# Patient Record
Sex: MALE | Race: WHITE | NOT HISPANIC OR LATINO | Employment: FULL TIME | URBAN - METROPOLITAN AREA
[De-identification: names, ages, dates, MRNs, and addresses within clinical notes are randomized per-mention and may not be internally consistent; named-entity substitution may affect disease eponyms.]

---

## 2017-01-03 ENCOUNTER — TRANSCRIBE ORDERS (OUTPATIENT)
Dept: LAB | Facility: CLINIC | Age: 57
End: 2017-01-03

## 2017-01-03 ENCOUNTER — APPOINTMENT (OUTPATIENT)
Dept: LAB | Facility: CLINIC | Age: 57
End: 2017-01-03
Payer: COMMERCIAL

## 2017-01-03 DIAGNOSIS — N40.1 ENLARGED PROSTATE WITH URINARY OBSTRUCTION: Primary | ICD-10-CM

## 2017-01-03 DIAGNOSIS — N40.1 ENLARGED PROSTATE WITH URINARY OBSTRUCTION: ICD-10-CM

## 2017-01-03 DIAGNOSIS — N13.8 ENLARGED PROSTATE WITH URINARY OBSTRUCTION: Primary | ICD-10-CM

## 2017-01-03 DIAGNOSIS — N13.8 ENLARGED PROSTATE WITH URINARY OBSTRUCTION: ICD-10-CM

## 2017-01-03 LAB — PSA SERPL-MCNC: 0.8 NG/ML (ref 0–4)

## 2017-01-03 PROCEDURE — 84153 ASSAY OF PSA TOTAL: CPT

## 2017-01-09 ENCOUNTER — ALLSCRIPTS OFFICE VISIT (OUTPATIENT)
Dept: OTHER | Facility: OTHER | Age: 57
End: 2017-01-09

## 2017-01-20 ENCOUNTER — ALLSCRIPTS OFFICE VISIT (OUTPATIENT)
Dept: OTHER | Facility: OTHER | Age: 57
End: 2017-01-20

## 2017-01-20 ENCOUNTER — TRANSCRIBE ORDERS (OUTPATIENT)
Dept: ADMINISTRATIVE | Facility: HOSPITAL | Age: 57
End: 2017-01-20

## 2017-01-20 ENCOUNTER — HOSPITAL ENCOUNTER (OUTPATIENT)
Dept: RADIOLOGY | Facility: MEDICAL CENTER | Age: 57
Discharge: HOME | End: 2017-01-20
Payer: COMMERCIAL

## 2017-01-20 DIAGNOSIS — T84.038A MECHANICAL LOOSENING OF OTHER INTERNAL PROSTHETIC JOINT, INITIAL ENCOUNTER (HCC): ICD-10-CM

## 2017-01-20 DIAGNOSIS — T84.039A: ICD-10-CM

## 2017-01-20 DIAGNOSIS — Z47.1 AFTERCARE FOLLOWING JOINT REPLACEMENT SURGERY: ICD-10-CM

## 2017-01-20 DIAGNOSIS — T84.039A: Primary | ICD-10-CM

## 2017-01-20 DIAGNOSIS — E11.9 TYPE 2 DIABETES MELLITUS WITHOUT COMPLICATIONS (HCC): ICD-10-CM

## 2017-01-20 PROCEDURE — 73560 X-RAY EXAM OF KNEE 1 OR 2: CPT

## 2017-01-27 ENCOUNTER — HOSPITAL ENCOUNTER (OUTPATIENT)
Dept: NUCLEAR MEDICINE | Facility: HOSPITAL | Age: 57
Discharge: HOME | End: 2017-01-27
Attending: ORTHOPAEDIC SURGERY
Payer: COMMERCIAL

## 2017-01-27 DIAGNOSIS — T84.039A: ICD-10-CM

## 2017-01-27 PROCEDURE — A9503 TC99M MEDRONATE: HCPCS

## 2017-01-27 PROCEDURE — 78315 BONE IMAGING 3 PHASE: CPT

## 2017-02-03 ENCOUNTER — ALLSCRIPTS OFFICE VISIT (OUTPATIENT)
Dept: OTHER | Facility: OTHER | Age: 57
End: 2017-02-03

## 2017-02-16 ENCOUNTER — LAB REQUISITION (OUTPATIENT)
Dept: LAB | Facility: HOSPITAL | Age: 57
End: 2017-02-16
Payer: COMMERCIAL

## 2017-02-16 ENCOUNTER — APPOINTMENT (OUTPATIENT)
Dept: LAB | Facility: CLINIC | Age: 57
End: 2017-02-16
Payer: COMMERCIAL

## 2017-02-16 ENCOUNTER — OFFICE VISIT (OUTPATIENT)
Dept: LAB | Facility: CLINIC | Age: 57
End: 2017-02-16
Payer: COMMERCIAL

## 2017-02-16 ENCOUNTER — TRANSCRIBE ORDERS (OUTPATIENT)
Dept: LAB | Facility: CLINIC | Age: 57
End: 2017-02-16

## 2017-02-16 DIAGNOSIS — E11.65 TYPE 2 DIABETES MELLITUS WITH HYPERGLYCEMIA (HCC): ICD-10-CM

## 2017-02-16 DIAGNOSIS — E78.5 HYPERLIPIDEMIA, UNSPECIFIED HYPERLIPIDEMIA TYPE: ICD-10-CM

## 2017-02-16 DIAGNOSIS — E11.9 DIABETES MELLITUS WITHOUT COMPLICATION (HCC): ICD-10-CM

## 2017-02-16 DIAGNOSIS — I10 ESSENTIAL (PRIMARY) HYPERTENSION: ICD-10-CM

## 2017-02-16 DIAGNOSIS — E11.9 TYPE 2 DIABETES MELLITUS WITHOUT COMPLICATIONS (HCC): ICD-10-CM

## 2017-02-16 DIAGNOSIS — E11.9 DIABETES MELLITUS WITHOUT COMPLICATION (HCC): Primary | ICD-10-CM

## 2017-02-16 DIAGNOSIS — I10 ESSENTIAL HYPERTENSION, MALIGNANT: ICD-10-CM

## 2017-02-16 DIAGNOSIS — Z96.649 MECHANICAL LOOSENING OF PROSTHETIC HIP, INITIAL ENCOUNTER (HCC): Primary | ICD-10-CM

## 2017-02-16 DIAGNOSIS — T84.038A MECHANICAL LOOSENING OF OTHER INTERNAL PROSTHETIC JOINT, INITIAL ENCOUNTER (HCC): ICD-10-CM

## 2017-02-16 DIAGNOSIS — E78.5 HYPERLIPIDEMIA: ICD-10-CM

## 2017-02-16 DIAGNOSIS — Z96.649 MECHANICAL LOOSENING OF PROSTHETIC HIP, INITIAL ENCOUNTER (HCC): ICD-10-CM

## 2017-02-16 DIAGNOSIS — T84.038A MECHANICAL LOOSENING OF PROSTHETIC HIP, INITIAL ENCOUNTER (HCC): ICD-10-CM

## 2017-02-16 DIAGNOSIS — T84.038A MECHANICAL LOOSENING OF PROSTHETIC HIP, INITIAL ENCOUNTER (HCC): Primary | ICD-10-CM

## 2017-02-16 LAB
ABO GROUP BLD: NORMAL
ALBUMIN SERPL BCP-MCNC: 4.1 G/DL (ref 3.5–5)
ALP SERPL-CCNC: 81 U/L (ref 46–116)
ALT SERPL W P-5'-P-CCNC: 57 U/L (ref 12–78)
ANION GAP SERPL CALCULATED.3IONS-SCNC: 10 MMOL/L (ref 4–13)
AST SERPL W P-5'-P-CCNC: 35 U/L (ref 5–45)
ATRIAL RATE: 63 BPM
BACTERIA UR QL AUTO: ABNORMAL /HPF
BASOPHILS # BLD AUTO: 0.02 THOUSANDS/ΜL (ref 0–0.1)
BASOPHILS NFR BLD AUTO: 0 % (ref 0–1)
BILIRUB SERPL-MCNC: 0.8 MG/DL (ref 0.2–1)
BILIRUB UR QL STRIP: NEGATIVE
BLD GP AB SCN SERPL QL: NEGATIVE
BUN SERPL-MCNC: 13 MG/DL (ref 5–25)
CALCIUM SERPL-MCNC: 9.2 MG/DL (ref 8.3–10.1)
CHLORIDE SERPL-SCNC: 102 MMOL/L (ref 100–108)
CHOLEST SERPL-MCNC: 180 MG/DL (ref 50–200)
CLARITY UR: CLEAR
CO2 SERPL-SCNC: 27 MMOL/L (ref 21–32)
COLOR UR: YELLOW
CREAT SERPL-MCNC: 0.82 MG/DL (ref 0.6–1.3)
CREAT UR-MCNC: 77.4 MG/DL
EOSINOPHIL # BLD AUTO: 0.2 THOUSAND/ΜL (ref 0–0.61)
EOSINOPHIL NFR BLD AUTO: 3 % (ref 0–6)
ERYTHROCYTE [DISTWIDTH] IN BLOOD BY AUTOMATED COUNT: 13.1 % (ref 11.6–15.1)
EST. AVERAGE GLUCOSE BLD GHB EST-MCNC: 143 MG/DL
GFR SERPL CREATININE-BSD FRML MDRD: >60 ML/MIN/1.73SQ M
GLUCOSE SERPL-MCNC: 139 MG/DL (ref 65–140)
GLUCOSE UR STRIP-MCNC: ABNORMAL MG/DL
HBA1C MFR BLD: 6.6 % (ref 4.2–6.3)
HCT VFR BLD AUTO: 43.7 % (ref 36.5–49.3)
HDLC SERPL-MCNC: 67 MG/DL (ref 40–60)
HGB BLD-MCNC: 14.5 G/DL (ref 12–17)
HGB UR QL STRIP.AUTO: NEGATIVE
KETONES UR STRIP-MCNC: NEGATIVE MG/DL
LDLC SERPL CALC-MCNC: 90 MG/DL (ref 0–100)
LDLC SERPL DIRECT ASSAY-MCNC: 102 MG/DL (ref 0–100)
LEUKOCYTE ESTERASE UR QL STRIP: ABNORMAL
LYMPHOCYTES # BLD AUTO: 1.89 THOUSANDS/ΜL (ref 0.6–4.47)
LYMPHOCYTES NFR BLD AUTO: 28 % (ref 14–44)
MCH RBC QN AUTO: 29.3 PG (ref 26.8–34.3)
MCHC RBC AUTO-ENTMCNC: 33.2 G/DL (ref 31.4–37.4)
MCV RBC AUTO: 88 FL (ref 82–98)
MICROALBUMIN UR-MCNC: 28.8 MG/L (ref 0–20)
MICROALBUMIN/CREAT 24H UR: 37 MG/G CREATININE (ref 0–30)
MONOCYTES # BLD AUTO: 0.59 THOUSAND/ΜL (ref 0.17–1.22)
MONOCYTES NFR BLD AUTO: 9 % (ref 4–12)
NEUTROPHILS # BLD AUTO: 4.01 THOUSANDS/ΜL (ref 1.85–7.62)
NEUTS SEG NFR BLD AUTO: 60 % (ref 43–75)
NITRITE UR QL STRIP: NEGATIVE
NON-SQ EPI CELLS URNS QL MICRO: ABNORMAL /HPF
P AXIS: -4 DEGREES
PH UR STRIP.AUTO: 6 [PH] (ref 4.5–8)
PLATELET # BLD AUTO: 197 THOUSANDS/UL (ref 149–390)
PMV BLD AUTO: 9.8 FL (ref 8.9–12.7)
POTASSIUM SERPL-SCNC: 4.1 MMOL/L (ref 3.5–5.3)
PR INTERVAL: 148 MS
PROT SERPL-MCNC: 7.7 G/DL (ref 6.4–8.2)
PROT UR STRIP-MCNC: NEGATIVE MG/DL
QRS AXIS: -12 DEGREES
QRSD INTERVAL: 96 MS
QT INTERVAL: 442 MS
QTC INTERVAL: 452 MS
RBC # BLD AUTO: 4.95 MILLION/UL (ref 3.88–5.62)
RBC #/AREA URNS AUTO: ABNORMAL /HPF
RH BLD: POSITIVE
SODIUM SERPL-SCNC: 139 MMOL/L (ref 136–145)
SP GR UR STRIP.AUTO: 1.01 (ref 1–1.03)
T WAVE AXIS: 21 DEGREES
TRIGL SERPL-MCNC: 117 MG/DL
UROBILINOGEN UR QL STRIP.AUTO: 0.2 E.U./DL
VENTRICULAR RATE: 63 BPM
WBC # BLD AUTO: 6.71 THOUSAND/UL (ref 4.31–10.16)
WBC #/AREA URNS AUTO: ABNORMAL /HPF

## 2017-02-16 PROCEDURE — 83036 HEMOGLOBIN GLYCOSYLATED A1C: CPT

## 2017-02-16 PROCEDURE — 82043 UR ALBUMIN QUANTITATIVE: CPT

## 2017-02-16 PROCEDURE — 80053 COMPREHEN METABOLIC PANEL: CPT

## 2017-02-16 PROCEDURE — 87086 URINE CULTURE/COLONY COUNT: CPT

## 2017-02-16 PROCEDURE — 36415 COLL VENOUS BLD VENIPUNCTURE: CPT

## 2017-02-16 PROCEDURE — 82570 ASSAY OF URINE CREATININE: CPT

## 2017-02-16 PROCEDURE — 83721 ASSAY OF BLOOD LIPOPROTEIN: CPT

## 2017-02-16 PROCEDURE — 93005 ELECTROCARDIOGRAM TRACING: CPT

## 2017-02-16 PROCEDURE — 86901 BLOOD TYPING SEROLOGIC RH(D): CPT | Performed by: ORTHOPAEDIC SURGERY

## 2017-02-16 PROCEDURE — 85025 COMPLETE CBC W/AUTO DIFF WBC: CPT

## 2017-02-16 PROCEDURE — 80061 LIPID PANEL: CPT

## 2017-02-16 PROCEDURE — 81001 URINALYSIS AUTO W/SCOPE: CPT

## 2017-02-16 PROCEDURE — 86850 RBC ANTIBODY SCREEN: CPT | Performed by: ORTHOPAEDIC SURGERY

## 2017-02-16 PROCEDURE — 86900 BLOOD TYPING SEROLOGIC ABO: CPT | Performed by: ORTHOPAEDIC SURGERY

## 2017-02-17 LAB — BACTERIA UR CULT: NORMAL

## 2017-02-19 ENCOUNTER — GENERIC CONVERSION - ENCOUNTER (OUTPATIENT)
Dept: OTHER | Facility: OTHER | Age: 57
End: 2017-02-19

## 2017-03-13 ENCOUNTER — ANESTHESIA EVENT (OUTPATIENT)
Dept: PERIOP | Facility: HOSPITAL | Age: 57
DRG: 467 | End: 2017-03-13
Payer: COMMERCIAL

## 2017-03-20 ENCOUNTER — ANESTHESIA (OUTPATIENT)
Dept: PERIOP | Facility: HOSPITAL | Age: 57
DRG: 467 | End: 2017-03-20
Payer: COMMERCIAL

## 2017-03-20 ENCOUNTER — HOSPITAL ENCOUNTER (INPATIENT)
Facility: HOSPITAL | Age: 57
LOS: 2 days | Discharge: HOME HEALTH | DRG: 467 | End: 2017-03-22
Attending: ORTHOPAEDIC SURGERY | Admitting: ORTHOPAEDIC SURGERY
Payer: COMMERCIAL

## 2017-03-20 DIAGNOSIS — E08.3319: Primary | ICD-10-CM

## 2017-03-20 DIAGNOSIS — I10 ESSENTIAL HYPERTENSION: Chronic | ICD-10-CM

## 2017-03-20 DIAGNOSIS — E78.5 HYPERLIPIDEMIA, UNSPECIFIED HYPERLIPIDEMIA TYPE: Chronic | ICD-10-CM

## 2017-03-20 DIAGNOSIS — Z96.651 STATUS POST REVISION OF TOTAL REPLACEMENT OF RIGHT KNEE: ICD-10-CM

## 2017-03-20 LAB
ABO GROUP BLD: NORMAL
BLD GP AB SCN SERPL QL: NEGATIVE
GLUCOSE SERPL-MCNC: 107 MG/DL (ref 65–140)
GLUCOSE SERPL-MCNC: 125 MG/DL (ref 65–140)
GLUCOSE SERPL-MCNC: 277 MG/DL (ref 65–140)
RH BLD: POSITIVE

## 2017-03-20 PROCEDURE — C1776 JOINT DEVICE (IMPLANTABLE): HCPCS | Performed by: ORTHOPAEDIC SURGERY

## 2017-03-20 PROCEDURE — 0SUT09Z SUPPLEMENT RIGHT KNEE JOINT, FEMORAL SURFACE WITH LINER, OPEN APPROACH: ICD-10-PCS | Performed by: ORTHOPAEDIC SURGERY

## 2017-03-20 PROCEDURE — 86901 BLOOD TYPING SEROLOGIC RH(D): CPT | Performed by: ORTHOPAEDIC SURGERY

## 2017-03-20 PROCEDURE — 86900 BLOOD TYPING SEROLOGIC ABO: CPT | Performed by: ORTHOPAEDIC SURGERY

## 2017-03-20 PROCEDURE — C1894 INTRO/SHEATH, NON-LASER: HCPCS | Performed by: ORTHOPAEDIC SURGERY

## 2017-03-20 PROCEDURE — C1713 ANCHOR/SCREW BN/BN,TIS/BN: HCPCS | Performed by: ORTHOPAEDIC SURGERY

## 2017-03-20 PROCEDURE — 0SRT0JZ REPLACEMENT OF RIGHT KNEE JOINT, FEMORAL SURFACE WITH SYNTHETIC SUBSTITUTE, OPEN APPROACH: ICD-10-PCS | Performed by: ORTHOPAEDIC SURGERY

## 2017-03-20 PROCEDURE — 0SPC09Z REMOVAL OF LINER FROM RIGHT KNEE JOINT, OPEN APPROACH: ICD-10-PCS | Performed by: ORTHOPAEDIC SURGERY

## 2017-03-20 PROCEDURE — 0SPT0JZ REMOVAL OF SYNTHETIC SUBSTITUTE FROM RIGHT KNEE JOINT, FEMORAL SURFACE, OPEN APPROACH: ICD-10-PCS | Performed by: ORTHOPAEDIC SURGERY

## 2017-03-20 PROCEDURE — 86850 RBC ANTIBODY SCREEN: CPT | Performed by: ORTHOPAEDIC SURGERY

## 2017-03-20 PROCEDURE — 82948 REAGENT STRIP/BLOOD GLUCOSE: CPT

## 2017-03-20 DEVICE — P.F.C. SIGMA FEMORAL ADAPTER BOLT NEUTRAL
Type: IMPLANTABLE DEVICE | Site: KNEE | Status: FUNCTIONAL
Brand: P.F.C. SIGMA

## 2017-03-20 DEVICE — SIGMA FEMORAL TC3 CEMENTED 5 RIGHT
Type: IMPLANTABLE DEVICE | Site: KNEE | Status: FUNCTIONAL
Brand: SIGMA

## 2017-03-20 DEVICE — P.F.C. SIGMA POSTERIOR AUGMENT COMBO CEMENTED SIZE 5 8MM
Type: IMPLANTABLE DEVICE | Site: KNEE | Status: FUNCTIONAL
Brand: P.F.C. SIGMA

## 2017-03-20 DEVICE — SIGMA TIBIAL INSERT ROTATING PLATFORM TC3 SIZE 5 25MM GVF
Type: IMPLANTABLE DEVICE | Site: KNEE | Status: FUNCTIONAL
Brand: SIGMA

## 2017-03-20 DEVICE — P.F.C. SIGMA POSTERIOR AUGMENT COMBO CEMENTED SIZE 5 4MM
Type: IMPLANTABLE DEVICE | Site: KNEE | Status: FUNCTIONAL
Brand: P.F.C. SIGMA

## 2017-03-20 DEVICE — UNIVERSAL STEM FLUTED 75MM X 20MM: Type: IMPLANTABLE DEVICE | Site: KNEE | Status: FUNCTIONAL

## 2017-03-20 DEVICE — P.F.C. SIGMA FEMORAL ADAPTER 5 DEGREE
Type: IMPLANTABLE DEVICE | Site: KNEE | Status: FUNCTIONAL
Brand: P.F.C. SIGMA

## 2017-03-20 DEVICE — SMARTSET GHV GENTAMICIN HIGH VISCOSITY BONE CEMENT 40G
Type: IMPLANTABLE DEVICE | Site: KNEE | Status: FUNCTIONAL
Brand: SMARTSET

## 2017-03-20 DEVICE — UNIVERSAL FEMORAL SLEEVE FULL POROUS 31MM: Type: IMPLANTABLE DEVICE | Site: KNEE | Status: FUNCTIONAL

## 2017-03-20 DEVICE — P.F.C. SIGMA DISTAL AUGMENT SIZE 5 4MM RIGHT
Type: IMPLANTABLE DEVICE | Site: KNEE | Status: FUNCTIONAL
Brand: P.F.C. SIGMA

## 2017-03-20 RX ORDER — GABAPENTIN 100 MG/1
100 CAPSULE ORAL EVERY 8 HOURS SCHEDULED
Status: DISCONTINUED | OUTPATIENT
Start: 2017-03-20 | End: 2017-03-22 | Stop reason: HOSPADM

## 2017-03-20 RX ORDER — ACETAMINOPHEN 325 MG/1
650 TABLET ORAL EVERY 6 HOURS SCHEDULED
Status: DISCONTINUED | OUTPATIENT
Start: 2017-03-20 | End: 2017-03-20

## 2017-03-20 RX ORDER — ACETAMINOPHEN 325 MG/1
975 TABLET ORAL ONCE
Status: COMPLETED | OUTPATIENT
Start: 2017-03-20 | End: 2017-03-20

## 2017-03-20 RX ORDER — TRAMADOL HYDROCHLORIDE 50 MG/1
50 TABLET ORAL EVERY 6 HOURS SCHEDULED
Status: DISCONTINUED | OUTPATIENT
Start: 2017-03-20 | End: 2017-03-22 | Stop reason: HOSPADM

## 2017-03-20 RX ORDER — MAGNESIUM HYDROXIDE 1200 MG/15ML
LIQUID ORAL AS NEEDED
Status: DISCONTINUED | OUTPATIENT
Start: 2017-03-20 | End: 2017-03-20 | Stop reason: HOSPADM

## 2017-03-20 RX ORDER — SODIUM CHLORIDE, SODIUM LACTATE, POTASSIUM CHLORIDE, CALCIUM CHLORIDE 600; 310; 30; 20 MG/100ML; MG/100ML; MG/100ML; MG/100ML
50 INJECTION, SOLUTION INTRAVENOUS CONTINUOUS
Status: DISCONTINUED | OUTPATIENT
Start: 2017-03-20 | End: 2017-03-20

## 2017-03-20 RX ORDER — SODIUM CHLORIDE, SODIUM LACTATE, POTASSIUM CHLORIDE, CALCIUM CHLORIDE 600; 310; 30; 20 MG/100ML; MG/100ML; MG/100ML; MG/100ML
125 INJECTION, SOLUTION INTRAVENOUS CONTINUOUS
Status: DISCONTINUED | OUTPATIENT
Start: 2017-03-20 | End: 2017-03-22 | Stop reason: HOSPADM

## 2017-03-20 RX ORDER — OXYCODONE HCL 5 MG/5 ML
5 SOLUTION, ORAL ORAL EVERY 4 HOURS PRN
Status: DISCONTINUED | OUTPATIENT
Start: 2017-03-20 | End: 2017-03-20

## 2017-03-20 RX ORDER — ONDANSETRON 2 MG/ML
4 INJECTION INTRAMUSCULAR; INTRAVENOUS ONCE
Status: DISCONTINUED | OUTPATIENT
Start: 2017-03-20 | End: 2017-03-20 | Stop reason: HOSPADM

## 2017-03-20 RX ORDER — GABAPENTIN 300 MG/1
300 CAPSULE ORAL ONCE
Status: COMPLETED | OUTPATIENT
Start: 2017-03-20 | End: 2017-03-20

## 2017-03-20 RX ORDER — LISINOPRIL 20 MG/1
40 TABLET ORAL DAILY
Status: DISCONTINUED | OUTPATIENT
Start: 2017-03-21 | End: 2017-03-22 | Stop reason: HOSPADM

## 2017-03-20 RX ORDER — FENOFIBRATE 145 MG/1
145 TABLET, COATED ORAL DAILY
Status: DISCONTINUED | OUTPATIENT
Start: 2017-03-21 | End: 2017-03-22 | Stop reason: HOSPADM

## 2017-03-20 RX ORDER — PROPOFOL 10 MG/ML
INJECTION, EMULSION INTRAVENOUS CONTINUOUS PRN
Status: DISCONTINUED | OUTPATIENT
Start: 2017-03-20 | End: 2017-03-20 | Stop reason: SURG

## 2017-03-20 RX ORDER — OXYCODONE HYDROCHLORIDE 10 MG/1
10 TABLET ORAL EVERY 4 HOURS PRN
Status: DISCONTINUED | OUTPATIENT
Start: 2017-03-20 | End: 2017-03-20

## 2017-03-20 RX ORDER — SENNOSIDES 8.6 MG
1 TABLET ORAL DAILY
Status: DISCONTINUED | OUTPATIENT
Start: 2017-03-21 | End: 2017-03-22 | Stop reason: HOSPADM

## 2017-03-20 RX ORDER — ALLOPURINOL 100 MG/1
100 TABLET ORAL DAILY
Status: DISCONTINUED | OUTPATIENT
Start: 2017-03-21 | End: 2017-03-22 | Stop reason: HOSPADM

## 2017-03-20 RX ORDER — MORPHINE SULFATE 2 MG/ML
2 INJECTION, SOLUTION INTRAMUSCULAR; INTRAVENOUS EVERY 2 HOUR PRN
Status: DISCONTINUED | OUTPATIENT
Start: 2017-03-20 | End: 2017-03-20

## 2017-03-20 RX ORDER — PROPOFOL 10 MG/ML
INJECTION, EMULSION INTRAVENOUS AS NEEDED
Status: DISCONTINUED | OUTPATIENT
Start: 2017-03-20 | End: 2017-03-20 | Stop reason: SURG

## 2017-03-20 RX ORDER — TRAMADOL HYDROCHLORIDE 50 MG/1
100 TABLET ORAL EVERY 6 HOURS PRN
COMMUNITY
End: 2017-03-22 | Stop reason: HOSPADM

## 2017-03-20 RX ORDER — LABETALOL 100 MG/1
100 TABLET, FILM COATED ORAL 2 TIMES DAILY
COMMUNITY
End: 2021-08-17 | Stop reason: SDUPTHER

## 2017-03-20 RX ORDER — ATORVASTATIN CALCIUM 20 MG/1
20 TABLET, FILM COATED ORAL
Status: DISCONTINUED | OUTPATIENT
Start: 2017-03-20 | End: 2017-03-22 | Stop reason: HOSPADM

## 2017-03-20 RX ORDER — MIDAZOLAM HYDROCHLORIDE 1 MG/ML
INJECTION INTRAMUSCULAR; INTRAVENOUS AS NEEDED
Status: DISCONTINUED | OUTPATIENT
Start: 2017-03-20 | End: 2017-03-20 | Stop reason: SURG

## 2017-03-20 RX ORDER — LABETALOL 100 MG/1
100 TABLET, FILM COATED ORAL 2 TIMES DAILY
Status: DISCONTINUED | OUTPATIENT
Start: 2017-03-21 | End: 2017-03-22 | Stop reason: HOSPADM

## 2017-03-20 RX ORDER — DOCUSATE SODIUM 100 MG/1
100 CAPSULE, LIQUID FILLED ORAL 2 TIMES DAILY
Status: DISCONTINUED | OUTPATIENT
Start: 2017-03-20 | End: 2017-03-22 | Stop reason: HOSPADM

## 2017-03-20 RX ORDER — HYDROCODONE BITARTRATE AND ACETAMINOPHEN 5; 325 MG/1; MG/1
2 TABLET ORAL EVERY 6 HOURS PRN
Status: DISCONTINUED | OUTPATIENT
Start: 2017-03-20 | End: 2017-03-21

## 2017-03-20 RX ORDER — PANTOPRAZOLE SODIUM 40 MG/1
40 TABLET, DELAYED RELEASE ORAL
Status: DISCONTINUED | OUTPATIENT
Start: 2017-03-21 | End: 2017-03-22 | Stop reason: HOSPADM

## 2017-03-20 RX ORDER — AMLODIPINE BESYLATE 10 MG/1
10 TABLET ORAL DAILY
Status: DISCONTINUED | OUTPATIENT
Start: 2017-03-21 | End: 2017-03-22 | Stop reason: HOSPADM

## 2017-03-20 RX ORDER — BUPIVACAINE HYDROCHLORIDE 7.5 MG/ML
INJECTION, SOLUTION INTRASPINAL AS NEEDED
Status: DISCONTINUED | OUTPATIENT
Start: 2017-03-20 | End: 2017-03-20 | Stop reason: SURG

## 2017-03-20 RX ORDER — ROPIVACAINE HYDROCHLORIDE 5 MG/ML
INJECTION, SOLUTION EPIDURAL; INFILTRATION; PERINEURAL AS NEEDED
Status: DISCONTINUED | OUTPATIENT
Start: 2017-03-20 | End: 2017-03-20 | Stop reason: SURG

## 2017-03-20 RX ORDER — KETAMINE HYDROCHLORIDE 50 MG/ML
INJECTION, SOLUTION, CONCENTRATE INTRAMUSCULAR; INTRAVENOUS AS NEEDED
Status: DISCONTINUED | OUTPATIENT
Start: 2017-03-20 | End: 2017-03-20 | Stop reason: SURG

## 2017-03-20 RX ORDER — FENTANYL CITRATE 50 UG/ML
INJECTION, SOLUTION INTRAMUSCULAR; INTRAVENOUS AS NEEDED
Status: DISCONTINUED | OUTPATIENT
Start: 2017-03-20 | End: 2017-03-20 | Stop reason: SURG

## 2017-03-20 RX ORDER — ONDANSETRON 2 MG/ML
4 INJECTION INTRAMUSCULAR; INTRAVENOUS EVERY 4 HOURS PRN
Status: DISCONTINUED | OUTPATIENT
Start: 2017-03-20 | End: 2017-03-22 | Stop reason: HOSPADM

## 2017-03-20 RX ADMIN — HYDROMORPHONE HYDROCHLORIDE 0.4 MG: 1 INJECTION, SOLUTION INTRAMUSCULAR; INTRAVENOUS; SUBCUTANEOUS at 18:25

## 2017-03-20 RX ADMIN — HYDROMORPHONE HYDROCHLORIDE 0.4 MG: 1 INJECTION, SOLUTION INTRAMUSCULAR; INTRAVENOUS; SUBCUTANEOUS at 18:33

## 2017-03-20 RX ADMIN — CEFAZOLIN SODIUM 2000 MG: 2 SOLUTION INTRAVENOUS at 15:37

## 2017-03-20 RX ADMIN — SODIUM CHLORIDE, SODIUM LACTATE, POTASSIUM CHLORIDE, AND CALCIUM CHLORIDE: .6; .31; .03; .02 INJECTION, SOLUTION INTRAVENOUS at 16:04

## 2017-03-20 RX ADMIN — SODIUM CHLORIDE, SODIUM LACTATE, POTASSIUM CHLORIDE, AND CALCIUM CHLORIDE 125 ML/HR: .6; .31; .03; .02 INJECTION, SOLUTION INTRAVENOUS at 18:57

## 2017-03-20 RX ADMIN — HYDROMORPHONE HYDROCHLORIDE 0.4 MG: 1 INJECTION, SOLUTION INTRAMUSCULAR; INTRAVENOUS; SUBCUTANEOUS at 18:02

## 2017-03-20 RX ADMIN — FENTANYL CITRATE 80 MCG: 50 INJECTION, SOLUTION INTRAMUSCULAR; INTRAVENOUS at 15:34

## 2017-03-20 RX ADMIN — MORPHINE SULFATE 2 MG: 2 INJECTION, SOLUTION INTRAMUSCULAR; INTRAVENOUS at 20:23

## 2017-03-20 RX ADMIN — MIDAZOLAM HYDROCHLORIDE 2 MG: 1 INJECTION, SOLUTION INTRAMUSCULAR; INTRAVENOUS at 14:44

## 2017-03-20 RX ADMIN — TRAMADOL HYDROCHLORIDE 50 MG: 50 TABLET, COATED ORAL at 19:52

## 2017-03-20 RX ADMIN — CEFAZOLIN SODIUM 1000 MG: 1 SOLUTION INTRAVENOUS at 15:38

## 2017-03-20 RX ADMIN — DOCUSATE SODIUM 100 MG: 100 CAPSULE, LIQUID FILLED ORAL at 19:52

## 2017-03-20 RX ADMIN — HYDROMORPHONE HYDROCHLORIDE 0.4 MG: 1 INJECTION, SOLUTION INTRAMUSCULAR; INTRAVENOUS; SUBCUTANEOUS at 18:07

## 2017-03-20 RX ADMIN — PROPOFOL 50 MG: 10 INJECTION, EMULSION INTRAVENOUS at 17:34

## 2017-03-20 RX ADMIN — KETAMINE HYDROCHLORIDE 75 MG: 50 INJECTION, SOLUTION INTRAMUSCULAR; INTRAVENOUS at 15:38

## 2017-03-20 RX ADMIN — PHENYLEPHRINE HYDROCHLORIDE 30 MCG/MIN: 10 INJECTION INTRAVENOUS at 16:02

## 2017-03-20 RX ADMIN — FENTANYL CITRATE 20 MCG: 50 INJECTION, SOLUTION INTRAMUSCULAR; INTRAVENOUS at 15:36

## 2017-03-20 RX ADMIN — ATORVASTATIN CALCIUM 20 MG: 20 TABLET, FILM COATED ORAL at 19:52

## 2017-03-20 RX ADMIN — GABAPENTIN 100 MG: 100 CAPSULE ORAL at 21:27

## 2017-03-20 RX ADMIN — HYDROMORPHONE HYDROCHLORIDE 0.4 MG: 1 INJECTION, SOLUTION INTRAMUSCULAR; INTRAVENOUS; SUBCUTANEOUS at 18:20

## 2017-03-20 RX ADMIN — BUPIVACAINE HYDROCHLORIDE IN DEXTROSE 1.6 ML: 7.5 INJECTION, SOLUTION SUBARACHNOID at 15:36

## 2017-03-20 RX ADMIN — ROPIVACAINE HYDROCHLORIDE 25 ML: 5 INJECTION, SOLUTION EPIDURAL; INFILTRATION; PERINEURAL at 14:53

## 2017-03-20 RX ADMIN — SODIUM CHLORIDE, SODIUM LACTATE, POTASSIUM CHLORIDE, AND CALCIUM CHLORIDE 50 ML/HR: .6; .31; .03; .02 INJECTION, SOLUTION INTRAVENOUS at 13:18

## 2017-03-20 RX ADMIN — ACETAMINOPHEN 975 MG: 325 TABLET, FILM COATED ORAL at 12:33

## 2017-03-20 RX ADMIN — HYDROCODONE BITARTRATE AND ACETAMINOPHEN 2 TABLET: 5; 325 TABLET ORAL at 21:27

## 2017-03-20 RX ADMIN — PROPOFOL 70 MCG/KG/MIN: 10 INJECTION, EMULSION INTRAVENOUS at 15:40

## 2017-03-20 RX ADMIN — SODIUM CHLORIDE, SODIUM LACTATE, POTASSIUM CHLORIDE, AND CALCIUM CHLORIDE: .6; .31; .03; .02 INJECTION, SOLUTION INTRAVENOUS at 17:49

## 2017-03-20 RX ADMIN — ACETAMINOPHEN 650 MG: 325 TABLET, FILM COATED ORAL at 19:52

## 2017-03-20 RX ADMIN — GABAPENTIN 300 MG: 300 CAPSULE ORAL at 12:33

## 2017-03-21 ENCOUNTER — APPOINTMENT (INPATIENT)
Dept: OCCUPATIONAL THERAPY | Facility: HOSPITAL | Age: 57
DRG: 467 | End: 2017-03-21
Payer: COMMERCIAL

## 2017-03-21 LAB
ANION GAP SERPL CALCULATED.3IONS-SCNC: 9 MMOL/L (ref 4–13)
BUN SERPL-MCNC: 13 MG/DL (ref 5–25)
CALCIUM SERPL-MCNC: 8.9 MG/DL (ref 8.3–10.1)
CHLORIDE SERPL-SCNC: 103 MMOL/L (ref 100–108)
CO2 SERPL-SCNC: 26 MMOL/L (ref 21–32)
CREAT SERPL-MCNC: 0.82 MG/DL (ref 0.6–1.3)
ERYTHROCYTE [DISTWIDTH] IN BLOOD BY AUTOMATED COUNT: 13 % (ref 11.6–15.1)
GFR SERPL CREATININE-BSD FRML MDRD: >60 ML/MIN/1.73SQ M
GLUCOSE SERPL-MCNC: 204 MG/DL (ref 65–140)
GLUCOSE SERPL-MCNC: 211 MG/DL (ref 65–140)
GLUCOSE SERPL-MCNC: 237 MG/DL (ref 65–140)
GLUCOSE SERPL-MCNC: 241 MG/DL (ref 65–140)
HCT VFR BLD AUTO: 38.3 % (ref 36.5–49.3)
HGB BLD-MCNC: 13 G/DL (ref 12–17)
MCH RBC QN AUTO: 30.3 PG (ref 26.8–34.3)
MCHC RBC AUTO-ENTMCNC: 33.9 G/DL (ref 31.4–37.4)
MCV RBC AUTO: 89 FL (ref 82–98)
PLATELET # BLD AUTO: 171 THOUSANDS/UL (ref 149–390)
PMV BLD AUTO: 9.8 FL (ref 8.9–12.7)
POTASSIUM SERPL-SCNC: 3.8 MMOL/L (ref 3.5–5.3)
RBC # BLD AUTO: 4.29 MILLION/UL (ref 3.88–5.62)
SODIUM SERPL-SCNC: 138 MMOL/L (ref 136–145)
WBC # BLD AUTO: 7.07 THOUSAND/UL (ref 4.31–10.16)

## 2017-03-21 PROCEDURE — G8989 SELF CARE D/C STATUS: HCPCS

## 2017-03-21 PROCEDURE — G8978 MOBILITY CURRENT STATUS: HCPCS

## 2017-03-21 PROCEDURE — 97116 GAIT TRAINING THERAPY: CPT

## 2017-03-21 PROCEDURE — G8979 MOBILITY GOAL STATUS: HCPCS

## 2017-03-21 PROCEDURE — 80048 BASIC METABOLIC PNL TOTAL CA: CPT | Performed by: ORTHOPAEDIC SURGERY

## 2017-03-21 PROCEDURE — G8987 SELF CARE CURRENT STATUS: HCPCS

## 2017-03-21 PROCEDURE — G8988 SELF CARE GOAL STATUS: HCPCS

## 2017-03-21 PROCEDURE — 97165 OT EVAL LOW COMPLEX 30 MIN: CPT

## 2017-03-21 PROCEDURE — 85027 COMPLETE CBC AUTOMATED: CPT | Performed by: ORTHOPAEDIC SURGERY

## 2017-03-21 PROCEDURE — 97162 PT EVAL MOD COMPLEX 30 MIN: CPT

## 2017-03-21 PROCEDURE — 82948 REAGENT STRIP/BLOOD GLUCOSE: CPT

## 2017-03-21 RX ORDER — HYDROCODONE BITARTRATE AND ACETAMINOPHEN 5; 325 MG/1; MG/1
1 TABLET ORAL EVERY 6 HOURS PRN
Status: DISCONTINUED | OUTPATIENT
Start: 2017-03-21 | End: 2017-03-21

## 2017-03-21 RX ORDER — HYDROCODONE BITARTRATE AND ACETAMINOPHEN 5; 325 MG/1; MG/1
2 TABLET ORAL EVERY 6 HOURS PRN
Status: DISCONTINUED | OUTPATIENT
Start: 2017-03-21 | End: 2017-03-21

## 2017-03-21 RX ORDER — HYDROCODONE BITARTRATE AND ACETAMINOPHEN 5; 325 MG/1; MG/1
2 TABLET ORAL EVERY 4 HOURS PRN
Status: DISCONTINUED | OUTPATIENT
Start: 2017-03-21 | End: 2017-03-22 | Stop reason: HOSPADM

## 2017-03-21 RX ORDER — OXYCODONE HYDROCHLORIDE 5 MG/1
5 TABLET ORAL EVERY 4 HOURS PRN
Status: DISCONTINUED | OUTPATIENT
Start: 2017-03-21 | End: 2017-03-21

## 2017-03-21 RX ORDER — ACETAMINOPHEN 325 MG/1
650 TABLET ORAL EVERY 6 HOURS
Status: DISCONTINUED | OUTPATIENT
Start: 2017-03-21 | End: 2017-03-21

## 2017-03-21 RX ORDER — HYDROCODONE BITARTRATE AND ACETAMINOPHEN 5; 325 MG/1; MG/1
1 TABLET ORAL EVERY 4 HOURS PRN
Status: DISCONTINUED | OUTPATIENT
Start: 2017-03-21 | End: 2017-03-22 | Stop reason: HOSPADM

## 2017-03-21 RX ORDER — OXYCODONE HYDROCHLORIDE 10 MG/1
10 TABLET ORAL EVERY 4 HOURS PRN
Status: DISCONTINUED | OUTPATIENT
Start: 2017-03-21 | End: 2017-03-21

## 2017-03-21 RX ADMIN — METFORMIN HYDROCHLORIDE 1000 MG: 500 TABLET, FILM COATED ORAL at 08:24

## 2017-03-21 RX ADMIN — GABAPENTIN 100 MG: 100 CAPSULE ORAL at 21:58

## 2017-03-21 RX ADMIN — GABAPENTIN 100 MG: 100 CAPSULE ORAL at 13:12

## 2017-03-21 RX ADMIN — ENOXAPARIN SODIUM 40 MG: 40 INJECTION SUBCUTANEOUS at 08:24

## 2017-03-21 RX ADMIN — ACETAMINOPHEN 650 MG: 325 TABLET, FILM COATED ORAL at 14:27

## 2017-03-21 RX ADMIN — TRAMADOL HYDROCHLORIDE 50 MG: 50 TABLET, COATED ORAL at 17:48

## 2017-03-21 RX ADMIN — HYDROCODONE BITARTRATE AND ACETAMINOPHEN 2 TABLET: 5; 325 TABLET ORAL at 03:34

## 2017-03-21 RX ADMIN — LABETALOL HYDROCHLORIDE 100 MG: 100 TABLET, FILM COATED ORAL at 08:25

## 2017-03-21 RX ADMIN — LISINOPRIL 40 MG: 20 TABLET ORAL at 08:25

## 2017-03-21 RX ADMIN — CEFAZOLIN SODIUM 1000 MG: 1 SOLUTION INTRAVENOUS at 08:25

## 2017-03-21 RX ADMIN — INSULIN LISPRO 2 UNITS: 100 INJECTION, SOLUTION INTRAVENOUS; SUBCUTANEOUS at 14:26

## 2017-03-21 RX ADMIN — SENNOSIDES 8.6 MG: 8.6 TABLET, FILM COATED ORAL at 08:25

## 2017-03-21 RX ADMIN — HYDROCODONE BITARTRATE AND ACETAMINOPHEN 2 TABLET: 5; 325 TABLET ORAL at 09:53

## 2017-03-21 RX ADMIN — TRAMADOL HYDROCHLORIDE 50 MG: 50 TABLET, COATED ORAL at 06:15

## 2017-03-21 RX ADMIN — HYDROMORPHONE HYDROCHLORIDE 1 MG: 1 INJECTION, SOLUTION INTRAMUSCULAR; INTRAVENOUS; SUBCUTANEOUS at 20:34

## 2017-03-21 RX ADMIN — CEFAZOLIN SODIUM 1000 MG: 1 SOLUTION INTRAVENOUS at 00:06

## 2017-03-21 RX ADMIN — DOCUSATE SODIUM 100 MG: 100 CAPSULE, LIQUID FILLED ORAL at 08:25

## 2017-03-21 RX ADMIN — HYDROMORPHONE HYDROCHLORIDE 1 MG: 1 INJECTION, SOLUTION INTRAMUSCULAR; INTRAVENOUS; SUBCUTANEOUS at 00:59

## 2017-03-21 RX ADMIN — PANTOPRAZOLE SODIUM 40 MG: 40 TABLET, DELAYED RELEASE ORAL at 06:15

## 2017-03-21 RX ADMIN — HYDROMORPHONE HYDROCHLORIDE 1 MG: 1 INJECTION, SOLUTION INTRAMUSCULAR; INTRAVENOUS; SUBCUTANEOUS at 13:12

## 2017-03-21 RX ADMIN — INSULIN LISPRO 1 UNITS: 100 INJECTION, SOLUTION INTRAVENOUS; SUBCUTANEOUS at 21:48

## 2017-03-21 RX ADMIN — GABAPENTIN 100 MG: 100 CAPSULE ORAL at 06:15

## 2017-03-21 RX ADMIN — SODIUM CHLORIDE, SODIUM LACTATE, POTASSIUM CHLORIDE, AND CALCIUM CHLORIDE 125 ML/HR: .6; .31; .03; .02 INJECTION, SOLUTION INTRAVENOUS at 02:21

## 2017-03-21 RX ADMIN — ATORVASTATIN CALCIUM 20 MG: 20 TABLET, FILM COATED ORAL at 17:48

## 2017-03-21 RX ADMIN — METFORMIN HYDROCHLORIDE 1000 MG: 500 TABLET, FILM COATED ORAL at 17:48

## 2017-03-21 RX ADMIN — DOCUSATE SODIUM 100 MG: 100 CAPSULE, LIQUID FILLED ORAL at 17:48

## 2017-03-21 RX ADMIN — TRAMADOL HYDROCHLORIDE 50 MG: 50 TABLET, COATED ORAL at 11:53

## 2017-03-21 RX ADMIN — TRAMADOL HYDROCHLORIDE 50 MG: 50 TABLET, COATED ORAL at 23:48

## 2017-03-21 RX ADMIN — HYDROCODONE BITARTRATE AND ACETAMINOPHEN 2 TABLET: 5; 325 TABLET ORAL at 21:47

## 2017-03-21 RX ADMIN — TRAMADOL HYDROCHLORIDE 50 MG: 50 TABLET, COATED ORAL at 00:06

## 2017-03-21 RX ADMIN — INSULIN LISPRO 2 UNITS: 100 INJECTION, SOLUTION INTRAVENOUS; SUBCUTANEOUS at 17:48

## 2017-03-21 RX ADMIN — HYDROCODONE BITARTRATE AND ACETAMINOPHEN 2 TABLET: 5; 325 TABLET ORAL at 14:35

## 2017-03-21 RX ADMIN — AMLODIPINE BESYLATE 10 MG: 10 TABLET ORAL at 08:25

## 2017-03-21 RX ADMIN — LABETALOL HYDROCHLORIDE 100 MG: 100 TABLET, FILM COATED ORAL at 17:48

## 2017-03-21 RX ADMIN — ALLOPURINOL 100 MG: 100 TABLET ORAL at 08:27

## 2017-03-21 RX ADMIN — FENOFIBRATE 145 MG: 145 TABLET ORAL at 08:27

## 2017-03-22 ENCOUNTER — APPOINTMENT (INPATIENT)
Dept: PHYSICAL THERAPY | Facility: HOSPITAL | Age: 57
DRG: 467 | End: 2017-03-22
Payer: COMMERCIAL

## 2017-03-22 VITALS
HEIGHT: 70 IN | WEIGHT: 293.65 LBS | DIASTOLIC BLOOD PRESSURE: 67 MMHG | TEMPERATURE: 98.3 F | RESPIRATION RATE: 18 BRPM | BODY MASS INDEX: 42.04 KG/M2 | OXYGEN SATURATION: 96 % | HEART RATE: 79 BPM | SYSTOLIC BLOOD PRESSURE: 130 MMHG

## 2017-03-22 LAB
ANION GAP SERPL CALCULATED.3IONS-SCNC: 7 MMOL/L (ref 4–13)
BUN SERPL-MCNC: 12 MG/DL (ref 5–25)
CALCIUM SERPL-MCNC: 9.3 MG/DL (ref 8.3–10.1)
CHLORIDE SERPL-SCNC: 101 MMOL/L (ref 100–108)
CO2 SERPL-SCNC: 26 MMOL/L (ref 21–32)
CREAT SERPL-MCNC: 0.9 MG/DL (ref 0.6–1.3)
ERYTHROCYTE [DISTWIDTH] IN BLOOD BY AUTOMATED COUNT: 13.1 % (ref 11.6–15.1)
GFR SERPL CREATININE-BSD FRML MDRD: >60 ML/MIN/1.73SQ M
GLUCOSE SERPL-MCNC: 186 MG/DL (ref 65–140)
GLUCOSE SERPL-MCNC: 221 MG/DL (ref 65–140)
GLUCOSE SERPL-MCNC: 232 MG/DL (ref 65–140)
HCT VFR BLD AUTO: 39.5 % (ref 36.5–49.3)
HGB BLD-MCNC: 13.4 G/DL (ref 12–17)
MCH RBC QN AUTO: 30.5 PG (ref 26.8–34.3)
MCHC RBC AUTO-ENTMCNC: 33.9 G/DL (ref 31.4–37.4)
MCV RBC AUTO: 90 FL (ref 82–98)
PLATELET # BLD AUTO: 179 THOUSANDS/UL (ref 149–390)
PMV BLD AUTO: 10.3 FL (ref 8.9–12.7)
POTASSIUM SERPL-SCNC: 3.9 MMOL/L (ref 3.5–5.3)
RBC # BLD AUTO: 4.39 MILLION/UL (ref 3.88–5.62)
SODIUM SERPL-SCNC: 134 MMOL/L (ref 136–145)
WBC # BLD AUTO: 8.85 THOUSAND/UL (ref 4.31–10.16)

## 2017-03-22 PROCEDURE — 97530 THERAPEUTIC ACTIVITIES: CPT

## 2017-03-22 PROCEDURE — 85027 COMPLETE CBC AUTOMATED: CPT | Performed by: ORTHOPAEDIC SURGERY

## 2017-03-22 PROCEDURE — 80048 BASIC METABOLIC PNL TOTAL CA: CPT | Performed by: ORTHOPAEDIC SURGERY

## 2017-03-22 PROCEDURE — 82948 REAGENT STRIP/BLOOD GLUCOSE: CPT

## 2017-03-22 PROCEDURE — 97116 GAIT TRAINING THERAPY: CPT

## 2017-03-22 RX ORDER — ACETAMINOPHEN 325 MG/1
650 TABLET ORAL EVERY 6 HOURS SCHEDULED
Status: DISCONTINUED | OUTPATIENT
Start: 2017-03-22 | End: 2017-03-22 | Stop reason: HOSPADM

## 2017-03-22 RX ORDER — PANTOPRAZOLE SODIUM 40 MG/1
40 TABLET, DELAYED RELEASE ORAL
Qty: 30 TABLET | Refills: 0 | Status: ON HOLD | OUTPATIENT
Start: 2017-03-22 | End: 2018-02-09

## 2017-03-22 RX ORDER — HYDROCODONE BITARTRATE AND ACETAMINOPHEN 5; 325 MG/1; MG/1
2 TABLET ORAL EVERY 4 HOURS PRN
Refills: 0 | Status: SHIPPED | OUTPATIENT
Start: 2017-03-22 | End: 2017-04-01

## 2017-03-22 RX ORDER — ACETAMINOPHEN 325 MG/1
650 TABLET ORAL EVERY 6 HOURS SCHEDULED
Qty: 240 TABLET | Refills: 0 | Status: SHIPPED | OUTPATIENT
Start: 2017-03-22 | End: 2017-04-21

## 2017-03-22 RX ORDER — TRAMADOL HYDROCHLORIDE 50 MG/1
50 TABLET ORAL EVERY 6 HOURS SCHEDULED
Qty: 40 TABLET | Refills: 0 | Status: SHIPPED | OUTPATIENT
Start: 2017-03-22 | End: 2017-04-01

## 2017-03-22 RX ORDER — HYDROCODONE BITARTRATE AND ACETAMINOPHEN 5; 325 MG/1; MG/1
1 TABLET ORAL EVERY 4 HOURS PRN
Refills: 0 | Status: SHIPPED | OUTPATIENT
Start: 2017-03-22 | End: 2017-04-01

## 2017-03-22 RX ORDER — GABAPENTIN 100 MG/1
100 CAPSULE ORAL EVERY 8 HOURS SCHEDULED
Qty: 90 CAPSULE | Refills: 0 | Status: ON HOLD | OUTPATIENT
Start: 2017-03-22 | End: 2018-02-09

## 2017-03-22 RX ADMIN — ALLOPURINOL 100 MG: 100 TABLET ORAL at 08:41

## 2017-03-22 RX ADMIN — METFORMIN HYDROCHLORIDE 1000 MG: 500 TABLET, FILM COATED ORAL at 08:33

## 2017-03-22 RX ADMIN — LISINOPRIL 40 MG: 20 TABLET ORAL at 08:33

## 2017-03-22 RX ADMIN — ACETAMINOPHEN 650 MG: 325 TABLET, FILM COATED ORAL at 11:25

## 2017-03-22 RX ADMIN — DOCUSATE SODIUM 100 MG: 100 CAPSULE, LIQUID FILLED ORAL at 08:34

## 2017-03-22 RX ADMIN — HYDROCODONE BITARTRATE AND ACETAMINOPHEN 2 TABLET: 5; 325 TABLET ORAL at 12:43

## 2017-03-22 RX ADMIN — HYDROCODONE BITARTRATE AND ACETAMINOPHEN 2 TABLET: 5; 325 TABLET ORAL at 03:29

## 2017-03-22 RX ADMIN — INSULIN LISPRO 1 UNITS: 100 INJECTION, SOLUTION INTRAVENOUS; SUBCUTANEOUS at 08:38

## 2017-03-22 RX ADMIN — LABETALOL HYDROCHLORIDE 100 MG: 100 TABLET, FILM COATED ORAL at 08:33

## 2017-03-22 RX ADMIN — ENOXAPARIN SODIUM 40 MG: 40 INJECTION SUBCUTANEOUS at 08:33

## 2017-03-22 RX ADMIN — HYDROCODONE BITARTRATE AND ACETAMINOPHEN 2 TABLET: 5; 325 TABLET ORAL at 08:34

## 2017-03-22 RX ADMIN — GABAPENTIN 100 MG: 100 CAPSULE ORAL at 12:43

## 2017-03-22 RX ADMIN — TRAMADOL HYDROCHLORIDE 50 MG: 50 TABLET, COATED ORAL at 07:32

## 2017-03-22 RX ADMIN — PANTOPRAZOLE SODIUM 40 MG: 40 TABLET, DELAYED RELEASE ORAL at 07:31

## 2017-03-22 RX ADMIN — INSULIN LISPRO 2 UNITS: 100 INJECTION, SOLUTION INTRAVENOUS; SUBCUTANEOUS at 11:31

## 2017-03-22 RX ADMIN — FENOFIBRATE 145 MG: 145 TABLET ORAL at 08:41

## 2017-03-22 RX ADMIN — SENNOSIDES 8.6 MG: 8.6 TABLET, FILM COATED ORAL at 08:33

## 2017-03-22 RX ADMIN — AMLODIPINE BESYLATE 10 MG: 10 TABLET ORAL at 08:34

## 2017-03-22 RX ADMIN — GABAPENTIN 100 MG: 100 CAPSULE ORAL at 07:32

## 2017-03-22 RX ADMIN — TRAMADOL HYDROCHLORIDE 50 MG: 50 TABLET, COATED ORAL at 11:25

## 2017-03-28 ENCOUNTER — GENERIC CONVERSION - ENCOUNTER (OUTPATIENT)
Dept: OTHER | Facility: OTHER | Age: 57
End: 2017-03-28

## 2017-03-31 ENCOUNTER — ALLSCRIPTS OFFICE VISIT (OUTPATIENT)
Dept: OTHER | Facility: OTHER | Age: 57
End: 2017-03-31

## 2017-03-31 ENCOUNTER — HOSPITAL ENCOUNTER (OUTPATIENT)
Dept: RADIOLOGY | Facility: MEDICAL CENTER | Age: 57
Discharge: HOME | End: 2017-03-31
Payer: COMMERCIAL

## 2017-03-31 DIAGNOSIS — Z47.1 AFTERCARE FOLLOWING JOINT REPLACEMENT SURGERY: ICD-10-CM

## 2017-03-31 PROCEDURE — 73560 X-RAY EXAM OF KNEE 1 OR 2: CPT

## 2017-04-07 ENCOUNTER — ALLSCRIPTS OFFICE VISIT (OUTPATIENT)
Dept: OTHER | Facility: OTHER | Age: 57
End: 2017-04-07

## 2017-04-18 ENCOUNTER — GENERIC CONVERSION - ENCOUNTER (OUTPATIENT)
Dept: OTHER | Facility: OTHER | Age: 57
End: 2017-04-18

## 2017-04-18 ENCOUNTER — APPOINTMENT (OUTPATIENT)
Dept: PHYSICAL THERAPY | Facility: CLINIC | Age: 57
End: 2017-04-18
Payer: COMMERCIAL

## 2017-04-18 DIAGNOSIS — Z47.1 AFTERCARE FOLLOWING JOINT REPLACEMENT SURGERY: ICD-10-CM

## 2017-04-18 PROCEDURE — 97162 PT EVAL MOD COMPLEX 30 MIN: CPT

## 2017-04-18 PROCEDURE — 97110 THERAPEUTIC EXERCISES: CPT

## 2017-04-20 ENCOUNTER — APPOINTMENT (OUTPATIENT)
Dept: PHYSICAL THERAPY | Facility: CLINIC | Age: 57
End: 2017-04-20
Payer: COMMERCIAL

## 2017-04-25 ENCOUNTER — APPOINTMENT (OUTPATIENT)
Dept: PHYSICAL THERAPY | Facility: CLINIC | Age: 57
End: 2017-04-25
Payer: COMMERCIAL

## 2017-04-25 PROCEDURE — 97110 THERAPEUTIC EXERCISES: CPT

## 2017-04-25 PROCEDURE — 97140 MANUAL THERAPY 1/> REGIONS: CPT

## 2017-04-27 ENCOUNTER — APPOINTMENT (OUTPATIENT)
Dept: PHYSICAL THERAPY | Facility: CLINIC | Age: 57
End: 2017-04-27
Payer: COMMERCIAL

## 2017-04-27 PROCEDURE — 97110 THERAPEUTIC EXERCISES: CPT

## 2017-04-27 PROCEDURE — 97140 MANUAL THERAPY 1/> REGIONS: CPT

## 2017-04-28 ENCOUNTER — ALLSCRIPTS OFFICE VISIT (OUTPATIENT)
Dept: OTHER | Facility: OTHER | Age: 57
End: 2017-04-28

## 2017-05-02 ENCOUNTER — APPOINTMENT (OUTPATIENT)
Dept: PHYSICAL THERAPY | Facility: CLINIC | Age: 57
End: 2017-05-02
Payer: COMMERCIAL

## 2017-05-04 ENCOUNTER — APPOINTMENT (OUTPATIENT)
Dept: PHYSICAL THERAPY | Facility: CLINIC | Age: 57
End: 2017-05-04
Payer: COMMERCIAL

## 2017-05-04 PROCEDURE — 97140 MANUAL THERAPY 1/> REGIONS: CPT

## 2017-05-04 PROCEDURE — 97110 THERAPEUTIC EXERCISES: CPT

## 2017-05-09 ENCOUNTER — APPOINTMENT (OUTPATIENT)
Dept: PHYSICAL THERAPY | Facility: CLINIC | Age: 57
End: 2017-05-09
Payer: COMMERCIAL

## 2017-05-09 PROCEDURE — 97110 THERAPEUTIC EXERCISES: CPT

## 2017-05-09 PROCEDURE — 97140 MANUAL THERAPY 1/> REGIONS: CPT

## 2017-05-11 ENCOUNTER — APPOINTMENT (OUTPATIENT)
Dept: PHYSICAL THERAPY | Facility: CLINIC | Age: 57
End: 2017-05-11
Payer: COMMERCIAL

## 2017-05-11 PROCEDURE — 97110 THERAPEUTIC EXERCISES: CPT

## 2017-05-11 PROCEDURE — 97140 MANUAL THERAPY 1/> REGIONS: CPT

## 2017-05-16 ENCOUNTER — APPOINTMENT (OUTPATIENT)
Dept: PHYSICAL THERAPY | Facility: CLINIC | Age: 57
End: 2017-05-16
Payer: COMMERCIAL

## 2017-05-18 ENCOUNTER — APPOINTMENT (OUTPATIENT)
Dept: PHYSICAL THERAPY | Facility: CLINIC | Age: 57
End: 2017-05-18
Payer: COMMERCIAL

## 2017-05-18 ENCOUNTER — GENERIC CONVERSION - ENCOUNTER (OUTPATIENT)
Dept: OTHER | Facility: OTHER | Age: 57
End: 2017-05-18

## 2017-05-18 PROCEDURE — 97140 MANUAL THERAPY 1/> REGIONS: CPT

## 2017-05-18 PROCEDURE — 97110 THERAPEUTIC EXERCISES: CPT

## 2017-05-23 ENCOUNTER — APPOINTMENT (OUTPATIENT)
Dept: PHYSICAL THERAPY | Facility: CLINIC | Age: 57
End: 2017-05-23
Payer: COMMERCIAL

## 2017-05-23 PROCEDURE — 97110 THERAPEUTIC EXERCISES: CPT

## 2017-05-23 PROCEDURE — 97140 MANUAL THERAPY 1/> REGIONS: CPT

## 2017-05-24 ENCOUNTER — ALLSCRIPTS OFFICE VISIT (OUTPATIENT)
Dept: OTHER | Facility: OTHER | Age: 57
End: 2017-05-24

## 2017-05-25 ENCOUNTER — APPOINTMENT (OUTPATIENT)
Dept: PHYSICAL THERAPY | Facility: CLINIC | Age: 57
End: 2017-05-25
Payer: COMMERCIAL

## 2017-05-25 PROCEDURE — 97110 THERAPEUTIC EXERCISES: CPT

## 2017-05-26 ENCOUNTER — ALLSCRIPTS OFFICE VISIT (OUTPATIENT)
Dept: OTHER | Facility: OTHER | Age: 57
End: 2017-05-26

## 2017-05-26 ENCOUNTER — HOSPITAL ENCOUNTER (OUTPATIENT)
Dept: RADIOLOGY | Facility: MEDICAL CENTER | Age: 57
Discharge: HOME | End: 2017-05-26
Payer: COMMERCIAL

## 2017-05-26 DIAGNOSIS — Z47.1 AFTERCARE FOLLOWING JOINT REPLACEMENT SURGERY: ICD-10-CM

## 2017-05-26 PROCEDURE — 73560 X-RAY EXAM OF KNEE 1 OR 2: CPT

## 2017-06-29 ENCOUNTER — ALLSCRIPTS OFFICE VISIT (OUTPATIENT)
Dept: OTHER | Facility: OTHER | Age: 57
End: 2017-06-29

## 2017-06-30 ENCOUNTER — ALLSCRIPTS OFFICE VISIT (OUTPATIENT)
Dept: OTHER | Facility: OTHER | Age: 57
End: 2017-06-30

## 2017-07-07 ENCOUNTER — APPOINTMENT (OUTPATIENT)
Dept: LAB | Facility: CLINIC | Age: 57
End: 2017-07-07
Payer: COMMERCIAL

## 2017-07-07 ENCOUNTER — TRANSCRIBE ORDERS (OUTPATIENT)
Dept: LAB | Facility: CLINIC | Age: 57
End: 2017-07-07

## 2017-07-07 DIAGNOSIS — E78.5 OTHER AND UNSPECIFIED HYPERLIPIDEMIA: ICD-10-CM

## 2017-07-07 DIAGNOSIS — E11.9 DIABETES MELLITUS WITHOUT COMPLICATION (HCC): ICD-10-CM

## 2017-07-07 DIAGNOSIS — E11.9 DIABETES MELLITUS WITHOUT COMPLICATION (HCC): Primary | ICD-10-CM

## 2017-07-07 DIAGNOSIS — I10 ESSENTIAL HYPERTENSION, MALIGNANT: ICD-10-CM

## 2017-07-07 DIAGNOSIS — Z00.8 HEALTH EXAMINATION IN POPULATION SURVEY: Primary | ICD-10-CM

## 2017-07-07 DIAGNOSIS — Z00.8 HEALTH EXAMINATION IN POPULATION SURVEY: ICD-10-CM

## 2017-07-07 LAB
ALBUMIN SERPL BCP-MCNC: 4.1 G/DL (ref 3.5–5)
ALP SERPL-CCNC: 61 U/L (ref 46–116)
ALT SERPL W P-5'-P-CCNC: 48 U/L (ref 12–78)
ANION GAP SERPL CALCULATED.3IONS-SCNC: 16 MMOL/L (ref 4–13)
AST SERPL W P-5'-P-CCNC: 30 U/L (ref 5–45)
BILIRUB SERPL-MCNC: 0.5 MG/DL (ref 0.2–1)
BUN SERPL-MCNC: 21 MG/DL (ref 5–25)
CALCIUM SERPL-MCNC: 9.6 MG/DL (ref 8.3–10.1)
CHLORIDE SERPL-SCNC: 104 MMOL/L (ref 100–108)
CHOLEST SERPL-MCNC: 162 MG/DL (ref 50–200)
CO2 SERPL-SCNC: 22 MMOL/L (ref 21–32)
CREAT SERPL-MCNC: 0.92 MG/DL (ref 0.6–1.3)
EST. AVERAGE GLUCOSE BLD GHB EST-MCNC: 157 MG/DL
GFR SERPL CREATININE-BSD FRML MDRD: >60 ML/MIN/1.73SQ M
GLUCOSE P FAST SERPL-MCNC: 154 MG/DL (ref 65–99)
HBA1C MFR BLD: 7.1 % (ref 4.2–6.3)
HDLC SERPL-MCNC: 53 MG/DL (ref 40–60)
LDLC SERPL CALC-MCNC: 78 MG/DL (ref 0–100)
LDLC SERPL DIRECT ASSAY-MCNC: 89 MG/DL (ref 0–100)
POTASSIUM SERPL-SCNC: 4.1 MMOL/L (ref 3.5–5.3)
PROT SERPL-MCNC: 7.7 G/DL (ref 6.4–8.2)
SODIUM SERPL-SCNC: 142 MMOL/L (ref 136–145)
TRIGL SERPL-MCNC: 156 MG/DL

## 2017-07-07 PROCEDURE — 80061 LIPID PANEL: CPT

## 2017-07-07 PROCEDURE — 83721 ASSAY OF BLOOD LIPOPROTEIN: CPT

## 2017-07-07 PROCEDURE — 80053 COMPREHEN METABOLIC PANEL: CPT

## 2017-07-07 PROCEDURE — 36415 COLL VENOUS BLD VENIPUNCTURE: CPT

## 2017-07-07 PROCEDURE — 83036 HEMOGLOBIN GLYCOSYLATED A1C: CPT

## 2017-08-18 ENCOUNTER — ALLSCRIPTS OFFICE VISIT (OUTPATIENT)
Dept: OTHER | Facility: OTHER | Age: 57
End: 2017-08-18

## 2017-09-06 ENCOUNTER — ALLSCRIPTS OFFICE VISIT (OUTPATIENT)
Dept: OTHER | Facility: OTHER | Age: 57
End: 2017-09-06

## 2017-09-12 ENCOUNTER — HOSPITAL ENCOUNTER (OUTPATIENT)
Dept: RADIOLOGY | Facility: HOSPITAL | Age: 57
Discharge: HOME | End: 2017-09-12
Payer: COMMERCIAL

## 2017-09-12 ENCOUNTER — TRANSCRIBE ORDERS (OUTPATIENT)
Dept: ADMINISTRATIVE | Facility: HOSPITAL | Age: 57
End: 2017-09-12

## 2017-09-12 ENCOUNTER — HOSPITAL ENCOUNTER (OUTPATIENT)
Dept: MRI IMAGING | Facility: HOSPITAL | Age: 57
Discharge: HOME | End: 2017-09-12
Attending: ANESTHESIOLOGY
Payer: COMMERCIAL

## 2017-09-12 DIAGNOSIS — G89.29 OTHER CHRONIC PAIN: ICD-10-CM

## 2017-09-12 DIAGNOSIS — G89.29 OTHER CHRONIC PAIN: Primary | ICD-10-CM

## 2017-09-12 PROCEDURE — 72148 MRI LUMBAR SPINE W/O DYE: CPT

## 2017-09-26 ENCOUNTER — ALLSCRIPTS OFFICE VISIT (OUTPATIENT)
Dept: RADIOLOGY | Facility: CLINIC | Age: 57
End: 2017-09-26
Payer: COMMERCIAL

## 2017-09-27 ENCOUNTER — ALLSCRIPTS OFFICE VISIT (OUTPATIENT)
Dept: OTHER | Facility: OTHER | Age: 57
End: 2017-09-27

## 2017-10-09 DIAGNOSIS — E11.65 TYPE 2 DIABETES MELLITUS WITH HYPERGLYCEMIA (HCC): ICD-10-CM

## 2017-10-25 NOTE — CONSULTS
Assessment  Assessed    1  Chronic pain syndrome (338 4) (G89 4)   2  Complex regional pain syndrome type 1 of right lower extremity (337 22) (G90 521)    Plan  Chronic pain syndrome    · * MRI LUMBAR SPINE WO CONTRAST; Status:Need Information - Financial  Authorization; Requested LUIS:[id number];    Perform:[facility] Radiology; [id number]; Ordered; For:Chronic pain syndrome; Ordered By:John Corcoran; Complex regional pain syndrome type 1 of right lower extremity    · Lyrica 75 MG Oral Capsule; Take 1 capsule twice daily   Rx By: Janak Gutierrez; Dispense: 30 Days ; #:60 Capsule; Refill: 1;For: Complex regional pain syndrome type 1 of right lower extremity; ZULEYKA = N; Print Rx    Discussion/Summary    The patient's symptoms, history/physical are consistent with a chronic pain syndrome affecting the right knee consistent with complex regional pain syndrome type I following his multiple knee surgeries  At this time, I will start him on Lyrica 75 mg twice daily to help with the pain symptoms  He was apprised most common side effects including sleepiness and dizziness  I discussed that he would be a good candidate for spinal cord stimulation targeting the DRG  He was given a brochure  also going to order an MRI of the lumbar spine looking for any impingement in the lumbar spine that may be contributing to the severe pain that he experiences in the right knee  will call with an update next week on how he is feeling  Chief Complaint  Chief Complaints    1  Leg Pain    History of Present Illness  The patient is a pleasant 65year old male who presents for consultation in regards to right-sided knee and thigh pain  Symptoms have been present for 4 years  He has had multiple knee replacement surgeries due to complications with his multiple revisions most recently earlier this year  Symptoms are moderate to severe rated 9?10/10 on a numeric brain scale thus far constantly   Anus sharp, pressure-like and shooting in the right knee  Symptoms are aggravated standing, bending, walking, exercise and decreased with relaxation  history has included the knee replacements which have provided no relief  He is on nabumetone 750 mg twice daily which provides no relief  He's had multiple rounds of physical therapy as well with minimal improvement  Referring physician is  dr Epifanio Yadav presents with complaints of gradual onset of constant episodes of severe right anterior upper and right posterior upper leg pain, described as sharp, radiating to the right knee  On a scale of 1 to 10, the patient rates the pain as 9  Review of Systems    Constitutional: recent weight gain, but-- no fever-- and-- no recent weight loss  Eyes: no double vision-- and-- no blurry vision  Cardiovascular: no chest pain,-- no palpitations-- and-- no lower extremity edema  Respiratory: no complaints of shortness of breath-- and-- no wheezing  Musculoskeletal: difficulty walking-- and-- decreased range of motion, but-- no muscle weakness,-- no joint stiffness,-- no joint swelling,-- no limb swelling-- and-- no pain in extremity  Neurological: no dizziness,-- no difficulty swallowing,-- no memory loss,-- no loss of consciousness-- and-- no seizures  Gastrointestinal: no nausea,-- no vomiting,-- no constipation-- and-- no diarrhea  Genitourinary: no difficulty initiating urine stream,-- no genital pain-- and-- no frequent urination  Integumentary: no complaints of skin rash  Psychiatric: no depression  Endocrine: no excessive thirst,-- no adrenal disease,-- no hypothyroidism-- and-- no hyperthyroidism  Hematologic/Lymphatic: no tendency for easy bruising-- and-- no tendency for easy bleeding  ROS reviewed  Active Problems  Problems    1  Acquired ankle/foot deformity (196 70) (M21 969)   2  Aftercare following joint replacement surgery (V54 81) (Z47 1)   3  Benign essential hypertension (401 1) (I10)   4   Diabetes mellitus, type 2 (250 00) (E11 9)   5  Diabetes type 2, uncontrolled (250 02) (E11 65)   6  Difficulty walking (719 7) (R26 2)   7  Dyslipidemia (272 4) (E78 5)   8  Effusion of knee, unspecified laterality (719 06) (M25 469)   9  Effusion of right knee joint (719 06) (M25 461)   10  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 (354 2) (G56 22)   11  Fatigue (780 79) (R53 83)   12  Foot pain, bilateral (729 5) (M79 671,M79 672)   13  History of total bilateral knee replacement (V43 65) (Z96 653)   14  Hyperlipidemia, unspecified (272 4) (E78 5)   15  Kidney stones (592 0) (N20 0)   16  Knee bursitis (726 60) (M70 50)   17  Left knee pain (719 46) (M25 562)   18  Left knee pain (719 46) (M25 562)   19  Mechanical loosening of internal right knee prosthetic joint, subsequent encounter    (V58 89,996 41) (T84 032D)   20  Microalbuminuria (791 0) (R80 9)   21  Morbid obesity with BMI of 40 0-44 9, adult (278 01,V85 41) (E66 01,Z68 41)   22  Obesity (278 00) (E66 9)   23  Pes anserine bursitis (726 61) (M70 50)   24  Pes planus, congenital (754 61) (Q66 50)   25  Primary osteoarthritis of left knee (715 16) (M17 12)   26  Quadriceps tendonitis (727 09) (M76 899)   27  Right knee pain (719 46) (M25 561)   28  Status post revision of total replacement of right knee (V43 65) (Z96 651)   29  Thigh pain, musculoskeletal, right (729 5) (M79 651)   30  Type 2 diabetes mellitus with renal manifestations (250 40) (E11 29)   31  Uncontrolled type 2 diabetes with retinopathy (250 52,362 01) (E11 319,E11 65)   32  Vitamin D deficiency (268 9) (E55 9)    Past Medical History  Problems    1  Aftercare following joint replacement surgery (V54 81) (Z47 1)   2  History of Diabetes Mellitus (250 00)   3  History of hypertension (V12 59) (Z86 79)   4  History of Quadriceps tendonitis (727 09) (V47 299)    The active problems and past medical history were reviewed and updated today  Surgical History  Problems    1   History of Hernia Repair   2  History of Knee Replacement   3  History of Neuroplasty Decompression Median Nerve At Carpal Tunnel   4  History of Neuroplasty With Transposition Of Ulnar Nerve - At Elbow   5  History of Revision Of Total Knee Arthroplasty   6  History of Transcath Intravascular Stent Placement Percutaneous Renal   7  History of Wrist Carpectomy    The surgical history was reviewed and updated today  Family History  Mother    1  Family history of diabetes mellitus (V18 0) (Z83 3)  Father    2  Family history of diabetes mellitus (V18 0) (Z83 3)  Family History    3  Family history of Arthritis (V17 7)   4  Family history of diabetes mellitus (V18 0) (Z83 3)   5  Family history of hypertension (V17 49) (Z82 49)   6  Family history of Prostate Cancer (V16 42)    The family history was reviewed and updated today  Social History  Problems    · Being A Social Drinker   · Caffeine Use   · Former smoker (C90 69) (M28 318)   · No drug use   · Denied: History of Tobacco Use  The social history was reviewed and updated today  The social history was reviewed and is unchanged  Current Meds   1  Allopurinol 100 MG Oral Tablet; Therapy: (Susy Reddy) to Recorded   2  AmLODIPine Besylate 10 MG Oral Tablet; Take 1 tablet daily  Requested for: 26GPP8954;   Last Rx:92Ulm7277 Ordered   3  Atorvastatin Calcium 20 MG Oral Tablet; 1 tablet daily; Therapy: 07KSR1127 to (Evaluate:66Znq8447)  Requested for: 24LCW7277; Last   Rx:35Btw7201 Ordered   4  Rossy Contour Next Test In [employer]; TEST 3 TIMES A DAY; Therapy: 42GKX1882 to (Evaluate:25Nov2015); Last Rx:58Fbl0860 Ordered   5  Rossy Microlet Lancets Miscellaneous; TEST three times a day; Therapy: 43BJZ9740 to (Evaluate:25Nov2015); Last Rx:54Fcx2687 Ordered   6  Cephalexin 500 MG Oral Capsule; TAKE 4 CAPSULES BY MOUTH AT ONCE 1 HOUR   PRIOR TO DENTAL PROCEDURE; Therapy: 88VKE4725 to (Last Rx:67Bxg4814)  Requested for: 02Aug2017 Ordered   7   Econazole Nitrate 1 % External Cream; APPLY SPARINGLY AND RUB IN WELL TO   AFFECTED AREA(S) ONCE DAILY; Therapy: 54DCF4141 to (Last Moro Rise)  Requested for: 09SES8341 Ordered   8  Fenofibrate TABS; Therapy: (Oscar Sapp) to Recorded   9  Jardiance 10 MG Oral Tablet; take one tablet by mouth every day; Therapy: 23STR8483 to (Last Rx:38Vpf3043)  Requested for: 62Ebh3420 Ordered   10  Labetalol HCl - 100 MG Oral Tablet; TAKE ONE TABLET BY MOUTH 2 TIMES A DAY; Therapy: 75VSM2180 to (Evaluate:95Nmy1174)  Requested for: 54JAY7762; Last    DT:76OYI3807 Ordered   11  Lisinopril 40 MG Oral Tablet; TAKE 1 TABLET DAILY AS DIRECTED; Therapy: (Oscar Sapp) to Recorded   12  MetFORMIN HCl - 1000 MG Oral Tablet; Take 1 tablet twice daily; Last Rx:75Ckz9406    Ordered   13  Multi Complete Oral Capsule; Therapy: (Andreas Lombard) to Recorded   14  Nabumetone 750 MG Oral Tablet; TAKE 1 TABLET TWICE DAILY AFTER MEALS; Therapy: 18YZI5087 to (Evaluate:53Cjx6344); Last Rx:17Ccj2637 Ordered   15  Trulicity 1 5 JX/7 3LY Subcutaneous Solution Pen-injector; Use 1 per week; Therapy: 50OYA9571 to (Evaluate:37Tnf1749)  Requested for: 73TUD9063; Last    Rx:89Veu9587 Ordered   16  Vitamin D3 1000 UNIT Oral Tablet Recorded    The medication list was reviewed and updated today  Allergies  Medication    1  Doxycycline Monohydrate CAPS  Non-Medication    2  Bee sting    Vitals  Vital Signs    Recorded: 37PIK4861 11:07AM   Temperature 98 6 F   Heart Rate 82   Respiration 17   Systolic 691   Diastolic 74   Height 5 ft 10 in   Weight 290 lb    BMI Calculated 41 61   BSA Calculated 2 44     Physical Exam    Constitutional   General appearance: Abnormal   morbidly obese  Eyes   Sclera: anicteric   HEENT   Hearing grossly intact  Pulmonary   Respiratory effort: Even and unlabored  Cardiovascular   Examination of extremities: No edema or pitting edema present          Skin   Skin and subcutaneous tissue: Normal without rashes or lesions, well hydrated  Psychiatric   Mood and affect: Mood and affect appropriate  Musculoskeletal   Gait and station: Abnormal  -- antalgic  -- Examination of the right knee reveals severe pain with range of motion but only mildly reduced range of motion  Lumbar/Sacral Spine examination demonstrates Lumbosacral Spine:   Appearance: Normal    Lumbosacral Spine Sensory: intact to light touch and pinprick in the lower extremities  ROM Lumbosacral Spine: Full  Foot and ankle strength was normal bilaterally  Knee strength was normal bilaterally  Hip strength was normal bilaterally  Evaluation of Muscle Stretch Reflexes on the right side demonstrates 1/4 Knee Jerk Reflex-- and-- 1/4 Ankle Jerk Reflex  Evaluation of Muscle Stretch Reflexes on the left side demonstrates 1/4 Knee Jerk Reflex-- and-- 1/4 Ankle Jerk Reflex  Special Tests: Deferred  Results/Data  Procedure Flowsheet 70HWH1574 11:04AM      Test Name Result Flag Reference   Oswestry Score 48         Results    I personally reviewed the films/images in the office today  Radiology:  RIGHT KNEE dated 5/26/17    INDICATION: 50-year-old male, knee arthroplasty, follow-up  COMPARISON: 3/31/2017 x-rays    VIEWS: AP and lateral    IMAGES: 4    FINDINGS:  Right knee revision hardware is unchanged, appears intact    There is no acute fracture or dislocation  Persistent moderate joint effusion    No degenerative changes  No lytic or blastic lesions are seen  Soft tissues are unremarkable  Probable dystrophic suprapatellar calcifications, unchanged      IMPRESSION:  Stable appearance revision hardware  Persistent joint effusion  No acute osseous abnormality        Future Appointments    Date/Time Provider Specialty Site   09/27/2017 04:00 PM Sobia Smith [facility] Endocrinology [facility] ENDOCRINOLOGY   10/02/2017 07:15 AM Елена Huff MD Pain Management [place] [address]   Electronically signed by : Rose Ricks MD; Sep  6 2017  2:00PM EST                       (Author)

## 2017-10-26 ENCOUNTER — ALLSCRIPTS OFFICE VISIT (OUTPATIENT)
Dept: RADIOLOGY | Facility: CLINIC | Age: 57
End: 2017-10-26
Payer: COMMERCIAL

## 2017-11-21 ENCOUNTER — TRANSCRIBE ORDERS (OUTPATIENT)
Dept: LAB | Facility: CLINIC | Age: 57
End: 2017-11-21

## 2017-11-21 ENCOUNTER — APPOINTMENT (OUTPATIENT)
Dept: LAB | Facility: CLINIC | Age: 57
End: 2017-11-21
Payer: COMMERCIAL

## 2017-11-21 DIAGNOSIS — E78.2 MIXED HYPERLIPIDEMIA: ICD-10-CM

## 2017-11-21 DIAGNOSIS — E11.65 TYPE 2 DIABETES MELLITUS WITH HYPERGLYCEMIA (HCC): ICD-10-CM

## 2017-11-21 DIAGNOSIS — I10 BENIGN HYPERTENSION: ICD-10-CM

## 2017-11-21 DIAGNOSIS — D64.9 RELATIVE ANEMIA: ICD-10-CM

## 2017-11-21 DIAGNOSIS — E11.9 DIABETES MELLITUS WITHOUT COMPLICATION (HCC): ICD-10-CM

## 2017-11-21 DIAGNOSIS — E11.9 DIABETES MELLITUS WITHOUT COMPLICATION (HCC): Primary | ICD-10-CM

## 2017-11-21 LAB
ALBUMIN SERPL BCP-MCNC: 4.2 G/DL (ref 3.5–5)
ALP SERPL-CCNC: 52 U/L (ref 46–116)
ALT SERPL W P-5'-P-CCNC: 70 U/L (ref 12–78)
ANION GAP SERPL CALCULATED.3IONS-SCNC: 11 MMOL/L (ref 4–13)
AST SERPL W P-5'-P-CCNC: 37 U/L (ref 5–45)
BASOPHILS # BLD AUTO: 0.02 THOUSANDS/ΜL (ref 0–0.1)
BASOPHILS NFR BLD AUTO: 0 % (ref 0–1)
BILIRUB SERPL-MCNC: 0.7 MG/DL (ref 0.2–1)
BUN SERPL-MCNC: 14 MG/DL (ref 5–25)
CALCIUM SERPL-MCNC: 9.4 MG/DL (ref 8.3–10.1)
CHLORIDE SERPL-SCNC: 104 MMOL/L (ref 100–108)
CO2 SERPL-SCNC: 26 MMOL/L (ref 21–32)
CREAT SERPL-MCNC: 0.99 MG/DL (ref 0.6–1.3)
EOSINOPHIL # BLD AUTO: 0.15 THOUSAND/ΜL (ref 0–0.61)
EOSINOPHIL NFR BLD AUTO: 3 % (ref 0–6)
ERYTHROCYTE [DISTWIDTH] IN BLOOD BY AUTOMATED COUNT: 13 % (ref 11.6–15.1)
EST. AVERAGE GLUCOSE BLD GHB EST-MCNC: 154 MG/DL
GFR SERPL CREATININE-BSD FRML MDRD: 85 ML/MIN/1.73SQ M
GLUCOSE P FAST SERPL-MCNC: 148 MG/DL (ref 65–99)
HBA1C MFR BLD: 7 % (ref 4.2–6.3)
HCT VFR BLD AUTO: 43.1 % (ref 36.5–49.3)
HGB BLD-MCNC: 14.8 G/DL (ref 12–17)
LDLC SERPL DIRECT ASSAY-MCNC: 101 MG/DL (ref 0–100)
LYMPHOCYTES # BLD AUTO: 2.28 THOUSANDS/ΜL (ref 0.6–4.47)
LYMPHOCYTES NFR BLD AUTO: 37 % (ref 14–44)
MCH RBC QN AUTO: 30.8 PG (ref 26.8–34.3)
MCHC RBC AUTO-ENTMCNC: 34.3 G/DL (ref 31.4–37.4)
MCV RBC AUTO: 90 FL (ref 82–98)
MONOCYTES # BLD AUTO: 0.56 THOUSAND/ΜL (ref 0.17–1.22)
MONOCYTES NFR BLD AUTO: 9 % (ref 4–12)
NEUTROPHILS # BLD AUTO: 3.09 THOUSANDS/ΜL (ref 1.85–7.62)
NEUTS SEG NFR BLD AUTO: 51 % (ref 43–75)
PLATELET # BLD AUTO: 197 THOUSANDS/UL (ref 149–390)
PMV BLD AUTO: 9.5 FL (ref 8.9–12.7)
POTASSIUM SERPL-SCNC: 4.1 MMOL/L (ref 3.5–5.3)
PROT SERPL-MCNC: 7.6 G/DL (ref 6.4–8.2)
RBC # BLD AUTO: 4.8 MILLION/UL (ref 3.88–5.62)
SODIUM SERPL-SCNC: 141 MMOL/L (ref 136–145)
WBC # BLD AUTO: 6.1 THOUSAND/UL (ref 4.31–10.16)

## 2017-11-21 PROCEDURE — 85025 COMPLETE CBC W/AUTO DIFF WBC: CPT

## 2017-11-21 PROCEDURE — 80053 COMPREHEN METABOLIC PANEL: CPT

## 2017-11-21 PROCEDURE — 83036 HEMOGLOBIN GLYCOSYLATED A1C: CPT | Performed by: INTERNAL MEDICINE

## 2017-11-21 PROCEDURE — 36415 COLL VENOUS BLD VENIPUNCTURE: CPT | Performed by: INTERNAL MEDICINE

## 2017-11-21 PROCEDURE — 83721 ASSAY OF BLOOD LIPOPROTEIN: CPT

## 2017-12-01 ENCOUNTER — ALLSCRIPTS OFFICE VISIT (OUTPATIENT)
Dept: OTHER | Facility: OTHER | Age: 57
End: 2017-12-01

## 2017-12-05 NOTE — PROGRESS NOTES
Assessment    1  Herniation of lumbar intervertebral disc with radiculopathy (722 10) (M51 16)   2  Complex regional pain syndrome type 1 of right lower extremity (337 22) (G90 521)    Discussion/Summary    The patient is doing much better following the 2 lumbar epidural steroid injections so at this point I recommended that he continue with the exercises he learned at physical therapy for his knee  He will follow back up on an as-needed basis  The patient has the current Goals: Continued pain relief  The patent has the current Barriers: None  Patient is able to Self-Care  Chief Complaint    1  Leg Pain    History of Present Illness  The patient is a pleasant 52-year-old male with a history of lumbar disc disorder with radiculopathy, lumbar spinal stenosis, complex regional pain syndrome type 1 affecting the right lower extremity who returns for follow-up  He is now status post 2 lumbar epidural steroid injections at the right L4-5 level  He reports significant relief which is ongoing  He gets constant dull-aching however still in his right knee, but no longer is having the shooting pain into his thigh  He states that [facility] now  He states that the mild pain that he gets in his right knee is tolerable and is overall feeling much better  Keon Macias presents with complaints of gradual onset of constant episodes of mild left anterior lower and left posterior lower leg pain, described as dull and aching  On a scale of 1 to 10, the patient rates the pain as 4  Symptoms are improving  Review of Systems    Constitutional: no fever, no recent weight gain and no recent weight loss  Eyes: no double vision and no blurry vision  Cardiovascular: no chest pain, no palpitations and no lower extremity edema  Respiratory: no complaints of shortness of breath and no wheezing     Musculoskeletal: difficulty walking, joint stiffness and pain in extremity right knee, but no muscle weakness, no joint swelling, no limb swelling and no decreased range of motion  Neurological: no dizziness, no difficulty swallowing, no memory loss, no loss of consciousness and no seizures  Gastrointestinal: no nausea, no vomiting, no constipation and no diarrhea  Genitourinary: no difficulty initiating urine stream, no genital pain and no frequent urination  Integumentary: no complaints of skin rash  Psychiatric: no depression  Endocrine: no excessive thirst, no adrenal disease, no hypothyroidism and no hyperthyroidism  Hematologic/Lymphatic: no tendency for easy bruising and no tendency for easy bleeding  ROS reviewed  Active Problems    1  Acquired ankle/foot deformity (736 70) (M21 969)   2  Aftercare following joint replacement surgery (V54 81) (Z47 1)   3  Benign essential hypertension (401 1) (I10)   4  Chronic pain syndrome (338 4) (G89 4)   5  Complex regional pain syndrome type 1 of right lower extremity (337 22) (G90 521)   6  Difficulty walking (719 7) (R26 2)   7  Dyslipidemia (272 4) (E78 5)   8  Effusion of knee, unspecified laterality (719 06) (M25 469)   9  Effusion of right knee joint (719 06) (M25 461)   10  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 (354 2) (G56 22)   11  Fatigue (780 79) (R53 83)   12  Foot pain, bilateral (729 5) (M79 671,M79 672)   13  History of total bilateral knee replacement (V43 65) (Z96 653)   14  Hyperlipidemia, unspecified (272 4) (E78 5)   15  Kidney stones (592 0) (N20 0)   16  Knee bursitis (726 60) (M70 50)   17  Left knee pain (719 46) (M25 562)   18  Left knee pain (719 46) (M25 562)   19  Mechanical loosening of internal right knee prosthetic joint, subsequent encounter    (V58 89,996 41) (T84 032D)   20  Microalbuminuria (791 0) (R80 9)   21  Morbid obesity with BMI of 40 0-44 9, adult (278 01,V85 41) (E66 01,Z68 41)   22  Obesity (278 00) (E66 9)   23  Pes anserine bursitis (726 61) (M70 50)   24   Pes planus, congenital (754 61) (Q66 50)   25  Primary osteoarthritis of left knee (715 16) (M17 12)   26  Quadriceps tendonitis (727 09) (M76 899)   27  Right knee pain (719 46) (M25 561)   28  Status post revision of total replacement of right knee (V43 65) (Z96 651)   29  Thigh pain, musculoskeletal, right (729 5) (M79 651)   30  Type 2 diabetes mellitus with renal manifestations (250 40) (E11 29)   31  Uncontrolled type 2 diabetes with retinopathy (250 52,362 01) (E11 319,E11 65)   32  Vitamin D deficiency (268 9) (E55 9)    Past Medical History    1  Aftercare following joint replacement surgery (V54 81) (Z47 1)   2  History of Diabetes Mellitus (250 00)   3  History of hypertension (V12 59) (Z86 79)   4  History of Quadriceps tendonitis (727 09) (M76 899)    The active problems and past medical history were reviewed and updated today  Surgical History    1  History of Hernia Repair   2  History of Knee Replacement   3  History of Neuroplasty Decompression Median Nerve At Carpal Tunnel   4  History of Neuroplasty With Transposition Of Ulnar Nerve - At Elbow   5  History of Revision Of Total Knee Arthroplasty   6  History of Transcath Intravascular Stent Placement Percutaneous Renal   7  History of Wrist Carpectomy    The surgical history was reviewed and updated today  Family History  Mother    1  Family history of diabetes mellitus (V18 0) (Z83 3)  Father    2  Family history of diabetes mellitus (V18 0) (Z83 3)  Family History    3  Family history of Arthritis (V17 7)   4  Family history of diabetes mellitus (V18 0) (Z83 3)   5  Family history of hypertension (V17 49) (Z82 49)   6  Family history of Prostate Cancer (V16 42)    The family history was reviewed and updated today  Social History    · Being A Social Drinker   · Caffeine Use   · Former smoker (F51 52) (E22 216)   · No drug use   · Denied: History of Tobacco Use  The social history was reviewed and updated today   The social history was reviewed and is unchanged  Current Meds   1  Allopurinol 100 MG Oral Tablet; Therapy: (Radha Torre) to Recorded   2  AmLODIPine Besylate 10 MG Oral Tablet; Take 1 tablet daily  Requested for: 35QSP5165;   Last Rx:88Eht4291 Ordered   3  Atorvastatin Calcium 20 MG Oral Tablet; 1 tablet daily; Therapy: 12MWK8894 to (Evaluate:01Vkc7183)  Requested for: 04NZH5156; Last   Rx:65Jxy0153 Ordered   4  Rossy Contour Next Test In [employer]; TEST 3 TIMES A DAY; Therapy: 82PUH7786 to (Evaluate:25Nov2015); Last Rx:04Qth6066 Ordered   5  Rossy Microlet Lancets Miscellaneous; TEST three times a day; Therapy: 27YPV2155 to (Evaluate:25Nov2015); Last Rx:75Ghb3289 Ordered   6  Cephalexin 500 MG Oral Capsule; TAKE 4 CAPSULES BY MOUTH AT ONCE 1 HOUR   PRIOR TO DENTAL PROCEDURE; Therapy: 53POA7400 to (Last Rx:31Pky9049)  Requested for: 74Vjd3236 Ordered   7  Fenofibrate TABS; Therapy: (Radha Torre) to Recorded   8  Jardiance 10 MG Oral Tablet; take one tablet by mouth every day; Therapy: 88QQE5437 to (Last Rx:01Nov2017)  Requested for: 28EGF5742 Ordered   9  Labetalol HCl - 100 MG Oral Tablet; TAKE ONE TABLET BY MOUTH 2 TIMES A DAY; Therapy: 63EOY3139 to (Evaluate:48Tjr9890)  Requested for: 87RNS1663; Last   GI:76VGY1785 Ordered   10  Lisinopril 40 MG Oral Tablet; TAKE 1 TABLET DAILY AS DIRECTED; Therapy: (Radha Torre) to Recorded   11  MetFORMIN HCl - 1000 MG Oral Tablet; Take 1 tablet twice daily; Last Rx:05Bnp1753    Ordered   12  Multi Complete Oral Capsule; Therapy: (Hakan Brandt) to Recorded   13  Trulicity 1 5 EN/8 1FN Subcutaneous Solution Pen-injector; Use 1 per week; Therapy: 17SFV6957 to (Evaluate:24Uwi9560)  Requested for: 05WBM0962; Last    Rx:06Jun2017 Ordered   14  Vitamin D3 1000 UNIT Oral Tablet Recorded    The medication list was reviewed and updated today  Allergies    1  Doxycycline Monohydrate CAPS    2   Bee sting    Vitals  Vital Signs    Recorded: 90LDL7031 07: 18AM   Temperature 98 F   Heart Rate 76   Respiration 16   Systolic 497   Diastolic 82   Height 5 ft 10 in   Weight 293 lb    BMI Calculated 42 04   BSA Calculated 2 46   Pain Scale 4     Physical Exam    Constitutional   General appearance: Abnormal   morbidly obese  Eyes   Sclera: anicteric   HEENT   Hearing grossly intact  Pulmonary   Respiratory effort: Even and unlabored  Cardiovascular   Examination of extremities: No edema or pitting edema present  Skin   Skin and subcutaneous tissue: Normal without rashes or lesions, well hydrated  Psychiatric   Mood and affect: Mood and affect appropriate  Lumbar/Sacral Spine examination demonstrates Lumbosacral Spine:   Appearance: Normal    Tenderness: None  ROM Lumbosacral Spine: Full  Foot and ankle strength was normal bilaterally  Knee strength was normal bilaterally  Hip strength was normal bilaterally  Results/Data  Results Free Text Form [facility]:   Results    I personally reviewed the films/images in the office today  MRI:  MRI LUMBAR SPINE WITHOUT CONTRAST (9/12/2017)     INDICATION: Back pain  COMPARISON: None  TECHNIQUE: Sagittal T1, sagittal T2, sagittal inversion recovery, axial T1 and axial T2, coronal T2      IMAGE QUALITY: Diagnostic     FINDINGS:     ALIGNMENT: Normal alignment of the lumbar spine  No compression fracture  No spondylolysis or spondylolisthesis  No scoliosis  MARROW SIGNAL: Mild endplate marrow changes noted at multiple levels without worrisome marrow lesion  Scattered endplate vertebral's nodes also evident  DISTAL CORD AND CONUS: Normal size and signal within the distal cord and conus  The conus ends at the L1 level  PARASPINAL SOFT TISSUES: Small left renal cortical cysts evident  SACRUM: Normal signal within the sacrum  No evidence of insufficiency or stress fracture       LOWER THORACIC DISC SPACES: Normal disc height and signal  No disc herniation, canal stenosis or foraminal narrowing  LUMBAR DISC SPACES:     L1-L2: Normal      L2-L3: Disc desiccation without significant loss of disc height  Mild circumferential disc bulge and bilateral facet hypertrophy with ligamentum flavum infolding  No significant central canal or foraminal encroachment  L3-L4: Disc desiccation with mild loss of disc height  Right greater than left facet hypertrophy and ligamentum flavum infolding  Circumferential disc bulge identified  Mild central canal encroachment  There is mild to moderate right and mild left   foraminal narrowing  Correlate clinically for right greater than left L3 radiculopathy  L4-L5: Disc desiccation with mild loss of disc height  Circumferential disc bulge with bilateral facet hypertrophy and ligamentum flavum infolding  Superimposed right paracentral disc herniation, extrusion type, with mild caudal migration resulting in   narrowing of the right lateral recess and possible impingement of the right L5 nerve root  Correlate clinically  Mild central canal encroachment identified  There is mild left and mild to moderate right foraminal encroachment  Correlate clinically   for right greater than left L4 radiculopathy  L5-S1: Disc desiccation with significant loss of disc height and prominent circumferential disc bulge  Superimposed right paracentral disc osteophyte complex resulting in ventral impression upon the thecal sac best appreciated on 6/24  There is   narrowing of the right subarticular recess in this region and clinical correlation for right S1 radiculopathy is suggested  Right greater than left facet hypertrophy and ligamentum flavum infolding noted  Mild central canal encroachment evident with   mild bilateral foraminal stenosis  Future Appointments    Date/Time Provider Specialty Site   01/16/2018 08:20 AM KAYDEN Peñaloza   Gastroenterology Adult [address]     Signatures   Electronically signed by : Sharon Srivastava Gabby Epstein MD; Dec  1 2017  7:38AM EST                       (Author)

## 2018-01-05 ENCOUNTER — APPOINTMENT (OUTPATIENT)
Dept: LAB | Facility: CLINIC | Age: 58
End: 2018-01-05
Payer: COMMERCIAL

## 2018-01-05 ENCOUNTER — TRANSCRIBE ORDERS (OUTPATIENT)
Dept: LAB | Facility: CLINIC | Age: 58
End: 2018-01-05

## 2018-01-05 DIAGNOSIS — N40.1 ENLARGED PROSTATE WITH URINARY OBSTRUCTION: ICD-10-CM

## 2018-01-05 DIAGNOSIS — N13.8 ENLARGED PROSTATE WITH URINARY OBSTRUCTION: ICD-10-CM

## 2018-01-05 DIAGNOSIS — N40.1 ENLARGED PROSTATE WITH URINARY OBSTRUCTION: Primary | ICD-10-CM

## 2018-01-05 DIAGNOSIS — N13.8 ENLARGED PROSTATE WITH URINARY OBSTRUCTION: Primary | ICD-10-CM

## 2018-01-05 LAB — PSA SERPL-MCNC: 0.4 NG/ML (ref 0–4)

## 2018-01-05 PROCEDURE — 84153 ASSAY OF PSA TOTAL: CPT

## 2018-01-09 ENCOUNTER — GENERIC CONVERSION - ENCOUNTER (OUTPATIENT)
Dept: ENDOCRINOLOGY | Facility: CLINIC | Age: 58
End: 2018-01-09

## 2018-01-10 NOTE — RESULT NOTES
Message   A1C improved and at goal 6 6 , uine protein also impoved      Verified Results  (1) LIPID PANEL, FASTING 60MRV5922 07:02AM Kim Jewel Order Number: [id number]     Test Name Result Flag Reference   CHOLESTEROL 180 mg/dL     HDL,DIRECT 67 mg/dL H 40-60   Specimen collection should occur prior to Metamizole administration due to the potential for falsely depressed results  LDL CHOLESTEROL CALCULATED 90 mg/dL  0-100   - Patient Instructions: This is a fasting blood test  Water,black tea or black  coffee only after 9:00pm the night before test   Drink 2 glasses of water the morning of test     - Patient Instructions: This is a fasting blood test  Water,black tea or black  coffee only after 9:00pm the night before test Drink 2 glasses of water the morning of test   Triglyceride:         Normal              <150 mg/dl       Borderline High    150-199 mg/dl       High               200-499 mg/dl       Very High          >499 mg/dl  Cholesterol:         Desirable        <200 mg/dl      Borderline High  200-239 mg/dl      High             >239 mg/dl  HDL Cholesterol:        High    >59 mg/dL      Low     <41 mg/dL  LDL CALCULATED:    This screening LDL is a calculated result  It does not have the accuracy of the Direct Measured LDL in the monitoring of patients with hyperlipidemia and/or statin therapy  Direct Measure LDL (UEC883) must be ordered separately in these patients  TRIGLYCERIDES 117 mg/dL  <=150   Specimen collection should occur prior to N-Acetylcysteine or Metamizole administration due to the potential for falsely depressed results  (1) COMPREHENSIVE METABOLIC PANEL 18DST8272 44:94PW Kim Jewel Order Number: [id number]     Test Name Result Flag Reference   GLUCOSE,RANDM 139 mg/dL     If the patient is fasting, the ADA then defines impaired fasting glucose as > 100 mg/dL and diabetes as > or equal to 123 mg/dL     SODIUM 139 mmol/L  136-145 POTASSIUM 4 1 mmol/L  3 5-5 3   CHLORIDE 102 mmol/L  100-108   CARBON DIOXIDE 27 mmol/L  21-32   ANION GAP (CALC) 10 mmol/L  4-13   BLOOD UREA NITROGEN 13 mg/dL  5-25   CREATININE 0 82 mg/dL  0 60-1 30   Standardized to IDMS reference method   CALCIUM 9 2 mg/dL  8 3-10 1   BILI, TOTAL 0 80 mg/dL  0 20-1 00   ALK PHOSPHATAS 81 U/L     ALT (SGPT) 57 U/L  12-78   AST(SGOT) 35 U/L  5-45   ALBUMIN 4 1 g/dL  3 5-5 0   TOTAL PROTEIN 7 7 g/dL  6 4-8 2   eGFR Non-African American      >60 0 ml/min/1 73sq m   - Patient Instructions: This is a fasting blood test  Water,black tea or black  coffee only after 9:00pm the night before test Drink 2 glasses of water the morning of test   National Kidney Disease Education Program recommendations are as follows:  GFR calculation is accurate only with a steady state creatinine  Chronic Kidney disease less than 60 ml/min/1 73 sq  meters  Kidney failure less than 15 ml/min/1 73 sq  meters  (1) HEMOGLOBIN A1C 30CGH0868 07:02AM Holley Puls Order Number: [id number]     Test Name Result Flag Reference   HEMOGLOBIN A1C 6 6 % H 4 2-6 3   EST  AVG   GLUCOSE 143 mg/dl       (1) MICROALBUMIN CREATININE RATIO, RANDOM URINE 79Xkn4964 07:02AM Holley Puls Order Number: [id number]     Test Name Result Flag Reference   MICROALBUMIN/ CREAT R 37 mg/g creatinine H 0-30   MICROALBUMIN,URINE 28 8 mg/L H 0 0-20 0   CREATININE URINE 77 4 mg/dL

## 2018-01-11 NOTE — MISCELLANEOUS
Message  To Whom It May Concern    This patient had a knee revision surgery, of the right knee    He underwent quadriceps tendon turndown and subsequent repair    He is required to wear a knee immobilizer for standing for weightbearing and for walking right leg  He is allowed  Range of motion, flexion to 45 degrees  out of the immobilizer, but absolutely non-weightbearing without the protection of the immobilizer      Thank you      Signatures   Electronically signed by : Edu Nevarez, [facility]; Mar 28 2017 11:38AM EST                       (Author)

## 2018-01-12 VITALS
HEART RATE: 79 BPM | SYSTOLIC BLOOD PRESSURE: 159 MMHG | HEIGHT: 70 IN | BODY MASS INDEX: 41.95 KG/M2 | WEIGHT: 293 LBS | DIASTOLIC BLOOD PRESSURE: 77 MMHG

## 2018-01-12 VITALS
DIASTOLIC BLOOD PRESSURE: 76 MMHG | SYSTOLIC BLOOD PRESSURE: 140 MMHG | HEART RATE: 72 BPM | WEIGHT: 293 LBS | BODY MASS INDEX: 41.95 KG/M2 | HEIGHT: 70 IN

## 2018-01-12 VITALS
WEIGHT: 293 LBS | SYSTOLIC BLOOD PRESSURE: 155 MMHG | HEIGHT: 70 IN | BODY MASS INDEX: 41.95 KG/M2 | HEART RATE: 78 BPM | DIASTOLIC BLOOD PRESSURE: 83 MMHG

## 2018-01-12 VITALS
HEIGHT: 70 IN | DIASTOLIC BLOOD PRESSURE: 68 MMHG | HEART RATE: 88 BPM | WEIGHT: 294.03 LBS | SYSTOLIC BLOOD PRESSURE: 144 MMHG | BODY MASS INDEX: 42.09 KG/M2

## 2018-01-12 VITALS
HEART RATE: 96 BPM | BODY MASS INDEX: 41.52 KG/M2 | WEIGHT: 290 LBS | HEIGHT: 70 IN | SYSTOLIC BLOOD PRESSURE: 161 MMHG | DIASTOLIC BLOOD PRESSURE: 81 MMHG

## 2018-01-12 NOTE — PROGRESS NOTES
Plan    1  DSMT/MNT Time Record; Status:Complete;   Done: 16LDS4131 12:00AM    Discussion/Summary    PATIENT EDUCATION RECORD   Indication for Services: hypertension, type 2 Diabetes Mellitus, hyperlipidemia and obesity  He is ready to learn  He has no barriers to learning  Healthy Eating:   Discussed general nutrition topics: Method: Instruction  Response: Verbalizes Understanding   Discussed importance of meal timing/consistency: Method: Instruction  Response: Verbalizes Understanding   Discussed nutrient types ( Cho/Fat/Protein): Method: Instruction and Handout  Response: Verbalizes Understanding   Discussed portion sizes: Method: Instruction and Handout  Response: Verbalizes Understanding   Discussed alcohol consumption: Method: Instruction  Response: Verbalizes Understanding   Discussed Eating Out: Method: Instruction  Response: Verbalizes Understanding   Discussed food label reading: Method: Instruction  Response: Verbalizes Understanding  Provided food diary and instructions on use: Method: Instruction and HandoutResponse: Verbalizes Understanding   Provided meal planning: Method: Instruction and Handout  Response: Verbalizes Understanding His current weight is 300  His keal needs are ~2200 calories  His CHO's per meal are 60-75 grams  He/She was provided a meal plan for: fixed carbohydrates and weight loss  Discussed weight management/weight loss: Method: Instruction  Response: Verbalizes Understanding His weight goal is Lose 10% of present body weight  Discussed fat intake and choices: Method: Instruction and Handout  Response: Verbalizes Understanding   Discussed basic carbohydrate counting: Method: Instruction and Handout  Response: Verbalizes Understanding   Being Active:   Stated the benefits of exercise: Method: Instruction  Response: Verbalizes Understanding   Discussed "exercise guidelines": Method: Instruction  Response: Verbalizes Understanding     Recommend he discuss a structured exercise program with his PCP/Cardiologist  He was given the following educational materials: portion book, Personal Meal Plan 2200 calories calories and Calorie and Carbohydrate Tracking Books/Websites/Phone Apps   Chief Complaint  Patient with T2DM, HTN, hyperlipidemia and obesity seen for diet consult per MD request       History of Present Illness  Patient reports losing ~30 pounds after having surgery on his knee ~3 months ago  He states, "This was my 5th knee surgery and I am still having pain  I haven't been able to exercise and it's been hard for me to work  I attend physical therapy for 3 months"  Problems identified in food recall include meal skipping, large portions, excess calories coming from alcohol consumption and high fat food choices, low intake of plant based foods, whole grains and low fat dairy and general lack of balance  Provided patient with 2200 calorie meal plan to assist with balance and portion control  Encouraged patient to consume 3 meals a day 4-5 hours apart  Advised him to keep his carbohydrate intake to 60-75 grams per meal and 30 grams per HS snack to assist with glycemic control  Suggested he limit lean protein intake to 10 ounces a day and limit added fat to 6 servings daily to assist with glycemic and calorie control  Patient was encouraged to discuss a safe level of activity with his physician  Jody Carcamo agreed to keep daily food logs  He will return his self-assessment in one week  RD will remain available for further dietary questions/concerns  This is his initial assessment    Present at session: patient    Medical Diagnosis/Reason for Referral:T2DM, HTN, Hyperlipidemia, and obesity  He has no special learning needs  His caloric needs are ~2200 calories a day  Recent weight change: 30 pounds in the past 3 months  Patient  shops for food  Patient  cooks the food  Exercise routine:  Patient does not exercise beyond his daily activities   He had attended PT for the past 3 months  He eats breakfast at  5:00AM AM SKIPS DAILY; coffee with creamer and sweet-n-low   He snacks at 9:30-10:00AM AM 2 hard cooked eggs, coffee with creamer and sweet-n-low   He eats lunch at  12:00PM PM Cafeteria food i e  stir rosales shrimp and veggies over 2 cups of rice OR a ham and cheese sandwich or cheese steak sandwich with either [place] fried or potato chips, diet soda    He eats dinner at  6:00PM PM 8oz meat or fish or chicken, usually non-starchy vegetables (broccoli and zucchini), occasionally corn on the cob (usually no carb at the dinner meal) OR 6 cups cooked pasta with mussels in marinara sauce, 2-3 drinks per night (gin and tonic or beer)   He snacks at 8:00PM at bedtime 6 cups plain popcorn     NUTRITION DIAGNOSES   Food And Nutrition Related Knowledge Defici   Food and nutrition related knowledge deficit related to  lack of prior exposure to accurate nutrition related information  As evidenced by  no prior knowledge of need for food and nutrition related recommendations  Medical Nutrition Therapy Intervention: Plate Method, Carbohydrate counting, Meal planning, Strategies to reduce fat intake, Individualized meal plan, Strategies to increase the intake of plant based foods, Strategies to monitor portion control, Label reading, Meal timing, Exercise Guidelines, Behavior modification strategies and Weight/BMI Goals  His comprehension was good   His motivation was good   His compliance was good   Goals:  1  60-75 grams CHO per meal and 30 grams per HS snack  2  10 ounces of lean protein a day and 6 servings of added fat daily  3  Consume 3 meals a day 4-5 hours apart        Vitals  Signs [Data Includes: Current Encounter]   Recorded: 27Gqf3832 09:26AM   Weight: 300 lb   BMI Calculated: 43 05  BSA Calculated: 2 48    Results/Data  Encounter Results   DSMT/MNT Time Record 50Onj3445 12:00AM Ludivina Sanchez     Test Name Result Flag Reference   Date of Service 7/13/2016     Start - Stop Time 4:45-5:45PM     Total MInutes 60 minutes     Group Or Individual Instruction MNT-I       Future Appointments    Date/Time Provider Specialty Site   07/15/2016 02:30 PM KAYDEN Joseph  Orthopedic Surgery [facility]   08/19/2016 03:45 PM KAYDEN Joseph   Orthopedic Surgery [facility]   09/26/2016 04:30 PM Willy Miller [facility] Endocrinology [facility] ENDOCRINOLOGY     Signatures   Electronically signed by : Micaela Ardon, [facility]; Jul 14 2016  9:58AM EST                       (Author)    Electronically signed by : KAYDEN Ortiz ; Jul 14 2016 10:02AM EST

## 2018-01-13 VITALS
DIASTOLIC BLOOD PRESSURE: 74 MMHG | TEMPERATURE: 98.6 F | RESPIRATION RATE: 17 BRPM | HEIGHT: 70 IN | WEIGHT: 290 LBS | HEART RATE: 82 BPM | BODY MASS INDEX: 41.52 KG/M2 | SYSTOLIC BLOOD PRESSURE: 130 MMHG

## 2018-01-13 VITALS
WEIGHT: 290 LBS | HEIGHT: 70 IN | SYSTOLIC BLOOD PRESSURE: 139 MMHG | DIASTOLIC BLOOD PRESSURE: 74 MMHG | BODY MASS INDEX: 41.52 KG/M2 | HEART RATE: 89 BPM

## 2018-01-13 VITALS
BODY MASS INDEX: 41.52 KG/M2 | HEIGHT: 70 IN | DIASTOLIC BLOOD PRESSURE: 90 MMHG | WEIGHT: 290 LBS | RESPIRATION RATE: 17 BRPM | SYSTOLIC BLOOD PRESSURE: 155 MMHG | HEART RATE: 79 BPM

## 2018-01-13 VITALS
HEART RATE: 69 BPM | BODY MASS INDEX: 41.95 KG/M2 | HEIGHT: 70 IN | WEIGHT: 293 LBS | DIASTOLIC BLOOD PRESSURE: 85 MMHG | SYSTOLIC BLOOD PRESSURE: 156 MMHG

## 2018-01-13 VITALS
HEART RATE: 78 BPM | WEIGHT: 293.02 LBS | HEIGHT: 70 IN | DIASTOLIC BLOOD PRESSURE: 78 MMHG | BODY MASS INDEX: 41.95 KG/M2 | SYSTOLIC BLOOD PRESSURE: 142 MMHG

## 2018-01-13 VITALS
WEIGHT: 290 LBS | DIASTOLIC BLOOD PRESSURE: 71 MMHG | HEIGHT: 70 IN | SYSTOLIC BLOOD PRESSURE: 137 MMHG | BODY MASS INDEX: 41.52 KG/M2 | HEART RATE: 80 BPM

## 2018-01-13 NOTE — MISCELLANEOUS
Message  Return to work or school:   Yisel Benton is under my professional care  He was seen in my office on last seen 15, July   He is able to return to work on  July 20th      Patient called the office today and requests to return to work seated,  Patient request was granted          Signatures   Electronically signed by : Deyanira Bateman, [facility]; Jul 19 2016  9:38AM EST                       (Author)

## 2018-01-14 VITALS
HEIGHT: 70 IN | WEIGHT: 290 LBS | HEART RATE: 82 BPM | BODY MASS INDEX: 41.52 KG/M2 | DIASTOLIC BLOOD PRESSURE: 85 MMHG | SYSTOLIC BLOOD PRESSURE: 174 MMHG

## 2018-01-14 VITALS
DIASTOLIC BLOOD PRESSURE: 77 MMHG | WEIGHT: 293 LBS | SYSTOLIC BLOOD PRESSURE: 133 MMHG | HEIGHT: 70 IN | BODY MASS INDEX: 41.95 KG/M2 | HEART RATE: 87 BPM

## 2018-01-16 ENCOUNTER — ALLSCRIPTS OFFICE VISIT (OUTPATIENT)
Dept: OTHER | Facility: OTHER | Age: 58
End: 2018-01-16

## 2018-01-16 LAB
CLARITY UR: NORMAL
COLOR UR: YELLOW
GLUCOSE (HISTORICAL): NORMAL
HGB UR QL STRIP.AUTO: NORMAL
KETONES UR STRIP-MCNC: NORMAL MG/DL
LEUKOCYTE ESTERASE UR QL STRIP: NORMAL
NITRITE UR QL STRIP: NORMAL
PH UR STRIP.AUTO: 6.5 [PH]
PROT UR STRIP-MCNC: NORMAL MG/DL

## 2018-01-17 NOTE — CONSULTS
Assessment   1  History of colon polyps (V12 72) (Z86 010)   2  Chronic GERD (530 81) (K21 9)    Plan   Chronic GERD    · EGD; Status:Hold For - Scheduling; Requested MERARI:[id number]; Perform:[facility]; KRQ:[id number]; Ordered; For:Chronic GERD; Ordered By:Mk Resendez;  History of colon polyps    · Suprep Bowel Prep Kit 17 5-3 13-1 6 GM/180ML Oral Solution; USE AS DIRECTED   Rx By: Graham Pinedo; Dispense: 0 Days ; #:1 X 177 ML Bottle (2 Bottles); Refill: 0;For: History of colon polyps; ZULEYKA = N; Verified Transmission to [facility]; Last Updated By: System, [employer]; 1/16/2018 9:03:05 AM   · COLONOSCOPY (GI, SURG); Status:Hold For - Scheduling; Requested ESS:[id number]; Perform:[facility]; QMJ:17NNJ2678;JSVVABQ;SPR:NYJKNTQ of colon polyps; Ordered By:Mk Resendez;    Discussion/Summary   Discussion Summary:    Una 79-year-old gentleman with a longstanding history of diabetes which is well controlled, multiple knee surgeries, bilateral knee replacements, history of colon polyps in the past last examination was greater than 5 years ago, also has a longstanding history of acid reflux about 2 to 3 nights per week  Chronic GERD: Given his age and chronic symptoms, we will plan for an upper endoscopy to exclude underlying Stanley's esophagus  this time the patient takes over-the-counter antacids as needed which she can continue will evaluate at the time of his upper endoscopy   History of colon polyps: The pathology is not available to us, surgeon both systems was told to come back in for recall in 5 years, there is no colonoscopy report in the last 6 years in the system will schedule his procedure discussed with him the risks of both procedures including bleeding, surgery, perforation, missed polyp detection rate        Chief Complaint   Chief Complaint Free Text Note Form: Evaluation for colonoscopy      History of Present Illness   HPI: As you know this is a pleasant 79-year-old gentleman with a history of hypertension, diabetes, he works in engineering across the street at the [employer], he reports that his last colonoscopy was 5 or more years ago and he had a polyp at that time  He was told to come back in 5 years  The pathology is not immediately available to us  He has history of diverticulitis treated as an outpatient, his last episode was fiber 6 years ago  Denies any change in bowel movement at this time, denies any melena, rectal bleeding, diarrhea, constipation, lower abdominal pain  Denies any nausea vomiting, he does have reflux symptoms about 2 to 3 nights per week, usually worse if he eats late at night, worse if he lays down after eating with some occasional regurgitation but no dysphagia  weight has been stable  His diabetes is fairly well controlled  He has had multiple knee surgeries, bilateral knee replacements, multiple revisions of his right knee  Denies any family history of GI or associated malignancies  patient drinks at least 2 drinks on a daily basis, more over the course of the weekend  Review of Systems   Complete-Male GI Adult: Other Symptoms: The remainder of the ten ROS was negative  Active Problems   1  Acquired ankle/foot deformity (736 70) (M21 969)   2  Aftercare following joint replacement surgery (V54 81) (Z47 1)   3  Benign essential hypertension (401 1) (I10)   4  Chronic pain syndrome (338 4) (G89 4)   5  Complex regional pain syndrome type 1 of right lower extremity (337 22) (G90 521)   6  Difficulty walking (719 7) (R26 2)   7  Dyslipidemia (272 4) (E78 5)   8  Effusion of knee, unspecified laterality (719 06) (M25 469)   9  Effusion of right knee joint (719 06) (M25 461)   10  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 (354 2) (G56 22)   11  Fatigue (780 79) (R53 83)   12  Foot pain, bilateral (729 5) (M79 671,M79 672)   13  Herniation of lumbar intervertebral disc with radiculopathy (722 10) (M51 16)   14   History of total bilateral knee replacement (V43 65) (Z96 653)   15  Hyperlipidemia, unspecified (272 4) (E78 5)   16  Kidney stones (592 0) (N20 0)   17  Knee bursitis (726 60) (M70 50)   18  Left knee pain (719 46) (M25 562)   19  Left knee pain (719 46) (M25 562)   20  Mechanical loosening of internal right knee prosthetic joint, subsequent encounter      (V58 89,996 41) (T84 032D)   21  Microalbuminuria (791 0) (R80 9)   22  Morbid obesity with BMI of 40 0-44 9, adult (278 01,V85 41) (E66 01,Z68 41)   23  Obesity (278 00) (E66 9)   24  Pes anserine bursitis (726 61) (M70 50)   25  Pes planus, congenital (754 61) (Q66 50)   26  Primary osteoarthritis of left knee (715 16) (M17 12)   27  Quadriceps tendonitis (727 09) (M76 899)   28  Right knee pain (719 46) (M25 561)   29  Status post revision of total replacement of right knee (V43 65) (Z96 651)   30  Thigh pain, musculoskeletal, right (729 5) (M79 651)   31  Type 2 diabetes mellitus with renal manifestations (250 40) (E11 29)   32  Uncontrolled type 2 diabetes with retinopathy (250 52,362 01) (E11 319,E11 65)   33  Vitamin D deficiency (268 9) (E55 9)    Past Medical History   1  Aftercare following joint replacement surgery (V54 81) (Z47 1)   2  History of Diabetes Mellitus (250 00)   3  History of hypertension (V12 59) (Z86 79)   4  History of Quadriceps tendonitis (727 09) (G45 917)  Active Problems And Past Medical History Reviewed: The active problems and past medical history were reviewed and updated today  Surgical History   1  History of Hernia Repair   2  History of Knee Replacement   3  History of Neuroplasty Decompression Median Nerve At Carpal Tunnel   4  History of Neuroplasty With Transposition Of Ulnar Nerve - At Elbow   5  History of Revision Of Total Knee Arthroplasty   6  History of Transcath Intravascular Stent Placement Percutaneous Renal   7  History of Wrist Carpectomy  Surgical History Reviewed:     The surgical history was reviewed and updated today  Family History   Mother    1  Family history of diabetes mellitus (V18 0) (Z83 3)  Father    2  Family history of diabetes mellitus (V18 0) (Z83 3)  Family History    3  Family history of Arthritis (V17 7)   4  Family history of diabetes mellitus (V18 0) (Z83 3)   5  Family history of hypertension (V17 49) (Z82 49)   6  Family history of Prostate Cancer (J05 92)  Family History Reviewed: The family history was reviewed and updated today  Social History    · Being A Social Drinker   · Caffeine Use   · Former smoker (C39 17) (C56 067)   · No drug use   · Denied: History of Tobacco Use  Social History Reviewed: The social history was reviewed and updated today  Current Meds    1  Allopurinol 100 MG Oral Tablet; Therapy: ([place]) to Recorded   2  AmLODIPine Besylate 10 MG Oral Tablet; Take 1 tablet daily  Requested for: 18AAF4907;     Last Rx:43Apv7846 Ordered   3  Atorvastatin Calcium 20 MG Oral Tablet; 1 tablet daily; Therapy: 26ZOY3186 to (Evaluate:88Xsf2578)  Requested for: 21RGT2189; Last     Rx:37Xrn0946 Ordered   4  Rossy Contour Next Test In [employer]; TEST 3 TIMES A DAY; Therapy: 47RDS5780 to (Evaluate:25Nov2015); Last Rx:97Uwx7268 Ordered   5  Rossy Microlet Lancets Miscellaneous; TEST three times a day; Therapy: 66FMH1698 to (Evaluate:25Nov2015); Last Rx:15Ypk8526 Ordered   6  Cephalexin 500 MG Oral Capsule; TAKE 4 CAPSULES BY MOUTH AT ONCE 1 HOUR     PRIOR TO DENTAL PROCEDURE; Therapy: 87SSI2511 to (Last Rx:00Lun2763)  Requested for: 21Dst5037 Ordered   7  Fenofibrate TABS; Therapy: (Orvilla [place]) to Recorded   8  Jardiance 10 MG Oral Tablet; take one tablet by mouth every day; Therapy: 66ZWA0592 to (Last Rx:01Nov2017)  Requested for: 80PPY4716 Ordered   9  Labetalol HCl - 100 MG Oral Tablet; TAKE ONE TABLET BY MOUTH 2 TIMES A DAY;      Therapy: 13ROG3711 to (Evaluate:43Gst8355)  Requested for: 60KDM3117; Last     WC:73QGJ1147 Ordered   10  Lisinopril 40 MG Oral Tablet; TAKE 1 TABLET DAILY AS DIRECTED; Therapy: (Lynn Narayanan) to Recorded   11  MetFORMIN HCl - 1000 MG Oral Tablet; Take 1 tablet twice daily; Last Rx:77Lcl9017      Ordered   12  Multi Complete Oral Capsule; Therapy: (Carli Sanches) to Recorded   13  Trulicity 1 5 NL/0 3JL Subcutaneous Solution Pen-injector; Use 1 per week; Therapy: 94CLL4383 to (Evaluate:62Gml3585)  Requested for: 14KWY1428; Last      Rx:58Dvi8961 Ordered   14  Vitamin D3 1000 UNIT Oral Tablet Recorded  Medication List Reviewed: The medication list was reviewed and updated today  Allergies   1  Doxycycline Monohydrate CAPS  2   Bee sting    Vitals   Vital Signs    Recorded: 86VXK6217 08:47AM Recorded: 39PTO1671 08:33AM   Temperature  97 6 F   Heart Rate  63   Respiration 14    Systolic 775, LUE, Sitting    Diastolic 72, LUE, Sitting    Height 5 ft 9 in    Weight 298 lb  298 lb 6 4 oz   BMI Calculated 44 01 42 82   BSA Calculated 2 45 2 47   O2 Saturation 97 97     Physical Exam   Gen:  Overweight, walks with significant limp wn/wd, NAD     HEENT: anicteric, MMM, no cervical LAD     CVS: RRR, no m/r/g     CHEST: CTA b/l     ABD: +BS, soft, NT,ND, no hepatosplenomegaly     EXT:  Bilateral lower extremity edema, right greater than left      NEURO: aaox3     SKIN: NO rashes             Future Appointments      Date/Time Provider Specialty Site   01/16/2018 04:00 PM [facility]     Signatures    Electronically signed by : KAYDEN Romero ; Jan 16 2018  9:09AM EST                       (Author)

## 2018-01-17 NOTE — PROGRESS NOTES
Preliminary Nursing Report                Patient Information    Initial Encounter Entry Date:   2/3/2016 9:07 AM EST (Automated Transmission Automated Transmission)       Last Modified:   {Chester Corrigan}              Legal Name: Dash Morgan Number:        YOB: 1960        Age (years): 48        Gender: M        Body Mass Index (BMI): 43 kg/m2        Height: 70 in  Weight: 301 lbs (137 kgs)           Address:   [address] [place] [address] [address] [facility] [address] [address]               Phone: [phone]        Email:        Ethnicity: Decline to [facility]        Baptism:        Marital Status:        Preferred Language: English        Race: Other Race                    Patient Insurance Information        Primary Insurance Information Carrier Name: {Primary  CarrierName}           Carrier Address:   {Primary  CarrierAddress}              Carrier Phone: {Primary  CarrierPhone}          Group Number: {Primary  GroupNumber}          Policy Number: {Primary  PolicyNumber}          Insured Name: {Primary  InsuredName}          Insured : {Primary  InsuredDOB}          Relationship to Insured: {Primary  RelationshiptoInsured}           Secondary Insurance Information Carrier Name: {Secondary  CarrierName}           Carrier Address:   {Secondary  CarrierAddress}              Carrier Phone: {Secondary  CarrierPhone}          Group Number: {Secondary  GroupNumber}          Policy Number: {Secondary  PolicyNumber}          Insured Name: {Secondary  InsuredName}          Insured : {Secondary  InsuredDOB}          Relationship to Insured: {Secondary  RelationshiptoInsured}                       Health Profile   Booking #:   Darian Clifton #: [id number]               DOS: 2016    Surgery : REVISE KNEE JOINT REPLACE      Add'l Procedures/Notes:     Surgery Risk: Intermediate          Precautions     Diabetes mellitus, type 2                   Allergies    Doxycycline Monohydrate CAPS Medications    Allopurinol 100 MG Oral Tablet       AmLODIPine Besylate 5 MG Oral Tablet       Atorvastatin Calcium 10 MG Oral Tablet       Bactroban Nasal 2 % Nasal Ointment       Fenofibrate TABS       Ferrous Sulfate 325 (65 Fe) MG Oral Tablet       Folic Acid 1 MG Oral Tablet       Indomethacin 25 MG Oral Capsule       Lisinopril 40 MG Oral Tablet       MetFORMIN HCl - 1000 MG Oral Tablet       Multi Complete Oral Capsule       Trulicity 1 5 HP/9 3DE Subcutaneous Solution Pen-injector       Vitamin C 500 MG Oral Capsule               Conditions    Benign essential hypertension       Diabetes mellitus, type 2       Effusion of knee, unspecified laterality       Effusion of right knee joint       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       Fatigue       History of total bilateral knee replacement       Hyperlipemia       Kidney stones       Knee bursitis       Left knee pain       Mechanical complication of internal orthopedic device, implant, and graft       Morbid obesity with BMI of 40 0-44 9, adult       Orthopedic aftercare for joint replacement       Pes anserine bursitis       Primary osteoarthritis of left knee       Right knee pain       Vitamin D deficiency               Family History    None             Surgical History    None             Social History    Being A Social Drinker       Caffeine Use       Denies Tobacco Use       Former smoker                               Patient Instructions       ? NPO Instructions   The day before surgery it is recommended to have a light dinner at your usual time and you are allowed a light snack early in the evening  Do not eat anything heavy or eat a big meal after 7pm  Do not eat or drink anything after midnight prior to your surgery  If you are supposed to take any of your medications, do so with a sip of water   Failure to follow these instructions can lead to an increased risk of lung complications and may result in a delay or cancellation of your procedure  If you have any questions, contact your institution for further instructions  No candy, no gum, no mints, no chewing tobacco   Triggered by: Medical Procedure Risk         ? ACE/ARB (Blood Pressure Medication) 1  Please continue the following medications up to the evening before surgery, but do not take it on the day of surgery  Please restart your medications as soon as clinically feasible  Triggered by: Lisinopril 40 MG Oral Tablet         ? Diabetic Medication   Please decrease your morning insulin dose to one-half of normal dose  If you are taking oral diabetes medications, the morning dose should be omitted  If taking metformin (Glucophage), discontinue the medication for 24 hours prior to surgery  If you have an insulin pump, continue at a basal rate only  Triggered by: Diabetes mellitus, type 2, , MetFORMIN HCl - 1000 MG Oral Tablet         ? NSAID (Pain Medication) 61  Please stop ibuprofen, naproxen and other non-steroidal anti-inflammatory drugs (NSAIDS) for 24 hrs before surgery  Triggered by: Indomethacin 25 MG Oral Capsule               Testing Considerations       ? Basic Metabolic Panel (BMP) t  If test was completed and normal within last six months, repeat test is not necessary  Triggered by: Benign essential hypertension, Diabetes mellitus, type 2, Morbid obesity with BMI of 40 0-44 9, adult         ? Blood Glucose on Day of Surgery t  Please check the blood sugar on the morning of surgery  Triggered by: Diabetes mellitus, type 2         ? Complete Blood Count (CBC) t, client, client  If test was completed and normal within last six months, repeat test is not necessary  Triggered by: Age or Facility Rec         ? Consider Hemoglobin A1c (HbA1c) client, client, client  If test was completed and normal within the last three months, repeat test is not necessary  Triggered by: Diabetes mellitus, type 2, Age or Facility Rec         ?  Electrocardiogram (ECG) t  Patient does not need new test if normal ECG is present within the last six months and no change in clinical condition  Triggered by: Benign essential hypertension, Diabetes mellitus, type 2, Morbid obesity with BMI of 40 0-44 9, adult         ? Type and Screen client  Type and Screen - Blood: If there is anticipated or possible large blood loss with this procedure, then a Type and Screen for Blood should be ordered  Triggered by: Age or Facility Rec               Consultations       ? Primary Care Physician Evaluation   Primary care physician may need to evaluate patient prior to surgery  This is likely NOT necessary if the listed conditions are chronic and stable  Triggered by: Kidney stones               Miscellaneous Questions         Question: Are you able to walk up a flight of stairs, walk up a hill or do heavy housework WITHOUT having chest pain or shortness of breath? Answer: YES                   Allergies/Conditions/Medications Not Found        The following were not recognized by our system when generating the recommendations  Please consider if this would impact any preoperative protocols  ? Being A Social Drinker       ? Caffeine Use       ? Denies Tobacco Use       ? Allopurinol 100 MG Oral Tablet       ? AmLODIPine Besylate 5 MG Oral Tablet       ? Bactroban Nasal 2 % Nasal Ointment       ? Fenofibrate TABS       ? Multi Complete Oral Capsule       ? Trulicity 1 5 YZ/8 7XC Subcutaneous Solution Pen-injector                  Appointment Information         Date:    03/24/2016        Location:    [place]        Address:           Directions:                      Footnotes revision 14      ?? Denotes a free-text entry  Legal Disclaimer: Any and all recommendations and services provided herein are designed to assist in the preoperative care of the patient   Nothing contained herein is designed to replace, eliminate or alleviate the responsibility of the attending physician to supervise and determine the patient?s preoperative care and course of treatment  Failure to provide complete, accurate information may negatively impact the system?s ability to recommend the proper preoperative protocol  THE ATTENDING PHYSICIAN IS RESPONSIBLE TO REVIEW THE SUGGESTED PREOPERATIVE PROTOCOLS/COURSE OF TREATMENT AND PRESCRIBE THE FINAL COURSE OF PREOPERATIVE TREATMENT IN CONSULTATION WITH THE PATIENT  THE ePREOP SYSTEM AND ITS MATERIALS ARE PROVIDED ? AS IS? WITHOUT WARRANTY OF ANY KIND, EXPRESS OR IMPLIED, INCLUDING, BUT NOT LIMITED TO, WARRANTIES OF PERFORMANCE OR MERCHANTABILITY OR FITNESS FOR A PARTICULAR PURPOSE  PATIENT AND PHYSICIANS HEREBY AGREE THAT THEIR USE OF THE MATERIALS AND RESOURCES ACT AS A CONSENT TO RELEASE AND WAIVE ePREOP FROM ANY AND ALL CLAIMS OF WARRANTY, TORT OR CONTRACT LAW OF ANY KIND  Electronically signed by:Pawan VICENTE    Feb 4 2016  5:44PM EST

## 2018-01-22 VITALS
HEIGHT: 69 IN | BODY MASS INDEX: 44.14 KG/M2 | RESPIRATION RATE: 14 BRPM | TEMPERATURE: 97.6 F | OXYGEN SATURATION: 97 % | WEIGHT: 298 LBS | SYSTOLIC BLOOD PRESSURE: 140 MMHG | DIASTOLIC BLOOD PRESSURE: 72 MMHG | HEART RATE: 63 BPM

## 2018-01-23 VITALS
HEIGHT: 70 IN | DIASTOLIC BLOOD PRESSURE: 82 MMHG | SYSTOLIC BLOOD PRESSURE: 136 MMHG | TEMPERATURE: 98 F | RESPIRATION RATE: 16 BRPM | WEIGHT: 293 LBS | BODY MASS INDEX: 41.95 KG/M2 | HEART RATE: 76 BPM

## 2018-01-25 ENCOUNTER — ANESTHESIA EVENT (OUTPATIENT)
Dept: PERIOP | Facility: AMBULARY SURGERY CENTER | Age: 58
End: 2018-01-25
Payer: COMMERCIAL

## 2018-02-09 ENCOUNTER — HOSPITAL ENCOUNTER (OUTPATIENT)
Facility: AMBULARY SURGERY CENTER | Age: 58
Setting detail: OUTPATIENT SURGERY
Discharge: HOME | End: 2018-02-09
Attending: INTERNAL MEDICINE | Admitting: INTERNAL MEDICINE
Payer: COMMERCIAL

## 2018-02-09 ENCOUNTER — ANESTHESIA (OUTPATIENT)
Dept: PERIOP | Facility: AMBULARY SURGERY CENTER | Age: 58
End: 2018-02-09
Payer: COMMERCIAL

## 2018-02-09 VITALS
HEIGHT: 70 IN | WEIGHT: 293 LBS | HEART RATE: 67 BPM | RESPIRATION RATE: 18 BRPM | TEMPERATURE: 97.2 F | DIASTOLIC BLOOD PRESSURE: 82 MMHG | SYSTOLIC BLOOD PRESSURE: 148 MMHG | BODY MASS INDEX: 41.95 KG/M2 | OXYGEN SATURATION: 100 %

## 2018-02-09 DIAGNOSIS — K21.9 CHRONIC GERD: ICD-10-CM

## 2018-02-09 DIAGNOSIS — Z86.010 HISTORY OF COLONIC POLYPS: ICD-10-CM

## 2018-02-09 LAB — GLUCOSE SERPL-MCNC: 165 MG/DL (ref 65–140)

## 2018-02-09 PROCEDURE — 82948 REAGENT STRIP/BLOOD GLUCOSE: CPT

## 2018-02-09 PROCEDURE — 88305 TISSUE EXAM BY PATHOLOGIST: CPT | Performed by: PATHOLOGY

## 2018-02-09 PROCEDURE — 88342 IMHCHEM/IMCYTCHM 1ST ANTB: CPT | Performed by: INTERNAL MEDICINE

## 2018-02-09 PROCEDURE — 88305 TISSUE EXAM BY PATHOLOGIST: CPT | Performed by: INTERNAL MEDICINE

## 2018-02-09 PROCEDURE — 43239 EGD BIOPSY SINGLE/MULTIPLE: CPT | Performed by: INTERNAL MEDICINE

## 2018-02-09 PROCEDURE — 88342 IMHCHEM/IMCYTCHM 1ST ANTB: CPT | Performed by: PATHOLOGY

## 2018-02-09 PROCEDURE — 45380 COLONOSCOPY AND BIOPSY: CPT | Performed by: INTERNAL MEDICINE

## 2018-02-09 RX ORDER — MELATONIN
1000
COMMUNITY

## 2018-02-09 RX ORDER — PROPOFOL 10 MG/ML
INJECTION, EMULSION INTRAVENOUS AS NEEDED
Status: DISCONTINUED | OUTPATIENT
Start: 2018-02-09 | End: 2018-02-09 | Stop reason: SURG

## 2018-02-09 RX ORDER — SODIUM CHLORIDE 9 MG/ML
125 INJECTION, SOLUTION INTRAVENOUS CONTINUOUS
Status: DISCONTINUED | OUTPATIENT
Start: 2018-02-09 | End: 2018-02-09 | Stop reason: HOSPADM

## 2018-02-09 RX ADMIN — SODIUM CHLORIDE: 0.9 INJECTION, SOLUTION INTRAVENOUS at 08:00

## 2018-02-09 RX ADMIN — PROPOFOL 100 MG: 10 INJECTION, EMULSION INTRAVENOUS at 08:03

## 2018-02-09 RX ADMIN — PROPOFOL 50 MG: 10 INJECTION, EMULSION INTRAVENOUS at 08:12

## 2018-02-09 RX ADMIN — PROPOFOL 50 MG: 10 INJECTION, EMULSION INTRAVENOUS at 08:23

## 2018-02-09 RX ADMIN — PROPOFOL 50 MG: 10 INJECTION, EMULSION INTRAVENOUS at 08:19

## 2018-02-09 RX ADMIN — PROPOFOL 50 MG: 10 INJECTION, EMULSION INTRAVENOUS at 08:06

## 2018-02-09 RX ADMIN — PROPOFOL 50 MG: 10 INJECTION, EMULSION INTRAVENOUS at 08:15

## 2018-02-09 RX ADMIN — PROPOFOL 50 MG: 10 INJECTION, EMULSION INTRAVENOUS at 08:09

## 2018-02-09 RX ADMIN — PROPOFOL 50 MG: 10 INJECTION, EMULSION INTRAVENOUS at 08:25

## 2018-02-09 NOTE — OP NOTE
COLONOSCOPY    PROCEDURE: Colonoscopy/ Polypectomny (Cold Biopsy)    INDICATIONS: History of Colon Polyps    POST-OP DIAGNOSIS: See the impression below    SEDATION: Monitored anesthesia care, check anesthesia records    PHYSICAL EXAM:    /82   Pulse 69   Temp 97 6 °F (36 4 °C) (Temporal)   Resp 20   Ht 5' 10" (1 778 m)   Wt 133 kg (293 lb)   SpO2 98%   BMI 42 04 kg/m²   Body mass index is 42 04 kg/m²  General: NAD  Heart: S1 & S2 normal, RRR  Lungs: CTA, No rales or rhonchi  Abdomen: Soft, nontender, nondistended, good bowel sounds    CONSENT:  Informed consent was obtained for the procedure, including sedation after explaining the risks and benefits of the procedure  Risks including but not limited to bleeding, perforation, infection, aspiration were discussed in detail  Also explained about less than 100%$ sensitivity with the exam and other alternatives  PREPARATION:   EKG tracing, pulse oximetry, blood pressure were monitored throughout the procedure  Patient was identified by myself both verbally and by visual inspection of ID band  DESCRIPTION:   Patient was placed in the left lateral decubitus position and was sedated with the above medication  Digital rectal examination was performed  The colonoscope was introduced in to the anal canal and advanced up to cecum, which was identified by the appendiceal orifice and IC valve  A careful inspection was made as the colonoscope was withdrawn, including a retroflexed view of the rectum; findings and interventions are described below  Appropriate photodocumentation was obtained  The quality of the colonic preparation was good      FINDINGS:    Two diminutive polyps seen in the ascending colon upon retroflexion in the cecum, removed by cold biopsy forceps  One 3 mm semi sessile polyp seen in the transverse colon removed by cold biopsy forceps  Descending and sigmoid diverticulosis  Mild internal hemorrhoids on retroflexion IMPRESSIONS:      Total of 3 diminutive polyps, removed by cold biopsy forceps  Diverticulosis  Internal hemorrhoids    RECOMMENDATIONS:    Discharge home  Resume regular diet  Resume medications  Follow up biopsy results, call the office in 3 weeks for results  Repeat colonoscopy in 5 years or based on pathology  Call with any abdominal pain, bleeding, fevers    COMPLICATIONS:  None; patient tolerated the procedure well      DISPOSITION: PACU           CONDITION: Stable

## 2018-02-09 NOTE — DISCHARGE INSTRUCTIONS
Discharge home  Resume regular diet  Resume medications  Follow up biopsy results, call the office in 3 weeks for results  Repeat colonoscopy in 5 years or based on pathology  Call with any abdominal pain, bleeding, fevers      Colonoscopy   WHAT YOU NEED TO KNOW:   A colonoscopy is a procedure to examine the inside of your colon (intestine) with a scope  Polyps or tissue growths may have been removed during your colonoscopy  It is normal to feel bloated and to have some abdominal discomfort  You should be passing gas  If you have hemorrhoids or you had polyps removed, you may have a small amount of bleeding         DISCHARGE INSTRUCTIONS:   Seek care immediately if:   · You have a large amount of bright red blood in your bowel movements      · Your abdomen is hard and firm and you have severe pain      · You have sudden trouble breathing  Contact your healthcare provider if:   · You develop a rash or hives      · You have a fever within 24 hours of your procedure        · You have not had a bowel movement for 3 days after your procedure      · You have questions or concerns about your condition or care  Activity:   · Do not lift, strain, or run for 3 days after your procedure      · Rest after your procedure  You have been given medicine to relax you  Do not drive or make important decisions until the day after your procedure  Return to your normal activity as directed      · Relieve gas and discomfort from bloating by lying on your right side with a heating pad on your abdomen  You may need to take short walks to help the gas move out  Eat small meals until bloating is relieved  If you had polyps removed: For 7 days after your procedure:  · Do not take aspirin      · Do not go on long car rides  Follow up with your healthcare provider as directed: Write down your questions so you remember to ask them during your visits     © 2017 [address] is for End User's use only and may not be sold, redistributed or otherwise used for commercial purposes  All illustrations and images included in CareNotes® are the copyrighted property of [employer]  or [employer] Goode  The above information is an  only  It is not intended as medical advice for individual conditions or treatments  Talk to your doctor, nurse or pharmacist before following any medical regimen to see if it is safe and effective for you        Upper Endoscopy   AMBULATORY CARE:   What you need to know about an upper endoscopy: An upper endoscopy is also called an upper gastrointestinal (GI) endoscopy, or an esophagogastroduodenoscopy (EGD)  A scope (thin, flexible tube with a light and camera) is used to examine the walls of your upper intestines  The upper intestines include the esophagus, stomach, and duodenum (first part of the small intestine)  An upper endoscopy is used to look for problems, such as bleeding, polyps, ulcers, or infection  How to prepare for an upper endoscopy: Your healthcare provider will talk to you about how to prepare for your procedure  You may need to not eat or drink anything except water for 6 to 12 hours before the procedure  He will tell you what medicines to take or not take on the day of your procedure  Arrange to have someone drive you home  What will happen during an upper endoscopy:   · You will be given medicine through your IV to help you relax and make you drowsy  You will also be given medicine to numb your throat  You may need to wear a plastic mouthpiece to help hold your mouth open and protect your teeth and tongue  Your healthcare provider will gently insert the endoscope through your mouth and down into your throat  You may be asked to swallow once to help move the scope into your upper intestines  You may feel pressure in your throat but you should not feel pain   The endoscope does not restrict your breathing       · Your healthcare provider will watch the scope on a monitor  He will take pictures with the scope  He may gently inject air so he can see your digestive tract clearly  Your healthcare provider may take tissue samples and send them to the lab for tests  He may remove foreign objects, tumors, or polyps that may be blocking your upper intestines  Your healthcare provider may also insert tools with the scope to treat bleeding or place a stent (tube)  When the procedure is finished, the endoscope will be slowly removed  What will happen after an upper endoscopy: You may feel bloated, gassy, or have some abdominal discomfort  Your throat may be sore for 24 to 36 hours after the procedure  You may burp or pass gas from air that is still inside your body after your procedure  You may need to take short walks to help move the gas out  Eat small meals, if you feel bloated  Do not drive or make important decisions until the day after your procedure  Risks of an upper endoscopy: Your esophagus, stomach, or duodenum may be punctured or torn during the procedure  This is because of increased pressure as the scope and air are passing through  You may bleed more than expected or get an infection  You may have a slow or irregular heartbeat, or low blood pressure  This can cause sweating and fainting  Fluid may enter your lungs and you may have trouble breathing  These problems can be life-threatening  Call 911 if:   · You have sudden chest pain or trouble breathing      Seek care immediately if:   · You feel dizzy or faint      · You have trouble swallowing      · You have severe throat pain      · Your bowel movements are very dark or black      · Your abdomen is hard and firm and you have severe pain      · You vomit blood    Contact your healthcare provider if:   · You feel full or bloated and cannot burp or pass gas      · You have not had a bowel movement for 3 days after your procedure      · You have neck pain      · You have a fever or chills      · You have nausea or are vomiting      · You have a rash or hives      · You have questions or concerns about your endoscopy  Relieve a sore throat: Suck on throat lozenges or crushed ice  Gargle with a small amount of warm salt water  Mix 1 teaspoon of salt and 1 cup of warm water to make salt water  Relieve gas and discomfort from bloating: Lie on your right side with a heating pad on your abdomen  Take short walks to help pass gas  Eat small meals until bloating is relieved  Rest after your procedure: You have been given medicine to relax you  Do not drive or make important decisions until the day after your procedure  Return to your normal activity as directed  You can usually return to work the day after your procedure  Follow up with your healthcare provider as directed: Write down your questions so you remember to ask them during your visits  © 2017 [address] Information is for End User's use only and may not be sold, redistributed or otherwise used for commercial purposes  All illustrations and images included in CareNotes® are the copyrighted property of [employer]  or Alex Goode  The above information is an  only  It is not intended as medical advice for individual conditions or treatments   Talk to your doctor, nurse or pharmacist before following any medical regimen to see if it is safe and effective for you

## 2018-02-09 NOTE — ANESTHESIA POSTPROCEDURE EVALUATION
Post-Op Assessment Note      CV Status:  Stable    Mental Status:  Alert and awake    Hydration Status:  Stable and euvolemic    PONV Controlled:  Controlled    Airway Patency:  Patent and adequate    Post Op Vitals Reviewed: Yes          Staff: CRNA           /82 (02/09/18 0827)    Temp      Pulse 69 (02/09/18 0827)   Resp 20 (02/09/18 0827)    SpO2 98 % (02/09/18 0827)

## 2018-02-09 NOTE — ANESTHESIA PREPROCEDURE EVALUATION
Review of Systems/Medical History  Patient summary reviewed  Chart reviewed  No history of anesthetic complications     Cardiovascular  Exercise tolerance: good,  Hyperlipidemia, Hypertension ,    Pulmonary  Not a smoker , Asthma: , No shortness of breath, No recent URI ,        GI/Hepatic       Kidney stones,        Endo/Other  Diabetes well controlled type 2 Oral agent,      GYN       Hematology   Musculoskeletal    Arthritis     Neurology   Psychology           Physical Exam    Airway    Mallampati score: II         Dental   No notable dental hx     Cardiovascular      Pulmonary      Other Findings      Lab Results   Component Value Date    WBC 6 10 11/21/2017    HGB 14 8 11/21/2017     11/21/2017     Lab Results   Component Value Date     11/21/2017    K 4 1 11/21/2017    BUN 14 11/21/2017    CREATININE 0 99 11/21/2017    GLUCOSE 186 (H) 03/22/2017     No results found for: PTT   Lab Results   Component Value Date    INR 1 06 07/10/2015       Blood type O    Lab Results   Component Value Date    HGBA1C 7 0 (H) 11/21/2017       Anesthesia Plan  ASA Score- 2     Anesthesia Type- IV sedation with anesthesia with ASA Monitors  Additional Monitors:   Airway Plan:     Comment: KAYDEN Valenzuela , have personally seen and evaluated the patient prior to anesthetic care  I have reviewed the pre-anesthetic record, and other medical records if appropriate to the anesthetic care  If a CRNA is involved in the case, I have reviewed the CRNA assessment, if present, and agree  Risks/benefits and alternatives discussed with patient including possible PONV, sore throat, and possibility of rare anesthetic and surgical emergencies        Plan Factors-Patient not instructed to abstain from smoking on day of procedure  Patient did not smoke on day of surgery  Induction-     Postoperative Plan-     Informed Consent- Anesthetic plan and risks discussed with patient    I personally reviewed this patient with the CRNA  Discussed and agreed on the Anesthesia Plan with the CRNA  Mahogany Nova

## 2018-02-09 NOTE — OP NOTE
ESOPHAGOGASTRODUODENOSCOPY    PROCEDURE: EGD/ Biopsy    INDICATIONS: GERD    POST-OP DIAGNOSIS: See the impression below    SEDATION: Monitored anesthesia care, check anesthesia records    PHYSICAL EXAM:    Vitals:    02/09/18 0827   BP: 137/82   Pulse: 69   Resp: 20   Temp:    SpO2: 98%    Body mass index is 42 04 kg/m²  General: NAD  Heart: S1 & S2 normal, RRR  Lungs: CTA, No rales or rhonchi  Abdomen: Soft, nontender, nondistended, good bowel sounds    CONSENT:  Informed consent was obtained for the procedure, including sedation after explaining the risks and benefits of the procedure  Risks including but not limited to bleeding, perforation, infection, aspiration were discussed in detail  Also explained about less than 100% sensitivity with the exam and other alternatives  PREPARATION:   EKG tracing, pulse oximetry, blood pressure were monitored throughout the procedure  Patient was identified by myself both verbally and by visual inspection of ID band  DESCRIPTION:   Patient was placed in the left lateral decubitus position and was sedated with the above medication  The gastroscope was introduced in to the oropharynx and the esophagus was intubated under direct visualization  Scope was passed down the esophagus up to 2nd part of the duodenum  A careful inspection was made as the gastroscope was withdrawn, including a retroflexed view of the stomach; findings and interventions are described below  FINDINGS:    #1  Esophagus and GEJ-mild GE junction nodularity and mild reflux esophagitis, biopsies taken    #2  Stomach-mild antral gastritis, biopsies taken  Normal retroflexion    #3   Duodenum-normal duodenum         IMPRESSIONS:      Mild gastritis biopsies  Mild reflux esophagitis with minimal nodularity biopsies taken    RECOMMENDATIONS:     Discharge home  Resume regular diet  Resume medications  Follow up biopsy results, call the office in 3 weeks for results  Call with any abdominal pain, bleeding, fevers    COMPLICATIONS:  None; patient tolerated the procedure well            DISPOSITION: PACU           CONDITION: Stable

## 2018-02-09 NOTE — H&P
History and Physical - [facility]  Cyn Zapata 65 y o  male MRN: [id number]    HPI: Cyn Zapata is a 65y o  year old male who presents with chronic GERD, hx of colon polyps, last exam 6 years ago        Review of Systems    Historical Information   Past Medical History:   Diagnosis Date    Arthritis     Diabetes mellitus ([facility] [address] )     DJD (degenerative joint disease)     Hyperlipidemia     Hypertension     Kidney stone     Mechanical complication of internal orthopedic device ([facility] [address] )      Past Surgical History:   Procedure Laterality Date    CARPAL TUNNEL RELEASE Left     HERNIA REPAIR      JOINT REPLACEMENT      right knee revision, infection total of 4 surgeries     JOINT REPLACEMENT      left knee     KIDNEY STONE SURGERY      x3    SC REVISE KNEE JOINT REPLACE,1 PART Right 3/20/2017    Procedure: REVISION TOTAL KNEE ARTHROPLASTY INCLUDING FEMORAL STEM REVISION;  Surgeon: Noemi Be MD;  [facility];  Service: Orthopedics    SC REVISE KNEE JOINT REPLACE,ALL PARTS Right 4/6/2016    Procedure: REVISION TOTAL KNEE ARTHROPLASTY INCLUDING FEMORAL AND TIBIAL COMPONENTS;  Surgeon: Noemi Be MD;  Location[facility];  Service: Orthopedics    ULNAR TUNNEL RELEASE       Social History   History   Alcohol Use    8 4 oz/week    14 Cans of beer per week     History   Drug Use No     History   Smoking Status    Former Smoker   Smokeless Tobacco    Never Used     Comment: quit 36 years ago     Family History   Problem Relation Age of Onset    Diabetes Mother     Hypertension Mother     Hypertension Father        Meds/Allergies     Prescriptions Prior to Admission   Medication    allopurinol (ZYLOPRIM) 100 mg tablet    amLODIPine (NORVASC) 5 mg tablet    atorvastatin (LIPITOR) 10 mg tablet    cholecalciferol (VITAMIN D3) 1,000 units tablet    fenofibrate (TRICOR) 145 mg tablet    labetalol (NORMODYNE) 100 mg tablet    lisinopril (ZESTRIL) 40 mg tablet    metFORMIN (GLUCOPHAGE) 1000 MG tablet    Misc Natural Products (TURMERIC CURCUMIN) CAPS    Multiple Vitamin (MULTIVITAMIN) tablet    Dulaglutide (TRULICITY) 1 5 YX/0 0UO SOPN    Empagliflozin (JARDIANCE) 10 MG TABS    enoxaparin (LOVENOX) 40 mg/0 4 mL       Allergies   Allergen Reactions    Doxycycline Photosensitivity    Other      Bee stings    Oxycodone Other (See Comments)     Flu like symptons       Objective     Blood pressure 135/72, pulse 72, temperature 97 6 °F (36 4 °C), temperature source Temporal, resp  rate 18, height 5' 10" (1 778 m), weight 133 kg (293 lb), SpO2 97 %  PHYSICAL EXAM    Gen: NAD  CV: RRR  CHEST: Clear  ABD: soft, NT/ND  EXT: no edema  Neuro: AAO      ASSESSMENT/PLAN:  This is a 65y o  year old male here for chronic GERD, hx of colon polyps, last exam 6 years ago        PLAN:   Procedure: egd/colonoscopy

## 2018-03-07 NOTE — PROCEDURES
Procedure      Pre-procedure Diagnosis: Lumbar disc disorder with radiculopathy  Post-procedure Diagnosis: Lumbar disc disorder with radiculopathy  Procedure Title(s):  1  Right L4 transforaminal epidural steroid injection 2  Right L5 transforaminal epidural steroid injection  Attending Surgeon:   Alistair Fletcher MD  Anesthesia:   Local     Indications: The patient is a 65year-old male with a diagnosis of lumbar disc disorder with radiculopathy  The patient's history and physical exam were reviewed  The risks, benefits and alternatives to the procedure were discussed, and all questions were answered to the patient's satisfaction  The patient agreed to proceed, and written informed consent was obtained  Procedure in Detail: The patient was brought into the procedure room and placed in the prone position on the fluoroscopy table  The area of the lumbar spine was prepped with chloraprep solution then draped in a sterile manner  The L4 vertebral body was identified with AP fluoroscopy  An oblique view to the right was obtained to better visualize the inferior junction of the pedicle and transverse process  The 6 o'clock position of the pedicle was marked and identified  The skin and subcutaneous tissues in the area were anesthetized with 1% lidocaine  A 22-gauge, 5 inch needle was directed toward the targeted point under fluoroscopy until bone was contacted  The needle was then walked inferiorly until the neural foramen was entered  A lateral fluoroscopic view was then used to place the needle tip at the 10 o'clock position of the foramen  The same procedure was repeated for the right L5 level    Negative aspiration was confirmed, and 1 ml Omnipaque 300 was injected at each level  Appropriate neurograms were observed under AP fluoroscopy  Digital subtraction angiography was performed showing no vascular uptake and appropriate spread in the epidural space   Then, after negative aspiration, a solution consisting of 1 mL 0 25% bupivacaine and 0 5 mL depo-medrol (80mg/mL) was easily injected at each level  The needles were removed with a 1% lidocaine flush  The patient's back was cleaned and a bandage was placed over the needle insertion points  Disposition: The patient tolerated the procedure well, and there were no apparent complications  The patient was taken to the recovery area where written discharge instructions for the procedure were given             Signatures   Electronically signed by : Naa Biswas MD; Oct 26 2017  8:38AM EST                       (Author)

## 2018-03-30 ENCOUNTER — LAB (OUTPATIENT)
Dept: LAB | Facility: CLINIC | Age: 58
End: 2018-03-30
Payer: COMMERCIAL

## 2018-03-30 ENCOUNTER — TRANSCRIBE ORDERS (OUTPATIENT)
Dept: LAB | Facility: CLINIC | Age: 58
End: 2018-03-30

## 2018-03-30 ENCOUNTER — TELEPHONE (OUTPATIENT)
Dept: PAIN MEDICINE | Facility: CLINIC | Age: 58
End: 2018-03-30

## 2018-03-30 DIAGNOSIS — E78.2 MIXED HYPERLIPIDEMIA: ICD-10-CM

## 2018-03-30 DIAGNOSIS — E11.9 DIABETES MELLITUS WITHOUT COMPLICATION (HCC): Primary | ICD-10-CM

## 2018-03-30 DIAGNOSIS — I10 ESSENTIAL HYPERTENSION, MALIGNANT: ICD-10-CM

## 2018-03-30 DIAGNOSIS — E11.9 DIABETES MELLITUS WITHOUT COMPLICATION (HCC): ICD-10-CM

## 2018-03-30 LAB
ALBUMIN SERPL BCP-MCNC: 4.2 G/DL (ref 3.5–5)
ALP SERPL-CCNC: 65 U/L (ref 46–116)
ALT SERPL W P-5'-P-CCNC: 79 U/L (ref 12–78)
ANION GAP SERPL CALCULATED.3IONS-SCNC: 11 MMOL/L (ref 4–13)
AST SERPL W P-5'-P-CCNC: 42 U/L (ref 5–45)
BILIRUB SERPL-MCNC: 0.9 MG/DL (ref 0.2–1)
BUN SERPL-MCNC: 19 MG/DL (ref 5–25)
CALCIUM SERPL-MCNC: 9.2 MG/DL (ref 8.3–10.1)
CHLORIDE SERPL-SCNC: 103 MMOL/L (ref 100–108)
CO2 SERPL-SCNC: 25 MMOL/L (ref 21–32)
CREAT SERPL-MCNC: 1.24 MG/DL (ref 0.6–1.3)
EST. AVERAGE GLUCOSE BLD GHB EST-MCNC: 186 MG/DL
GFR SERPL CREATININE-BSD FRML MDRD: 65 ML/MIN/1.73SQ M
GLUCOSE P FAST SERPL-MCNC: 209 MG/DL (ref 65–99)
HBA1C MFR BLD: 8.1 % (ref 4.2–6.3)
LDLC SERPL DIRECT ASSAY-MCNC: 86 MG/DL (ref 0–100)
POTASSIUM SERPL-SCNC: 4.3 MMOL/L (ref 3.5–5.3)
PROT SERPL-MCNC: 7.4 G/DL (ref 6.4–8.2)
SODIUM SERPL-SCNC: 139 MMOL/L (ref 136–145)

## 2018-03-30 PROCEDURE — 80053 COMPREHEN METABOLIC PANEL: CPT

## 2018-03-30 PROCEDURE — 83721 ASSAY OF BLOOD LIPOPROTEIN: CPT

## 2018-03-30 PROCEDURE — 36415 COLL VENOUS BLD VENIPUNCTURE: CPT | Performed by: INTERNAL MEDICINE

## 2018-03-30 PROCEDURE — 83036 HEMOGLOBIN GLYCOSYLATED A1C: CPT | Performed by: INTERNAL MEDICINE

## 2018-03-30 NOTE — TELEPHONE ENCOUNTER
Pt called requesting to schedule another Rt L4-L5 Tfesi  Last one was on 10/26/17   Please advise if ok to schedule another or if he needs to be seen in the office first  Thanks

## 2018-04-05 ENCOUNTER — CONSULT (OUTPATIENT)
Dept: VASCULAR SURGERY | Facility: CLINIC | Age: 58
End: 2018-04-05
Payer: COMMERCIAL

## 2018-04-05 VITALS
TEMPERATURE: 95.8 F | HEIGHT: 70 IN | BODY MASS INDEX: 41.95 KG/M2 | SYSTOLIC BLOOD PRESSURE: 132 MMHG | HEART RATE: 80 BPM | DIASTOLIC BLOOD PRESSURE: 68 MMHG | WEIGHT: 293 LBS | RESPIRATION RATE: 16 BRPM

## 2018-04-05 DIAGNOSIS — I83.813 VARICOSE VEINS OF BILATERAL LOWER EXTREMITIES WITH PAIN: Primary | ICD-10-CM

## 2018-04-05 PROCEDURE — 99244 OFF/OP CNSLTJ NEW/EST MOD 40: CPT | Performed by: PHYSICIAN ASSISTANT

## 2018-04-05 NOTE — PATIENT INSTRUCTIONS
Varicose Veins with pain and edema    - You are given a prescription graded compression stockings 20-30mmHg to wear each day  Put stockings on each morning and remove at night  - Weight management and regular exercise  - You can apply Eucerine cream to the legs to help with good skin care  - Will follow up in 3 months to re-assess  Varicose Veins   WHAT YOU NEED TO KNOW:   What are varicose veins? Varicose veins are veins that become large, twisted, and swollen  They are common on the back of the calves, knees, and thighs  Varicose veins are caused by valves in your veins that do not work properly  This causes blood to collect and increase pressure in the veins of your legs  The increased pressure causes your veins to stretch, get larger, swell, and twist        What increases my risk for varicose veins? · Pregnancy    · A family history of varicose veins    · Being overweight or obese    · Age 48 years or older    · Sitting or standing for long periods of time    · Wearing tight clothing  What are the signs and symptoms of varicose veins? Your symptoms may be worse after you stand or sit for long periods of time  You may have any of the following:  · Blue, purple, or bulging veins in your legs     · Pain, swelling, or muscle cramps in your legs    · Feeling of fatigue or heaviness in your legs  How are varicose veins diagnosed? Your healthcare provider will examine your legs and ask about your medical history  You may need tests, such as a Doppler ultrasound or duplex scan  These tests show your veins and valves, and how your blood is flowing through them  These tests may also show if there is a blockage or blood clot  How are varicose veins treated? The goal of treatment is to decrease symptoms, improve appearance, and prevent further problems  Treatment will depend on which veins are affected and how severe your condition is  You may need procedures to treat or remove your varicose veins   For example, your healthcare provider may inject a solution or use a laser to close the varicose veins  Surgery to remove long veins may also be done  Ask your healthcare provider for more information about procedures used to treat varicose veins  What can I do to manage my symptoms? · Do not sit or stand for long periods of time  This can cause the blood to collect in your legs and make your symptoms worse  Bend or rotate your ankles several times every hour  Walk around for a few minutes every hour to get blood moving in your legs  · Do not cross your legs when you sit  This decreases blood flow to your feet and can make your symptoms worse  · Do not wear tight clothing or shoes  Do not wear high-heeled shoes  Do not wear clothes that are tight around the waist or knees  · Maintain a healthy weight  Being overweight or obese can make your varicose veins worse  Ask your healthcare provider how much you should weigh  Ask him or her to help you create a weight loss plan if you are overweight  · Wear pressure stockings as directed  The stockings are tight and put pressure on your legs  They improve blood flow and help prevent clots  · Elevate your legs  Keep them above the level of your heart for 15 to 30 minutes several times a day  You can also prop the end of your bed up slightly to elevate your legs while you sleep  This will help blood to flow back to your heart  · Get regular exercise  Talk to your healthcare provider about the best exercise plan for you  Exercise can improve blood flow to your legs and feet  When should I seek immediate care? · You have a wound that does not heal or is infected  · You have an injury that has broken your skin and caused your varicose veins to bleed  · Your leg is swollen and hard  · You notice that your legs or feet are turning blue or black  · Your leg feels warm, tender, and painful  It may look swollen and red    When should I contact my healthcare provider? · You have pain in your leg that does not go away or gets worse  · You notice sudden large bruising on your legs  · You have a rash on your leg  · Your symptoms keep you from doing your daily activities  · You have questions or concerns about your condition or care  CARE AGREEMENT:   You have the right to help plan your care  Learn about your health condition and how it may be treated  Discuss treatment options with your caregivers to decide what care you want to receive  You always have the right to refuse treatment  The above information is an  only  It is not intended as medical advice for individual conditions or treatments  Talk to your doctor, nurse or pharmacist before following any medical regimen to see if it is safe and effective for you  © 2017 [employer] Information is for End User's use only and may not be sold, redistributed or otherwise used for commercial purposes  All illustrations and images included in CareNotes® are the copyrighted property of A [employer]  or [employer]

## 2018-04-05 NOTE — LETTER
April 5, 2018     Madalynlorena Barragan DO  [address] [facility]  [address]    Patient: Haleigh Benitez   YOB: 1960   Date of Visit: 4/5/2018       Dear Dr Yudith Zapata: Thank you for referring Haleigh Benitez to me for evaluation  Below are the relevant portions of my assessment and plan of care  Suzi Smith presents to discuss symptomatic varicose veins  He complains of daily symptoms of painful veins and tired legs  We had a detailed discussion about varicose veins, pathophysiology and treatment options  We discussed the mainstay therapy of compression and elevation which I strongly recommend that he follow  We will have him follow-up in the office in 3 months to reassess  response and offer any further options as appropriate  If you have questions, please do not hesitate to call me  I look forward to following Suzi Smith along with you           Sincerely,        Rene Crowley PA-C        CC: No Recipients

## 2018-04-05 NOTE — PROGRESS NOTES
Assessment/Plan:    Symptomatic Varicose Veins with pain and edema     57M who presents to discuss symptomatic varicose veins  He complains of daily symptoms of painful veins and tired legs  We had a detailed discussion about varicose veins, pathophysiology and treatment options  We discussed the mainstay therapy of compression and elevation which I strongly recommend that he follow  We will have him follow-up in 3 months to reassess response and offer any further options as appropriate  - Given script for graded compression stockings 20-30mmHg to wear each day and remove at night  - We discussed elevation of legs - particularly in the evening - about the level of the heart  - Weight management and regular exercise  - You can apply Eucerine cream to the legs maintain skin quality  - Follow up in office with Vascular surgeon in about 3 months to re-assess response to treatment  - VAS reflux lower limb venous duplex study with reflux assessment, complete bilateral; Future       Patient ID: Robyn Mendoza is a 65 y o  male  Chief Complaint: Varicose veins L> R since age 49  Complains of bulging, achy, tired, heavy, painful and itchy  Patient reports prior treatment to the right leg about 30 years ago but he does not know procedure or where it was performed  Patient is accompanied by his wife and both work for [employer]   Mr  Robyn Mendoza is a 65 gentleman with a longstanding history of varicose veins  Patient reports that he had a surgical procedure which sounds like stab phlebotomies or ligation to the RIGHT leg about 30 years ago  He comes into the office today with complaints of at least moderate bilateral leg symptoms related to varicose veins  He is on his feet at work all day  He complains that it can get difficult to get through work with very tired, aching, and cramping legs on a daily basis  More so, bulging veins create pain throughout the day   He has one truncal vein in the left thigh that is particularly bothersome to him  He has chronic edema which can get much worse by the end of the day and he gets cramps at night  Symptoms have been troubling him for years and has now decided to have vein evaluation  Both legs are symptomatic with the left worse than right  He hopes / expects to have good symptomatic relief and would like surgery as an option  He also is morbidly obese and he continues to have bilateral knee discomfort after knee replacements (multiple revisions to the right leg)  Despite this, 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  He is also trying to loose weight  He was given a script for compression last year but had not regularly tried to wear them  No calf pain  No bleeding veins  No ulcers or wounds to feet  No history of blood clots or phlebitis  No cardiac disease  No chest pain, shortness of breath  The following portions of the patient's history were reviewed and updated as appropriate: allergies, current medications, past family history, past medical history, past social history, past surgical history and problem list  ROS reviewed  Review of Systems   Constitutional: Negative  HENT: Negative  Eyes: Negative  Respiratory: Negative  Cardiovascular: Negative  Painful veins   Gastrointestinal: Negative  Endocrine: Negative  Genitourinary: Negative  Musculoskeletal: Positive for arthralgias, back pain, joint swelling, neck pain and neck stiffness  Leg swelling   Skin: Negative  Allergic/Immunologic: Negative  Neurological: Negative  Hematological: Negative  Psychiatric/Behavioral: Negative  Objective:      LEFT leg thigh large truncal vv      RIGHT leg medial aspect near knee - large vv               Physical Exam   Constitutional: He is oriented to person, place, and time  He appears well-developed and well-nourished  He is cooperative  HENT:   Head: Normocephalic and atraumatic  Eyes: EOM are normal  Pupils are equal, round, and reactive to light  Neck: Trachea normal  Neck supple  No JVD present  No thyromegaly present  Thick neck   Cardiovascular: Normal rate, regular rhythm, S1 normal, S2 normal and normal heart sounds  Exam reveals no gallop and no friction rub  No murmur heard  Pulses:       Dorsalis pedis pulses are 2+ on the right side, and 2+ on the left side  Posterior tibial pulses are 1+ on the right side, and 1+ on the left side  Marked R> L bilateral 3+ LE ankle and pre-tibial pitting edema from the ankles to the knees    Mild chronic venous changes    + Bilateral palpable, truncal bulging veins, as well as small reticular veins  + marked spider veins in feet  No wounds  Good pulses in both feet  Pulmonary/Chest: Effort normal and breath sounds normal  No accessory muscle usage  No respiratory distress  He has no wheezes  He has no rales  Abdominal: Soft  There is no hepatosplenomegaly  obese   Musculoskeletal: Normal range of motion  Bilateral well healed knee surgical scars   Neurological: He is alert and oriented to person, place, and time  Grossly normal    Skin: Skin is warm and dry  No lesion noted  No cyanosis  Nails show no clubbing  Psychiatric: He has a normal mood and affect  Nursing note and vitals reviewed

## 2018-04-13 ENCOUNTER — HOSPITAL ENCOUNTER (OUTPATIENT)
Dept: RADIOLOGY | Facility: CLINIC | Age: 58
Discharge: HOME | End: 2018-04-13
Attending: ANESTHESIOLOGY | Admitting: ANESTHESIOLOGY
Payer: COMMERCIAL

## 2018-04-13 VITALS
SYSTOLIC BLOOD PRESSURE: 126 MMHG | DIASTOLIC BLOOD PRESSURE: 78 MMHG | TEMPERATURE: 98.3 F | HEART RATE: 70 BPM | RESPIRATION RATE: 18 BRPM | OXYGEN SATURATION: 98 %

## 2018-04-13 DIAGNOSIS — M51.16 INTERVERTEBRAL DISC DISORDERS WITH RADICULOPATHY, LUMBAR REGION: ICD-10-CM

## 2018-04-13 PROCEDURE — 64483 NJX AA&/STRD TFRM EPI L/S 1: CPT | Performed by: ANESTHESIOLOGY

## 2018-04-13 PROCEDURE — 64484 NJX AA&/STRD TFRM EPI L/S EA: CPT | Performed by: ANESTHESIOLOGY

## 2018-04-13 RX ORDER — METHYLPREDNISOLONE ACETATE 80 MG/ML
80 INJECTION, SUSPENSION INTRA-ARTICULAR; INTRALESIONAL; INTRAMUSCULAR; PARENTERAL; SOFT TISSUE ONCE
Status: COMPLETED | OUTPATIENT
Start: 2018-04-13 | End: 2018-04-13

## 2018-04-13 RX ORDER — BUPIVACAINE HCL/PF 2.5 MG/ML
10 VIAL (ML) INJECTION ONCE
Status: COMPLETED | OUTPATIENT
Start: 2018-04-13 | End: 2018-04-13

## 2018-04-13 RX ORDER — LIDOCAINE HYDROCHLORIDE 10 MG/ML
10 INJECTION, SOLUTION EPIDURAL; INFILTRATION; INTRACAUDAL; PERINEURAL ONCE
Status: COMPLETED | OUTPATIENT
Start: 2018-04-13 | End: 2018-04-13

## 2018-04-13 RX ADMIN — METHYLPREDNISOLONE ACETATE 80 MG: 80 INJECTION, SUSPENSION INTRA-ARTICULAR; INTRALESIONAL; INTRAMUSCULAR; PARENTERAL; SOFT TISSUE at 08:42

## 2018-04-13 RX ADMIN — Medication 2 ML: at 08:42

## 2018-04-13 RX ADMIN — LIDOCAINE HYDROCHLORIDE 8 ML: 10 INJECTION, SOLUTION EPIDURAL; INFILTRATION; INTRACAUDAL; PERINEURAL at 08:38

## 2018-04-13 RX ADMIN — IOHEXOL 1 ML: 300 INJECTION, SOLUTION INTRAVENOUS at 08:42

## 2018-04-23 ENCOUNTER — TELEPHONE (OUTPATIENT)
Dept: RADIOLOGY | Facility: CLINIC | Age: 58
End: 2018-04-23

## 2018-04-23 NOTE — TELEPHONE ENCOUNTER
Patient states that he is not getting any relief  Patient is aware that it could take up to two weeks for full effect of injection  Pain scale is 5/10    Patient will be called next week for update

## 2018-04-30 NOTE — TELEPHONE ENCOUNTER
Spoke w/pt and he stated that he spoke with you this morning and that you where going to look at his MrI and go from their

## 2018-05-04 ENCOUNTER — OFFICE VISIT (OUTPATIENT)
Dept: OBGYN CLINIC | Facility: MEDICAL CENTER | Age: 58
End: 2018-05-04
Payer: COMMERCIAL

## 2018-05-04 VITALS
HEIGHT: 70 IN | BODY MASS INDEX: 41.98 KG/M2 | SYSTOLIC BLOOD PRESSURE: 144 MMHG | HEART RATE: 63 BPM | DIASTOLIC BLOOD PRESSURE: 84 MMHG | WEIGHT: 293.21 LBS

## 2018-05-04 DIAGNOSIS — M70.50 PES ANSERINE BURSITIS: ICD-10-CM

## 2018-05-04 DIAGNOSIS — M25.561 CHRONIC PAIN OF RIGHT KNEE: Primary | ICD-10-CM

## 2018-05-04 DIAGNOSIS — G89.29 CHRONIC PAIN OF RIGHT KNEE: Primary | ICD-10-CM

## 2018-05-04 PROCEDURE — 99213 OFFICE O/P EST LOW 20 MIN: CPT | Performed by: ORTHOPAEDIC SURGERY

## 2018-05-04 PROCEDURE — 20610 DRAIN/INJ JOINT/BURSA W/O US: CPT | Performed by: ORTHOPAEDIC SURGERY

## 2018-05-04 RX ORDER — LIDOCAINE HYDROCHLORIDE 10 MG/ML
2 INJECTION, SOLUTION INFILTRATION; PERINEURAL
Status: COMPLETED | OUTPATIENT
Start: 2018-05-04 | End: 2018-05-04

## 2018-05-04 RX ORDER — BUPIVACAINE HYDROCHLORIDE 2.5 MG/ML
2 INJECTION, SOLUTION INFILTRATION; PERINEURAL
Status: COMPLETED | OUTPATIENT
Start: 2018-05-04 | End: 2018-05-04

## 2018-05-04 RX ORDER — BETAMETHASONE SODIUM PHOSPHATE AND BETAMETHASONE ACETATE 3; 3 MG/ML; MG/ML
12 INJECTION, SUSPENSION INTRA-ARTICULAR; INTRALESIONAL; INTRAMUSCULAR; SOFT TISSUE
Status: COMPLETED | OUTPATIENT
Start: 2018-05-04 | End: 2018-05-04

## 2018-05-04 RX ADMIN — LIDOCAINE HYDROCHLORIDE 2 ML: 10 INJECTION, SOLUTION INFILTRATION; PERINEURAL at 09:58

## 2018-05-04 RX ADMIN — BUPIVACAINE HYDROCHLORIDE 2 ML: 2.5 INJECTION, SOLUTION INFILTRATION; PERINEURAL at 09:58

## 2018-05-04 RX ADMIN — BETAMETHASONE SODIUM PHOSPHATE AND BETAMETHASONE ACETATE 12 MG: 3; 3 INJECTION, SUSPENSION INTRA-ARTICULAR; INTRALESIONAL; INTRAMUSCULAR; SOFT TISSUE at 09:58

## 2018-05-04 NOTE — PROGRESS NOTES
65 y o male presents for evaluation  Since he has been seen last, he has undergone spine and pain treatments designed to correct a right lower extremity lumbar radiculopathy  This has resulted in significant reduction in his right thigh pain, however now he has medial-sided right knee pain  He describes pain in the medial aspect of the right knee joint, this creates difficulty ascending and descending stairs  Review of Systems  Review of systems negative unless otherwise specified in HPI    Past Medical History  Past Medical History:   Diagnosis Date    Arthritis     Diabetes mellitus ([address] )     DJD (degenerative joint disease)     Hyperlipidemia     Hypertension     Kidney stone     Mechanical complication of internal orthopedic device [facility])        Past Surgical History  Past Surgical History:   Procedure Laterality Date    CARPAL TUNNEL RELEASE Left     HERNIA REPAIR      JOINT REPLACEMENT      right knee revision, infection total of 4 surgeries     JOINT REPLACEMENT      left knee     KIDNEY STONE SURGERY      x3    AL COLONOSCOPY FLX DX W/COLLJ SPEC WHEN PFRMD N/A 2/9/2018    Procedure: EGD AND COLONOSCOPY;  Surgeon: Syed Urrutia MD;  [facility]: [facility]; Service: Gastroenterology    AL REVISE KNEE JOINT REPLACE,1 PART Right 3/20/2017    Procedure: REVISION TOTAL KNEE ARTHROPLASTY INCLUDING FEMORAL STEM REVISION;  Surgeon: Sergey Lynn MD;  [facility];  Service: Orthopedics    AL REVISE KNEE JOINT REPLACE,ALL PARTS Right 4/6/2016    Procedure: REVISION TOTAL KNEE ARTHROPLASTY INCLUDING FEMORAL AND TIBIAL COMPONENTS;  Surgeon: Sergey Lynn MD;  Location: [facility];  Service: Orthopedics    ULNAR TUNNEL RELEASE         Current Medications  Current Outpatient Prescriptions on File Prior to Visit   Medication Sig Dispense Refill    allopurinol (ZYLOPRIM) 100 mg tablet Take 100 mg by mouth daily        amLODIPine (NORVASC) 5 mg tablet Take 10 mg by mouth daily        atorvastatin (LIPITOR) 10 mg tablet Take 20 mg by mouth daily        cholecalciferol (VITAMIN D3) 1,000 units tablet Take 1,000 Units by mouth daily      Dulaglutide (TRULICITY) 1 5 UU/7 0MN SOPN Inject under the skin once a week Indications: sundays   Elastic Bandages & Supports (MEDICAL COMPRESSION STOCKINGS) MISC by Does not apply route daily Knee High 20-30mmHg 2 each 0    Elastic Bandages & Supports (MEDICAL COMPRESSION STOCKINGS) MISC by Does not apply route daily Knee High 20-30mmHg    For varicose veins/ venous insuf 2 each 3    Empagliflozin (JARDIANCE) 10 MG TABS Take 1 tablet by mouth daily      enoxaparin (LOVENOX) 40 mg/0 4 mL Inject 0 4 mL under the skin daily for 30 days 12 mL 0    fenofibrate (TRICOR) 145 mg tablet Take 145 mg by mouth daily   labetalol (NORMODYNE) 100 mg tablet Take 100 mg by mouth 2 (two) times a day      lisinopril (ZESTRIL) 40 mg tablet Take 40 mg by mouth daily   metFORMIN (GLUCOPHAGE) 1000 MG tablet Take 1,000 mg by mouth 2 (two) times a day with meals   Misc Natural Products (TURMERIC CURCUMIN) CAPS Take by mouth      Multiple Vitamin (MULTIVITAMIN) tablet Take 1 tablet by mouth daily  No current facility-administered medications on file prior to visit  Recent Labs [facility])    0  Lab Value Date/Time   HCT 43 1 11/21/2017 0639   HCT 38 3 10/10/2015 0829   HGB 14 8 11/21/2017 0639   HGB 13 2 10/10/2015 0829   WBC 6 10 11/21/2017 0639   WBC 5 20 10/10/2015 0829   INR 1 06 07/10/2015 0750   ESR 12 (H) 07/10/2015 0750   GLUCOSE 186 (H) 03/22/2017 0532   GLUCOSE 120 10/10/2015 0829   HGBA1C 8 1 (H) 03/30/2018 0700   HGBA1C 6 0 (H) 10/10/2015 0829         Physical exam  · General: Awake, Alert, Oriented  · Eyes: Pupils equal, round and reactive to light  · Heart: regular rate and rhythm  · Lungs: No audible wheezing  · Abdomen: soft  Examination finds the right knee with a healed anterior incision    There is moderate periarticular swelling but no well-defined effusion  Extension is almost full, flexion is excellent  There is no mid flexion valgus instability  There is discrete tenderness the patient the proximal medial tibia  This occurs that erythema or ecchymotic standing    Imaging  None are performed today    Procedure  An injection of corticosteroid is provided for the right pes bursa, it is documented below    Large joint arthrocentesis  Date/Time: 5/4/2018 9:58 AM  Consent given by: patient  Supporting Documentation  Indications: pain   Procedure Details  Location: knee - R knee (Pes Bursa)  Needle size: 22 G  Medications administered: 2 mL bupivacaine 0 25 %; 2 mL lidocaine 1 %; 12 mg betamethasone acetate-betamethasone sodium phosphate 6 (3-3) mg/mL    Patient tolerance: patient tolerated the procedure well with no immediate complications  Dressing:  Sterile dressing applied          Assessment/Plan:   57 y o male who has inferomedial right knee pain that is consistent with inflamed pes bursa  An injection of corticosteroid is offered for pain relief purposes  It is advised, except, administers outlined above    Would welcome the opportunity see this patient back in the office in 3 months time for follow-up

## 2018-05-15 ENCOUNTER — HOSPITAL ENCOUNTER (OUTPATIENT)
Dept: RADIOLOGY | Facility: CLINIC | Age: 58
Discharge: HOME | End: 2018-05-15
Attending: ANESTHESIOLOGY
Payer: COMMERCIAL

## 2018-05-15 VITALS
OXYGEN SATURATION: 98 % | SYSTOLIC BLOOD PRESSURE: 141 MMHG | DIASTOLIC BLOOD PRESSURE: 84 MMHG | RESPIRATION RATE: 18 BRPM | HEART RATE: 68 BPM | TEMPERATURE: 98 F

## 2018-05-15 DIAGNOSIS — M51.16 INTERVERTEBRAL DISC DISORDERS WITH RADICULOPATHY, LUMBAR REGION: ICD-10-CM

## 2018-05-15 PROCEDURE — 64484 NJX AA&/STRD TFRM EPI L/S EA: CPT | Performed by: ANESTHESIOLOGY

## 2018-05-15 PROCEDURE — 64483 NJX AA&/STRD TFRM EPI L/S 1: CPT | Performed by: ANESTHESIOLOGY

## 2018-05-15 RX ORDER — LIDOCAINE HYDROCHLORIDE 10 MG/ML
10 INJECTION, SOLUTION EPIDURAL; INFILTRATION; INTRACAUDAL; PERINEURAL ONCE
Status: COMPLETED | OUTPATIENT
Start: 2018-05-15 | End: 2018-05-15

## 2018-05-15 RX ORDER — BUPIVACAINE HCL/PF 2.5 MG/ML
10 VIAL (ML) INJECTION ONCE
Status: COMPLETED | OUTPATIENT
Start: 2018-05-15 | End: 2018-05-15

## 2018-05-15 RX ORDER — METHYLPREDNISOLONE ACETATE 80 MG/ML
80 INJECTION, SUSPENSION INTRA-ARTICULAR; INTRALESIONAL; INTRAMUSCULAR; PARENTERAL; SOFT TISSUE ONCE
Status: COMPLETED | OUTPATIENT
Start: 2018-05-15 | End: 2018-05-15

## 2018-05-15 RX ADMIN — IOHEXOL 1 ML: 300 INJECTION, SOLUTION INTRAVENOUS at 08:49

## 2018-05-15 RX ADMIN — Medication 2 ML: at 08:49

## 2018-05-15 RX ADMIN — LIDOCAINE HYDROCHLORIDE 8 ML: 10 INJECTION, SOLUTION EPIDURAL; INFILTRATION; INTRACAUDAL; PERINEURAL at 08:46

## 2018-05-15 RX ADMIN — METHYLPREDNISOLONE ACETATE 80 MG: 80 INJECTION, SUSPENSION INTRA-ARTICULAR; INTRALESIONAL; INTRAMUSCULAR; PARENTERAL; SOFT TISSUE at 08:49

## 2018-05-15 NOTE — DISCHARGE INSTRUCTIONS
Epidural Steroid Injection   WHAT YOU NEED TO KNOW:   An epidural steroid injection (DAVIE) is a procedure to inject steroid medicine into the epidural space  The epidural space is between your spinal cord and vertebrae  Steroids reduce inflammation and fluid buildup in your spine that may be causing pain  You may be given pain medicine along with the steroids  ACTIVITY  · Do not drive or operate machinery today  · No strenuous activity today - bending, lifting, etc   · You may resume normal activites starting tomorrow - start slowly and as tolerated  · You may shower today, but no tub baths or hot tubs  · You may have numbness for several hours from the local anesthetic  Please use caution and common sense, especially with weight-bearing activities  CARE OF THE INJECTION SITE  · If you have soreness or pain, apply ice to the area today (20 minutes on/20 minutes off)  · Starting tomorrow, you may use warm, moist heat or ice if needed  · You may have an increase or change in your discomfort for 36-48 hours after your treatment  · Apply ice and continue with any pain medication you have been prescribed  · Notify [facility] if you have any of the following: redness, drainage, swelling, headache, stiff neck or fever above 100°F     SPECIAL INSTRUCTIONS  · Our office will contact you in approximately 7 days for a progress report  MEDICATIONS  · Continue to take all routine medications  · Our office may have instructed you to hold some medications  If you have a problem specifically related to your procedure, please call our office at [phone]  Problems not related to your procedure should be directed to your primary care physician

## 2018-05-15 NOTE — H&P
History of Present Illness: The patient is a 65 y o  male who presents with complaints of right lower back and knee pain and is here today for right L3-4 transforaminal epidural steroid injection  Patient Active Problem List   Diagnosis    [facility]    Hypertension    Hyperlipidemia    Status post revision of total replacement of right knee    Varicose veins of bilateral lower extremities with pain    Intervertebral disc disorders with radiculopathy, lumbar region    Chronic pain of right knee    Pes anserine bursitis       Past Medical History:   Diagnosis Date    Arthritis     [facility]     DJD (degenerative joint disease)     Hyperlipidemia     Hypertension     Kidney stone     Mechanical complication of internal orthopedic device ([facility]        Past Surgical History:   Procedure Laterality Date    CARPAL TUNNEL RELEASE Left     HERNIA REPAIR      JOINT REPLACEMENT      right knee revision, infection total of 4 surgeries     JOINT REPLACEMENT      left knee     KIDNEY STONE SURGERY      x3    NM COLONOSCOPY FLX DX W/COLLJ SPEC WHEN PFRMD N/A 2/9/2018    Procedure: EGD AND COLONOSCOPY;  Surgeon: Jenny Ragland MD;  [facility]; Service: Gastroenterology    NM REVISE KNEE JOINT REPLACE,1 PART Right 3/20/2017    Procedure: REVISION TOTAL KNEE ARTHROPLASTY INCLUDING FEMORAL STEM REVISION;  Surgeon: Evalyn Collet, MD;  [facility];  Service: Orthopedics    NM REVISE KNEE JOINT REPLACE,ALL PARTS Right 4/6/2016    Procedure: REVISION TOTAL KNEE ARTHROPLASTY INCLUDING FEMORAL AND TIBIAL COMPONENTS;  Surgeon: Evalyn Collet, MD;  [facility];  Service: Orthopedics    ULNAR TUNNEL RELEASE           Current Outpatient Prescriptions:     allopurinol (ZYLOPRIM) 100 mg tablet, Take 100 mg by mouth daily  , Disp: , Rfl:     amLODIPine (NORVASC) 5 mg tablet, Take 10 mg by mouth daily  , Disp: , Rfl:     atorvastatin (LIPITOR) 10 mg tablet, Take 20 mg by mouth daily  , Disp: , Rfl:     cholecalciferol (VITAMIN D3) 1,000 units tablet, Take 1,000 Units by mouth daily, Disp: , Rfl:     Dulaglutide (TRULICITY) 1 5 BN/0 7DY SOPN, Inject under the skin once a week Indications: sundays  , Disp: , Rfl:     Elastic Bandages & Supports ([address]) MISC, by Does not apply route daily Knee High 20-30mmHg, Disp: 2 each, Rfl: 0    Elastic Bandages & Supports ([address]) MISC, by Does not apply route daily Knee High 20-30mmHg  For varicose veins/ venous insuf, Disp: 2 each, Rfl: 3    Empagliflozin (JARDIANCE) 10 MG TABS, Take 1 tablet by mouth daily, Disp: , Rfl:     enoxaparin (LOVENOX) 40 mg/0 4 mL, Inject 0 4 mL under the skin daily for 30 days, Disp: 12 mL, Rfl: 0    fenofibrate (TRICOR) 145 mg tablet, Take 145 mg by mouth daily  , Disp: , Rfl:     labetalol (NORMODYNE) 100 mg tablet, Take 100 mg by mouth 2 (two) times a day, Disp: , Rfl:     lisinopril (ZESTRIL) 40 mg tablet, Take 40 mg by mouth daily  , Disp: , Rfl:     metFORMIN (GLUCOPHAGE) 1000 MG tablet, Take 1,000 mg by mouth 2 (two) times a day with meals  , Disp: , Rfl:     Misc Natural Products (TURMERIC CURCUMIN) CAPS, Take by mouth, Disp: , Rfl:     Multiple Vitamin (MULTIVITAMIN) tablet, Take 1 tablet by mouth daily  , Disp: , Rfl:     Allergies   Allergen Reactions    Doxycycline Photosensitivity    Other      Bee stings    Oxycodone Other (See Comments)     Flu like symptons       Physical Exam:   Vitals:    05/15/18 0833   BP: 113/74   Pulse: 63   Resp: 18   Temp: 98 °F (36 7 °C)   SpO2: 98%     General: Awake, Alert, Oriented x 3, Mood and affect appropriate  Respiratory: Respirations even and unlabored  Cardiovascular: Peripheral pulses intact; no edema  Musculoskeletal Exam:  Right lower back tenderness    ASA Score: 2    Assessment:   1   Intervertebral disc disorders with radiculopathy, lumbar region        Plan: R L3-4 TFESI

## 2018-05-22 ENCOUNTER — TELEPHONE (OUTPATIENT)
Dept: RADIOLOGY | Facility: CLINIC | Age: 58
End: 2018-05-22

## 2018-05-22 NOTE — TELEPHONE ENCOUNTER
Patient states that he is having a little of relief  Patient states that when she gets up he feel the sharp pain but once he starts walking the pain goes away  Patient hopes for more progress next week      Pain scale - 4/10

## 2018-07-10 ENCOUNTER — HOSPITAL ENCOUNTER (OUTPATIENT)
Dept: NON INVASIVE DIAGNOSTICS | Facility: CLINIC | Age: 58
Discharge: HOME | End: 2018-07-10
Payer: COMMERCIAL

## 2018-07-10 DIAGNOSIS — I83.813 VARICOSE VEINS OF BILATERAL LOWER EXTREMITIES WITH PAIN: ICD-10-CM

## 2018-07-10 PROCEDURE — 93970 EXTREMITY STUDY: CPT

## 2018-07-10 PROCEDURE — 93970 EXTREMITY STUDY: CPT | Performed by: SURGERY

## 2018-08-06 ENCOUNTER — OFFICE VISIT (OUTPATIENT)
Dept: VASCULAR SURGERY | Facility: CLINIC | Age: 58
End: 2018-08-06
Payer: COMMERCIAL

## 2018-08-06 VITALS
TEMPERATURE: 98.3 F | WEIGHT: 293 LBS | HEART RATE: 72 BPM | RESPIRATION RATE: 18 BRPM | HEIGHT: 70 IN | BODY MASS INDEX: 41.95 KG/M2 | SYSTOLIC BLOOD PRESSURE: 120 MMHG | DIASTOLIC BLOOD PRESSURE: 70 MMHG

## 2018-08-06 DIAGNOSIS — I83.813 VARICOSE VEINS OF BILATERAL LOWER EXTREMITIES WITH PAIN: Primary | ICD-10-CM

## 2018-08-06 PROCEDURE — 99214 OFFICE O/P EST MOD 30 MIN: CPT | Performed by: SURGERY

## 2018-08-06 RX ORDER — CHLORHEXIDINE GLUCONATE 0.12 MG/ML
15 RINSE ORAL EVERY 12 HOURS SCHEDULED
Status: CANCELLED | OUTPATIENT
Start: 2018-08-06 | End: 2019-08-06

## 2018-08-06 NOTE — ASSESSMENT & PLAN NOTE
History of bilateral symptomatic varicose veins despite compression stockings  Venous reflux study does demonstrate bilateral greater saphenous vein incompetence  At this time recommend left greater saphenous vein EVLT and stab phlebectomies  The procedure, risks, benefits and alternatives were discussed in detail  Patient is agreeable to proceed  Written consent was obtained  Patient prefers to have procedure performed middle/late September

## 2018-08-06 NOTE — PROGRESS NOTES
Assessment/Plan:    Varicose veins of bilateral lower extremities with pain   History of bilateral symptomatic varicose veins despite compression stockings  Venous reflux study does demonstrate bilateral greater saphenous vein incompetence  At this time recommend left greater saphenous vein EVLT and stab phlebectomies  The procedure, risks, benefits and alternatives were discussed in detail  Patient is agreeable to proceed  Written consent was obtained  Patient prefers to have procedure performed middle/late September  Diagnoses and all orders for this visit:    Varicose veins of bilateral lower extremities with pain  -     Case request operating room: ENDOVASCULAR LASER THERAPY (EVLT); Standing  -     Basic metabolic panel; Future  -     CBC and Platelet; Future  -     EKG 12 lead; Future  -     Case request operating room: ENDOVASCULAR LASER THERAPY (EVLT)    Other orders  -     Diet NPO; Sips with meds; Standing  -     Void on call to OR; Standing  -     Insert peripheral IV; Standing  -     chlorhexidine (PERIDEX) 0 12 % oral rinse 15 mL; Swish and spit 15 mL every 12 (twelve) hours           Subjective:      Patient ID: Perez Gonzalez is a 63 y o  male  Patient had LEVDR on 7/10  He is c/o swelling and heaviness in the the bilat legs L>R, He has been wearing Rx compression most days but his bilat legs remain painful at night  He denies any open wounds or sores  Christofer Scott is a 27-year-old [facility] [employer] employee who presents to the office in follow-up following a trial of compression stockings for bilateral lower extremity symptomatic varicose veins  He has no personal / family history of DVT  He has worn compression stockings with minimal relief  He  Complains of heaviness /throbbing with  progress swelling as today progress  He denies any episodes of superficial thrombophlebitis          The following portions of the patient's history were reviewed and updated as appropriate: allergies, current medications, past family history, past medical history, past social history, past surgical history and problem list     Review of Systems   Constitutional: Negative  HENT: Negative  Eyes: Negative  Respiratory: Negative  Cardiovascular: Positive for leg swelling  Gastrointestinal: Negative  Endocrine: Negative  Genitourinary: Negative  Musculoskeletal: Negative  Skin: Negative  Allergic/Immunologic: Negative  Neurological: Negative  Hematological: Negative  Psychiatric/Behavioral: Negative  Vitals:    08/06/18 1606   BP: 120/70   BP Location: Left arm   Patient Position: Sitting   Cuff Size: Adult   Pulse: 72   Resp: 18   Temp: 98 3 °F (36 8 °C)   TempSrc: Tympanic   Weight: 133 kg (293 lb)   Height: 5' 10" (1 778 m)       Patient Active Problem List   Diagnosis    [facility]    Hypertension    Hyperlipidemia    Status post revision of total replacement of right knee    Varicose veins of bilateral lower extremities with pain    Intervertebral disc disorders with radiculopathy, lumbar region    Chronic pain of right knee    Pes anserine bursitis       Past Surgical History:   Procedure Laterality Date    CARPAL TUNNEL RELEASE Left     HERNIA REPAIR      JOINT REPLACEMENT      right knee revision, infection total of 4 surgeries     JOINT REPLACEMENT      left knee     KIDNEY STONE SURGERY      x3    MS COLONOSCOPY FLX DX W/COLLJ SPEC WHEN PFRMD N/A 2/9/2018    Procedure: EGD AND COLONOSCOPY;  Surgeon: Yasmin Wright MD;  [facility];   Service: Gastroenterology    MS REVISE KNEE JOINT REPLACE,1 PART Right 3/20/2017    Procedure: REVISION TOTAL KNEE ARTHROPLASTY INCLUDING FEMORAL STEM REVISION;  Surgeon: Yomi Benjamin MD;  [facility];  Service: Orthopedics    MS REVISE KNEE JOINT REPLACE,ALL PARTS Right 4/6/2016    Procedure: REVISION TOTAL KNEE ARTHROPLASTY INCLUDING FEMORAL AND TIBIAL COMPONENTS;  Surgeon: Ruben Johnson Gwen Costa MD;  [facility];  Service: Orthopedics    ULNAR TUNNEL RELEASE         Family History   Problem Relation Age of Onset    Diabetes Mother     Hypertension Mother     Hypertension Father        Social History     Social History    Marital status:      Spouse name: N/A    Number of children: N/A    Years of education: N/A     Occupational History    Not on file  Social History Main Topics    Smoking status: Former Smoker    Smokeless tobacco: Never Used      Comment: quit 40 years ago    Alcohol use 8 4 oz/week     14 Cans of beer per week    Drug use: No    Sexual activity: Not on file     Other Topics Concern    Not on file     Social History Narrative    No narrative on file       Allergies   Allergen Reactions    Doxycycline Photosensitivity    Other      Bee stings    Oxycodone Other (See Comments)     Flu like symptons         Current Outpatient Prescriptions:     allopurinol (ZYLOPRIM) 100 mg tablet, Take 100 mg by mouth daily  , Disp: , Rfl:     amLODIPine (NORVASC) 5 mg tablet, Take 10 mg by mouth daily  , Disp: , Rfl:     atorvastatin (LIPITOR) 10 mg tablet, Take 20 mg by mouth daily  , Disp: , Rfl:     cholecalciferol (VITAMIN D3) 1,000 units tablet, Take 1,000 Units by mouth daily, Disp: , Rfl:     Dulaglutide (TRULICITY) 1 5 XE/9 4IB SOPN, Inject under the skin once a week Indications: sundays  , Disp: , Rfl:     Elastic Bandages & Supports ([address]) MISC, by Does not apply route daily Knee High 20-30mmHg  For varicose veins/ venous insuf, Disp: 2 each, Rfl: 3    Empagliflozin (JARDIANCE) 10 MG TABS, Take 1 tablet by mouth daily, Disp: , Rfl:     fenofibrate (TRICOR) 145 mg tablet, Take 145 mg by mouth daily  , Disp: , Rfl:     labetalol (NORMODYNE) 100 mg tablet, Take 100 mg by mouth 2 (two) times a day, Disp: , Rfl:     lisinopril (ZESTRIL) 40 mg tablet, Take 40 mg by mouth daily  , Disp: , Rfl:     metFORMIN (GLUCOPHAGE) 1000 MG tablet, Take 1,000 mg by mouth 2 (two) times a day with meals  , Disp: , Rfl:     Misc Natural Products (TURMERIC CURCUMIN) CAPS, Take by mouth, Disp: , Rfl:     Multiple Vitamin (MULTIVITAMIN) tablet, Take 1 tablet by mouth daily  , Disp: , Rfl:     enoxaparin (LOVENOX) 40 mg/0 4 mL, Inject 0 4 mL under the skin daily for 30 days, Disp: 12 mL, Rfl: 0    Imaging:  I have reviewed the imaging and the findings are:   CONCLUSION:     Impression:  RIGHT LIMB:  Deep venous incompetence is noted  The great saphenous vein is incompetent  The great saphenous vein exits the saphenous compartment in the thigh  The small saphenous vein is competent and does not communicate with the  popliteal vein  There is no evidence of incompetent perforators in the thigh or calf  There is no evidence of deep vein thrombosis in the CFV, the proximal PFV, the  femoral vein and the popliteal vein  LEFT LIMB:  Deep venous incompetence is noted  The great saphenous vein is incompetent  The great saphenous vein remains in the saphenous compartment in the thigh  The small saphenous vein is competent and does not communicate with the  popliteal vein  There is no evidence of incompetent perforators in the thigh or calf  There is no evidence of deep vein thrombosis in the CFV, the proximal PFV, the  femoral vein and the popliteal vein  Objective:      /70 (BP Location: Left arm, Patient Position: Sitting, Cuff Size: Adult)   Pulse 72   Temp 98 3 °F (36 8 °C) (Tympanic)   Resp 18   Ht 5' 10" (1 778 m)   Wt 133 kg (293 lb)   BMI 42 04 kg/m²          Physical Exam   Constitutional: He is oriented to person, place, and time  He appears well-developed and well-nourished  No distress  HENT:   Head: Normocephalic and atraumatic  Eyes: Conjunctivae and EOM are normal  No scleral icterus  Neck: Normal range of motion  Neck supple  No tracheal deviation present     Cardiovascular: Normal rate, regular rhythm and normal heart sounds  Pulmonary/Chest: Effort normal and breath sounds normal    Abdominal: Soft  He exhibits no distension  Mass: no appreciable aortic pulstion/aneurysm  There is no tenderness  There is no rebound and no guarding  Musculoskeletal: Normal range of motion  Neurological: He is alert and oriented to person, place, and time  Skin: Skin is warm and dry  Bilateral lower extremity with prominent truncal varicosities  Psychiatric: He has a normal mood and affect   His behavior is normal

## 2018-08-06 NOTE — PATIENT INSTRUCTIONS
Varicose Veins   AMBULATORY CARE:   Varicose veins  are veins that become large, twisted, and swollen  They are common on the back of the calves, knees, and thighs  Varicose veins are caused by valves in your veins that do not work properly  This causes blood to collect and increase pressure in the veins of your legs  The increased pressure causes your veins to stretch, get larger, swell, and twist        Common symptoms include the following: Your symptoms may be worse after you stand or sit for long periods of time  You may have any of the following:  · Blue, purple, or bulging veins in your legs     · Pain, swelling, or muscle cramps in your legs    · Feeling of fatigue or heaviness in your legs    · Cramping in your legs  Seek care immediately if:   · You have a wound that does not heal or is infected  · You have an injury that has broken your skin and caused your varicose veins to bleed  · Your leg is swollen and hard  · You notice that your legs or feet are turning blue or black  · Your leg feels warm, tender, and painful  It may look swollen and red  Contact your healthcare provider if:   · You have pain in your leg that does not go away or gets worse  · You notice sudden large bruising on your legs  · You have a rash on your leg  · Your symptoms keep you from doing your daily activities  · You have questions or concerns about your condition or care  Treatment of varicose veins  aims to decrease symptoms, improve appearance, and prevent further problems  Treatment will depend on which veins are affected and how severe your condition is  You may need procedures to treat or remove your varicose veins  For example, your healthcare provider may inject a solution or use a laser to close the varicose veins  Surgery to remove long veins may also be done  Ask your healthcare provider for more information about procedures used to treat varicose veins    Manage varicose veins:   · Do not sit or stand for long periods of time  This can cause the blood to collect in your legs and make your symptoms worse  Bend or rotate your ankles several times every hour  Walk around for a few minutes every hour to get blood moving in your legs  · Do not cross your legs when you sit  This decreases blood flow to your feet and can make your symptoms worse  · Do not wear tight clothing or shoes  Do not wear high-heeled shoes  Do not wear clothes that are tight around the waist or knees  · Maintain a healthy weight  Being overweight or obese can make your varicose veins worse  Ask your healthcare provider how much you should weigh  Ask him or her to help you create a weight loss plan if you are overweight  · Wear pressure stockings as directed  The stockings are tight and put pressure on your legs  They improve blood flow and help prevent clots  · Elevate your legs  Keep them above the level of your heart for 15 to 30 minutes several times a day  You can also prop the end of your bed up slightly to elevate your legs while you sleep  This will help blood to flow back to your heart  · Get regular exercise  Talk to your healthcare provider about the best exercise plan for you  Exercise can improve blood flow to your legs and feet  Follow up with your healthcare provider as directed:  Write down your questions so you remember to ask them during your visits  © 2017 [employer] Information is for End User's use only and may not be sold, redistributed or otherwise used for commercial purposes  All illustrations and images included in CareNotes® are the copyrighted property of [employer]  or [facility]  The above information is an  only  It is not intended as medical advice for individual conditions or treatments  Talk to your doctor, nurse or pharmacist before following any medical regimen to see if it is safe and effective for you

## 2018-08-10 ENCOUNTER — TRANSCRIBE ORDERS (OUTPATIENT)
Dept: LAB | Facility: CLINIC | Age: 58
End: 2018-08-10

## 2018-08-10 ENCOUNTER — APPOINTMENT (OUTPATIENT)
Dept: LAB | Facility: CLINIC | Age: 58
End: 2018-08-10
Payer: COMMERCIAL

## 2018-08-10 DIAGNOSIS — E11.9 DIABETES MELLITUS WITHOUT COMPLICATION (HCC): ICD-10-CM

## 2018-08-10 DIAGNOSIS — Z00.8 HEALTH EXAMINATION IN POPULATION SURVEY: ICD-10-CM

## 2018-08-10 DIAGNOSIS — E78.2 MIXED HYPERLIPIDEMIA: ICD-10-CM

## 2018-08-10 DIAGNOSIS — Z00.8 HEALTH EXAMINATION IN POPULATION SURVEY: Primary | ICD-10-CM

## 2018-08-10 DIAGNOSIS — I10 ESSENTIAL HYPERTENSION, MALIGNANT: ICD-10-CM

## 2018-08-10 DIAGNOSIS — E11.9 DIABETES MELLITUS WITHOUT COMPLICATION (HCC): Primary | ICD-10-CM

## 2018-08-10 LAB
ALBUMIN SERPL BCP-MCNC: 4.2 G/DL (ref 3.5–5)
ALP SERPL-CCNC: 45 U/L (ref 46–116)
ALT SERPL W P-5'-P-CCNC: 53 U/L (ref 12–78)
ANION GAP SERPL CALCULATED.3IONS-SCNC: 12 MMOL/L (ref 4–13)
AST SERPL W P-5'-P-CCNC: 26 U/L (ref 5–45)
BILIRUB SERPL-MCNC: 0.5 MG/DL (ref 0.2–1)
BUN SERPL-MCNC: 22 MG/DL (ref 5–25)
CALCIUM SERPL-MCNC: 9.7 MG/DL (ref 8.3–10.1)
CHLORIDE SERPL-SCNC: 106 MMOL/L (ref 100–108)
CHOLEST SERPL-MCNC: 162 MG/DL (ref 50–200)
CO2 SERPL-SCNC: 23 MMOL/L (ref 21–32)
CREAT SERPL-MCNC: 0.87 MG/DL (ref 0.6–1.3)
EST. AVERAGE GLUCOSE BLD GHB EST-MCNC: 137 MG/DL
GFR SERPL CREATININE-BSD FRML MDRD: 96 ML/MIN/1.73SQ M
GLUCOSE P FAST SERPL-MCNC: 164 MG/DL (ref 65–99)
HBA1C MFR BLD: 6.4 % (ref 4.2–6.3)
HDLC SERPL-MCNC: 57 MG/DL (ref 40–60)
LDLC SERPL CALC-MCNC: 88 MG/DL (ref 0–100)
LDLC SERPL DIRECT ASSAY-MCNC: 88 MG/DL (ref 0–100)
NONHDLC SERPL-MCNC: 105 MG/DL
POTASSIUM SERPL-SCNC: 4 MMOL/L (ref 3.5–5.3)
PROT SERPL-MCNC: 7.5 G/DL (ref 6.4–8.2)
SODIUM SERPL-SCNC: 141 MMOL/L (ref 136–145)
TRIGL SERPL-MCNC: 86 MG/DL

## 2018-08-10 PROCEDURE — 80053 COMPREHEN METABOLIC PANEL: CPT

## 2018-08-10 PROCEDURE — 83721 ASSAY OF BLOOD LIPOPROTEIN: CPT

## 2018-08-10 PROCEDURE — 83036 HEMOGLOBIN GLYCOSYLATED A1C: CPT

## 2018-08-10 PROCEDURE — 36415 COLL VENOUS BLD VENIPUNCTURE: CPT

## 2018-08-10 PROCEDURE — 80061 LIPID PANEL: CPT

## 2018-08-15 ENCOUNTER — TELEPHONE (OUTPATIENT)
Dept: VASCULAR SURGERY | Facility: CLINIC | Age: 58
End: 2018-08-15

## 2018-08-15 NOTE — TELEPHONE ENCOUNTER
Patient is scheduled for L EVLT with stab phlebs 8/28/18 at [facility] with Dr Jed Leroy  Patient is aware of the bloodwork and EKG orders  We will ask Dr Jed Leroy to update the H&P since he was seen last month   Auth# [id number]

## 2018-08-16 ENCOUNTER — HOSPITAL ENCOUNTER (OUTPATIENT)
Facility: AMBULARY SURGERY CENTER | Age: 58
Setting detail: OUTPATIENT SURGERY
End: 2018-08-16
Attending: SURGERY | Admitting: SURGERY
Payer: COMMERCIAL

## 2018-08-16 DIAGNOSIS — I83.813 VARICOSE VEINS OF BOTH LOWER EXTREMITIES WITH PAIN: Primary | ICD-10-CM

## 2018-09-06 VITALS — HEIGHT: 70 IN | WEIGHT: 285 LBS | BODY MASS INDEX: 40.8 KG/M2

## 2018-09-06 NOTE — PRE-PROCEDURE INSTRUCTIONS
Pre-Surgery Instructions:   Medication Instructions    allopurinol (ZYLOPRIM) 100 mg tablet Instructed patient per Anesthesia Guidelines   amLODIPine (NORVASC) 5 mg tablet Instructed patient per Anesthesia Guidelines   atorvastatin (LIPITOR) 10 mg tablet Instructed patient per Anesthesia Guidelines   cholecalciferol (VITAMIN D3) 1,000 units tablet Patient was instructed by Physician and understands   Dulaglutide (TRULICITY) 1 5 SK/8 5VO SOPN Instructed patient per Anesthesia Guidelines   Empagliflozin (JARDIANCE) 10 MG TABS Patient was instructed by Physician and understands   fenofibrate (TRICOR) 145 mg tablet Instructed patient per Anesthesia Guidelines   labetalol (NORMODYNE) 100 mg tablet Instructed patient per Anesthesia Guidelines   lisinopril (ZESTRIL) 40 mg tablet Instructed patient per Anesthesia Guidelines   metFORMIN (GLUCOPHAGE) 1000 MG tablet Patient was instructed by Physician and understands   Multiple Vitamin (MULTIVITAMIN) tablet Patient was instructed by Physician and understands  Pre op and bathing instructions reviewed   Pt has hibiclens

## 2018-09-14 ENCOUNTER — APPOINTMENT (OUTPATIENT)
Dept: LAB | Facility: CLINIC | Age: 58
End: 2018-09-14
Payer: COMMERCIAL

## 2018-09-14 ENCOUNTER — OFFICE VISIT (OUTPATIENT)
Dept: LAB | Facility: CLINIC | Age: 58
End: 2018-09-14
Payer: COMMERCIAL

## 2018-09-14 ENCOUNTER — TRANSCRIBE ORDERS (OUTPATIENT)
Dept: LAB | Facility: CLINIC | Age: 58
End: 2018-09-14

## 2018-09-14 DIAGNOSIS — I83.813 VARICOSE VEINS OF BILATERAL LOWER EXTREMITIES WITH PAIN: ICD-10-CM

## 2018-09-14 LAB
ANION GAP SERPL CALCULATED.3IONS-SCNC: 12 MMOL/L (ref 4–13)
ATRIAL RATE: 83 BPM
BUN SERPL-MCNC: 17 MG/DL (ref 5–25)
CALCIUM SERPL-MCNC: 9.1 MG/DL (ref 8.3–10.1)
CHLORIDE SERPL-SCNC: 103 MMOL/L (ref 100–108)
CO2 SERPL-SCNC: 24 MMOL/L (ref 21–32)
CREAT SERPL-MCNC: 0.97 MG/DL (ref 0.6–1.3)
ERYTHROCYTE [DISTWIDTH] IN BLOOD BY AUTOMATED COUNT: 12.1 % (ref 11.6–15.1)
GFR SERPL CREATININE-BSD FRML MDRD: 86 ML/MIN/1.73SQ M
GLUCOSE P FAST SERPL-MCNC: 167 MG/DL (ref 65–99)
HCT VFR BLD AUTO: 43.1 % (ref 36.5–49.3)
HGB BLD-MCNC: 14.6 G/DL (ref 12–17)
MCH RBC QN AUTO: 31.5 PG (ref 26.8–34.3)
MCHC RBC AUTO-ENTMCNC: 33.9 G/DL (ref 31.4–37.4)
MCV RBC AUTO: 93 FL (ref 82–98)
P AXIS: 52 DEGREES
PLATELET # BLD AUTO: 182 THOUSANDS/UL (ref 149–390)
PMV BLD AUTO: 9.4 FL (ref 8.9–12.7)
POTASSIUM SERPL-SCNC: 4.1 MMOL/L (ref 3.5–5.3)
PR INTERVAL: 164 MS
QRS AXIS: -9 DEGREES
QRSD INTERVAL: 94 MS
QT INTERVAL: 420 MS
QTC INTERVAL: 475 MS
RBC # BLD AUTO: 4.63 MILLION/UL (ref 3.88–5.62)
SODIUM SERPL-SCNC: 139 MMOL/L (ref 136–145)
T WAVE AXIS: 25 DEGREES
VENTRICULAR RATE: 77 BPM
WBC # BLD AUTO: 8.61 THOUSAND/UL (ref 4.31–10.16)

## 2018-09-14 PROCEDURE — 93010 ELECTROCARDIOGRAM REPORT: CPT | Performed by: INTERNAL MEDICINE

## 2018-09-14 PROCEDURE — 80048 BASIC METABOLIC PNL TOTAL CA: CPT

## 2018-09-14 PROCEDURE — 85027 COMPLETE CBC AUTOMATED: CPT

## 2018-09-14 PROCEDURE — 93005 ELECTROCARDIOGRAM TRACING: CPT

## 2018-09-14 PROCEDURE — 36415 COLL VENOUS BLD VENIPUNCTURE: CPT

## 2018-09-17 ENCOUNTER — ANESTHESIA EVENT (OUTPATIENT)
Dept: PERIOP | Facility: AMBULARY SURGERY CENTER | Age: 58
End: 2018-09-17

## 2018-09-20 ENCOUNTER — OFFICE VISIT (OUTPATIENT)
Dept: PODIATRY | Facility: CLINIC | Age: 58
End: 2018-09-20
Payer: COMMERCIAL

## 2018-09-20 VITALS
WEIGHT: 285 LBS | BODY MASS INDEX: 40.8 KG/M2 | HEART RATE: 80 BPM | HEIGHT: 70 IN | RESPIRATION RATE: 17 BRPM | DIASTOLIC BLOOD PRESSURE: 88 MMHG | SYSTOLIC BLOOD PRESSURE: 139 MMHG

## 2018-09-20 DIAGNOSIS — M21.962 ACQUIRED DEFORMITY OF LEFT FOOT: ICD-10-CM

## 2018-09-20 DIAGNOSIS — M20.42 HAMMER TOE OF LEFT FOOT: ICD-10-CM

## 2018-09-20 DIAGNOSIS — L03.032 CELLULITIS OF SECOND TOE OF LEFT FOOT: ICD-10-CM

## 2018-09-20 DIAGNOSIS — M86.172 ACUTE OSTEOMYELITIS OF TOE OF LEFT FOOT (HCC): Primary | ICD-10-CM

## 2018-09-20 DIAGNOSIS — E11.621 DIABETIC ULCER OF TOE OF LEFT FOOT ASSOCIATED WITH TYPE 2 DIABETES MELLITUS, WITH FAT LAYER EXPOSED (HCC): ICD-10-CM

## 2018-09-20 DIAGNOSIS — L97.522 DIABETIC ULCER OF TOE OF LEFT FOOT ASSOCIATED WITH TYPE 2 DIABETES MELLITUS, WITH FAT LAYER EXPOSED (HCC): ICD-10-CM

## 2018-09-20 PROCEDURE — 87205 SMEAR GRAM STAIN: CPT | Performed by: PODIATRIST

## 2018-09-20 PROCEDURE — 87147 CULTURE TYPE IMMUNOLOGIC: CPT | Performed by: PODIATRIST

## 2018-09-20 PROCEDURE — 29550 STRAPPING OF TOES: CPT | Performed by: PODIATRIST

## 2018-09-20 PROCEDURE — 87077 CULTURE AEROBIC IDENTIFY: CPT | Performed by: PODIATRIST

## 2018-09-20 PROCEDURE — 99213 OFFICE O/P EST LOW 20 MIN: CPT | Performed by: PODIATRIST

## 2018-09-20 PROCEDURE — 87186 SC STD MICRODIL/AGAR DIL: CPT | Performed by: PODIATRIST

## 2018-09-20 PROCEDURE — 87070 CULTURE OTHR SPECIMN AEROBIC: CPT | Performed by: PODIATRIST

## 2018-09-20 RX ORDER — SULFAMETHOXAZOLE AND TRIMETHOPRIM 800; 160 MG/1; MG/1
1 TABLET ORAL EVERY 12 HOURS SCHEDULED
Qty: 20 TABLET | Refills: 0 | Status: SHIPPED | OUTPATIENT
Start: 2018-09-20 | End: 2018-09-25 | Stop reason: SDUPTHER

## 2018-09-20 NOTE — PROGRESS NOTES
Assessment/Plan:      There are no diagnoses linked to this encounter  Subjective:   Patient ID: Zuleima Ibrahim is a 63 y o  male  Past Medical History:   Diagnosis Date    Arthritis     Diabetes mellitus ([address] )     niddm    DJD (degenerative joint disease)     Hyperlipidemia     Hypertension     Kidney stone     Mechanical complication of internal orthopedic device (HCC)        Past Surgical History:   Procedure Laterality Date    CARPAL TUNNEL RELEASE Left     COLONOSCOPY      HERNIA REPAIR Left     inguinal    JOINT REPLACEMENT      right knee revision, infection total of 4 surgeries     JOINT REPLACEMENT      left knee     KIDNEY STONE SURGERY      x3    MS COLONOSCOPY FLX DX W/COLLJ SPEC WHEN PFRMD N/A 2/9/2018    Procedure: EGD AND COLONOSCOPY;  Surgeon: Ap Grey MD;  [facility]; Service: Gastroenterology    MS REVISE KNEE JOINT REPLACE,1 PART Right 3/20/2017    Procedure: REVISION TOTAL KNEE ARTHROPLASTY INCLUDING FEMORAL STEM REVISION;  Surgeon: Jose F Otto MD;  [facility];  Service: Orthopedics    MS REVISE KNEE JOINT REPLACE,ALL PARTS Right 4/6/2016    Procedure: REVISION TOTAL KNEE ARTHROPLASTY INCLUDING FEMORAL AND TIBIAL COMPONENTS;  Surgeon: Jose F Otto MD;  [facility];  Service: Orthopedics    ULNAR TUNNEL RELEASE      WISDOM TOOTH EXTRACTION         Allergies   Allergen Reactions    Doxycycline Photosensitivity    Other      Bee stings         Current Outpatient Prescriptions:     allopurinol (ZYLOPRIM) 100 mg tablet, Take 100 mg by mouth daily  , Disp: , Rfl:     amLODIPine (NORVASC) 5 mg tablet, Take 10 mg by mouth daily  , Disp: , Rfl:     atorvastatin (LIPITOR) 10 mg tablet, Take 20 mg by mouth daily  , Disp: , Rfl:     cholecalciferol (VITAMIN D3) 1,000 units tablet, Take 1,000 Units by mouth daily, Disp: , Rfl:     Dulaglutide (TRULICITY) 1 5 CD/4 4VU SOPN, Inject 1 5 mg under the skin once a week  , Disp: , Rfl:    Elastic Bandages & Supports (MEDICAL COMPRESSION STOCKINGS) MISC, by Does not apply route daily Knee High 20-30mmHg  For varicose veins/ venous insuf, Disp: 2 each, Rfl: 3    Empagliflozin (JARDIANCE) 10 MG TABS, Take 1 tablet by mouth daily, Disp: , Rfl:     fenofibrate (TRICOR) 145 mg tablet, Take 145 mg by mouth daily  , Disp: , Rfl:     labetalol (NORMODYNE) 100 mg tablet, Take 100 mg by mouth 2 (two) times a day, Disp: , Rfl:     lisinopril (ZESTRIL) 40 mg tablet, Take 40 mg by mouth daily  , Disp: , Rfl:     metFORMIN (GLUCOPHAGE) 1000 MG tablet, Take 1,000 mg by mouth 2 (two) times a day with meals  , Disp: , Rfl:     Multiple Vitamin (MULTIVITAMIN) tablet, Take 1 tablet by mouth daily  , Disp: , Rfl:     Patient Active Problem List   Diagnosis    Diabetes mellitus ([facility] )    Hypertension    Hyperlipidemia    Status post revision of total replacement of right knee    Varicose veins of bilateral lower extremities with pain    Intervertebral disc disorders with radiculopathy, lumbar region    Chronic pain of right knee    Pes anserine bursitis       HPI    The following portions of the patient's history were reviewed and updated as appropriate: allergies, current medications, past family history, past medical history, past social history, past surgical history and problem list     Review of Systems      Historical Information   Past Medical History:   Diagnosis Date    Arthritis     [facility]     niddm    DJD (degenerative joint disease)     Hyperlipidemia     Hypertension     Kidney stone     Mechanical complication of internal orthopedic device ([facility] )      Past Surgical History:   Procedure Laterality Date    CARPAL TUNNEL RELEASE Left     COLONOSCOPY      HERNIA REPAIR Left     inguinal    JOINT REPLACEMENT      right knee revision, infection total of 4 surgeries     JOINT REPLACEMENT      left knee     KIDNEY STONE SURGERY      x3    MT COLONOSCOPY FLX DX W/COLLJ SPEC WHEN PFRMD N/A 2/9/2018    Procedure: EGD AND COLONOSCOPY;  Surgeon: Renae Pratt MD;  [facility]; Service: Gastroenterology    NV REVISE KNEE JOINT REPLACE,1 PART Right 3/20/2017    Procedure: REVISION TOTAL KNEE ARTHROPLASTY INCLUDING FEMORAL STEM REVISION;  Surgeon: Elder Oliveira MD;  [facility];  Service: Orthopedics    NV REVISE KNEE JOINT REPLACE,ALL PARTS Right 4/6/2016    Procedure: REVISION TOTAL KNEE ARTHROPLASTY INCLUDING FEMORAL AND TIBIAL COMPONENTS;  Surgeon: Elder Oliveira MD;  [facility];  Service: Orthopedics    ULNAR TUNNEL RELEASE      WISDOM TOOTH EXTRACTION       Social History   History   Alcohol Use    8 4 oz/week    14 Cans of beer per week     History   Drug Use No     Family History:   Family History   Problem Relation Age of Onset    Diabetes Mother     Hypertension Mother     Hypertension Father        Meds/Allergies   Allergies   Allergen Reactions    Doxycycline Photosensitivity    Other      Bee stings       Current Outpatient Prescriptions on File Prior to Visit   Medication Sig Dispense Refill    allopurinol (ZYLOPRIM) 100 mg tablet Take 100 mg by mouth daily   amLODIPine (NORVASC) 5 mg tablet Take 10 mg by mouth daily        atorvastatin (LIPITOR) 10 mg tablet Take 20 mg by mouth daily        cholecalciferol (VITAMIN D3) 1,000 units tablet Take 1,000 Units by mouth daily      Dulaglutide (TRULICITY) 1 5 AV/9 3RQ SOPN Inject 1 5 mg under the skin once a week        Elastic Bandages & Supports (MEDICAL COMPRESSION STOCKINGS) MISC by Does not apply route daily Knee High 20-30mmHg    For varicose veins/ venous insuf 2 each 3    Empagliflozin (JARDIANCE) 10 MG TABS Take 1 tablet by mouth daily      fenofibrate (TRICOR) 145 mg tablet Take 145 mg by mouth daily   labetalol (NORMODYNE) 100 mg tablet Take 100 mg by mouth 2 (two) times a day      lisinopril (ZESTRIL) 40 mg tablet Take 40 mg by mouth daily        metFORMIN (GLUCOPHAGE) 1000 MG tablet Take 1,000 mg by mouth 2 (two) times a day with meals   Multiple Vitamin (MULTIVITAMIN) tablet Take 1 tablet by mouth daily  No current facility-administered medications on file prior to visit  Objective:  Patient's shoes and socks removed  Foot ExamPhysical Exam           Physical Exam  Left Foot: Appearance: Normal except as noted: excessive [id number] planus  Great toe deformities include a bunion  Tenderness: None except the distal second metatarsal    Right Foot: Appearance: Normal except as noted: excessive [id number] planus  Left Ankle: ROM: limited ROM in all planes Motor: Normal    Right Ankle: ROM: limited ROM in all planes Motor: Normal    Neurological Exam: performed  Light touch was intact bilaterally  Vibratory sensation was intact bilaterally  Response to monofilament test was intact bilaterally  Deep tendon reflexes: patellar reflex present [id number] reflex present bilaterally  Vascular Exam: performed Dorsalis pedis pulses were present bilaterally  Posterior tibial pulses were present bilaterally  Elevation Pallor: absent bilaterally  Dependence rubor was absent bilaterally  Edema: severe [id number]/4 pitting edema bilateral  Negative Zoila  Toenails: All of the toenails were [id number]       Socks and shoes removed, Right Foot Findings: normal foot, no swelling, no erythema  The right toes were normal    The sensory exam showed normal vibratory sensation at the level of the toes  Normal tactile sensation with monofilament testing throughout the right foot  Diminished tactile sensation with monofilament testing throughout the right foot  Socks and shoes removed, Left Foot Findings: normal foot, no swelling, no erythema  The left toes were normal    The sensory exam showed normal vibratory sensation at the level of the toes   Normal tactile sensation with monofilament testing throughout the left foot  Hyperkeratosis: present on both first toes,[id number] on both first sub [id number] tinea pedis noted bilateral    Shoe Gear Evaluation: performed ()   Recommendation(s): custom inlays

## 2018-09-20 NOTE — PROGRESS NOTES
Assessment/Plan:  Wound 2nd left toe  Digital cellulitis  Diabetic neuropathy  Rule out osteomyelitis    Plan  Wound debrided  Culture and sensitivity done  Silver nitrate dry sterile dressing applied  Patient be started on both oral and topical antibiotic  X-rays are being ordered to rule out osteomyelitis  Patient may need bone scan or MRI  He will bandage daily  Wife and patient instructed on wound care  No problem-specific Assessment & Plan notes found for this encounter  Diagnoses and all orders for this visit:    Ac[facility]omyel[facility] of [facility] foot ([facility] )  -     X-ray foot left 3+ views; Future    Acquired deformity of left foot    Hammer toe of left foot    Diabetic ulcer of toe of left foot associated with type 2 diabetes mellitus, with fat layer exposed ([facility])  -     sulfamethoxazole-trimethoprim (BACTRIM DS) 800-160 mg per tablet; Take 1 tablet by mouth every 12 (twelve) hours for 10 days  -     silver sulfadiazine (SILVADENE,SSD) 1 % cream; Apply topically 2 (two) times a day for 30 days    Cellulitis of second toe of left foot          Subjective:   Urgent visit  Patient is concerned with pain and bleeding of 2nd left toe  It has been this way for several weeks  No history of trauma  No treatment rendered  Patient is diabetic      Past Medical History:   Diagnosis Date    Arthritis     [facility] )     niddm    DJD (degenerative joint disease)     Hyperlipidemia     Hypertension     Kidney stone     Mechanical complication of internal orthopedic device ([facility])        Past Surgical History:   Procedure Laterality Date    CARPAL TUNNEL RELEASE Left     COLONOSCOPY      HERNIA REPAIR Left     inguinal    JOINT REPLACEMENT      right knee revision, infection total of 4 surgeries     JOINT REPLACEMENT      left knee     KIDNEY STONE SURGERY      x3    SD COLONOSCOPY FLX DX W/COLLJ SPEC WHEN PFRMD N/A 2/9/2018    Procedure: EGD AND COLONOSCOPY;  Surgeon: Mike Ba Leticia Tavarez MD;  [facility]; Service: Gastroenterology    AK REVISE KNEE JOINT REPLACE,1 PART Right 3/20/2017    Procedure: REVISION TOTAL KNEE ARTHROPLASTY INCLUDING FEMORAL STEM REVISION;  Surgeon: Demarco Devine MD;  [facility];  Service: Orthopedics    AK REVISE KNEE JOINT REPLACE,ALL PARTS Right 4/6/2016    Procedure: REVISION TOTAL KNEE ARTHROPLASTY INCLUDING FEMORAL AND TIBIAL COMPONENTS;  Surgeon: Demarco Devine MD;  [facility];  Service: Orthopedics    ULNAR TUNNEL RELEASE      WISDOM TOOTH EXTRACTION         Allergies   Allergen Reactions    Doxycycline Photosensitivity    Other      Bee stings         Current Outpatient Prescriptions:     allopurinol (ZYLOPRIM) 100 mg tablet, Take 100 mg by mouth daily  , Disp: , Rfl:     amLODIPine (NORVASC) 5 mg tablet, Take 10 mg by mouth daily  , Disp: , Rfl:     atorvastatin (LIPITOR) 10 mg tablet, Take 20 mg by mouth daily  , Disp: , Rfl:     cholecalciferol (VITAMIN D3) 1,000 units tablet, Take 1,000 Units by mouth daily, Disp: , Rfl:     Dulaglutide (TRULICITY) 1 5 ZQ/9 5ET SOPN, Inject 1 5 mg under the skin once a week  , Disp: , Rfl:     Elastic Bandages & Supports ([address]) MISC, by Does not apply route daily Knee High 20-30mmHg  For varicose veins/ venous insuf, Disp: 2 each, Rfl: 3    Empagliflozin (JARDIANCE) 10 MG TABS, Take 1 tablet by mouth daily, Disp: , Rfl:     fenofibrate (TRICOR) 145 mg tablet, Take 145 mg by mouth daily  , Disp: , Rfl:     labetalol (NORMODYNE) 100 mg tablet, Take 100 mg by mouth 2 (two) times a day, Disp: , Rfl:     lisinopril (ZESTRIL) 40 mg tablet, Take 40 mg by mouth daily  , Disp: , Rfl:     metFORMIN (GLUCOPHAGE) 1000 MG tablet, Take 1,000 mg by mouth 2 (two) times a day with meals  , Disp: , Rfl:     Multiple Vitamin (MULTIVITAMIN) tablet, Take 1 tablet by mouth daily  , Disp: , Rfl:     silver sulfadiazine (SILVADENE,SSD) 1 % cream, Apply topically 2 (two) times a day for 30 days, Disp: 50 g, Rfl: 0    sulfamethoxazole-trimethoprim (BACTRIM DS) 800-160 mg per tablet, Take 1 tablet by mouth every 12 (twelve) hours for 10 days, Disp: 20 tablet, Rfl: 0    Patient Active Problem List   Diagnosis    [facility]    Hypertension    Hyperlipidemia    Status post revision of total replacement of right knee    Varicose veins of bilateral lower extremities with pain    Intervertebral disc disorders with radiculopathy, lumbar region    Chronic pain of right knee    Pes anserine bursitis    Acute osteomyelitis of toe of left foot (HCC)    Acquired deformity of left foot    Hammer toe of left foot    Diabetic ulcer of toe of left foot associated with type 2 diabetes mellitus, with fat layer exposed ([facility]    Cellulitis of second toe of left foot          Patient ID: Haleigh Benitez is a 63 y o  male  HPI    The following portions of the patient's history were reviewed and updated as appropriate: allergies, current medications, past family history, past medical history, past social history, past surgical history and problem list     Review of Systems      Objective:  Patient's shoes and socks removed     Foot Exam    General  General Appearance: appears stated age and healthy   Orientation: alert and oriented to person, place, and time   Affect: appropriate       Right Foot/Ankle     Inspection and Palpation  Ecchymosis: none  Tenderness: none   Swelling: none   Arch: pes planus  Hammertoes: fifth toe  Claw Toes: second toe  Hallux valgus: no  Hallux limitus: yes  Skin Exam: callus and dry skin;     Neurovascular  Dorsalis pedis: 1+  Posterior tibial: 2+  Saphenous nerve sensation: diminished  Tibial nerve sensation: diminished  Superficial peroneal nerve sensation: diminished  Deep peroneal nerve sensation: diminished  Sural nerve sensation: diminished  Achilles reflex: 1+    Muscle Strength  Ankle dorsiflexion: 4      Left Foot/Ankle      Inspection and Palpation  Ecchymosis: none  Tenderness: (2nd left toe)  Swelling: (Distal aspect 2nd left toe)  Hammertoes: fifth toe  Claw toes: second toe  Hallux valgus: no  Hallux limitus: yes  Skin Exam: callus and ulcer;     Neurovascular  Dorsalis pedis: 1+  Posterior tibial: 2+  Saphenous nerve sensation: diminished  Tibial nerve sensation: diminished  Superficial peroneal nerve sensation: diminished  Deep peroneal nerve sensation: diminished  Sural nerve sensation: diminished  Achilles reflex: 1+    Muscle Strength  Ankle dorsiflexion: 4        Physical Exam   Constitutional: He appears well-developed and well-nourished  Cardiovascular: Normal rate and regular rhythm  Pulses:       Dorsalis pedis pulses are 1+ on the right side, and 1+ on the left side  Posterior tibial pulses are 2+ on the right side, and 2+ on the left side  Musculoskeletal:   2nd left toe is clogged  Feet:   Right Foot:   Skin Integrity: Positive for callus and dry skin  Left Foot:   Skin Integrity: Positive for ulcer and callus  Neurological:   Reflex Scores:       Achilles reflexes are 1+ on the right side and 1+ on the left side  Skin:   Distal aspect 2nd left toe demonstrates ulcerated clavi by  There is hyperkeratosis  Small superficial wound noted  There is mild cellulitis of the area  Negative occult pus  Vitals reviewed  Right Foot/Ankle   Right Foot Inspection  Skin Exam: dry skin, callus and callus                              Vascular    The right DP pulse is 1+  The right PT pulse is 2+  Right Toe  - Comprehensive Exam  Ecchymosis: none  Arch: pes planus  Hammertoes: fifth toe  Claw Toes: second toe  Hallux valgus: no  Hallux limitus: yes  Swelling: none   Tenderness: none         Left Foot/Ankle  Left Foot Inspection  Skin Exam: ulcer and callus                                           Vascular    The left DP pulse is 1+  The left PT pulse is 2+     Left Toe  - Comprehensive Exam  Ecchymosis: none  Hammertoes: fifth toe  Claw toes: second toe  Hallux valgus: no  Hallux limitus: yes  Swelling: (Distal aspect 2nd left toe)  Tenderness: (2nd left toe)

## 2018-09-21 ENCOUNTER — HOSPITAL ENCOUNTER (OUTPATIENT)
Dept: RADIOLOGY | Facility: HOSPITAL | Age: 58
Discharge: HOME | End: 2018-09-21
Payer: COMMERCIAL

## 2018-09-21 ENCOUNTER — LAB REQUISITION (OUTPATIENT)
Dept: LAB | Facility: HOSPITAL | Age: 58
End: 2018-09-21
Payer: COMMERCIAL

## 2018-09-21 DIAGNOSIS — M86.172 ACUTE OSTEOMYELITIS OF TOE OF LEFT FOOT (HCC): ICD-10-CM

## 2018-09-21 DIAGNOSIS — L03.032 CELLULITIS OF TOE OF LEFT FOOT: ICD-10-CM

## 2018-09-21 DIAGNOSIS — M86.172 OTHER ACUTE OSTEOMYELITIS, LEFT ANKLE AND FOOT (HCC): ICD-10-CM

## 2018-09-21 PROCEDURE — 73630 X-RAY EXAM OF FOOT: CPT

## 2018-09-24 LAB
BACTERIA WND AEROBE CULT: ABNORMAL
GRAM STN SPEC: ABNORMAL
GRAM STN SPEC: ABNORMAL

## 2018-09-25 ENCOUNTER — OFFICE VISIT (OUTPATIENT)
Dept: PODIATRY | Facility: CLINIC | Age: 58
End: 2018-09-25
Payer: COMMERCIAL

## 2018-09-25 ENCOUNTER — APPOINTMENT (OUTPATIENT)
Dept: LAB | Facility: HOSPITAL | Age: 58
End: 2018-09-25
Attending: PODIATRIST
Payer: COMMERCIAL

## 2018-09-25 ENCOUNTER — TELEPHONE (OUTPATIENT)
Dept: UROLOGY | Facility: CLINIC | Age: 58
End: 2018-09-25

## 2018-09-25 ENCOUNTER — TRANSCRIBE ORDERS (OUTPATIENT)
Dept: ADMINISTRATIVE | Facility: HOSPITAL | Age: 58
End: 2018-09-25

## 2018-09-25 VITALS
HEIGHT: 70 IN | WEIGHT: 285 LBS | BODY MASS INDEX: 40.8 KG/M2 | DIASTOLIC BLOOD PRESSURE: 87 MMHG | SYSTOLIC BLOOD PRESSURE: 142 MMHG

## 2018-09-25 DIAGNOSIS — M86.172 ACUTE OSTEOMYELITIS OF LEFT ANKLE OR FOOT (HCC): ICD-10-CM

## 2018-09-25 DIAGNOSIS — E11.621 DIABETIC ULCER OF TOE OF LEFT FOOT ASSOCIATED WITH TYPE 2 DIABETES MELLITUS, WITH FAT LAYER EXPOSED (HCC): ICD-10-CM

## 2018-09-25 DIAGNOSIS — L97.522 ULCER OF LEFT FOOT, WITH FAT LAYER EXPOSED (HCC): ICD-10-CM

## 2018-09-25 DIAGNOSIS — L03.116 CELLULITIS OF LEFT FOOT: Primary | ICD-10-CM

## 2018-09-25 DIAGNOSIS — L97.522 DIABETIC ULCER OF TOE OF LEFT FOOT ASSOCIATED WITH TYPE 2 DIABETES MELLITUS, WITH FAT LAYER EXPOSED (HCC): ICD-10-CM

## 2018-09-25 LAB
ALBUMIN SERPL BCP-MCNC: 4.2 G/DL (ref 3.5–5)
ALP SERPL-CCNC: 50 U/L (ref 46–116)
ALT SERPL W P-5'-P-CCNC: 51 U/L (ref 12–78)
ANION GAP SERPL CALCULATED.3IONS-SCNC: 10 MMOL/L (ref 4–13)
AST SERPL W P-5'-P-CCNC: 27 U/L (ref 5–45)
BASOPHILS # BLD AUTO: 0.03 THOUSANDS/ΜL (ref 0–0.1)
BASOPHILS NFR BLD AUTO: 1 % (ref 0–1)
BILIRUB SERPL-MCNC: 0.4 MG/DL (ref 0.2–1)
BUN SERPL-MCNC: 17 MG/DL (ref 5–25)
CALCIUM ALBUM COR SERPL-MCNC: 10 MG/DL (ref 8.3–10.1)
CALCIUM SERPL-MCNC: 10.2 MG/DL (ref 8.3–10.1)
CHLORIDE SERPL-SCNC: 101 MMOL/L (ref 100–108)
CO2 SERPL-SCNC: 25 MMOL/L (ref 21–32)
CREAT SERPL-MCNC: 1.09 MG/DL (ref 0.6–1.3)
CRP SERPL QL: <3 MG/L
EOSINOPHIL # BLD AUTO: 0.21 THOUSAND/ΜL (ref 0–0.61)
EOSINOPHIL NFR BLD AUTO: 3 % (ref 0–6)
ERYTHROCYTE [DISTWIDTH] IN BLOOD BY AUTOMATED COUNT: 12.1 % (ref 11.6–15.1)
ERYTHROCYTE [SEDIMENTATION RATE] IN BLOOD: 7 MM/HOUR (ref 2–10)
GFR SERPL CREATININE-BSD FRML MDRD: 75 ML/MIN/1.73SQ M
GLUCOSE SERPL-MCNC: 187 MG/DL (ref 65–140)
HCT VFR BLD AUTO: 44.2 % (ref 36.5–49.3)
HGB BLD-MCNC: 14.9 G/DL (ref 12–17)
IMM GRANULOCYTES # BLD AUTO: 0.06 THOUSAND/UL (ref 0–0.2)
IMM GRANULOCYTES NFR BLD AUTO: 1 % (ref 0–2)
LYMPHOCYTES # BLD AUTO: 2.31 THOUSANDS/ΜL (ref 0.6–4.47)
LYMPHOCYTES NFR BLD AUTO: 36 % (ref 14–44)
MCH RBC QN AUTO: 31.4 PG (ref 26.8–34.3)
MCHC RBC AUTO-ENTMCNC: 33.7 G/DL (ref 31.4–37.4)
MCV RBC AUTO: 93 FL (ref 82–98)
MONOCYTES # BLD AUTO: 0.68 THOUSAND/ΜL (ref 0.17–1.22)
MONOCYTES NFR BLD AUTO: 11 % (ref 4–12)
NEUTROPHILS # BLD AUTO: 3.15 THOUSANDS/ΜL (ref 1.85–7.62)
NEUTS SEG NFR BLD AUTO: 48 % (ref 43–75)
NRBC BLD AUTO-RTO: 0 /100 WBCS
PLATELET # BLD AUTO: 228 THOUSANDS/UL (ref 149–390)
PMV BLD AUTO: 9.3 FL (ref 8.9–12.7)
POTASSIUM SERPL-SCNC: 4 MMOL/L (ref 3.5–5.3)
PROT SERPL-MCNC: 7.6 G/DL (ref 6.4–8.2)
RBC # BLD AUTO: 4.75 MILLION/UL (ref 3.88–5.62)
SODIUM SERPL-SCNC: 136 MMOL/L (ref 136–145)
WBC # BLD AUTO: 6.44 THOUSAND/UL (ref 4.31–10.16)

## 2018-09-25 PROCEDURE — 97597 DBRDMT OPN WND 1ST 20 CM/<: CPT | Performed by: PODIATRIST

## 2018-09-25 PROCEDURE — 36415 COLL VENOUS BLD VENIPUNCTURE: CPT

## 2018-09-25 PROCEDURE — 80053 COMPREHEN METABOLIC PANEL: CPT

## 2018-09-25 PROCEDURE — 85025 COMPLETE CBC W/AUTO DIFF WBC: CPT

## 2018-09-25 PROCEDURE — 99213 OFFICE O/P EST LOW 20 MIN: CPT | Performed by: PODIATRIST

## 2018-09-25 PROCEDURE — 86140 C-REACTIVE PROTEIN: CPT

## 2018-09-25 PROCEDURE — 85652 RBC SED RATE AUTOMATED: CPT

## 2018-09-25 RX ORDER — SULFAMETHOXAZOLE AND TRIMETHOPRIM 800; 160 MG/1; MG/1
1 TABLET ORAL EVERY 12 HOURS SCHEDULED
Qty: 20 TABLET | Refills: 0 | Status: SHIPPED | OUTPATIENT
Start: 2018-09-25 | End: 2018-09-27

## 2018-09-25 NOTE — TELEPHONE ENCOUNTER
Patient called and left a message requesting a refill for Sildenafil to be sent to [facility]  It was not in patients current medication list so can you please put in a new order for medication and send to [facility] in patients chart  Thank you

## 2018-09-25 NOTE — PROGRESS NOTES
Assessment/Plan:    Patient was scheduled for elective vein surgery on the left leg this Friday this will be canceled because of infection  Patient will continue with Bactrim discussed that culture was is seen back to her sensitive to Bactrim  Blood work ordered  MRI rule out bone infection    Aseptic of debridement of ulceration left 2nd digit with #15 scalpel  A skin/subcutaneous tissue level excisional/surgical debridement  Subcutaneous tissue was removed along with devitalized tissue: biofilm, necrotic/eschar, and slough  Applied Silvadene and dry sterile dressing    Patient will continue with Silvadene and dry sterile dressing daily  Stressed importance of offloading toe  Discussed that x-ray shows likely bone infection MRI pending  Discussed possible need for amputation if not improving  Discussed referral to Infectious Disease likely need for 6 weeks of IV antibiotics  Discussed admission to hospital if any worsening of symptoms  Follow-up 1 week     Diagnoses and all orders for this visit:    Cellulitis of left foot    Acute osteomyelitis of left ankle or foot (HCC)  -     CBC and differential; Future  -     Comprehensive metabolic panel; Future  -     Sedimentation rate, automated; Future  -     C-reactive protein; Future  -     MRI foot/forefoot toes left w wo contrast; Future    Diabetic ulcer of toe of left foot associated with type 2 diabetes mellitus, with fat layer exposed ([facility])  -     sulfamethoxazole-trimethoprim (BACTRIM DS) 800-160 mg per tablet; Take 1 tablet by mouth every 12 (twelve) hours for 10 days    Ulcer of left foot, with fat layer exposed ([facility] [address] )          Subjective:      Patient ID: Louise Chang is a 63 y o  male  Patient is follow-up for infected ulcer of left 2nd digit  Patient says this started out as callus about 2 months ago and has been wound for over a month  Patient has previously had issues with wound healing with his right knee after surgery    Patient says blood sugar has been well controlled patient denies any fever or chills  Patient has been on Bactrim and redness and swelling has significantly improved  Patient has been applying Silvadene and dry sterile dressing daily  Past Medical History:   Diagnosis Date    Arthritis     Diabetes mellitus ([address] )     niddm    DJD (degenerative joint disease)     Hyperlipidemia     Hypertension     Kidney stone     Mechanical complication of internal orthopedic device (HCC)        Past Surgical History:   Procedure Laterality Date    CARPAL TUNNEL RELEASE Left     COLONOSCOPY      HERNIA REPAIR Left     inguinal    JOINT REPLACEMENT      right knee revision, infection total of 4 surgeries     JOINT REPLACEMENT      left knee     KIDNEY STONE SURGERY      x3    [place] COLONOSCOPY FLX DX W/COLLJ SPEC WHEN PFRMD N/A 2/9/2018    Procedure: EGD AND COLONOSCOPY;  Surgeon: Refugio Hinton MD;  [facility]; Service: Gastroenterology    [place] REVISE KNEE JOINT REPLACE,1 PART Right 3/20/2017    Procedure: REVISION TOTAL KNEE ARTHROPLASTY INCLUDING FEMORAL STEM REVISION;  Surgeon: Darren Condon MD;  [facility];  Service: Orthopedics    [place] REVISE KNEE JOINT REPLACE,ALL PARTS Right 4/6/2016    Procedure: REVISION TOTAL KNEE ARTHROPLASTY INCLUDING FEMORAL AND TIBIAL COMPONENTS;  Surgeon: Darren Condon MD;  [facility];  Service: Orthopedics    ULNAR TUNNEL RELEASE      WISDOM TOOTH EXTRACTION         Allergies   Allergen Reactions    Doxycycline Photosensitivity    Other      Bee stings         Current Outpatient Prescriptions:     allopurinol (ZYLOPRIM) 100 mg tablet, Take 100 mg by mouth daily  , Disp: , Rfl:     amLODIPine (NORVASC) 5 mg tablet, Take 10 mg by mouth daily  , Disp: , Rfl:     atorvastatin (LIPITOR) 10 mg tablet, Take 20 mg by mouth daily  , Disp: , Rfl:     cholecalciferol (VITAMIN D3) 1,000 units tablet, Take 1,000 Units by mouth daily, Disp: , Rfl:     Dulaglutide (TRULICITY) 1 5 MG/0 5ML SOPN, Inject 1 5 mg under the skin once a week  , Disp: , Rfl:     Elastic Bandages & Supports (MEDICAL COMPRESSION STOCKINGS) MISC, by Does not apply route daily Knee High 20-30mmHg  For varicose veins/ venous insuf, Disp: 2 each, Rfl: 3    Empagliflozin (JARDIANCE) 10 MG TABS, Take 1 tablet by mouth daily, Disp: , Rfl:     fenofibrate (TRICOR) 145 mg tablet, Take 145 mg by mouth daily  , Disp: , Rfl:     labetalol (NORMODYNE) 100 mg tablet, Take 100 mg by mouth 2 (two) times a day, Disp: , Rfl:     lisinopril (ZESTRIL) 40 mg tablet, Take 40 mg by mouth daily  , Disp: , Rfl:     metFORMIN (GLUCOPHAGE) 1000 MG tablet, Take 1,000 mg by mouth 2 (two) times a day with meals  , Disp: , Rfl:     Multiple Vitamin (MULTIVITAMIN) tablet, Take 1 tablet by mouth daily  , Disp: , Rfl:     silver sulfadiazine (SILVADENE,SSD) 1 % cream, Apply topically 2 (two) times a day for 30 days, Disp: 50 g, Rfl: 0    sulfamethoxazole-trimethoprim (BACTRIM DS) 800-160 mg per tablet, Take 1 tablet by mouth every 12 (twelve) hours for 10 days, Disp: 20 tablet, Rfl: 0    Patient Active Problem List   Diagnosis    Diabetes mellitus ([facility] [address] )    Hypertension    Hyperlipidemia    Status post revision of total replacement of right knee    Varicose veins of bilateral lower extremities with pain    Intervertebral disc disorders with radiculopathy, lumbar region    Chronic pain of right knee    Pes anserine bursitis    Acute osteomyelitis of toe of left foot (HCC)    Acquired deformity of left foot    Hammer toe of left foot    Diabetic ulcer of toe of left foot associated with type 2 diabetes mellitus, with fat layer exposed ([facility] [address] )    Cellulitis of second toe of left foot       Review of Systems   Constitutional: Negative  HENT: Negative  Eyes: Negative  Respiratory: Negative  Cardiovascular: Negative  Gastrointestinal: Negative  Endocrine: Negative  Genitourinary: Negative      Musculoskeletal: Negative  Skin: Negative  Allergic/Immunologic: Negative  Neurological: Negative  Hematological: Negative  Psychiatric/Behavioral: Negative  Objective:  Patient's shoes and socks were removed, feet examined  /87   Ht 5' 10" (1 778 m)   Wt 129 kg (285 lb)   BMI 40 89 kg/m²          Physical Exam   Cardiovascular:   Pulses:       Dorsalis pedis pulses are 2+ on the right side, and 2+ on the left side  Posterior tibial pulses are 1+ on the right side, and 1+ on the left side  Feet:   Left Foot:   Skin Integrity: Positive for ulcer  Foot Exam    General  General Appearance: appears stated age and healthy   Orientation: alert and oriented to person, place, and time   Affect: appropriate       Right Foot/Ankle     Inspection and Palpation  Hammertoes: second toe, third toe, fourth toe and fifth toe  Hallux limitus: yes    Neurovascular  Dorsalis pedis: 2+  Posterior tibial: 1+      Left Foot/Ankle      Inspection and Palpation  Hammertoes: second toe, fifth toe, third toe and fourth toe  Hallux limitus: yes  Skin Exam: ulcer;     Neurovascular  Dorsalis pedis: 2+  Posterior tibial: 1+            Patient's shoes and socks removed  Right Foot/Ankle   Right Foot Inspection      Sensory   Vibration: absent  Proprioception: diminished   Monofilament testing: diminished  Vascular  Capillary refills: < 3 seconds  The right DP pulse is 2+  The right PT pulse is 1+  Right Toe  - Comprehensive Exam  Hammertoes: second toe, third toe, fourth toe and fifth toe  Hallux limitus: yes    Left Foot/Ankle  Left Foot Inspection  Skin Exam: ulcer                                         Sensory   Vibration: absent  Proprioception: diminished  Monofilament: diminished  Vascular  Capillary refills: < 3 seconds  The left DP pulse is 2+  The left PT pulse is 1+     Left Toe  - Comprehensive Exam  Hammertoes: second toe, fifth toe, third toe and fourth toe  Hallux limitus: yes      Ulceration left distal 2nd digit 0 point 4 cm x 0 4 cm x 0 2 cm wound base granular localized erythema negative probe to bone negative ascending cellulitis  Flatfoot bilateral  Decreased vibratory sensation bilateral

## 2018-09-26 NOTE — TELEPHONE ENCOUNTER
Rec call from Carol YOUNG at [facility] today, patient is there and needs to cancel his L EVLT/phlebs per podiatry   He has a foot infection and this needs to be taken care of first

## 2018-09-27 DIAGNOSIS — M21.969 ACQUIRED DEFORMITY OF ANKLE AND FOOT, UNSPECIFIED LATERALITY: Primary | ICD-10-CM

## 2018-09-27 RX ORDER — CLINDAMYCIN HYDROCHLORIDE 300 MG/1
300 CAPSULE ORAL 3 TIMES DAILY
Qty: 30 CAPSULE | Refills: 1 | Status: SHIPPED | OUTPATIENT
Start: 2018-09-27 | End: 2018-10-07

## 2018-09-28 ENCOUNTER — ANESTHESIA (OUTPATIENT)
Dept: PERIOP | Facility: AMBULARY SURGERY CENTER | Age: 58
End: 2018-09-28

## 2018-10-02 ENCOUNTER — OFFICE VISIT (OUTPATIENT)
Dept: PODIATRY | Facility: CLINIC | Age: 58
End: 2018-10-02
Payer: COMMERCIAL

## 2018-10-02 VITALS — HEIGHT: 70 IN | BODY MASS INDEX: 40.8 KG/M2 | WEIGHT: 285 LBS

## 2018-10-02 DIAGNOSIS — L97.522 DIABETIC ULCER OF TOE OF LEFT FOOT ASSOCIATED WITH TYPE 2 DIABETES MELLITUS, WITH FAT LAYER EXPOSED (HCC): ICD-10-CM

## 2018-10-02 DIAGNOSIS — M86.172 ACUTE OSTEOMYELITIS OF TOE OF LEFT FOOT (HCC): Primary | ICD-10-CM

## 2018-10-02 DIAGNOSIS — E11.621 DIABETIC ULCER OF TOE OF LEFT FOOT ASSOCIATED WITH TYPE 2 DIABETES MELLITUS, WITH FAT LAYER EXPOSED (HCC): ICD-10-CM

## 2018-10-02 DIAGNOSIS — L97.522 ULCER OF LEFT FOOT, WITH FAT LAYER EXPOSED (HCC): ICD-10-CM

## 2018-10-02 DIAGNOSIS — L03.032 CELLULITIS OF SECOND TOE OF LEFT FOOT: ICD-10-CM

## 2018-10-02 PROCEDURE — 99213 OFFICE O/P EST LOW 20 MIN: CPT | Performed by: PODIATRIST

## 2018-10-02 NOTE — PROGRESS NOTES
Assessment/Plan:    Patient will continue with Bactrim DS 1 tab p o  b i d  will continue antibiotics until MRI  MRI rule out bone infection  Unable to have elective surgery in till we are sure that there is no remaining infection  Blood work reviewed  WBC 6 4/creatinine 1 09/LFT normal  Albumin 4 2  ESR 7/CRP less than 3     Bactroban and dressing daily       Stressed importance of offloading foot  Follow-up 2 weeks                        Subjective:      Patient ID: Wilton Harp is a 63 y o  male  Patient is follow-up for infected ulcer of left 2nd digit  Patient has been taking Bactrim twice a day  Redness swelling and wound has significantly improved  Been changing dressing daily  Patient has not had any fever or chills  Past Medical History:   Diagnosis Date    Arthritis     Diabetes mellitus ([address] )     niddm    DJD (degenerative joint disease)     Hyperlipidemia     Hypertension     Kidney stone     Mechanical complication of internal orthopedic device (HCC)        Past Surgical History:   Procedure Laterality Date    CARPAL TUNNEL RELEASE Left     COLONOSCOPY      HERNIA REPAIR Left     inguinal    JOINT REPLACEMENT      right knee revision, infection total of 4 surgeries     JOINT REPLACEMENT      left knee     KIDNEY STONE SURGERY      x3    [place] COLONOSCOPY FLX DX W/COLLJ SPEC WHEN PFRMD N/A 2/9/2018    Procedure: EGD AND COLONOSCOPY;  Surgeon: Wiley Hurst MD;  [facility];   Service: Gastroenterology    [place] REVISE KNEE JOINT REPLACE,1 PART Right 3/20/2017    Procedure: REVISION TOTAL KNEE ARTHROPLASTY INCLUDING FEMORAL STEM REVISION;  Surgeon: Suhail Nieves MD;  [facility];  Service: Orthopedics    [place] REVISE KNEE JOINT REPLACE,ALL PARTS Right 4/6/2016    Procedure: REVISION TOTAL KNEE ARTHROPLASTY INCLUDING FEMORAL AND TIBIAL COMPONENTS;  Surgeon: Suhail Nieves MD;  [facility];  Service: Orthopedics    ULNAR TUNNEL RELEASE      WISDOM TOOTH EXTRACTION         Allergies   Allergen Reactions    Doxycycline Photosensitivity    Other      Bee stings         Current Outpatient Prescriptions:     allopurinol (ZYLOPRIM) 100 mg tablet, Take 100 mg by mouth daily  , Disp: , Rfl:     amLODIPine (NORVASC) 5 mg tablet, Take 10 mg by mouth daily  , Disp: , Rfl:     atorvastatin (LIPITOR) 10 mg tablet, Take 20 mg by mouth daily  , Disp: , Rfl:     cholecalciferol (VITAMIN D3) 1,000 units tablet, Take 1,000 Units by mouth daily, Disp: , Rfl:     clindamycin (CLEOCIN) 300 MG capsule, Take 1 capsule (300 mg total) by mouth 3 (three) times a day for 10 days, Disp: 30 capsule, Rfl: 1    Dulaglutide (TRULICITY) 1 5 ZD/9 2IJ SOPN, Inject 1 5 mg under the skin once a week  , Disp: , Rfl:     Elastic Bandages & Supports (MEDICAL COMPRESSION STOCKINGS) MISC, by Does not apply route daily Knee High 20-30mmHg  For varicose veins/ venous insuf, Disp: 2 each, Rfl: 3    Empagliflozin (JARDIANCE) 10 MG TABS, Take 1 tablet by mouth daily, Disp: , Rfl:     fenofibrate (TRICOR) 145 mg tablet, Take 145 mg by mouth daily  , Disp: , Rfl:     labetalol (NORMODYNE) 100 mg tablet, Take 100 mg by mouth 2 (two) times a day, Disp: , Rfl:     lisinopril (ZESTRIL) 40 mg tablet, Take 40 mg by mouth daily  , Disp: , Rfl:     metFORMIN (GLUCOPHAGE) 1000 MG tablet, Take 1,000 mg by mouth 2 (two) times a day with meals  , Disp: , Rfl:     Multiple Vitamin (MULTIVITAMIN) tablet, Take 1 tablet by mouth daily  , Disp: , Rfl:     silver sulfadiazine (SILVADENE,SSD) 1 % cream, Apply topically 2 (two) times a day for 30 days, Disp: 50 g, Rfl: 0    Patient Active Problem List   Diagnosis    Diabetes mellitus ([facility] [address] )    Hypertension    Hyperlipidemia    Status post revision of total replacement of right knee    Varicose veins of bilateral lower extremities with pain    Intervertebral disc disorders with radiculopathy, lumbar region    Chronic pain of right knee    Pes anserine bursitis    Acute osteomyelitis of toe of left foot (HCC)    Acquired deformity of left foot    Hammer toe of left foot    Diabetic ulcer of toe of left foot associated with type 2 diabetes mellitus, with fat layer exposed ([address] )    Cellulitis of second toe of left foot       Review of Systems   Constitutional: Negative  HENT: Negative  Eyes: Negative  Respiratory: Negative  Cardiovascular: Negative  Gastrointestinal: Negative  Endocrine: Negative  Genitourinary: Negative  Musculoskeletal: Negative  Skin: Negative  Allergic/Immunologic: Negative  Neurological: Negative  Hematological: Negative  Psychiatric/Behavioral: Negative  Objective:  Patient's shoes and socks were removed, feet examined  Ht 5' 10" (1 778 m)   Wt 129 kg (285 lb)   BMI 40 89 kg/m²          Physical Exam   Cardiovascular:   Pulses:       Dorsalis pedis pulses are 2+ on the right side, and 2+ on the left side  Posterior tibial pulses are 1+ on the right side, and 1+ on the left side  Feet:   Left Foot:   Skin Integrity: Positive for ulcer  Foot Exam    General  General Appearance: appears stated age and healthy   Orientation: alert and oriented to person, place, and time   Affect: appropriate       Right Foot/Ankle     Inspection and Palpation  Hammertoes: second toe, third toe, fourth toe and fifth toe  Hallux limitus: yes    Neurovascular  Dorsalis pedis: 2+  Posterior tibial: 1+      Left Foot/Ankle      Inspection and Palpation  Hammertoes: second toe, fifth toe, third toe and fourth toe  Hallux limitus: yes  Skin Exam: ulcer;     Neurovascular  Dorsalis pedis: 2+  Posterior tibial: 1+            Patient's shoes and socks removed  Right Foot/Ankle   Right Foot Inspection      Sensory   Vibration: absent  Proprioception: diminished   Monofilament testing: diminished  Vascular  Capillary refills: < 3 seconds  The right DP pulse is 2+  The right PT pulse is 1+     Right Toe  - Comprehensive Exam  Hammertoes: second toe, third toe, fourth toe and fifth toe  Hallux limitus: yes    Left Foot/Ankle  Left Foot Inspection  Skin Exam: ulcer                                         Sensory   Vibration: absent  Proprioception: diminished  Monofilament: diminished  Vascular  Capillary refills: < 3 seconds  The left DP pulse is 2+  The left PT pulse is 1+     Left Toe  - Comprehensive Exam  Hammertoes: second toe, fifth toe, third toe and fourth toe  Hallux limitus: yes      Ulceration left distal 2nd digit 0 3 cm x 0 2 cm x 0 2 cm wound base granular   mild localized erythema, negative probe to bone, negative ascending cellulitis, minimal swelling minimal pain

## 2018-10-04 ENCOUNTER — HOSPITAL ENCOUNTER (OUTPATIENT)
Dept: MRI IMAGING | Facility: HOSPITAL | Age: 58
Discharge: HOME | End: 2018-10-04
Payer: COMMERCIAL

## 2018-10-04 DIAGNOSIS — M86.172 ACUTE OSTEOMYELITIS OF LEFT ANKLE OR FOOT (HCC): ICD-10-CM

## 2018-10-04 PROCEDURE — 73720 MRI LWR EXTREMITY W/O&W/DYE: CPT

## 2018-10-04 PROCEDURE — A9585 GADOBUTROL INJECTION: HCPCS | Performed by: PODIATRIST

## 2018-10-04 RX ADMIN — GADOBUTROL 13 ML: 604.72 INJECTION INTRAVENOUS at 13:08

## 2018-10-16 ENCOUNTER — OFFICE VISIT (OUTPATIENT)
Dept: PODIATRY | Facility: CLINIC | Age: 58
End: 2018-10-16
Payer: COMMERCIAL

## 2018-10-16 VITALS
BODY MASS INDEX: 40.8 KG/M2 | DIASTOLIC BLOOD PRESSURE: 96 MMHG | WEIGHT: 285 LBS | HEIGHT: 70 IN | SYSTOLIC BLOOD PRESSURE: 151 MMHG

## 2018-10-16 DIAGNOSIS — M20.42 HAMMER TOE OF LEFT FOOT: ICD-10-CM

## 2018-10-16 DIAGNOSIS — L97.521 ULCER OF TOE OF LEFT FOOT, LIMITED TO BREAKDOWN OF SKIN (HCC): Primary | ICD-10-CM

## 2018-10-16 DIAGNOSIS — E11.622 DIABETES MELLITUS WITH SKIN ULCER (HCC): ICD-10-CM

## 2018-10-16 DIAGNOSIS — L98.499 DIABETES MELLITUS WITH SKIN ULCER (HCC): ICD-10-CM

## 2018-10-16 PROCEDURE — 99213 OFFICE O/P EST LOW 20 MIN: CPT | Performed by: PODIATRIST

## 2018-10-16 NOTE — PROGRESS NOTES
Assessment/Plan:    Patient is off antibiotics  Patient will continue with Bactroban and dressing daily  Discussed possible need for offloading shoes  Discussed possible need for surgical correction of underlying deformity  Patient has no sign of infection and mild wound patient is clear for venous surgery unless there is any signs of infection patient will check daily    Follow-up 2 weeks                            Subjective:      Patient ID: Kristin Benson is a 63 y o  male  Patient is follow-up for ulcer left 2nd digit  Patient has been applying Bactroban and dressing daily  Wound is significantly improved  Patient had MRI which did not reveal any signs of bone infection  There is no redness no swelling minimal drainage  Patient does have some pain in toe secondary to contracted hammertoe  Has been using crest pad and orthotics  Past Medical History:   Diagnosis Date    Arthritis     Diabetes mellitus ([address] )     niddm    DJD (degenerative joint disease)     Hyperlipidemia     Hypertension     Kidney stone     Mechanical complication of internal orthopedic device (HCC)        Past Surgical History:   Procedure Laterality Date    CARPAL TUNNEL RELEASE Left     COLONOSCOPY      HERNIA REPAIR Left     inguinal    JOINT REPLACEMENT      right knee revision, infection total of 4 surgeries     JOINT REPLACEMENT      left knee     KIDNEY STONE SURGERY      x3    [place] COLONOSCOPY FLX DX W/COLLJ SPEC WHEN PFRMD N/A 2/9/2018    Procedure: EGD AND COLONOSCOPY;  Surgeon: Soumya Toure MD;  [facility];   Service: Gastroenterology    [place] REVISE KNEE JOINT REPLACE,1 PART Right 3/20/2017    Procedure: REVISION TOTAL KNEE ARTHROPLASTY INCLUDING FEMORAL STEM REVISION;  Surgeon: Tone Scales MD;  [facility];  Service: Orthopedics    [place] REVISE KNEE JOINT REPLACE,ALL PARTS Right 4/6/2016    Procedure: REVISION TOTAL KNEE ARTHROPLASTY INCLUDING FEMORAL AND TIBIAL COMPONENTS; Surgeon: Dewayne Tracey MD;  [facility];  Service: Orthopedics    ULNAR TUNNEL RELEASE      WISDOM TOOTH EXTRACTION         Allergies   Allergen Reactions    Doxycycline Photosensitivity    Other      Bee stings         Current Outpatient Prescriptions:     allopurinol (ZYLOPRIM) 100 mg tablet, Take 100 mg by mouth daily  , Disp: , Rfl:     amLODIPine (NORVASC) 5 mg tablet, Take 10 mg by mouth daily  , Disp: , Rfl:     atorvastatin (LIPITOR) 10 mg tablet, Take 20 mg by mouth daily  , Disp: , Rfl:     cholecalciferol (VITAMIN D3) 1,000 units tablet, Take 1,000 Units by mouth daily, Disp: , Rfl:     Dulaglutide (TRULICITY) 1 5 YG/2 5NR SOPN, Inject 1 5 mg under the skin once a week  , Disp: , Rfl:     Elastic Bandages & Supports ([address]) MISC, by Does not apply route daily Knee High 20-30mmHg  For varicose veins/ venous insuf, Disp: 2 each, Rfl: 3    Empagliflozin (JARDIANCE) 10 MG TABS, Take 1 tablet by mouth daily, Disp: , Rfl:     fenofibrate (TRICOR) 145 mg tablet, Take 145 mg by mouth daily  , Disp: , Rfl:     labetalol (NORMODYNE) 100 mg tablet, Take 100 mg by mouth 2 (two) times a day, Disp: , Rfl:     lisinopril (ZESTRIL) 40 mg tablet, Take 40 mg by mouth daily  , Disp: , Rfl:     metFORMIN (GLUCOPHAGE) 1000 MG tablet, Take 1,000 mg by mouth 2 (two) times a day with meals  , Disp: , Rfl:     Multiple Vitamin (MULTIVITAMIN) tablet, Take 1 tablet by mouth daily  , Disp: , Rfl:     silver sulfadiazine (SILVADENE,SSD) 1 % cream, Apply topically 2 (two) times a day for 30 days, Disp: 50 g, Rfl: 0    Patient Active Problem List   Diagnosis    Diabetes mellitus ([facility] [address] )    Hypertension    Hyperlipidemia    Status post revision of total replacement of right knee    Varicose veins of bilateral lower extremities with pain    Intervertebral disc disorders with radiculopathy, lumbar region    Chronic pain of right knee    Pes anserine bursitis    Acute osteomyelitis of toe of left foot (HCC)    Acquired deformity of left foot    Hammer toe of left foot    Diabetic ulcer of toe of left foot associated with type 2 diabetes mellitus, with fat layer exposed ([address] )    Cellulitis of second toe of left foot       Review of Systems   Constitutional: Negative  HENT: Negative  Eyes: Negative  Respiratory: Negative  Cardiovascular: Negative  Gastrointestinal: Negative  Endocrine: Negative  Genitourinary: Negative  Musculoskeletal: Negative  Skin: Negative  Allergic/Immunologic: Negative  Neurological: Negative  Hematological: Negative  Psychiatric/Behavioral: Negative  Objective:  Patient's shoes and socks were removed, feet examined  /96   Ht 5' 10" (1 778 m)   Wt 129 kg (285 lb)   BMI 40 89 kg/m²          Physical Exam   Cardiovascular:   Pulses:       Dorsalis pedis pulses are 2+ on the right side, and 2+ on the left side  Posterior tibial pulses are 1+ on the right side, and 1+ on the left side  Feet:   Left Foot:   Skin Integrity: Positive for ulcer  Foot Exam    General  General Appearance: appears stated age and healthy   Orientation: alert and oriented to person, place, and time   Affect: appropriate       Right Foot/Ankle     Inspection and Palpation  Hammertoes: second toe, third toe, fourth toe and fifth toe  Hallux limitus: yes    Neurovascular  Dorsalis pedis: 2+  Posterior tibial: 1+      Left Foot/Ankle      Inspection and Palpation  Hammertoes: second toe, fifth toe, third toe and fourth toe  Hallux limitus: yes  Skin Exam: ulcer;     Neurovascular  Dorsalis pedis: 2+  Posterior tibial: 1+            Patient's shoes and socks removed  Right Foot/Ankle   Right Foot Inspection      Sensory   Vibration: absent  Proprioception: diminished   Monofilament testing: diminished  Vascular  Capillary refills: < 3 seconds  The right DP pulse is 2+  The right PT pulse is 1+     Right Toe  - Comprehensive Exam  Hammertoes: second toe, third toe, fourth toe and fifth toe  Hallux limitus: yes    Left Foot/Ankle  Left Foot Inspection  Skin Exam: ulcer                                         Sensory   Vibration: absent  Proprioception: diminished  Monofilament: diminished  Vascular  Capillary refills: < 3 seconds  The left DP pulse is 2+  The left PT pulse is 1+  Left Toe  - Comprehensive Exam  Hammertoes: second toe, fifth toe, third toe and fourth toe  Hallux limitus: yes      Ulceration left distal 2nd digit 0 2 cm x 0 2 cm x 0 1 cm wound base granular    negative erythema negative purulence negative probe to bone  No signs of infection minimal swelling  There is contracted hammertoes left worse than right    There is some pain and distal aspect of the left 2nd digit secondary to contracted hammertoe

## 2018-11-02 ENCOUNTER — OFFICE VISIT (OUTPATIENT)
Dept: PAIN MEDICINE | Facility: CLINIC | Age: 58
End: 2018-11-02
Payer: COMMERCIAL

## 2018-11-02 VITALS
DIASTOLIC BLOOD PRESSURE: 84 MMHG | HEIGHT: 70 IN | BODY MASS INDEX: 40.89 KG/M2 | SYSTOLIC BLOOD PRESSURE: 146 MMHG | TEMPERATURE: 98.5 F | HEART RATE: 80 BPM

## 2018-11-02 DIAGNOSIS — M51.16 INTERVERTEBRAL DISC DISORDERS WITH RADICULOPATHY, LUMBAR REGION: Primary | ICD-10-CM

## 2018-11-02 DIAGNOSIS — G90.521 COMPLEX REGIONAL PAIN SYNDROME TYPE 1 OF RIGHT LOWER EXTREMITY: ICD-10-CM

## 2018-11-02 PROCEDURE — 99214 OFFICE O/P EST MOD 30 MIN: CPT | Performed by: NURSE PRACTITIONER

## 2018-11-02 NOTE — PROGRESS NOTES
Assessment:  1  Intervertebral disc disorders with radiculopathy, lumbar region    2  Complex regional pain syndrome type 1 of right lower extremity        Plan:  Fletcher Newsome is a 63 y o  male with a history of lumbar intervertebral disc with radiculopathy and complex regional pain syndrome type 1 of the right lower extremity  The patient reports that his pain and symptoms are the same pain and symptoms that he experienced prior to undergoing of right L3-L4 transforaminal epidural steroid injection on 5/15/2018  Therefore, the patient would like to repeat this injection at this time  The patient was educated on the procedures most common risks and was given a brochure on the procedure  The patient verbalized understanding and would like to proceed with the procedure  Therefore, the patient will be scheduled for an upcoming Tuesday or Thursday  '    The patient reported having an ulcer on the 2nd digit of his left foot in September  However, he reports that the ulcer has since completely healed  I instructed him that if he had any open wounds that we would not be able to move forward with the injection he verbalized understanding  Complete risks and benefits including bleeding, infection, tissue reaction, nerve injury and allergic reaction were discussed  The approach was demonstrated using models and literature was provided  The patient will follow up after his right L3-L4 transforaminal epidural steroid injection, or sooner with the worsening of symptoms  My impressions and treatment recommendations were discussed in detail with the patient who verbalized understanding and had no further questions  Discharge instructions were provided  I personally saw and examined the patient and I agree with the above discussed plan of care      Orders Placed This Encounter   Procedures    FL spine and pain procedure     Standing Status:   Future     Standing Expiration Date:   11/2/2022     Order Specific Question:   Reason for Exam:     Answer:   Right L3-L4 TFESI     Order Specific Question:   Anticoagulant hold needed? Answer:   No     No orders of the defined types were placed in this encounter  History of Present Illness:    Denise Kayser is a 63 y o  male with a history of lumbar intervertebral disc with radiculopathy and complex regional pain syndrome type 1 of the right lower extremity  The patient was last in the office on 5/15/2018 where he underwent a right L3-L4 transforaminal epidural steroid injection  He presents for follow-up visit  For low back pain that radiates down over the anterior aspect of his right thigh to his knee  He denies bilateral leg weakness, or bowel or bladder issues  He describes his pain as sharp shooting pain that is constant nature occurring throughout the day  The patient reports that his pain symptoms are unchanged since last office visit  He currently rates his pain a 5 to 6/10 pain score  I have personally reviewed and/or updated the patient's past medical history, past surgical history, family history, social history, current medications, allergies, and vital signs today  Review of Systems:    Review of Systems   Respiratory: Negative for shortness of breath  Cardiovascular: Negative for chest pain  Gastrointestinal: Negative for constipation, diarrhea, nausea and vomiting  Musculoskeletal: Positive for gait problem (difficulty walking/ decreased range of motion)  Negative for arthralgias, joint swelling and myalgias  Skin: Negative for rash  Neurological: Negative for dizziness, seizures and weakness  All other systems reviewed and are negative        Patient Active Problem List   Diagnosis    Diabetes mellitus ([facility] )    Hypertension    Hyperlipidemia    Status post revision of total replacement of right knee    Varicose veins of bilateral lower extremities with pain    Intervertebral disc disorders with radiculopathy, lumbar region  Chronic pain of right knee    Pes anserine bursitis    Acute osteomyelitis of toe of left foot (HCC)    Acquired deformity of left foot    Hammer toe of left foot    Diabetic ulcer of toe of left foot associated with type 2 diabetes mellitus, with [facility] layer exposed [facility]    Cellulitis of second toe of left foot       Past Medical History:   Diagnosis Date    Arthritis     Diabetes mellitus ([facility] )     niddm    DJD (degenerative joint disease)     Hyperlipidemia     Hypertension     Kidney stone     Mechanical complication of internal orthopedic device ([facility] )        Past Surgical History:   Procedure Laterality Date    CARPAL TUNNEL RELEASE Left     COLONOSCOPY      HERNIA REPAIR Left     inguinal    JOINT REPLACEMENT      right knee revision, infection total of 4 surgeries     JOINT REPLACEMENT      left knee     KIDNEY STONE SURGERY      x3    [place] COLONOSCOPY FLX DX W/COLLJ SPEC WHEN PFRMD N/A 2/9/2018    Procedure: EGD AND COLONOSCOPY;  Surgeon: Catrachita Quinteros MD;  [facility]; Service: Gastroenterology    [place] REVISE KNEE JOINT REPLACE,1 PART Right 3/20/2017    Procedure: REVISION TOTAL KNEE ARTHROPLASTY INCLUDING FEMORAL STEM REVISION;  Surgeon: Tari Brambila MD;  [facility];  Service: Orthopedics    [place] REVISE KNEE JOINT REPLACE,ALL PARTS Right 4/6/2016    Procedure: REVISION TOTAL KNEE ARTHROPLASTY INCLUDING FEMORAL AND TIBIAL COMPONENTS;  Surgeon: Tari Brambila MD;  [facility];  Service: Orthopedics    ULNAR TUNNEL RELEASE      WISDOM TOOTH EXTRACTION         Family History   Problem Relation Age of Onset    Diabetes Mother     Hypertension Mother     Hypertension Father        Social History     Occupational History    Not on file       Social History Main Topics    Smoking status: Former Smoker    Smokeless tobacco: Never Used      Comment: quit 40 years ago    Alcohol use 8 4 oz/week     14 Cans of beer per week    Drug use: No    Sexual activity: Not on file       Current Outpatient Prescriptions on File Prior to Visit   Medication Sig    allopurinol (ZYLOPRIM) 100 mg tablet Take 100 mg by mouth daily   amLODIPine (NORVASC) 5 mg tablet Take 10 mg by mouth daily      atorvastatin (LIPITOR) 10 mg tablet Take 20 mg by mouth daily      cholecalciferol (VITAMIN D3) 1,000 units tablet Take 1,000 Units by mouth daily    Dulaglutide (TRULICITY) 1 5 TP/9 9AI SOPN Inject 1 5 mg under the skin once a week      Elastic Bandages & Supports (MEDICAL COMPRESSION STOCKINGS) MISC by Does not apply route daily Knee High 20-30mmHg    For varicose veins/ venous insuf    Empagliflozin (JARDIANCE) 10 MG TABS Take 1 tablet by mouth daily    fenofibrate (TRICOR) 145 mg tablet Take 145 mg by mouth daily   labetalol (NORMODYNE) 100 mg tablet Take 100 mg by mouth 2 (two) times a day    lisinopril (ZESTRIL) 40 mg tablet Take 40 mg by mouth daily   metFORMIN (GLUCOPHAGE) 1000 MG tablet Take 1,000 mg by mouth 2 (two) times a day with meals   Multiple Vitamin (MULTIVITAMIN) tablet Take 1 tablet by mouth daily  No current facility-administered medications on file prior to visit  Allergies   Allergen Reactions    Doxycycline Photosensitivity    Other      Bee stings       Physical Exam:    /84   Pulse 80   Temp 98 5 °F (36 9 °C) (Oral)   Ht 5' 10" (1 778 m)   BMI 40 89 kg/m²     Constitutional: normal, well developed, well nourished, alert, in no distress and non-toxic and no overt pain behavior    Eyes: anicteric  HEENT: grossly intact  Neck: supple, symmetric, trachea midline and no masses   Pulmonary:even and unlabored  Cardiovascular:No edema or pitting edema present  Skin:Normal without rashes or lesions and well hydrated  Psychiatric:Mood and affect appropriate  Neurologic:Cranial Nerves II-XII grossly intact  Musculoskeletal:normal     Lumbar Spine Exam    Appearance:  Normal lordosis  Palpation/Tenderness:  left lumbar paraspinal tenderness  right lumbar paraspinal tenderness  Sensory:  no sensory deficits noted  Range of Motion:  Flexion:  Minimally limited  with pain  Extension:  Minimally limited  with pain  Lateral Flexion - Left:  Minimally limited  with pain  Lateral Flexion - Right:  Minimally limited  with pain  Rotation - Left:  Minimally limited  with pain  Rotation - Right:  Minimally limited  with pain  Motor Strength:  Left hip flexion:  5/5  Right hip flexion:  5/5  Left knee extension:  5/5  Right knee extension:  5/5  Left foot dorsiflexion:  5/5  Left foot plantar flexion:  5/5  Right foot dorsiflexion:  5/5  Right foot plantar flexion:  5/5      Imaging  MRI LUMBAR SPINE WITHOUT CONTRAST     INDICATION:  Back pain      COMPARISON:  None      TECHNIQUE:  Sagittal T1, sagittal T2, sagittal inversion recovery, axial T1 and axial T2, coronal T2        IMAGE QUALITY:  Diagnostic     FINDINGS:     ALIGNMENT:  Normal alignment of the lumbar spine  No compression fracture  No spondylolysis or spondylolisthesis  No scoliosis      MARROW SIGNAL:  Mild endplate marrow changes noted at multiple levels without worrisome marrow lesion  Scattered endplate vertebral's nodes also evident      DISTAL CORD AND CONUS:  Normal size and signal within the distal cord and conus  The conus ends at the L1 level      PARASPINAL SOFT TISSUES:  Small left renal cortical cysts evident      SACRUM:  Normal signal within the sacrum  No evidence of insufficiency or stress fracture      LOWER THORACIC DISC SPACES:  Normal disc height and signal   No disc herniation, canal stenosis or foraminal narrowing      LUMBAR DISC SPACES:          L1-L2:  Normal      L2-L3:  Disc desiccation without significant loss of disc height  Mild circumferential disc bulge and bilateral facet hypertrophy with ligamentum flavum infolding  No significant central canal or foraminal encroachment      L3-L4:  Disc desiccation with mild loss of disc height    Right greater than left facet hypertrophy and ligamentum flavum infolding  Circumferential disc bulge identified  Mild central canal encroachment  There is mild to moderate right and mild left   foraminal narrowing  Correlate clinically for right greater than left L3 radiculopathy      L4-L5:  Disc desiccation with mild loss of disc height  Circumferential disc bulge with bilateral facet hypertrophy and ligamentum flavum infolding  Superimposed right paracentral disc herniation, extrusion type, with mild caudal migration resulting in   narrowing of the right lateral recess and possible impingement of the right L5 nerve root  Correlate clinically  Mild central canal encroachment identified  There is mild left and mild to moderate right foraminal encroachment  Correlate clinically   for right greater than left L4 radiculopathy      L5-S1:  Disc desiccation with significant loss of disc height and prominent circumferential disc bulge  Superimposed right paracentral disc osteophyte complex resulting in ventral impression upon the thecal sac best appreciated on 6/24  There is   narrowing of the right subarticular recess in this region and clinical correlation for right S1 radiculopathy is suggested  Right greater than left facet hypertrophy and ligamentum flavum infolding noted    Mild central canal encroachment evident with   mild bilateral foraminal stenosis      IMPRESSION:  Moderate spondylotic changes of the lumbar spine as detailed above

## 2018-11-14 ENCOUNTER — OFFICE VISIT (OUTPATIENT)
Dept: PODIATRY | Facility: CLINIC | Age: 58
End: 2018-11-14
Payer: COMMERCIAL

## 2018-11-14 VITALS — BODY MASS INDEX: 40.8 KG/M2 | WEIGHT: 285 LBS | RESPIRATION RATE: 17 BRPM | HEIGHT: 70 IN

## 2018-11-14 DIAGNOSIS — M20.42 HAMMER TOE OF LEFT FOOT: ICD-10-CM

## 2018-11-14 DIAGNOSIS — L97.522 DIABETIC ULCER OF TOE OF LEFT FOOT ASSOCIATED WITH TYPE 2 DIABETES MELLITUS, WITH FAT LAYER EXPOSED (HCC): Primary | ICD-10-CM

## 2018-11-14 DIAGNOSIS — L97.522 ULCER OF LEFT FOOT, WITH FAT LAYER EXPOSED (HCC): ICD-10-CM

## 2018-11-14 DIAGNOSIS — E11.621 DIABETIC ULCER OF TOE OF LEFT FOOT ASSOCIATED WITH TYPE 2 DIABETES MELLITUS, WITH FAT LAYER EXPOSED (HCC): Primary | ICD-10-CM

## 2018-11-14 PROCEDURE — 99213 OFFICE O/P EST LOW 20 MIN: CPT | Performed by: PODIATRIST

## 2018-11-14 NOTE — PROGRESS NOTES
Assessment/Plan:    Aseptic of debridement of left 2nd digit ulceration with #15 scalpel  A skin/subcutaneous tissue level excisional/surgical debridement  Subcutaneous tissue was removed along with devitalized tissue: biofilm, necrotic/eschar, and slough  Applied silver nitrate and gentian violet    Patient apply Bactroban and dressing daily  Patient will apply crest offloading pads daily  Patient will call if any signs of infection  Follow-up 2 weeks     Diagnoses and all orders for this visit:    Diabetic ulcer of toe of left foot associated with type 2 diabetes mellitus, with fat layer exposed ([address] )    Ulcer of left foot, with fat layer exposed ([address] )    Hammer toe of left foot          Subjective:      Patient ID: Ho Foote is a 63 y o  male  Patient is follow-up for left 2nd digit ulceration  Patient has been applying Bactroban and dressing daily  Patient has had mild amount of clear drainage foot the has not had any pus no redness mid there is moderate swelling  Patient says blood sugar has been well controlled  Patient is vein procedure was delayed till the new year because of scheduling issues  Patient has contracted hammertoe which is putting pressure on the toe which is causing pain patient has not been wearing offloading pad  Patient denies any fever or chills          Past Medical History:   Diagnosis Date    Arthritis     Diabetes mellitus ([address] )     niddm    DJD (degenerative joint disease)     Hyperlipidemia     Hypertension     Kidney stone     Mechanical complication of internal orthopedic device (HCC)        Past Surgical History:   Procedure Laterality Date    CARPAL TUNNEL RELEASE Left     COLONOSCOPY      HERNIA REPAIR Left     inguinal    JOINT REPLACEMENT      right knee revision, infection total of 4 surgeries     JOINT REPLACEMENT      left knee     KIDNEY STONE SURGERY      x3    [place] COLONOSCOPY FLX DX W/COLLJ SPEC WHEN PFRMD N/A 2/9/2018    Procedure: EGD AND COLONOSCOPY;  Surgeon: River Caballero MD;  [facility]; Service: Gastroenterology    [place] REVISE KNEE JOINT REPLACE,1 PART Right 3/20/2017    Procedure: REVISION TOTAL KNEE ARTHROPLASTY INCLUDING FEMORAL STEM REVISION;  Surgeon: Jose Antonio Oliveira MD;  [facility];  Service: Orthopedics    [place] REVISE KNEE JOINT REPLACE,ALL PARTS Right 4/6/2016    Procedure: REVISION TOTAL KNEE ARTHROPLASTY INCLUDING FEMORAL AND TIBIAL COMPONENTS;  Surgeon: Jose Antonio Oliveira MD;  [facility];  Service: Orthopedics    ULNAR TUNNEL RELEASE      WISDOM TOOTH EXTRACTION         Allergies   Allergen Reactions    Doxycycline Photosensitivity    Other      Bee stings         Current Outpatient Prescriptions:     allopurinol (ZYLOPRIM) 100 mg tablet, Take 100 mg by mouth daily  , Disp: , Rfl:     amLODIPine (NORVASC) 5 mg tablet, Take 10 mg by mouth daily  , Disp: , Rfl:     atorvastatin (LIPITOR) 10 mg tablet, Take 20 mg by mouth daily  , Disp: , Rfl:     cholecalciferol (VITAMIN D3) 1,000 units tablet, Take 1,000 Units by mouth daily, Disp: , Rfl:     Dulaglutide (TRULICITY) 1 5 LM/1 1KD SOPN, Inject 1 5 mg under the skin once a week  , Disp: , Rfl:     Elastic Bandages & Supports ([address]) MISC, by Does not apply route daily Knee High 20-30mmHg  For varicose veins/ venous insuf, Disp: 2 each, Rfl: 3    Empagliflozin (JARDIANCE) 10 MG TABS, Take 1 tablet by mouth daily, Disp: , Rfl:     fenofibrate (TRICOR) 145 mg tablet, Take 145 mg by mouth daily  , Disp: , Rfl:     labetalol (NORMODYNE) 100 mg tablet, Take 100 mg by mouth 2 (two) times a day, Disp: , Rfl:     lisinopril (ZESTRIL) 40 mg tablet, Take 40 mg by mouth daily  , Disp: , Rfl:     metFORMIN (GLUCOPHAGE) 1000 MG tablet, Take 1,000 mg by mouth 2 (two) times a day with meals  , Disp: , Rfl:     Multiple Vitamin (MULTIVITAMIN) tablet, Take 1 tablet by mouth daily  , Disp: , Rfl:     Patient Active Problem List   Diagnosis    Diabetes mellitus ([facility]    Hypertension    Hyperlipidemia    Status post revision of total replacement of right knee    Varicose veins of bilateral lower extremities with pain    Intervertebral disc disorders with radiculopathy, lumbar region    Chronic pain of right knee    Pes anserine bursitis    Acute osteomyelitis of toe of left foot (HCC)    Acquired deformity of left foot    Hammer toe of left foot    Diabetic ulcer of toe of left foot associated with type 2 diabetes mellitus, with fat layer exposed ([facility] [facility] )    Cellulitis of second toe of left foot       Review of Systems   Constitutional: Negative  HENT: Negative  Eyes: Negative  Respiratory: Negative  Cardiovascular: Negative  Gastrointestinal: Negative  Endocrine: Negative  Genitourinary: Negative  Musculoskeletal: Negative  Skin: Negative  Allergic/Immunologic: Negative  Neurological: Negative  Hematological: Negative  Psychiatric/Behavioral: Negative  Objective:  Patient's shoes and socks were removed, feet examined  Resp 17   Ht 5' 10" (1 778 m)   Wt 129 kg (285 lb)   BMI 40 89 kg/m²          Physical Exam   Cardiovascular:   Pulses:       Dorsalis pedis pulses are 2+ on the right side, and 2+ on the left side  Posterior tibial pulses are 1+ on the right side, and 1+ on the left side         Foot Exam    General  General Appearance: appears stated age and healthy   Orientation: alert and oriented to person, place, and time   Affect: appropriate   Gait: unimpaired       Right Foot/Ankle     Inspection and Palpation  Arch: pes planus  Hammertoes: second toe, fifth toe, fourth toe and third toe  Hallux valgus: yes    Neurovascular  Dorsalis pedis: 2+  Posterior tibial: 1+      Left Foot/Ankle      Inspection and Palpation  Arch: pes planus  Hammertoes: second toe, fifth toe, fourth toe and third toe  Hallux valgus: yes    Neurovascular  Dorsalis pedis: 2+  Posterior tibial: 1+            Patient's shoes and socks removed  Right Foot/Ankle   Right Foot Inspection      Sensory   Vibration: absent  Proprioception: diminished   Monofilament testing: diminished  Vascular  Capillary refills: < 3 seconds  The right DP pulse is 2+  The right PT pulse is 1+  Right Toe  - Comprehensive Exam  Arch: pes planus  Hammertoes: second toe, fifth toe, fourth toe and third toe  Hallux valgus: yes    Left Foot/Ankle  Left Foot Inspection                                           Sensory   Vibration: absent  Proprioception: diminished  Monofilament: diminished  Vascular  Capillary refills: < 3 seconds  The left DP pulse is 2+  The left PT pulse is 1+  Left Toe  - Comprehensive Exam  Arch: pes planus  Hammertoes: second toe, fifth toe, fourth toe and third toe  Hallux valgus: yes  Assign Risk Category:  Deformity present; Loss of protective sensation;        Risk: 2      Ulceration left distal 2nd digit 0 5 cm x 0 4 cm x 0 2cm wound base granular     there is mild maceration around the wound    There is no redness negative probe to bone no purulence no fluctuance no signs of infection  Contracted hammertoe left 2nd digit which is causing pain and creating pressure  Significant flatfoot  Muscle strength 5/5 all groups bilateral feet and ankles

## 2018-11-21 ENCOUNTER — TELEPHONE (OUTPATIENT)
Dept: VASCULAR SURGERY | Facility: CLINIC | Age: 58
End: 2018-11-21

## 2018-11-21 DIAGNOSIS — I83.813 VARICOSE VEINS OF BOTH LOWER EXTREMITIES WITH PAIN: Primary | ICD-10-CM

## 2018-11-21 NOTE — TELEPHONE ENCOUNTER
I spoke to pt and confirmed new date of EVLT surgery for 1/4/19 with Dr Favian Warren at [facility]  He will go for blood work and see his PCP for H&P within 30 days of surgery  No hold on medications  Instructions reviewed and understood

## 2018-11-28 ENCOUNTER — OFFICE VISIT (OUTPATIENT)
Dept: PODIATRY | Facility: CLINIC | Age: 58
End: 2018-11-28
Payer: COMMERCIAL

## 2018-11-28 VITALS
HEIGHT: 70 IN | HEART RATE: 86 BPM | WEIGHT: 285 LBS | RESPIRATION RATE: 17 BRPM | DIASTOLIC BLOOD PRESSURE: 84 MMHG | BODY MASS INDEX: 40.8 KG/M2 | SYSTOLIC BLOOD PRESSURE: 142 MMHG

## 2018-11-28 DIAGNOSIS — E11.42 DIABETIC POLYNEUROPATHY ASSOCIATED WITH TYPE 2 DIABETES MELLITUS (HCC): ICD-10-CM

## 2018-11-28 DIAGNOSIS — E11.621 DIABETIC ULCER OF TOE OF LEFT FOOT ASSOCIATED WITH TYPE 2 DIABETES MELLITUS, LIMITED TO BREAKDOWN OF SKIN (HCC): Primary | ICD-10-CM

## 2018-11-28 DIAGNOSIS — M79.672 LEFT FOOT PAIN: ICD-10-CM

## 2018-11-28 DIAGNOSIS — L97.521 DIABETIC ULCER OF TOE OF LEFT FOOT ASSOCIATED WITH TYPE 2 DIABETES MELLITUS, LIMITED TO BREAKDOWN OF SKIN (HCC): Primary | ICD-10-CM

## 2018-11-28 PROCEDURE — 99213 OFFICE O/P EST LOW 20 MIN: CPT | Performed by: PODIATRIST

## 2018-11-28 NOTE — PROGRESS NOTES
Procedures   Foot Exam       Signed  Encounter Date: 9/20/2018   Assessment/Plan:  Wound 2nd left toe  Healing slowly  Digital cellulitis, has resolved  Diabetic neuropathy  Rule out osteomyelitis  Hammertoe formation    Plan  Wound debrided  Silver nitrate dry sterile dressing applied     X-rays are being ordered to rule out osteomyelitis  Patient may need bone scan or MRI  He will bandage daily  Wife and patient instructed on wound care      No problem-specific Assessment & Plan notes found for this encounter          Diagnoses and all orders for this visit:     Acute osteomyelitis of toe of left foot ([facility] )  -     X-ray foot left 3+ views; Future     Acquired deformity of left foot     Hammer toe of left foot     Diabetic ulcer of toe of left foot associated with type 2 diabetes mellitus, with fat layer exposed ([facility])  -     sulfamethoxazole-trimethoprim (BACTRIM DS) 800-160 mg per tablet; Take 1 tablet by mouth every 12 (twelve) hours for 10 days  -     silver sulfadiazine (SILVADENE,SSD) 1 % cream; Apply topically 2 (two) times a day for 30 days     Cellulitis of second toe of left foot            Subjective:  patient is seen on follow-up  He is a diabetic with wound of 2nd right toe  He has not bandaging daily  He has no history of fever or night sweats  He has no pain in the toe at this time         Past Medical History:   Diagnosis Date    Arthritis      Diabetes mellitus (HCC)       niddm    DJD (degenerative joint disease)      Hyperlipidemia      Hypertension      Kidney stone      Mechanical complication of internal orthopedic device ([facility] )                 Past Surgical History:   Procedure Laterality Date    CARPAL TUNNEL RELEASE Left      COLONOSCOPY        HERNIA REPAIR Left       inguinal    JOINT REPLACEMENT         right knee revision, infection total of 4 surgeries     JOINT REPLACEMENT         left knee     KIDNEY STONE SURGERY         x3    NE COLONOSCOPY FLX DX W/COLLJ SPEC WHEN PFRMD N/A 2/9/2018     Procedure: EGD AND COLONOSCOPY;  Surgeon: Skip Felix MD;  Location: [facility]  GI LAB; Service: Gastroenterology    SD REVISE KNEE JOINT REPLACE,1 PART Right 3/20/2017     Procedure: REVISION TOTAL KNEE ARTHROPLASTY INCLUDING FEMORAL STEM REVISION;  Surgeon: Dewayne Tracey MD;  Location: [facility] MAIN OR;  Service: Orthopedics    SD REVISE KNEE JOINT REPLACE,ALL PARTS Right 4/6/2016     Procedure: REVISION TOTAL KNEE ARTHROPLASTY INCLUDING FEMORAL AND TIBIAL COMPONENTS;  Surgeon: Dewayne Tracey MD;  Location: [facility] MAIN OR;  Service: Orthopedics    ULNAR TUNNEL RELEASE        WISDOM TOOTH EXTRACTION                   Allergies   Allergen Reactions    Doxycycline Photosensitivity    Other         Bee stings            Current Outpatient Prescriptions:     allopurinol (ZYLOPRIM) 100 mg tablet, Take 100 mg by mouth daily  , Disp: , Rfl:     amLODIPine (NORVASC) 5 mg tablet, Take 10 mg by mouth daily  , Disp: , Rfl:     atorvastatin (LIPITOR) 10 mg tablet, Take 20 mg by mouth daily  , Disp: , Rfl:     cholecalciferol (VITAMIN D3) 1,000 units tablet, Take 1,000 Units by mouth daily, Disp: , Rfl:     Dulaglutide (TRULICITY) 1 5 VO/2 9UJ SOPN, Inject 1 5 mg under the skin once a week  , Disp: , Rfl:     Elastic Bandages & Supports ([address]) MISC, by Does not apply route daily Knee High 20-30mmHg  For varicose veins/ venous insuf, Disp: 2 each, Rfl: 3    Empagliflozin (JARDIANCE) 10 MG TABS, Take 1 tablet by mouth daily, Disp: , Rfl:     fenofibrate (TRICOR) 145 mg tablet, Take 145 mg by mouth daily  , Disp: , Rfl:     labetalol (NORMODYNE) 100 mg tablet, Take 100 mg by mouth 2 (two) times a day, Disp: , Rfl:     lisinopril (ZESTRIL) 40 mg tablet, Take 40 mg by mouth daily  , Disp: , Rfl:     metFORMIN (GLUCOPHAGE) 1000 MG tablet, Take 1,000 mg by mouth 2 (two) times a day with meals  , Disp: , Rfl:     Multiple Vitamin (MULTIVITAMIN) tablet, Take 1 tablet by mouth daily  , Disp: , Rfl:     silver sulfadiazine (SILVADENE,SSD) 1 % cream, Apply topically 2 (two) times a day for 30 days, Disp: 50 g, Rfl: 0    sulfamethoxazole-trimethoprim (BACTRIM DS) 800-160 mg per tablet, Take 1 tablet by mouth every 12 (twelve) hours for 10 days, Disp: 20 tablet, Rfl: 0         Patient Active Problem List   Diagnosis    Diabetes mellitus ([facility] )    Hypertension    Hyperlipidemia    Status post revision of total replacement of right knee    Varicose veins of bilateral lower extremities with pain    Intervertebral disc disorders with radiculopathy, lumbar region    Chronic pain of right knee    Pes anserine bursitis    Acute osteomyelitis of toe of left foot [facility]    Acquired deformity of left foot    Hammer toe of left foot    Diabetic ulcer of toe of left foot associated with type 2 diabetes mellitus, with fat layer exposed ([facility]    Cellulitis of second toe of left foot             Patient ID: Gabrielle Sofia is a 63 y o  male      HPI     The following portions of the patient's history were reviewed and updated as appropriate: allergies, current medications, past family history, past medical history, past social history, past surgical history and problem list      Review of Systems       Objective:  Patient's shoes and socks removed     Foot Exam     General  General Appearance: appears stated age and healthy   Orientation: alert and oriented to person, place, and time   Affect: appropriate         Right Foot/Ankle      Inspection and Palpation  Ecchymosis: none  Tenderness: none   Swelling: none   Arch: pes planus  Hammertoes: fifth toe  Claw Toes: second toe  Hallux valgus: no  Hallux limitus: yes  Skin Exam: callus and dry skin;      Neurovascular  Dorsalis pedis: 1+  Posterior tibial: 2+  Saphenous nerve sensation: diminished  Tibial nerve sensation: diminished  Superficial peroneal nerve sensation: diminished  Deep peroneal nerve sensation: diminished  Sural nerve sensation: diminished  Achilles reflex: 1+     Muscle Strength  Ankle dorsiflexion: 4        Left Foot/Ankle       Inspection and Palpation  Ecchymosis: none  Tenderness: (2nd left toe)  Swelling: (Distal aspect 2nd left toe)  Hammertoes: fifth toe  Claw toes: second toe  Hallux valgus: no  Hallux limitus: yes  Skin Exam: callus and ulcer;      Neurovascular  Dorsalis pedis: 1+  Posterior tibial: 2+  Saphenous nerve sensation: diminished  Tibial nerve sensation: diminished  Superficial peroneal nerve sensation: diminished  Deep peroneal nerve sensation: diminished  Sural nerve sensation: diminished  Achilles reflex: 1+     Muscle Strength  Ankle dorsiflexion: 4        Physical Exam   Constitutional: He appears well-developed and well-nourished  Cardiovascular: Normal rate and regular rhythm  Pulses:       Dorsalis pedis pulses are 1+ on the right side, and 1+ on the left side  Posterior tibial pulses are 2+ on the right side, and 2+ on the left side  Musculoskeletal:   2nd left toe is clogged  Feet:   Right Foot:   Skin Integrity: Positive for callus and dry skin  Left Foot:   Skin Integrity: Positive for ulcer and callus  Neurological:   Reflex Scores:       Achilles reflexes are 1+ on the right side and 1+ on the left side  Skin:   Distal aspect 2nd left toe demonstrates ulcerated clavi   There is superficial wound noted  It is approximately 0 2 centimeter squared  There is hyperkeratosis  no infection noted  Negative occult pus  Vitals reviewed  Right Foot/Ankle   Right Foot Inspection  Skin Exam: dry skin, callus and callus                                 Vascular     The right DP pulse is 1+  The right PT pulse is 2+     Right Toe  - Comprehensive Exam  Ecchymosis: none  Arch: pes planus  Hammertoes: fifth toe  Claw Toes: second toe  Hallux valgus: no  Hallux limitus: yes  Swelling: none   Tenderness: none            Left Foot/Ankle  Left Foot Inspection  Skin Exam: ulcer and callus                                             Vascular     The left DP pulse is 1+  The left PT pulse is 2+     Left Toe  - Comprehensive Exam  Ecchymosis: none  Hammertoes: fifth toe  Claw toes: second toe  Hallux valgus: no  Hallux limitus: yes  Swelling: (Distal aspect 2nd left toe)  Tenderness: (2nd left toe)

## 2018-11-30 ENCOUNTER — HOSPITAL ENCOUNTER (OUTPATIENT)
Dept: RADIOLOGY | Facility: HOSPITAL | Age: 58
Discharge: HOME | End: 2018-11-30
Payer: COMMERCIAL

## 2018-11-30 ENCOUNTER — TRANSCRIBE ORDERS (OUTPATIENT)
Dept: ADMINISTRATIVE | Facility: HOSPITAL | Age: 58
End: 2018-11-30

## 2018-11-30 DIAGNOSIS — M86.9 DIABETIC FOOT ULCER WITH OSTEOMYELITIS (HCC): ICD-10-CM

## 2018-11-30 DIAGNOSIS — E11.621 DIABETIC FOOT ULCER WITH OSTEOMYELITIS (HCC): ICD-10-CM

## 2018-11-30 DIAGNOSIS — L97.509 DIABETIC FOOT ULCER WITH OSTEOMYELITIS (HCC): ICD-10-CM

## 2018-11-30 DIAGNOSIS — E11.621 DIABETIC ULCER OF TOE OF LEFT FOOT ASSOCIATED WITH TYPE 2 DIABETES MELLITUS, LIMITED TO BREAKDOWN OF SKIN (HCC): ICD-10-CM

## 2018-11-30 DIAGNOSIS — L97.521 ULCER OF TOE OF LEFT FOOT, LIMITED TO BREAKDOWN OF SKIN (HCC): Primary | ICD-10-CM

## 2018-11-30 DIAGNOSIS — L97.521 DIABETIC ULCER OF TOE OF LEFT FOOT ASSOCIATED WITH TYPE 2 DIABETES MELLITUS, LIMITED TO BREAKDOWN OF SKIN (HCC): ICD-10-CM

## 2018-11-30 DIAGNOSIS — E11.69 DIABETIC FOOT ULCER WITH OSTEOMYELITIS (HCC): ICD-10-CM

## 2018-11-30 PROCEDURE — 73630 X-RAY EXAM OF FOOT: CPT

## 2018-12-04 ENCOUNTER — OFFICE VISIT (OUTPATIENT)
Dept: PODIATRY | Facility: CLINIC | Age: 58
End: 2018-12-04
Payer: COMMERCIAL

## 2018-12-04 ENCOUNTER — OFFICE VISIT (OUTPATIENT)
Dept: OBGYN CLINIC | Facility: CLINIC | Age: 58
End: 2018-12-04
Payer: COMMERCIAL

## 2018-12-04 ENCOUNTER — TELEPHONE (OUTPATIENT)
Dept: PODIATRY | Facility: CLINIC | Age: 58
End: 2018-12-04

## 2018-12-04 ENCOUNTER — LAB REQUISITION (OUTPATIENT)
Dept: LAB | Facility: HOSPITAL | Age: 58
End: 2018-12-04
Payer: COMMERCIAL

## 2018-12-04 VITALS
HEART RATE: 77 BPM | BODY MASS INDEX: 40.8 KG/M2 | DIASTOLIC BLOOD PRESSURE: 91 MMHG | RESPIRATION RATE: 18 BRPM | SYSTOLIC BLOOD PRESSURE: 145 MMHG | WEIGHT: 285 LBS | HEIGHT: 70 IN

## 2018-12-04 VITALS
HEIGHT: 70 IN | SYSTOLIC BLOOD PRESSURE: 161 MMHG | HEART RATE: 71 BPM | BODY MASS INDEX: 40.8 KG/M2 | DIASTOLIC BLOOD PRESSURE: 88 MMHG | WEIGHT: 285 LBS

## 2018-12-04 DIAGNOSIS — M25.522 PAIN IN LEFT ELBOW: Primary | ICD-10-CM

## 2018-12-04 DIAGNOSIS — L97.524 DIABETIC ULCER OF TOE OF LEFT FOOT ASSOCIATED WITH TYPE 2 DIABETES MELLITUS, WITH NECROSIS OF BONE (HCC): ICD-10-CM

## 2018-12-04 DIAGNOSIS — M79.672 LEFT FOOT PAIN: ICD-10-CM

## 2018-12-04 DIAGNOSIS — E11.621 DIABETIC ULCER OF TOE OF LEFT FOOT ASSOCIATED WITH TYPE 2 DIABETES MELLITUS, WITH NECROSIS OF BONE (HCC): ICD-10-CM

## 2018-12-04 DIAGNOSIS — M86.172 ACUTE OSTEOMYELITIS OF TOE, LEFT (HCC): Primary | ICD-10-CM

## 2018-12-04 DIAGNOSIS — M86.172 OTHER ACUTE OSTEOMYELITIS, LEFT ANKLE AND FOOT (HCC): ICD-10-CM

## 2018-12-04 DIAGNOSIS — E11.42 DIABETIC POLYNEUROPATHY ASSOCIATED WITH TYPE 2 DIABETES MELLITUS (HCC): ICD-10-CM

## 2018-12-04 DIAGNOSIS — M21.962 ACQUIRED DEFORMITY OF LEFT ANKLE AND FOOT: Primary | ICD-10-CM

## 2018-12-04 PROCEDURE — 87077 CULTURE AEROBIC IDENTIFY: CPT | Performed by: PODIATRIST

## 2018-12-04 PROCEDURE — 99214 OFFICE O/P EST MOD 30 MIN: CPT | Performed by: ORTHOPAEDIC SURGERY

## 2018-12-04 PROCEDURE — 87070 CULTURE OTHR SPECIMN AEROBIC: CPT | Performed by: PODIATRIST

## 2018-12-04 PROCEDURE — 87205 SMEAR GRAM STAIN: CPT | Performed by: PODIATRIST

## 2018-12-04 PROCEDURE — 87186 SC STD MICRODIL/AGAR DIL: CPT | Performed by: PODIATRIST

## 2018-12-04 PROCEDURE — 87147 CULTURE TYPE IMMUNOLOGIC: CPT | Performed by: PODIATRIST

## 2018-12-04 PROCEDURE — 99213 OFFICE O/P EST LOW 20 MIN: CPT | Performed by: PODIATRIST

## 2018-12-04 RX ORDER — SULFAMETHOXAZOLE AND TRIMETHOPRIM 800; 160 MG/1; MG/1
1 TABLET ORAL EVERY 12 HOURS SCHEDULED
Qty: 20 TABLET | Refills: 0 | Status: SHIPPED | OUTPATIENT
Start: 2018-12-04 | End: 2018-12-07 | Stop reason: SDUPTHER

## 2018-12-04 NOTE — PROGRESS NOTES
Assessment:  1  Pain in left elbow  MRI elbow left wo contrast       Plan:  LEFT elbow/distal biceps pain:  · Due to ongoing symptoms in LEFT arm, will order MRI of left elbow to evaluate distal biceps for possible tearing  · Patient was told to continue with OTC NSAIDs p r n  for pain relief  · May perform activities as tolerated  To do next visit:  Return for Discussion of left elbow MRI results  Consider referral to physical therapy or prescription NSAIDs as indicated  The above stated was discussed in layman's terms and the patient expressed understanding  All questions were answered to the patient's satisfaction  Scribe Attestation    I,:   Lanette Marquez am acting as a scribe while in the presence of the attending physician :        I,:   Graeme Hernandes MD personally performed the services described in this documentation    as scribed in my presence :              Subjective:   Wilton Harp is a 63 y o  male who presents today with diffuse LEFT arm pain  Patient notes that he has been experiencing L UE without any certain HARSH  He is noting pain from his hand to his shoulder  Most of his pain is present in his cubital fossa as well as just distal and proximal to the cubital fossa  Mentions that he has neck stiffness but no pain  He is constantly using his L UE when at work and this aggravates his symptoms  Denies numbness and tingling       Review of systems negative unless otherwise specified in HPI    Past Medical History:   Diagnosis Date    Arthritis     Diabetes mellitus ([facility] )     niddm    DJD (degenerative joint disease)     Hyperlipidemia     Hypertension     Kidney stone     Mechanical complication of internal orthopedic device [facility])        Past Surgical History:   Procedure Laterality Date    CARPAL TUNNEL RELEASE Left     COLONOSCOPY      HERNIA REPAIR Left     inguinal    JOINT REPLACEMENT      right knee revision, infection total of 4 surgeries     JOINT REPLACEMENT left knee     KIDNEY STONE SURGERY      x3    [place] COLONOSCOPY FLX DX W/COLLJ SPEC WHEN PFRMD N/A 2/9/2018    Procedure: EGD AND COLONOSCOPY;  Surgeon: Jeanna Oconnell MD;  Location: [facility]  GI LAB; Service: Gastroenterology    [place] REVISE KNEE JOINT REPLACE,1 PART Right 3/20/2017    Procedure: REVISION TOTAL KNEE ARTHROPLASTY INCLUDING FEMORAL STEM REVISION;  Surgeon: Atiya Christine MD;  Location: [facility];  Service: Orthopedics    AZ REVISE KNEE JOINT REPLACE,ALL PARTS Right 4/6/2016    Procedure: REVISION TOTAL KNEE ARTHROPLASTY INCLUDING FEMORAL AND TIBIAL COMPONENTS;  Surgeon: Atiya Christine MD;  Location: [facility];  Service: Orthopedics    ULNAR TUNNEL RELEASE      WISDOM TOOTH EXTRACTION         Family History   Problem Relation Age of Onset    Diabetes Mother     Hypertension Mother     Hypertension Father        Social History     Occupational History    Not on file  Social History Main Topics    Smoking status: Former Smoker    Smokeless tobacco: Never Used      Comment: quit 40 years ago    Alcohol use 8 4 oz/week     14 Cans of beer per week    Drug use: No    Sexual activity: Not on file         Current Outpatient Prescriptions:     allopurinol (ZYLOPRIM) 100 mg tablet, Take 100 mg by mouth daily  , Disp: , Rfl:     amLODIPine (NORVASC) 5 mg tablet, Take 10 mg by mouth daily  , Disp: , Rfl:     atorvastatin (LIPITOR) 10 mg tablet, Take 20 mg by mouth daily  , Disp: , Rfl:     cholecalciferol (VITAMIN D3) 1,000 units tablet, Take 1,000 Units by mouth daily, Disp: , Rfl:     Dulaglutide (TRULICITY) 1 5 SM/5 0SL SOPN, Inject 1 5 mg under the skin once a week  , Disp: , Rfl:     Elastic Bandages & Supports ([address]) MISC, by Does not apply route daily Knee High 20-30mmHg  For varicose veins/ venous insuf, Disp: 2 each, Rfl: 3    Empagliflozin (JARDIANCE) 10 MG TABS, Take 1 tablet by mouth daily, Disp: , Rfl:     fenofibrate (TRICOR) 145 mg tablet, Take 145 mg by mouth daily  , Disp: , Rfl:     labetalol (NORMODYNE) 100 mg tablet, Take 100 mg by mouth 2 (two) times a day, Disp: , Rfl:     lisinopril (ZESTRIL) 40 mg tablet, Take 40 mg by mouth daily  , Disp: , Rfl:     metFORMIN (GLUCOPHAGE) 1000 MG tablet, Take 1,000 mg by mouth 2 (two) times a day with meals  , Disp: , Rfl:     Multiple Vitamin (MULTIVITAMIN) tablet, Take 1 tablet by mouth daily  , Disp: , Rfl:     sulfamethoxazole-trimethoprim (BACTRIM DS) 800-160 mg per tablet, Take 1 tablet by mouth every 12 (twelve) hours for 10 days, Disp: 20 tablet, Rfl: 0    Allergies   Allergen Reactions    Doxycycline Photosensitivity    Other      Bee stings            Vitals:    12/04/18 1300   BP: 161/88   Pulse: 71       Objective:          Physical Exam                    Left Elbow Exam     Tenderness   Left elbow tenderness location: Cubital fossa/distal biceps  Range of Motion   The patient has normal left elbow ROM  Muscle Strength   The patient has normal left elbow strength  Tests Varus: negative  Valgus: negative  Tinel's Sign (cubital tunnel): negative    Other   Erythema: absent  Sensation: normal  Pulse: present    Comments:  Patient is NVID        Constitutional:  Well-developed and well-nourished  Eyes:  Anicteric sclerae  Lungs:  Unlabored breathing  Cardiovascular:  Capillary refill is less than 2 seconds  Skin:  Intact without erythema  Neurologic:  Sensation intact to light touch  Psychiatric:  Mood and affect are appropriate        Diagnostics, reviewed and taken today if performed as documented:    None performed        Procedures, if performed today:    Procedures    None performed      Portions of the record may have been created with voice recognition software   Occasional wrong word or "sound a like" substitutions may have occurred due to the inherent limitations of voice recognition software   Read the chart carefully and recognize, using context, where substitutions have occurred

## 2018-12-04 NOTE — TELEPHONE ENCOUNTER
Patient advised of x-ray results  He has probable contiguous osteomyelitis of the 2nd toe  We will start him on antibiotics  We will get him an office visit as soon as possible  He has been given options of long-term antibiotics or digital amputation    He will consider

## 2018-12-04 NOTE — PROGRESS NOTES
Assessment/Plan:  Osteomyelitis 2nd left toe  Diabetic neuropathy  Edema  Chronic wound of 2nd left toe  Plan  Patient has been advised of x-ray results  He has been placed on oral antibiotics  He has been given the choice of seeking intervention with Infectious Disease or digital amputation  Patient does not want to try IV antibiotic  He has had this condition for quite some time  It is painful  This is his 2nd episode  He would like to eradicating the problem by digital amputation  He is aware of surgery in expected outcomes  He realizes 2nd left toe is longer than rest   It is a nidus of pressure and problem  He is requesting surgical intervention in the hope of alleviating the chronic wound and osteomyelitis he consents to surgery understanding all the possible risks benefits alternatives and complications understand that no guarantees have been given  He will take antibiotic as directed  He will do daily wound care  He will seek medical clearance  No problem-specific Assessment & Plan notes found for this encounter  There are no diagnoses linked to this encounter  Subjective:  Patient has chronic wound of 2nd left toe  He bandages daily  He uses splint ties as directed  This is his 2nd episode of said wound  It healed once and recurred  He is aware he has infection  He has pain in the toe  He is not taking antibiotic at present      Past Medical History:   Diagnosis Date    Arthritis     Diabetes mellitus ([facility] )     niddm    DJD (degenerative joint disease)     Hyperlipidemia     Hypertension     Kidney stone     Mechanical complication of internal orthopedic device [facility])        Past Surgical History:   Procedure Laterality Date    CARPAL TUNNEL RELEASE Left     COLONOSCOPY      HERNIA REPAIR Left     inguinal    JOINT REPLACEMENT      right knee revision, infection total of 4 surgeries     JOINT REPLACEMENT      left knee     KIDNEY STONE SURGERY      x3    [place] COLONOSCOPY FLX DX W/COLLJ SPEC WHEN PFRMD N/A 2/9/2018    Procedure: EGD AND COLONOSCOPY;  Surgeon: Radha Lopez MD;  Location: [facility]  GI LAB; Service: Gastroenterology    OK REVISE KNEE JOINT REPLACE,1 PART Right 3/20/2017    Procedure: REVISION TOTAL KNEE ARTHROPLASTY INCLUDING FEMORAL STEM REVISION;  Surgeon: Cassidy Velásquez MD;  Location: [facility];  Service: Orthopedics    OK REVISE KNEE JOINT REPLACE,ALL PARTS Right 4/6/2016    Procedure: REVISION TOTAL KNEE ARTHROPLASTY INCLUDING FEMORAL AND TIBIAL COMPONENTS;  Surgeon: Cassidy Velásquez MD;  Location: [facility] MA[facility];  Service: Orthopedics    ULNAR TUNNEL RELEASE      WISDOM TOOTH EXTRACTION         Allergies   Allergen Reactions    Doxycycline Photosensitivity    Other      Bee stings         Current Outpatient Prescriptions:     allopurinol (ZYLOPRIM) 100 mg tablet, Take 100 mg by mouth daily  , Disp: , Rfl:     amLODIPine (NORVASC) 5 mg tablet, Take 10 mg by mouth daily  , Disp: , Rfl:     atorvastatin (LIPITOR) 10 mg tablet, Take 20 mg by mouth daily  , Disp: , Rfl:     cholecalciferol (VITAMIN D3) 1,000 units tablet, Take 1,000 Units by mouth daily, Disp: , Rfl:     Dulaglutide (TRULICITY) 1 5 GQ/0 4NM SOPN, Inject 1 5 mg under the skin once a week  , Disp: , Rfl:     Elastic Bandages & Supports ([address]) MISC, by Does not apply route daily Knee High 20-30mmHg  For varicose veins/ venous insuf, Disp: 2 each, Rfl: 3    Empagliflozin (JARDIANCE) 10 MG TABS, Take 1 tablet by mouth daily, Disp: , Rfl:     fenofibrate (TRICOR) 145 mg tablet, Take 145 mg by mouth daily  , Disp: , Rfl:     labetalol (NORMODYNE) 100 mg tablet, Take 100 mg by mouth 2 (two) times a day, Disp: , Rfl:     lisinopril (ZESTRIL) 40 mg tablet, Take 40 mg by mouth daily  , Disp: , Rfl:     metFORMIN (GLUCOPHAGE) 1000 MG tablet, Take 1,000 mg by mouth 2 (two) times a day with meals  , Disp: , Rfl:     Multiple Vitamin (MULTIVITAMIN) tablet, Take 1 tablet by mouth daily  , Disp: , Rfl:     sulfamethoxazole-trimethoprim (BACTRIM DS) 800-160 mg per tablet, Take 1 tablet by mouth every 12 (twelve) hours for 10 days, Disp: 20 tablet, Rfl: 0    Patient Active Problem List   Diagnosis    Diabetes mellitus ([facility] )    Hypertension    Hyperlipidemia    Status post revision of total replacement of right knee    Varicose veins of bilateral lower extremities with pain    Intervertebral disc disorders with radiculopathy, lumbar region    Chronic pain of right knee    Pes anserine bursitis    Acute osteomyelitis of toe of left foot (HCC)    Acquired deformity of left foot    Hammer toe of left foot    Diabetic ulcer of toe of left foot associated with type 2 diabetes mellitus, limited to breakdown of skin (HCC)    Cellulitis of second toe of left foot    Left foot pain    Diabetic polyneuropathy associated with type 2 diabetes mellitus ([facility] )          Patient ID: Monica Torres is a 63 y o  male  HPI    The following portions of the patient's history were reviewed and updated as appropriate: allergies, current medications, past family history, past medical history, past social history, past surgical history and problem list     Review of Systems      Objective:  Patient's shoes and socks removed     Foot ExamPhysical Exam       Foot Exam     General  General Appearance: appears stated age and healthy   Orientation: alert and oriented to person, place, and time   Affect: appropriate         Right Foot/Ankle      Inspection and Palpation  Ecchymosis: none  Tenderness: none   Swelling: none   Arch: pes planus  Hammertoes: fifth toe  Claw Toes: second toe  Hallux valgus: no  Hallux limitus: yes  Skin Exam: callus and dry skin;      Neurovascular  Dorsalis pedis: 1+  Posterior tibial: 2+  Saphenous nerve sensation: diminished  Tibial nerve sensation: diminished  Superficial peroneal nerve sensation: diminished  Deep peroneal nerve sensation: diminished  Sural nerve sensation: diminished  Achilles reflex: 1+     Muscle Strength  Ankle dorsiflexion: 4        Left Foot/Ankle       Inspection and Palpation  Ecchymosis: none  Tenderness: (2nd left toe)  Swelling: (Distal aspect 2nd left toe)  Hammertoes: fifth toe  Claw toes: second toe  Hallux valgus: no  Hallux limitus: yes  Skin Exam: callus and ulcer;      Neurovascular  Dorsalis pedis: 1+  Posterior tibial: 2+  Saphenous nerve sensation: diminished  Tibial nerve sensation: diminished  Superficial peroneal nerve sensation: diminished  Deep peroneal nerve sensation: diminished  Sural nerve sensation: diminished  Achilles reflex: 1+     Muscle Strength  Ankle dorsiflexion: 4        Physical Exam   Constitutional: He appears well-developed and well-nourished  Cardiovascular: Normal rate and regular rhythm     Pulses:       Dorsalis pedis pulses are 1+ on the right side, and 1+ on the left side         Posterior tibial pulses are 2+ on the right side, and 2+ on the left side  Musculoskeletal:   2nd left toe is clogged    Feet:   Right Foot:   Skin Integrity: Positive for callus and dry skin  Left Foot:   Skin Integrity: Positive for ulcer and callus  Neurological:   Reflex Scores:       Achilles reflexes are 1+ on the right side and 1+ on the left side  Skin:   Distal aspect 2nd left toe demonstrates ulcerated clavi   There is superficial wound noted  It is approximately 0 2 centimeter squared    There is hyperkeratosis     wound debrided  This reveals serous exudate from the area  The area is edematous  It is bulbous  And macerated  X-ray taken the hospital demonstrates bone change consistent with osteomyelitis  Vitals reviewed  Right Foot/Ankle   Right Foot Inspection  Skin Exam: dry skin, callus and callus                     Vascular     The right DP pulse is 1+  The right PT pulse is 2+     Right Toe  - Comprehensive Exam  Ecchymosis: none  Arch: pes planus  Hammertoes: fifth toe  Claw Toes: second toe  Hallux valgus: no  Hallux limitus: yes  Swelling: none   Tenderness: none            Left Foot/Ankle  Left Foot Inspection  Skin Exam: ulcer and callus                    Vascular     The left DP pulse is 1+  The left PT pulse is 2+     Left Toe  - Comprehensive Exam  Ecchymosis: none  Hammertoes: fifth toe  Claw toes: second toe  Hallux valgus: no  Hallux limitus: yes  Swelling: (Distal aspect 2nd left toe)  Tenderness: (2nd left toe)

## 2018-12-05 PROBLEM — M86.172 ACUTE OSTEOMYELITIS OF LEFT ANKLE OR FOOT (HCC): Status: ACTIVE | Noted: 2018-12-05

## 2018-12-07 ENCOUNTER — OFFICE VISIT (OUTPATIENT)
Dept: PODIATRY | Facility: CLINIC | Age: 58
End: 2018-12-07
Payer: COMMERCIAL

## 2018-12-07 VITALS
HEART RATE: 80 BPM | WEIGHT: 285 LBS | HEIGHT: 70 IN | SYSTOLIC BLOOD PRESSURE: 148 MMHG | RESPIRATION RATE: 17 BRPM | BODY MASS INDEX: 40.8 KG/M2 | DIASTOLIC BLOOD PRESSURE: 85 MMHG

## 2018-12-07 DIAGNOSIS — M86.172 ACUTE OSTEOMYELITIS OF TOE, LEFT (HCC): ICD-10-CM

## 2018-12-07 DIAGNOSIS — M21.962 ACQUIRED DEFORMITY OF LEFT FOOT: ICD-10-CM

## 2018-12-07 DIAGNOSIS — L97.524 DIABETIC ULCER OF TOE OF LEFT FOOT ASSOCIATED WITH TYPE 2 DIABETES MELLITUS, WITH NECROSIS OF BONE (HCC): Primary | ICD-10-CM

## 2018-12-07 DIAGNOSIS — E11.621 DIABETIC ULCER OF TOE OF LEFT FOOT ASSOCIATED WITH TYPE 2 DIABETES MELLITUS, WITH NECROSIS OF BONE (HCC): Primary | ICD-10-CM

## 2018-12-07 DIAGNOSIS — M79.672 LEFT FOOT PAIN: ICD-10-CM

## 2018-12-07 DIAGNOSIS — M21.962 ACQUIRED DEFORMITY OF LEFT ANKLE AND FOOT: ICD-10-CM

## 2018-12-07 LAB
BACTERIA WND AEROBE CULT: ABNORMAL
GRAM STN SPEC: ABNORMAL
GRAM STN SPEC: ABNORMAL

## 2018-12-07 PROCEDURE — 99213 OFFICE O/P EST LOW 20 MIN: CPT | Performed by: PODIATRIST

## 2018-12-07 PROCEDURE — 73620 X-RAY EXAM OF FOOT: CPT | Performed by: PODIATRIST

## 2018-12-07 RX ORDER — OXYCODONE HYDROCHLORIDE AND ACETAMINOPHEN 5; 325 MG/1; MG/1
1 TABLET ORAL EVERY 6 HOURS PRN
Qty: 24 TABLET | Refills: 0 | Status: SHIPPED | OUTPATIENT
Start: 2018-12-07 | End: 2018-12-14

## 2018-12-07 RX ORDER — HYDROXYZINE HYDROCHLORIDE 25 MG/1
25 TABLET, FILM COATED ORAL 3 TIMES DAILY
Qty: 9 TABLET | Refills: 0 | Status: SHIPPED | OUTPATIENT
Start: 2018-12-07 | End: 2018-12-12

## 2018-12-07 RX ORDER — OXYCODONE HYDROCHLORIDE AND ACETAMINOPHEN 5; 325 MG/1; MG/1
TABLET ORAL
Qty: 20 TABLET | Refills: 0 | Status: SHIPPED | OUTPATIENT
Start: 2018-12-07 | End: 2018-12-07 | Stop reason: SDUPTHER

## 2018-12-07 RX ORDER — SULFAMETHOXAZOLE AND TRIMETHOPRIM 800; 160 MG/1; MG/1
1 TABLET ORAL EVERY 12 HOURS SCHEDULED
Qty: 20 TABLET | Refills: 0 | Status: SHIPPED | OUTPATIENT
Start: 2018-12-07 | End: 2018-12-12

## 2018-12-07 NOTE — PROGRESS NOTES
Assessment/Plan:  Patient has chronic wound of 2nd left toe  Patient has been diagnosed with osteomyelitis  He has mild digital cellulitis  Patient has diabetic neuropathy  Arterial status within normal limits  Plan  Patient understands the nature of the problem  Wants to permanent fix  He chooses not to undergo 6 weeks of IV antibiotics and Infectious Disease referral   He understands the nature of amputation  Patient consents to surgery for partial amputation of 2nd left toe  Patient understands all the possible risks benefits alternatives and complications and understands no guarantees have been given  Procedure reviewed  Perioperative course reviewed  Patient has been given written instructions  He has been given postoperative medication  He will remain on antibiotics  He needs medical clearance  No problem-specific Assessment & Plan notes found for this encounter  Diagnoses and all orders for this visit:    Diabetic ulcer of toe of left foot associated with type 2 diabetes mellitus, with necrosis of bone (HCC)  -     hydrOXYzine HCL (ATARAX) 25 mg tablet; Take 1 tablet (25 mg total) by mouth 3 (three) times a day for 3 days  -     oxyCODONE-acetaminophen (PERCOCET) 5-325 mg per tablet; Earliest Fill Date: 12/7/18 1 tablet every 6 hr as needed for left foot pain  Acute osteomyelitis of toe, left (HCC)    Left foot pain    Acquired deformity of left ankle and foot  -     sulfamethoxazole-trimethoprim (BACTRIM DS) 800-160 mg per tablet; Take 1 tablet by mouth every 12 (twelve) hours for 10 days          Subjective:  Patient has chronic wound of 2nd left toe  Patient has attempted wound care  Of note, this is the patient's 2nd episode of wound of 2nd left toe  Patient is diabetic with no neuropathy  He is taking antibiotic as directed    Past Medical History:   Diagnosis Date    Arthritis     Diabetes mellitus ([facility] [address] )     niddm    DJD (degenerative joint disease)     Hyperlipidemia     Hypertension     Kidney stone     Mechanical complication of internal orthopedic device [facility])        Past Surgical History:   Procedure Laterality Date    CARPAL TUNNEL RELEASE Left     COLONOSCOPY      HERNIA REPAIR Left     inguinal    JOINT REPLACEMENT      right knee revision, infection total of 4 surgeries     JOINT REPLACEMENT      left knee     KIDNEY STONE SURGERY      x3    MA COLONOSCOPY FLX DX W/COLLJ SPEC WHEN PFRMD N/A 2/9/2018    Procedure: EGD AND COLONOSCOPY;  Surgeon: Dudley Galvan MD;  Location: [facility]  GI LAB; Service: Gastroenterology    MA REVISE KNEE JOINT REPLACE,1 PART Right 3/20/2017    Procedure: REVISION TOTAL KNEE ARTHROPLASTY INCLUDING FEMORAL STEM REVISION;  Surgeon: Anastasiya Whitman MD;  Location: [facility] MAIN OR;  Service: Orthopedics    MA REVISE KNEE JOINT REPLACE,ALL PARTS Right 4/6/2016    Procedure: REVISION TOTAL KNEE ARTHROPLASTY INCLUDING FEMORAL AND TIBIAL COMPONENTS;  Surgeon: Anastasiya Whitman MD;  Location: [facility] MAIN OR;  Service: Orthopedics    ULNAR TUNNEL RELEASE      WISDOM TOOTH EXTRACTION         Allergies   Allergen Reactions    Doxycycline Photosensitivity    Other      Bee stings         Current Outpatient Prescriptions:     allopurinol (ZYLOPRIM) 100 mg tablet, Take 100 mg by mouth daily  , Disp: , Rfl:     amLODIPine (NORVASC) 5 mg tablet, Take 10 mg by mouth daily  , Disp: , Rfl:     atorvastatin (LIPITOR) 10 mg tablet, Take 20 mg by mouth daily  , Disp: , Rfl:     cholecalciferol (VITAMIN D3) 1,000 units tablet, Take 1,000 Units by mouth daily, Disp: , Rfl:     Dulaglutide (TRULICITY) 1 5 YP/2 1RL SOPN, Inject 1 5 mg under the skin once a week  , Disp: , Rfl:     Elastic Bandages & Supports ([address]) MISC, by Does not apply route daily Knee High 20-30mmHg  For varicose veins/ venous insuf, Disp: 2 each, Rfl: 3    Empagliflozin (JARDIANCE) 10 MG TABS, Take 1 tablet by mouth daily, Disp: , Rfl:     fenofibrate (TRICOR) 145 mg tablet, Take 145 mg by mouth daily  , Disp: , Rfl:     hydrOXYzine HCL (ATARAX) 25 mg tablet, Take 1 tablet (25 mg total) by mouth 3 (three) times a day for 3 days, Disp: 9 tablet, Rfl: 0    labetalol (NORMODYNE) 100 mg tablet, Take 100 mg by mouth 2 (two) times a day, Disp: , Rfl:     lisinopril (ZESTRIL) 40 mg tablet, Take 40 mg by mouth daily  , Disp: , Rfl:     metFORMIN (GLUCOPHAGE) 1000 MG tablet, Take 1,000 mg by mouth 2 (two) times a day with meals  , Disp: , Rfl:     Multiple Vitamin (MULTIVITAMIN) tablet, Take 1 tablet by mouth daily  , Disp: , Rfl:     oxyCODONE-acetaminophen (PERCOCET) 5-325 mg per tablet, Earliest Fill Date: 12/7/18 1 tablet every 6 hr as needed for left foot pain , Disp: 20 tablet, Rfl: 0    sulfamethoxazole-trimethoprim (BACTRIM DS) 800-160 mg per tablet, Take 1 tablet by mouth every 12 (twelve) hours for 10 days, Disp: 20 tablet, Rfl: 0    Patient Active Problem List   Diagnosis    Diabetes mellitus ([facility] [address] )    Hypertension    Hyperlipidemia    Status post revision of total replacement of right knee    Varicose veins of bilateral lower extremities with pain    Intervertebral disc disorders with radiculopathy, lumbar region    Chronic pain of right knee    Pes anserine bursitis    Acute osteomyelitis of toe of left foot ([facility] [address] )    Acquired deformity of left foot    Hammer toe of left foot    Diabetic ulcer of toe of left foot associated with type 2 diabetes mellitus, with necrosis of bone (HCC)    Cellulitis of second toe of left foot    Left foot pain    Diabetic polyneuropathy associated with type 2 diabetes mellitus ([facility] [address] )    Acute osteomyelitis of toe, left (HCC)    Acute osteomyelitis of left ankle or foot ([facility] [address] )          Patient ID: Kristin Benson is a 63 y o  male      HPI    The following portions of the patient's history were reviewed and updated as appropriate: allergies, current medications, past family history, past medical history, past social history, past surgical history and problem list     Review of Systems      Objective:  Patient's shoes and socks removed  Foot ExamPhysical Exam   Constitutional: He appears well-developed and well-nourished  Cardiovascular: Normal rate and regular rhythm  Musculoskeletal:   X-ray demonstrates lucency of distal phalanx of 2nd left toe   Nursing note and vitals reviewed  Objective:  Patient's shoes and socks removed  Foot ExamPhysical Exam        Foot Exam     General  General Appearance: appears stated age and healthy   Orientation: alert and oriented to person, place, and time   Affect: appropriate         Right Foot/Ankle      Inspection and Palpation  Ecchymosis: none  Tenderness: none   Swelling: none   Arch: pes planus  Hammertoes: fifth toe  Claw Toes: second toe  Hallux valgus: no  Hallux limitus: yes  Skin Exam: callus and dry skin;      Neurovascular  Dorsalis pedis: 1+  Posterior tibial: 2+  Saphenous nerve sensation: diminished  Tibial nerve sensation: diminished  Superficial peroneal nerve sensation: diminished  Deep peroneal nerve sensation: diminished  Sural nerve sensation: diminished  Achilles reflex: 1+     Muscle Strength  Ankle dorsiflexion: 4        Left Foot/Ankle       Inspection and Palpation  Ecchymosis: none  Tenderness: (2nd left toe)  Swelling: (Distal aspect 2nd left toe)  Hammertoes: fifth toe  Claw toes: second toe  Hallux valgus: no  Hallux limitus: yes  Skin Exam: callus and ulcer;      Neurovascular  Dorsalis pedis: 1+  Posterior tibial: 2+  Saphenous nerve sensation: diminished  Tibial nerve sensation: diminished  Superficial peroneal nerve sensation: diminished  Deep peroneal nerve sensation: diminished  Sural nerve sensation: diminished  Achilles reflex: 1+     Muscle Strength  Ankle dorsiflexion: 4        Physical Exam   Constitutional: He appears well-developed and well-nourished     Cardiovascular: Normal rate and regular rhythm     Pulses:       Dorsalis pedis pulses are 1+ on the right side, and 1+ on the left side         Posterior tibial pulses are 2+ on the right side, and 2+ on the left side  Musculoskeletal:   2nd left toe is clogged    Feet:   Right Foot:   Skin Integrity: Positive for callus and dry skin  Left Foot:   Skin Integrity: Positive for ulcer and callus  Neurological:   Reflex Scores:       Achilles reflexes are 1+ on the right side and 1+ on the left side  Skin:   Distal aspect 2nd left toe demonstrates ulcerated clavi    There is superficial wound noted   It is approximately 0 2 centimeter squared    There is hyperkeratosis     wound debrided  This reveals serous exudate from the area  The area is edematous  It is bulbous  And macerated  X-ray taken the hospital demonstrates bone change consistent with osteomyelitis  Vitals reviewed  Right Foot/Ankle   Right Foot Inspection  Skin Exam: dry skin, callus and callus                     Vascular     The right DP pulse is 1+  The right PT pulse is 2+  Right Toe  - Comprehensive Exam  Ecchymosis: none  Arch: pes planus  Hammertoes: fifth toe  Claw Toes: second toe  Hallux valgus: no  Hallux limitus: yes  Swelling: none   Tenderness: none            Left Foot/Ankle  Left Foot Inspection  Skin Exam: ulcer and callus                    Vascular     The left DP pulse is 1+  The left PT pulse is 2+     Left Toe  - Comprehensive Exam  Ecchymosis: none  Hammertoes: fifth toe  Claw toes: second toe  Hallux valgus: no  Hallux limitus: yes  Swelling: (Distal aspect 2nd left toe)  Tenderness: (2nd left toe)

## 2018-12-10 ENCOUNTER — HOSPITAL ENCOUNTER (OUTPATIENT)
Dept: MRI IMAGING | Facility: HOSPITAL | Age: 58
Discharge: HOME | End: 2018-12-10
Attending: ORTHOPAEDIC SURGERY
Payer: COMMERCIAL

## 2018-12-10 DIAGNOSIS — M25.522 PAIN IN LEFT ELBOW: ICD-10-CM

## 2018-12-10 PROCEDURE — 73221 MRI JOINT UPR EXTREM W/O DYE: CPT

## 2018-12-11 ENCOUNTER — OFFICE VISIT (OUTPATIENT)
Dept: OBGYN CLINIC | Facility: CLINIC | Age: 58
End: 2018-12-11
Payer: COMMERCIAL

## 2018-12-11 VITALS
WEIGHT: 285 LBS | DIASTOLIC BLOOD PRESSURE: 87 MMHG | BODY MASS INDEX: 40.8 KG/M2 | HEART RATE: 75 BPM | SYSTOLIC BLOOD PRESSURE: 154 MMHG | HEIGHT: 70 IN

## 2018-12-11 DIAGNOSIS — M75.22 BICEPS TENDINITIS OF LEFT UPPER EXTREMITY: Primary | ICD-10-CM

## 2018-12-11 PROCEDURE — 99213 OFFICE O/P EST LOW 20 MIN: CPT | Performed by: ORTHOPAEDIC SURGERY

## 2018-12-11 NOTE — LETTER
December 11, 2018     Patient: Fercho Condon   YOB: 1960   Date of Visit: 12/11/2018       To Whom it May Concern:    Fercho Condon is under my professional care  He was seen in my office on 12/11/2018  He may return to work with limitations no repetitive use of the left upper extremity  No lifting more than 20 lbs       If you have any questions or concerns, please don't hesitate to call           Sincerely,          Iqra Aguilar MD        CC: No Recipients

## 2018-12-11 NOTE — PROGRESS NOTES
Assessment:  1  Biceps tendinitis of left upper extremity         Plan:  LEFT elbow Distal Biceps Tendinitis:  · Will place on light duty at work until next evaluation  No repetitive use of his left upper extremity and no lifting greater than 20 lbs  For 6 weeks  · If symptoms persist then formal physical therapy will be initiated  · Patient will continue use of OTC NSAIDs p r n  for pain relief in the meantime  Consider short course of prescription NSAIDs if not improving  To do next visit:  Return in about 6 weeks (around 1/22/2019) for Recheck of LEFT elbow  The above stated was discussed in layman's terms and the patient expressed understanding  All questions were answered to the patient's satisfaction  Scribe Attestation    I,:   Minerva Ren am acting as a scribe while in the presence of the attending physician :        I,:   Frances Cavanaugh MD personally performed the services described in this documentation    as scribed in my presence :              Subjective:   Nalini Moreno is a 63 y o  male who presents today for a follow up visit for his LEFT upper extremity and to discuss MRI results  She continues to note left distal elbow pain mainly over the cubital fossa, worse with repetitive movements and flexion extension of his elbow  Denies numbness tingling      Review of systems negative unless otherwise specified in HPI    Past Medical History:   Diagnosis Date    Arthritis     Diabetes mellitus ([facility] [address] )     niddm    DJD (degenerative joint disease)     Hyperlipidemia     Hypertension     Kidney stone     Mechanical complication of internal orthopedic device ([facility] [address] )        Past Surgical History:   Procedure Laterality Date    CARPAL TUNNEL RELEASE Left     COLONOSCOPY      HERNIA REPAIR Left     inguinal    JOINT REPLACEMENT      right knee revision, infection total of 4 surgeries     JOINT REPLACEMENT      left knee     KIDNEY STONE SURGERY      x3    NH COLONOSCOPY FLX DX W/COLLJ SPEC WHEN PFRMD N/A 2/9/2018    Procedure: EGD AND COLONOSCOPY;  Surgeon: Gerardo Hoffmann MD;  Location: [facility]  GI LAB; Service: Gastroenterology    SC REVISE KNEE JOINT REPLACE,1 PART Right 3/20/2017    Procedure: REVISION TOTAL KNEE ARTHROPLASTY INCLUDING FEMORAL STEM REVISION;  Surgeon: Steve Danielle MD;  Location[facility];  Service: Orthopedics    SC REVISE KNEE JOINT REPLACE,ALL PARTS Right 4/6/2016    Procedure: REVISION TOTAL KNEE ARTHROPLASTY INCLUDING FEMORAL AND TIBIAL COMPONENTS;  Surgeon: Steve Danielle MD;  Location: [facility];  Service: Orthopedics    ULNAR TUNNEL RELEASE      WISDOM TOOTH EXTRACTION         Family History   Problem Relation Age of Onset    Diabetes Mother     Hypertension Mother     Hypertension Father        Social History     Occupational History    Not on file  Social History Main Topics    Smoking status: Former Smoker    Smokeless tobacco: Never Used      Comment: quit 40 years ago    Alcohol use 8 4 oz/week     14 Cans of beer per week    Drug use: No    Sexual activity: Not on file         Current Outpatient Prescriptions:     allopurinol (ZYLOPRIM) 100 mg tablet, Take 100 mg by mouth daily  , Disp: , Rfl:     amLODIPine (NORVASC) 5 mg tablet, Take 10 mg by mouth daily  , Disp: , Rfl:     atorvastatin (LIPITOR) 10 mg tablet, Take 20 mg by mouth daily  , Disp: , Rfl:     cholecalciferol (VITAMIN D3) 1,000 units tablet, Take 1,000 Units by mouth daily, Disp: , Rfl:     Dulaglutide (TRULICITY) 1 5 FR/6 5TX SOPN, Inject 1 5 mg under the skin once a week  , Disp: , Rfl:     Elastic Bandages & Supports ([address]) MISC, by Does not apply route daily Knee High 20-30mmHg  For varicose veins/ venous insuf, Disp: 2 each, Rfl: 3    Empagliflozin (JARDIANCE) 10 MG TABS, Take 1 tablet by mouth daily, Disp: , Rfl:     fenofibrate (TRICOR) 145 mg tablet, Take 145 mg by mouth daily  , Disp: , Rfl:     hydrOXYzine HCL (ATARAX) 25 mg tablet, Take 1 tablet (25 mg total) by mouth 3 (three) times a day for 3 days, Disp: 9 tablet, Rfl: 0    labetalol (NORMODYNE) 100 mg tablet, Take 100 mg by mouth 2 (two) times a day, Disp: , Rfl:     lisinopril (ZESTRIL) 40 mg tablet, Take 40 mg by mouth daily  , Disp: , Rfl:     metFORMIN (GLUCOPHAGE) 1000 MG tablet, Take 1,000 mg by mouth 2 (two) times a day with meals  , Disp: , Rfl:     Multiple Vitamin (MULTIVITAMIN) tablet, Take 1 tablet by mouth daily  , Disp: , Rfl:     oxyCODONE-acetaminophen (PERCOCET) 5-325 mg per tablet, Take 1 tablet by mouth every 6 (six) hours as needed for moderate pain (Pain in leg) for up to 7 days Max Daily Amount: 4 tablets, Disp: 24 tablet, Rfl: 0    sulfamethoxazole-trimethoprim (BACTRIM DS) 800-160 mg per tablet, Take 1 tablet by mouth every 12 (twelve) hours for 10 days, Disp: 20 tablet, Rfl: 0    Allergies   Allergen Reactions    Doxycycline Photosensitivity    Other      Bee stings            Vitals:    12/11/18 1545   BP: 154/87   Pulse: 75       Objective:          Physical Exam                    Left Elbow Exam     Tenderness   Left elbow tenderness location: Cubital fossa  Range of Motion   The patient has normal left elbow ROM  Muscle Strength   The patient has normal left elbow strength  Other   Erythema: absent  Sensation: normal  Pulse: present    Comments:  Patient is neurovascular intact distally            Diagnostics, reviewed and taken today if performed as documented: The attending physician has personally reviewed the pertinent films in PACS and interpretation is as follows:    LEFT ELBOW:  · Prominent tendinitis with inserstitial tearing at the insertion of the biceps tendon but no full-thickness component is present       Procedures, if performed today:    Procedures    None performed      Portions of the record may have been created with voice recognition software   Occasional wrong word or "sound a like" substitutions may have occurred due to the inherent limitations of voice recognition software   Read the chart carefully and recognize, using context, where substitutions have occurred

## 2018-12-11 NOTE — LETTER
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

## 2018-12-12 ENCOUNTER — TRANSCRIBE ORDERS (OUTPATIENT)
Dept: ADMINISTRATIVE | Facility: HOSPITAL | Age: 58
End: 2018-12-12

## 2018-12-12 ENCOUNTER — HOSPITAL ENCOUNTER (OUTPATIENT)
Dept: RADIOLOGY | Facility: HOSPITAL | Age: 58
Discharge: HOME | End: 2018-12-12
Attending: PODIATRIST
Payer: COMMERCIAL

## 2018-12-12 ENCOUNTER — APPOINTMENT (OUTPATIENT)
Dept: LAB | Facility: HOSPITAL | Age: 58
End: 2018-12-12
Attending: PODIATRIST
Payer: COMMERCIAL

## 2018-12-12 ENCOUNTER — APPOINTMENT (OUTPATIENT)
Dept: PREADMISSION TESTING | Facility: HOSPITAL | Age: 58
End: 2018-12-12
Payer: COMMERCIAL

## 2018-12-12 DIAGNOSIS — Z01.818 PREOP EXAMINATION: Primary | ICD-10-CM

## 2018-12-12 DIAGNOSIS — M86.172 ACUTE OSTEOMYELITIS OF LEFT ANKLE OR FOOT (HCC): ICD-10-CM

## 2018-12-12 LAB
ANION GAP SERPL CALCULATED.3IONS-SCNC: 10 MMOL/L (ref 4–13)
BASOPHILS # BLD AUTO: 0.05 THOUSANDS/ΜL (ref 0–0.1)
BASOPHILS NFR BLD AUTO: 1 % (ref 0–1)
BUN SERPL-MCNC: 11 MG/DL (ref 5–25)
CALCIUM SERPL-MCNC: 9.7 MG/DL (ref 8.3–10.1)
CHLORIDE SERPL-SCNC: 101 MMOL/L (ref 100–108)
CO2 SERPL-SCNC: 24 MMOL/L (ref 21–32)
CREAT SERPL-MCNC: 1.01 MG/DL (ref 0.6–1.3)
EOSINOPHIL # BLD AUTO: 0.23 THOUSAND/ΜL (ref 0–0.61)
EOSINOPHIL NFR BLD AUTO: 4 % (ref 0–6)
ERYTHROCYTE [DISTWIDTH] IN BLOOD BY AUTOMATED COUNT: 12.4 % (ref 11.6–15.1)
GFR SERPL CREATININE-BSD FRML MDRD: 82 ML/MIN/1.73SQ M
GLUCOSE P FAST SERPL-MCNC: 119 MG/DL (ref 65–99)
HCT VFR BLD AUTO: 45.3 % (ref 36.5–49.3)
HGB BLD-MCNC: 14.9 G/DL (ref 12–17)
IMM GRANULOCYTES # BLD AUTO: 0.03 THOUSAND/UL (ref 0–0.2)
IMM GRANULOCYTES NFR BLD AUTO: 1 % (ref 0–2)
LYMPHOCYTES # BLD AUTO: 2.03 THOUSANDS/ΜL (ref 0.6–4.47)
LYMPHOCYTES NFR BLD AUTO: 34 % (ref 14–44)
MCH RBC QN AUTO: 30.5 PG (ref 26.8–34.3)
MCHC RBC AUTO-ENTMCNC: 32.9 G/DL (ref 31.4–37.4)
MCV RBC AUTO: 93 FL (ref 82–98)
MONOCYTES # BLD AUTO: 0.64 THOUSAND/ΜL (ref 0.17–1.22)
MONOCYTES NFR BLD AUTO: 11 % (ref 4–12)
NEUTROPHILS # BLD AUTO: 2.92 THOUSANDS/ΜL (ref 1.85–7.62)
NEUTS SEG NFR BLD AUTO: 49 % (ref 43–75)
NRBC BLD AUTO-RTO: 0 /100 WBCS
PLATELET # BLD AUTO: 193 THOUSANDS/UL (ref 149–390)
PMV BLD AUTO: 9.8 FL (ref 8.9–12.7)
POTASSIUM SERPL-SCNC: 4.3 MMOL/L (ref 3.5–5.3)
RBC # BLD AUTO: 4.88 MILLION/UL (ref 3.88–5.62)
SODIUM SERPL-SCNC: 135 MMOL/L (ref 136–145)
WBC # BLD AUTO: 5.9 THOUSAND/UL (ref 4.31–10.16)

## 2018-12-12 PROCEDURE — 71046 X-RAY EXAM CHEST 2 VIEWS: CPT

## 2018-12-12 PROCEDURE — 36415 COLL VENOUS BLD VENIPUNCTURE: CPT | Performed by: PODIATRIST

## 2018-12-12 PROCEDURE — 85025 COMPLETE CBC W/AUTO DIFF WBC: CPT | Performed by: PODIATRIST

## 2018-12-12 PROCEDURE — 80048 BASIC METABOLIC PNL TOTAL CA: CPT

## 2018-12-12 RX ORDER — FENOFIBRATE 160 MG/1
160 TABLET ORAL EVERY MORNING
COMMUNITY
End: 2021-08-04 | Stop reason: SDUPTHER

## 2018-12-12 RX ORDER — ATORVASTATIN CALCIUM 40 MG/1
40 TABLET, FILM COATED ORAL
COMMUNITY
End: 2021-11-12 | Stop reason: SDUPTHER

## 2018-12-12 NOTE — PRE-PROCEDURE INSTRUCTIONS
Pre-Surgery Instructions:   Medication Instructions    allopurinol (ZYLOPRIM) 100 mg tablet Instructed patient per Anesthesia Guidelines   amLODIPine (NORVASC) 5 mg tablet Instructed patient per Anesthesia Guidelines   atorvastatin (LIPITOR) 40 mg tablet Instructed patient per Anesthesia Guidelines   cholecalciferol (VITAMIN D3) 1,000 units tablet Instructed patient per Anesthesia Guidelines   Dulaglutide (TRULICITY) 1 5 QR/8 6CN SOPN Instructed patient per Anesthesia Guidelines   Empagliflozin (JARDIANCE) 10 MG TABS Instructed patient per Anesthesia Guidelines   fenofibrate (TRIGLIDE) 160 MG tablet Instructed patient per Anesthesia Guidelines   labetalol (NORMODYNE) 100 mg tablet Instructed patient per Anesthesia Guidelines   lisinopril (ZESTRIL) 40 mg tablet Instructed patient per Anesthesia Guidelines   metFORMIN (GLUCOPHAGE) 1000 MG tablet Instructed patient per Anesthesia Guidelines   Multiple Vitamin (MULTIVITAMIN) tablet Instructed patient per Anesthesia Guidelines   oxyCODONE-acetaminophen (PERCOCET) 5-325 mg per tablet Instructed patient per Anesthesia Guidelines   UNKNOWN TO PATIENT Instructed patient per Anesthesia Guidelines  Pre op instructions given  Pt to take lisinopril, labetalol and blood sugar the am of surgery   [place] [phone] Surgical Experience    The following information was developed to assist you to prepare for your operation  What do I need to do before coming to the hospital?   Arrange for a responsible person to drive you to and from the hospital    Arrange care for your children at home  Children are not allowed in the recovery areas of the hospital   Plan to wear clothing that is easy to put on and take off  If you are having shoulder surgery, wear a shirt that buttons or zippers in the front  Bathing  o Shower the evening before and the morning of your surgery with an antibacterial soap   Please refer to the Pre Op Showering Instructions for Surgery Patients Sheet   o Remove nail polish and all body piercing jewelry  o Do not shave any body part for at least 24 hours before surgery-this includes face, arms, legs and upper body  Food  o Nothing to eat or drink after midnight the night before your surgery  This includes candy and chewing gum  o Exception: If your surgery is after 12:00pm (noon), you may have clear liquids such as 7-Up®, ginger ale, apple or cranberry juice, Jell-O®, water, or clear broth until 8:00 am  o Do not drink milk or juice with pulp on the morning before surgery  o Do not drink alcohol 24 hours before surgery  Medicine  o Follow instructions you received from your surgeon about which medicines you may take on the day of surgery  o If instructed to take medicine on the morning of surgery, take pills with just a small sip of water  Call your prescribing doctor for specific infroamtion on what to do if you take insulin    What should I bring to the hospital?    Bring:  [place] or a walker, if you have them, for foot or knee surgery   A list of the daily medicines, vitamins, minerals, herbals and nutritional supplements you take  Include the dosages of medicines and the time you take them each day   Glasses, dentures or hearing aids   Minimal clothing; you will be wearing hospital sleepwear   Photo ID; required to verify your identity   If you have a Living Will or Power of , bring a copy of the documents   If you have an ostomy, bring an extra pouch and any supplies you use    Do not bring   Medicines or inhalers   Money, valuables or jewelry    What other information should I know about the day of surgery?  Notify your surgeons if you develop a cold, sore throat, cough, fever, rash or any other illness     Report to the Ambulatory Surgical/Same Day Surgery Unit   You will be instructed to stop at Registration only if you have not been pre-registered   Inform your  fi they do not stay that they will be asked by the staff to leave a phone number where they can be reached   Be available to be reached before surgery  In the event the operating room schedule changes, you may be asked to come in earlier or later than expected    *It is important to tell your doctor and others involved in your health care if you are taking or have been taking any non-prescription drugs, vitamins, minerals, herbals or other nutritional supplements   Any of these may interact with some food or medicines and cause a reaction

## 2018-12-14 ENCOUNTER — ANESTHESIA EVENT (OUTPATIENT)
Dept: PERIOP | Facility: HOSPITAL | Age: 58
End: 2018-12-14
Payer: COMMERCIAL

## 2018-12-14 ENCOUNTER — HOSPITAL ENCOUNTER (OUTPATIENT)
Facility: HOSPITAL | Age: 58
Setting detail: OUTPATIENT SURGERY
Discharge: HOME | End: 2018-12-14
Attending: PODIATRIST | Admitting: PODIATRIST
Payer: COMMERCIAL

## 2018-12-14 ENCOUNTER — ANESTHESIA (OUTPATIENT)
Dept: PERIOP | Facility: HOSPITAL | Age: 58
End: 2018-12-14
Payer: COMMERCIAL

## 2018-12-14 VITALS
BODY MASS INDEX: 40.8 KG/M2 | TEMPERATURE: 97.1 F | SYSTOLIC BLOOD PRESSURE: 144 MMHG | WEIGHT: 285 LBS | DIASTOLIC BLOOD PRESSURE: 86 MMHG | RESPIRATION RATE: 18 BRPM | HEIGHT: 70 IN | HEART RATE: 66 BPM | OXYGEN SATURATION: 97 %

## 2018-12-14 DIAGNOSIS — M86.172 ACUTE OSTEOMYELITIS OF LEFT ANKLE OR FOOT (HCC): ICD-10-CM

## 2018-12-14 PROCEDURE — 88300 SURGICAL PATH GROSS: CPT | Performed by: PATHOLOGY

## 2018-12-14 PROCEDURE — PBSUR PB OPERATIVE NOTE CHARGE PLACEHOLDER: Performed by: PODIATRIST

## 2018-12-14 PROCEDURE — 87176 TISSUE HOMOGENIZATION CULTR: CPT | Performed by: PODIATRIST

## 2018-12-14 PROCEDURE — 28825 PARTIAL AMPUTATION OF TOE: CPT | Performed by: PODIATRIST

## 2018-12-14 PROCEDURE — 87075 CULTR BACTERIA EXCEPT BLOOD: CPT | Performed by: PODIATRIST

## 2018-12-14 PROCEDURE — 87070 CULTURE OTHR SPECIMN AEROBIC: CPT | Performed by: PODIATRIST

## 2018-12-14 PROCEDURE — HPPLACEHOLDER PB H&P PLACEHOLDER: Performed by: PODIATRIST

## 2018-12-14 PROCEDURE — 87205 SMEAR GRAM STAIN: CPT | Performed by: PODIATRIST

## 2018-12-14 RX ORDER — SODIUM CHLORIDE, SODIUM LACTATE, POTASSIUM CHLORIDE, CALCIUM CHLORIDE 600; 310; 30; 20 MG/100ML; MG/100ML; MG/100ML; MG/100ML
INJECTION, SOLUTION INTRAVENOUS CONTINUOUS PRN
Status: DISCONTINUED | OUTPATIENT
Start: 2018-12-14 | End: 2018-12-14 | Stop reason: SURG

## 2018-12-14 RX ORDER — SULFAMETHOXAZOLE AND TRIMETHOPRIM 400; 80 MG/1; MG/1
1 TABLET ORAL EVERY 12 HOURS SCHEDULED
Status: ON HOLD | COMMUNITY
End: 2019-01-04 | Stop reason: ALTCHOICE

## 2018-12-14 RX ORDER — SODIUM CHLORIDE, SODIUM LACTATE, POTASSIUM CHLORIDE, CALCIUM CHLORIDE 600; 310; 30; 20 MG/100ML; MG/100ML; MG/100ML; MG/100ML
100 INJECTION, SOLUTION INTRAVENOUS CONTINUOUS
Status: CANCELLED | OUTPATIENT
Start: 2018-12-14

## 2018-12-14 RX ORDER — BUPIVACAINE HYDROCHLORIDE 5 MG/ML
INJECTION, SOLUTION PERINEURAL AS NEEDED
Status: DISCONTINUED | OUTPATIENT
Start: 2018-12-14 | End: 2018-12-14 | Stop reason: HOSPADM

## 2018-12-14 RX ORDER — PROPOFOL 10 MG/ML
INJECTION, EMULSION INTRAVENOUS CONTINUOUS PRN
Status: DISCONTINUED | OUTPATIENT
Start: 2018-12-14 | End: 2018-12-14 | Stop reason: SURG

## 2018-12-14 RX ORDER — DIPHENHYDRAMINE HYDROCHLORIDE 50 MG/ML
12.5 INJECTION INTRAMUSCULAR; INTRAVENOUS ONCE AS NEEDED
Status: DISCONTINUED | OUTPATIENT
Start: 2018-12-14 | End: 2018-12-14 | Stop reason: HOSPADM

## 2018-12-14 RX ORDER — PROMETHAZINE HYDROCHLORIDE 25 MG/ML
25 INJECTION, SOLUTION INTRAMUSCULAR; INTRAVENOUS ONCE AS NEEDED
Status: DISCONTINUED | OUTPATIENT
Start: 2018-12-14 | End: 2018-12-14 | Stop reason: HOSPADM

## 2018-12-14 RX ORDER — ONDANSETRON 2 MG/ML
4 INJECTION INTRAMUSCULAR; INTRAVENOUS ONCE AS NEEDED
Status: DISCONTINUED | OUTPATIENT
Start: 2018-12-14 | End: 2018-12-14 | Stop reason: HOSPADM

## 2018-12-14 RX ORDER — ONDANSETRON 2 MG/ML
INJECTION INTRAMUSCULAR; INTRAVENOUS AS NEEDED
Status: DISCONTINUED | OUTPATIENT
Start: 2018-12-14 | End: 2018-12-14 | Stop reason: SURG

## 2018-12-14 RX ORDER — PROPOFOL 10 MG/ML
INJECTION, EMULSION INTRAVENOUS AS NEEDED
Status: DISCONTINUED | OUTPATIENT
Start: 2018-12-14 | End: 2018-12-14 | Stop reason: SURG

## 2018-12-14 RX ORDER — FENTANYL CITRATE 50 UG/ML
INJECTION, SOLUTION INTRAMUSCULAR; INTRAVENOUS AS NEEDED
Status: DISCONTINUED | OUTPATIENT
Start: 2018-12-14 | End: 2018-12-14 | Stop reason: SURG

## 2018-12-14 RX ORDER — MEPERIDINE HYDROCHLORIDE 25 MG/ML
12.5 INJECTION INTRAMUSCULAR; INTRAVENOUS; SUBCUTANEOUS
Status: DISCONTINUED | OUTPATIENT
Start: 2018-12-14 | End: 2018-12-14 | Stop reason: HOSPADM

## 2018-12-14 RX ORDER — CEFAZOLIN SODIUM 1 G/50ML
1000 SOLUTION INTRAVENOUS ONCE
Status: DISCONTINUED | OUTPATIENT
Start: 2018-12-14 | End: 2018-12-14

## 2018-12-14 RX ORDER — FENTANYL CITRATE/PF 50 MCG/ML
25 SYRINGE (ML) INJECTION
Status: DISCONTINUED | OUTPATIENT
Start: 2018-12-14 | End: 2018-12-14 | Stop reason: HOSPADM

## 2018-12-14 RX ORDER — METOCLOPRAMIDE HYDROCHLORIDE 5 MG/ML
INJECTION INTRAMUSCULAR; INTRAVENOUS AS NEEDED
Status: DISCONTINUED | OUTPATIENT
Start: 2018-12-14 | End: 2018-12-14 | Stop reason: SURG

## 2018-12-14 RX ORDER — MIDAZOLAM HYDROCHLORIDE 1 MG/ML
INJECTION INTRAMUSCULAR; INTRAVENOUS AS NEEDED
Status: DISCONTINUED | OUTPATIENT
Start: 2018-12-14 | End: 2018-12-14 | Stop reason: SURG

## 2018-12-14 RX ORDER — CEFAZOLIN SODIUM 2 G/50ML
2000 SOLUTION INTRAVENOUS EVERY 8 HOURS
Status: DISCONTINUED | OUTPATIENT
Start: 2018-12-14 | End: 2018-12-14 | Stop reason: HOSPADM

## 2018-12-14 RX ORDER — LIDOCAINE HYDROCHLORIDE 20 MG/ML
INJECTION, SOLUTION INFILTRATION; PERINEURAL AS NEEDED
Status: DISCONTINUED | OUTPATIENT
Start: 2018-12-14 | End: 2018-12-14 | Stop reason: HOSPADM

## 2018-12-14 RX ORDER — LIDOCAINE HYDROCHLORIDE 10 MG/ML
INJECTION, SOLUTION INFILTRATION; PERINEURAL AS NEEDED
Status: DISCONTINUED | OUTPATIENT
Start: 2018-12-14 | End: 2018-12-14 | Stop reason: SURG

## 2018-12-14 RX ADMIN — METOCLOPRAMIDE HYDROCHLORIDE 10 MG: 5 INJECTION INTRAMUSCULAR; INTRAVENOUS at 13:18

## 2018-12-14 RX ADMIN — SODIUM CHLORIDE, SODIUM LACTATE, POTASSIUM CHLORIDE, AND CALCIUM CHLORIDE: .6; .31; .03; .02 INJECTION, SOLUTION INTRAVENOUS at 12:14

## 2018-12-14 RX ADMIN — LIDOCAINE HYDROCHLORIDE 50 MG: 10 INJECTION, SOLUTION INFILTRATION; PERINEURAL at 13:19

## 2018-12-14 RX ADMIN — PROPOFOL 100 MG: 10 INJECTION, EMULSION INTRAVENOUS at 13:25

## 2018-12-14 RX ADMIN — MIDAZOLAM HYDROCHLORIDE 2 MG: 1 INJECTION, SOLUTION INTRAMUSCULAR; INTRAVENOUS at 13:19

## 2018-12-14 RX ADMIN — PROPOFOL 150 MCG/KG/MIN: 10 INJECTION, EMULSION INTRAVENOUS at 13:19

## 2018-12-14 RX ADMIN — PROPOFOL 100 MG: 10 INJECTION, EMULSION INTRAVENOUS at 13:19

## 2018-12-14 RX ADMIN — FENTANYL CITRATE 100 MCG: 50 INJECTION, SOLUTION INTRAMUSCULAR; INTRAVENOUS at 13:19

## 2018-12-14 RX ADMIN — ONDANSETRON 4 MG: 2 INJECTION INTRAMUSCULAR; INTRAVENOUS at 13:18

## 2018-12-14 RX ADMIN — CEFAZOLIN SODIUM 3000 MG: 2 SOLUTION INTRAVENOUS at 13:15

## 2018-12-14 NOTE — INTERIM OP NOTE
AMPUTATION TOE  Postoperative Note  PATIENT NAME: Dorma Aase  : 1960  MRN: [id number]  [facility] ROOM 02    Surgery Date: 2018    Preop Diagnosis:  Acute osteomyelitis of left ankle or foot ([facility] [address] ) [M86 172]    Post-Op Diagnosis Codes:     * Acute osteomyelitis of left ankle or foot ([facility] [address] ) [M86 172]    Procedure(s) (LRB):  AMPUTATION TOE (Left)    Surgeon(s) and Role:     Na Das DPM - Primary    Specimens:  ID Type Source Tests Collected by Time Destination   1 : BONE  LEFT FOOT, 2ND TOE  Tissue Bone TISSUE EXAM Min Prince DPM 2018 1401    A : LEFT FOOT SWAB CULTUREFOR ANAEROBIC Tissue Foot, Left ANAEROBIC CULTURE AND GRAM STAIN, CULTURE, TISSUE AND GRAM STAIN Min Prince DPM 2018 1417    B : LEFT FOOT SWAB  FOR AEROBIC Tissue Foot, Left CULTURE, TISSUE AND GRAM STAIN Min Prince [facility] 2018 1419        Estimated Blood Loss:   Minimal    Anesthesia Type:   IV Sedation with Anesthesia     Findings:    Chronic wound distal aspect 2nd left toe cellulitis 2nd left toe    Osteomyelitis 2nd left toe  Complications:   None    SIGNATURE: Min Prince DPM   DATE: 2018   TIME: 2:23 PM

## 2018-12-14 NOTE — OP NOTE
OPERATIVE REPORT  PATIENT NAME: Soren Lyons    :  1960  MRN: [id number]  Pt Location: [facility] OR ROOM 02    SURGERY DATE: 2018    Surgeon(s) and Role:     Tristan Beach DPM - Primary    Preop Diagnosis:  Acute osteomyelitis of left ankle or foot ([facility] [address] ) [M86 172]    Post-Op Diagnosis Codes:     * Acute osteomyelitis of left ankle or foot ([facility] [address] ) [M86 172]    Procedure(s) (LRB):  AMPUTATION TOE (Left)    Specimen(s):  ID Type Source Tests Collected by Time Destination   1 : BONE  LEFT FOOT, 2ND TOE  Tissue Bone TISSUE EXAM Antonietta Flores DPM 2018 1401    A : LEFT FOOT SWAB CULTUREFOR ANAEROBIC Tissue Foot, Left ANAEROBIC CULTURE AND GRAM STAIN, CULTURE, TISSUE AND GRAM STAIN Antonietta Flores DPM 2018 1417    B : LEFT FOOT SWAB  FOR AEROBIC Tissue Foot, Left CULTURE, TISSUE AND GRAM STAIN Antonietta Flores DPM 2018 1419        Estimated Blood Loss:   Minimal    Drains:       Anesthesia Type:   IV Sedation with Anesthesia    Operative Indications:  Acute osteomyelitis of left ankle or foot (HCC) [M86 172]  Chronic wound 2nd left toe  Diabetic neuropathy  Digital cellulitis  Operative Findings:  Patient has chronic wound of 2nd left toe  This was self-induced wound  Patient attempted to cut his callus on the end of the 2nd left toe at home  This initiated infection  Wound developed as well  Conservative care was tried  However wound failed to heal   X-rays demonstrated positive bone lysis consistent with osteomyelitis  Patient was aware findings  He was presented option of IV antibiotic and wound care versus amputation  He chose amputation  He has consented to this procedure understanding all the risks benefits complications alternatives understand that no guarantees have been given  He understands and following could occur but not limited to pain scar swelling hypo or hyper seizure  RSD phlebitis or vasculitis  Further wound    Need for wound care infection or need for infection care need for future surgery loss of part of all the foot to name a few  Complications:   None    Procedure and Technique:  Patient was brought to the OR and placed on the operating table in a supine position  Under sterile technique and after checking for adequate anesthesia, attention was then directed to the distal aspect of the 2nd left toe  At this point, a fishmouth type incision was performed from the area the PIPJ  This allowed for disarticulation of the toe at the PIPJ  This was done successfully and all bleeders being bovied and coagulated as necessary  At this point to denude the cartilage the head of the proximal phalanx was excised as well  This was done with bone forceps  The wound appeared to be clean at this time however culture and sensitivities were done  After copiously flushing large amounts of sterile saline, deep structures reapproximated with 4 0 chromic and the skin with 4 O nylon  Tourniquet was released during closure and vascular status returned to normal preoperative levels  Dry sterile dressing applied  Patient returned recovery room with vital signs stable neurovascular status intact  Patient is to be followed up in the office a week    He will take the prescribed medications of all written instructions   I was present for the entire procedure    Patient Disposition:  PACU     SIGNATURE: Neha Clarke DPM  DATE: December 14, 2018  TIME: 2:26 PM

## 2018-12-14 NOTE — PERIOPERATIVE NURSING NOTE
Dressing left foot ace wrap dry / intact / walking shoe in place / ice to same / denies pain / oob with no ill effects

## 2018-12-14 NOTE — ANESTHESIA POSTPROCEDURE EVALUATION
Post-Op Assessment Note      CV Status:  Stable    Mental Status:  Alert    Hydration Status:  Stable    PONV Controlled:  Controlled    Airway Patency:  Patent    Post Op Vitals Reviewed: Yes          Staff: CRNA           /81 (12/14/18 1430)    Temp     Pulse 65 (12/14/18 1430)   Resp 18 (12/14/18 1430)    SpO2 96 % (12/14/18 1430)

## 2018-12-14 NOTE — DISCHARGE INSTRUCTIONS
PODIATRY DISCHARGE INSTRUCTIONS  DR Philipp Rubin Instructions    · No driving or operating machinery for 24 hours or while taking pain medication  · No alcoholic beverages for 24 hours or while taking pain medication  · DO NOT make any important personal or business decisions for 24 hours  Diet:  · Begin with liquids and light food and progress to your normal diet unless you are nauseated or otherwise instructed by your physician  Medication:  · Begin taking pain medication as directed and remember that narcotic medications may be constipating  Consider starting over the counter stool softeners  If constipation persists begin taking over the counter laxative  Dressing:  · Keep dressing dry   Do not remove dressing until seen by Dr Torre Pew:  · Apply ice to affected area 20 minutes on and 20 minutes off unless otherwise         Instructed     Elevation:  · Keep affected foot elevated on pillows    Weight Bearing status:  ___________________________  Wear blue surgical shoe when walking  Use crutches, cane or walker as directed    Keep regular scheduled appointment with  Memory Setting    Call  Memory Setting with any problems or questions at [phone]

## 2018-12-14 NOTE — ANESTHESIA PREPROCEDURE EVALUATION
Review of Systems/Medical History  Patient summary reviewed  Chart reviewed      Cardiovascular  Hyperlipidemia, Hypertension ,    Pulmonary       GI/Hepatic       Kidney stones,        Endo/Other  Diabetes type 2 Oral agent,   Obesity  super morbid obesity   GYN       Hematology   Musculoskeletal    Arthritis     Neurology   Psychology           Physical Exam    Airway    Mallampati score: I  TM Distance: >3 FB  Neck ROM: full     Dental       Cardiovascular  Rhythm: regular, Rate: normal,     Pulmonary  Breath sounds clear to auscultation,     Other Findings        Anesthesia Plan  ASA Score- 3     Anesthesia Type-     Plan Factors-    Induction- intravenous  Postoperative Plan-     Informed Consent- Anesthetic plan and risks discussed with patient  I personally reviewed this patient with the CRNA  Discussed and agreed on the Anesthesia Plan with the CRNA  Santi Medeiros

## 2018-12-16 LAB — BACTERIA SPEC ANAEROBE CULT: NO GROWTH

## 2018-12-17 LAB
BACTERIA TISS AEROBE CULT: NO GROWTH
GRAM STN SPEC: NORMAL

## 2018-12-18 ENCOUNTER — ANESTHESIA EVENT (OUTPATIENT)
Dept: PERIOP | Facility: AMBULARY SURGERY CENTER | Age: 58
End: 2018-12-18
Payer: COMMERCIAL

## 2018-12-18 ENCOUNTER — OFFICE VISIT (OUTPATIENT)
Dept: PODIATRY | Facility: CLINIC | Age: 58
End: 2018-12-18

## 2018-12-18 VITALS — BODY MASS INDEX: 40.8 KG/M2 | HEIGHT: 70 IN | WEIGHT: 285 LBS

## 2018-12-18 DIAGNOSIS — M79.672 LEFT FOOT PAIN: Primary | ICD-10-CM

## 2018-12-18 PROCEDURE — 99024 POSTOP FOLLOW-UP VISIT: CPT | Performed by: PODIATRIST

## 2018-12-18 NOTE — PROGRESS NOTES
Procedures   Foot Exam     Patient is status post procedure  He had partial amputation 2nd left toe  He has no history of fever or night sweats  Patient has no pain  0  No change in neurovascular status  Left foot has dry sterile dressing  Dried and removed  Incision of 2nd left toe coapted  No evidence of infection  Culture and sensitivities so far are negative for wound infection  Plan  Dry sterile dressing has been changed    Patient return 1 week for x-ray and stitch removal

## 2018-12-26 NOTE — PRE-PROCEDURE INSTRUCTIONS
Pre-Surgery Instructions:   Medication Instructions    allopurinol (ZYLOPRIM) 100 mg tablet Instructed patient per Anesthesia Guidelines   amLODIPine (NORVASC) 5 mg tablet Instructed patient per Anesthesia Guidelines   atorvastatin (LIPITOR) 40 mg tablet Instructed patient per Anesthesia Guidelines   cholecalciferol (VITAMIN D3) 1,000 units tablet Instructed patient per Anesthesia Guidelines   Dulaglutide (TRULICITY) 1 5 HN/3 4VG SOPN Instructed patient per Anesthesia Guidelines   Empagliflozin (JARDIANCE) 10 MG TABS Instructed patient per Anesthesia Guidelines   fenofibrate (TRIGLIDE) 160 MG tablet Instructed patient per Anesthesia Guidelines   labetalol (NORMODYNE) 100 mg tablet Instructed patient per Anesthesia Guidelines   lisinopril (ZESTRIL) 40 mg tablet Instructed patient per Anesthesia Guidelines   metFORMIN (GLUCOPHAGE) 1000 MG tablet Instructed patient per Anesthesia Guidelines   Multiple Vitamin (MULTIVITAMIN) tablet Instructed patient per Anesthesia Guidelines  Pre-op and bathing instructions given

## 2018-12-27 ENCOUNTER — OFFICE VISIT (OUTPATIENT)
Dept: PODIATRY | Facility: CLINIC | Age: 58
End: 2018-12-27
Payer: COMMERCIAL

## 2018-12-27 VITALS
BODY MASS INDEX: 40.8 KG/M2 | SYSTOLIC BLOOD PRESSURE: 163 MMHG | HEIGHT: 70 IN | DIASTOLIC BLOOD PRESSURE: 92 MMHG | WEIGHT: 285 LBS

## 2018-12-27 DIAGNOSIS — M21.962 ACQUIRED DEFORMITY OF LEFT FOOT: Primary | ICD-10-CM

## 2018-12-27 PROCEDURE — 73620 X-RAY EXAM OF FOOT: CPT | Performed by: PODIATRIST

## 2018-12-27 PROCEDURE — 99024 POSTOP FOLLOW-UP VISIT: CPT | Performed by: PODIATRIST

## 2018-12-27 NOTE — PROGRESS NOTES
Procedures   Foot Exam     Patient is status post procedure left foot  He had digital amputation of the 2nd left toe  He is doing well  He has no history of fever or night sweats  He is using postoperative shoe as directed  0  No change in neurovascular status  Left foot dry sterile dressing is dry  Incision of 2nd left toe is grossly coapted  Only very small area of dehiscence noted on the medial side  No evidence of cellulitis  X-ray  No evidence of osteomyelitis  Plan  Some sutures removed  Silver nitrate applied to area of dehiscence  X-rays taken  Patient remain in surgical shoe    He will return for x-ray and stitch removal

## 2019-01-02 ENCOUNTER — APPOINTMENT (OUTPATIENT)
Dept: LAB | Facility: CLINIC | Age: 59
End: 2019-01-02
Payer: COMMERCIAL

## 2019-01-02 ENCOUNTER — TRANSCRIBE ORDERS (OUTPATIENT)
Dept: LAB | Facility: CLINIC | Age: 59
End: 2019-01-02

## 2019-01-02 DIAGNOSIS — N40.0 ENLARGED PROSTATE: ICD-10-CM

## 2019-01-02 DIAGNOSIS — N40.0 ENLARGED PROSTATE: Primary | ICD-10-CM

## 2019-01-02 DIAGNOSIS — E11.9 DIABETES MELLITUS WITHOUT COMPLICATION (HCC): ICD-10-CM

## 2019-01-02 DIAGNOSIS — I10 ESSENTIAL HYPERTENSION, BENIGN: ICD-10-CM

## 2019-01-02 DIAGNOSIS — E78.2 MIXED HYPERLIPIDEMIA: ICD-10-CM

## 2019-01-02 DIAGNOSIS — E11.9 DIABETES MELLITUS WITHOUT COMPLICATION (HCC): Primary | ICD-10-CM

## 2019-01-02 LAB
ALBUMIN SERPL BCP-MCNC: 4.2 G/DL (ref 3.5–5)
ALP SERPL-CCNC: 57 U/L (ref 46–116)
ALT SERPL W P-5'-P-CCNC: 70 U/L (ref 12–78)
ANION GAP SERPL CALCULATED.3IONS-SCNC: 11 MMOL/L (ref 4–13)
AST SERPL W P-5'-P-CCNC: 39 U/L (ref 5–45)
BILIRUB SERPL-MCNC: 0.5 MG/DL (ref 0.2–1)
BUN SERPL-MCNC: 15 MG/DL (ref 5–25)
CALCIUM SERPL-MCNC: 9.5 MG/DL (ref 8.3–10.1)
CHLORIDE SERPL-SCNC: 102 MMOL/L (ref 100–108)
CO2 SERPL-SCNC: 26 MMOL/L (ref 21–32)
CREAT SERPL-MCNC: 1.06 MG/DL (ref 0.6–1.3)
EST. AVERAGE GLUCOSE BLD GHB EST-MCNC: 166 MG/DL
GFR SERPL CREATININE-BSD FRML MDRD: 78 ML/MIN/1.73SQ M
GLUCOSE P FAST SERPL-MCNC: 194 MG/DL (ref 65–99)
HBA1C MFR BLD: 7.4 % (ref 4.2–6.3)
LDLC SERPL DIRECT ASSAY-MCNC: 94 MG/DL (ref 0–100)
POTASSIUM SERPL-SCNC: 3.9 MMOL/L (ref 3.5–5.3)
PROT SERPL-MCNC: 7.4 G/DL (ref 6.4–8.2)
PSA SERPL-MCNC: 0.5 NG/ML (ref 0–4)
SODIUM SERPL-SCNC: 139 MMOL/L (ref 136–145)

## 2019-01-02 PROCEDURE — 80053 COMPREHEN METABOLIC PANEL: CPT

## 2019-01-02 PROCEDURE — 83036 HEMOGLOBIN GLYCOSYLATED A1C: CPT | Performed by: INTERNAL MEDICINE

## 2019-01-02 PROCEDURE — 83721 ASSAY OF BLOOD LIPOPROTEIN: CPT

## 2019-01-02 PROCEDURE — 84153 ASSAY OF PSA TOTAL: CPT

## 2019-01-02 PROCEDURE — 36415 COLL VENOUS BLD VENIPUNCTURE: CPT | Performed by: INTERNAL MEDICINE

## 2019-01-03 ENCOUNTER — OFFICE VISIT (OUTPATIENT)
Dept: PODIATRY | Facility: CLINIC | Age: 59
End: 2019-01-03
Payer: COMMERCIAL

## 2019-01-03 VITALS
SYSTOLIC BLOOD PRESSURE: 143 MMHG | DIASTOLIC BLOOD PRESSURE: 85 MMHG | HEART RATE: 80 BPM | BODY MASS INDEX: 40.8 KG/M2 | HEIGHT: 70 IN | WEIGHT: 285 LBS | RESPIRATION RATE: 17 BRPM

## 2019-01-03 DIAGNOSIS — B35.3 TINEA PEDIS OF BOTH FEET: Primary | ICD-10-CM

## 2019-01-03 DIAGNOSIS — M20.42 HAMMER TOE OF LEFT FOOT: ICD-10-CM

## 2019-01-03 PROCEDURE — 99211 OFF/OP EST MAY X REQ PHY/QHP: CPT | Performed by: PODIATRIST

## 2019-01-03 PROCEDURE — 73620 X-RAY EXAM OF FOOT: CPT | Performed by: PODIATRIST

## 2019-01-03 NOTE — PROGRESS NOTES
Procedures   Foot Exam     Patient is status post procedure of 2nd left toe  Patient has no pain  He is ambulating in street shoe  Patient is diabetic     0  No change in neurovascular status  Be left foot 2nd toe demonstrates grossly coapted incision of amputation site  There is only mild maceration of skin  No evidence of cellulitis at this time  Remaining sutures removed  X-ray demonstrates no evidence of osteomyelitis  Plan  Sutures removed  X-rays taken  Gentian violet applied  Silver nitrate applied  Dry sterile dressing applied  Patient will bandage area for 1 week  Addendum  Patient has dry scaly skin on the plantar aspect of his foot  This is chronic moccasin tinea pedis  He will be prescribed Loprox

## 2019-01-04 ENCOUNTER — HOSPITAL ENCOUNTER (OUTPATIENT)
Dept: ULTRASOUND IMAGING | Facility: AMBULARY SURGERY CENTER | Age: 59
Discharge: HOME | End: 2019-01-04
Payer: COMMERCIAL

## 2019-01-04 ENCOUNTER — HOSPITAL ENCOUNTER (OUTPATIENT)
Facility: AMBULARY SURGERY CENTER | Age: 59
Setting detail: OUTPATIENT SURGERY
Discharge: HOME | End: 2019-01-04
Attending: SURGERY | Admitting: SURGERY
Payer: COMMERCIAL

## 2019-01-04 ENCOUNTER — ANESTHESIA (OUTPATIENT)
Dept: PERIOP | Facility: AMBULARY SURGERY CENTER | Age: 59
End: 2019-01-04
Payer: COMMERCIAL

## 2019-01-04 VITALS
TEMPERATURE: 97.4 F | BODY MASS INDEX: 42.23 KG/M2 | RESPIRATION RATE: 18 BRPM | DIASTOLIC BLOOD PRESSURE: 79 MMHG | WEIGHT: 295 LBS | HEART RATE: 74 BPM | SYSTOLIC BLOOD PRESSURE: 156 MMHG | OXYGEN SATURATION: 95 % | HEIGHT: 70 IN

## 2019-01-04 DIAGNOSIS — I83.813 VARICOSE VEINS OF BILATERAL LOWER EXTREMITIES WITH PAIN: Primary | ICD-10-CM

## 2019-01-04 DIAGNOSIS — I83.90 VARICOSE VEINS OF LOWER EXTREMITY: ICD-10-CM

## 2019-01-04 LAB — GLUCOSE SERPL-MCNC: 155 MG/DL (ref 65–140)

## 2019-01-04 PROCEDURE — 36478 ENDOVENOUS LASER 1ST VEIN: CPT | Performed by: SURGERY

## 2019-01-04 PROCEDURE — 93971 EXTREMITY STUDY: CPT

## 2019-01-04 PROCEDURE — 82948 REAGENT STRIP/BLOOD GLUCOSE: CPT

## 2019-01-04 PROCEDURE — 37765 STAB PHLEB VEINS XTR 10-20: CPT | Performed by: SURGERY

## 2019-01-04 RX ORDER — SODIUM CHLORIDE 9 MG/ML
50 INJECTION, SOLUTION INTRAVENOUS CONTINUOUS
Status: DISCONTINUED | OUTPATIENT
Start: 2019-01-04 | End: 2019-01-04 | Stop reason: HOSPADM

## 2019-01-04 RX ORDER — EPHEDRINE SULFATE 50 MG/ML
INJECTION, SOLUTION INTRAVENOUS AS NEEDED
Status: DISCONTINUED | OUTPATIENT
Start: 2019-01-04 | End: 2019-01-04 | Stop reason: SURG

## 2019-01-04 RX ORDER — OXYCODONE HYDROCHLORIDE AND ACETAMINOPHEN 5; 325 MG/1; MG/1
1 TABLET ORAL EVERY 4 HOURS PRN
Qty: 25 TABLET | Refills: 0 | Status: SHIPPED | OUTPATIENT
Start: 2019-01-04 | End: 2019-01-14

## 2019-01-04 RX ORDER — ONDANSETRON 2 MG/ML
INJECTION INTRAMUSCULAR; INTRAVENOUS AS NEEDED
Status: DISCONTINUED | OUTPATIENT
Start: 2019-01-04 | End: 2019-01-04 | Stop reason: SURG

## 2019-01-04 RX ORDER — HYDROCODONE BITARTRATE AND ACETAMINOPHEN 5; 325 MG/1; MG/1
1 TABLET ORAL EVERY 6 HOURS PRN
Status: DISCONTINUED | OUTPATIENT
Start: 2019-01-04 | End: 2019-01-04

## 2019-01-04 RX ORDER — DEXAMETHASONE SODIUM PHOSPHATE 4 MG/ML
INJECTION, SOLUTION INTRA-ARTICULAR; INTRALESIONAL; INTRAMUSCULAR; INTRAVENOUS; SOFT TISSUE AS NEEDED
Status: DISCONTINUED | OUTPATIENT
Start: 2019-01-04 | End: 2019-01-04 | Stop reason: SURG

## 2019-01-04 RX ORDER — SODIUM CHLORIDE 9 MG/ML
INJECTION, SOLUTION INTRAVENOUS CONTINUOUS PRN
Status: DISCONTINUED | OUTPATIENT
Start: 2019-01-04 | End: 2019-01-04

## 2019-01-04 RX ORDER — FENTANYL CITRATE 50 UG/ML
INJECTION, SOLUTION INTRAMUSCULAR; INTRAVENOUS AS NEEDED
Status: DISCONTINUED | OUTPATIENT
Start: 2019-01-04 | End: 2019-01-04 | Stop reason: SURG

## 2019-01-04 RX ORDER — SODIUM CHLORIDE 9 MG/ML
INJECTION, SOLUTION INTRAVENOUS AS NEEDED
Status: DISCONTINUED | OUTPATIENT
Start: 2019-01-04 | End: 2019-01-04 | Stop reason: HOSPADM

## 2019-01-04 RX ORDER — MAGNESIUM HYDROXIDE 1200 MG/15ML
LIQUID ORAL AS NEEDED
Status: DISCONTINUED | OUTPATIENT
Start: 2019-01-04 | End: 2019-01-04 | Stop reason: HOSPADM

## 2019-01-04 RX ORDER — ONDANSETRON 2 MG/ML
4 INJECTION INTRAMUSCULAR; INTRAVENOUS ONCE AS NEEDED
Status: DISCONTINUED | OUTPATIENT
Start: 2019-01-04 | End: 2019-01-04 | Stop reason: HOSPADM

## 2019-01-04 RX ORDER — FENTANYL CITRATE/PF 50 MCG/ML
25 SYRINGE (ML) INJECTION
Status: DISCONTINUED | OUTPATIENT
Start: 2019-01-04 | End: 2019-01-04 | Stop reason: HOSPADM

## 2019-01-04 RX ORDER — MIDAZOLAM HYDROCHLORIDE 1 MG/ML
INJECTION INTRAMUSCULAR; INTRAVENOUS AS NEEDED
Status: DISCONTINUED | OUTPATIENT
Start: 2019-01-04 | End: 2019-01-04 | Stop reason: SURG

## 2019-01-04 RX ORDER — PROPOFOL 10 MG/ML
INJECTION, EMULSION INTRAVENOUS AS NEEDED
Status: DISCONTINUED | OUTPATIENT
Start: 2019-01-04 | End: 2019-01-04 | Stop reason: SURG

## 2019-01-04 RX ORDER — METOCLOPRAMIDE HYDROCHLORIDE 5 MG/ML
INJECTION INTRAMUSCULAR; INTRAVENOUS AS NEEDED
Status: DISCONTINUED | OUTPATIENT
Start: 2019-01-04 | End: 2019-01-04 | Stop reason: SURG

## 2019-01-04 RX ORDER — OXYCODONE HYDROCHLORIDE AND ACETAMINOPHEN 5; 325 MG/1; MG/1
1 TABLET ORAL EVERY 4 HOURS PRN
Status: DISCONTINUED | OUTPATIENT
Start: 2019-01-04 | End: 2019-01-04 | Stop reason: HOSPADM

## 2019-01-04 RX ORDER — LIDOCAINE HYDROCHLORIDE 10 MG/ML
INJECTION, SOLUTION INFILTRATION; PERINEURAL AS NEEDED
Status: DISCONTINUED | OUTPATIENT
Start: 2019-01-04 | End: 2019-01-04 | Stop reason: SURG

## 2019-01-04 RX ADMIN — PROPOFOL 200 MG: 10 INJECTION, EMULSION INTRAVENOUS at 14:19

## 2019-01-04 RX ADMIN — ONDANSETRON 4 MG: 2 INJECTION INTRAMUSCULAR; INTRAVENOUS at 14:45

## 2019-01-04 RX ADMIN — DEXAMETHASONE SODIUM PHOSPHATE 4 MG: 4 INJECTION, SOLUTION INTRAMUSCULAR; INTRAVENOUS at 14:46

## 2019-01-04 RX ADMIN — MIDAZOLAM HYDROCHLORIDE 2 MG: 1 INJECTION, SOLUTION INTRAMUSCULAR; INTRAVENOUS at 14:15

## 2019-01-04 RX ADMIN — METOCLOPRAMIDE HYDROCHLORIDE 10 MG: 5 INJECTION INTRAMUSCULAR; INTRAVENOUS at 14:40

## 2019-01-04 RX ADMIN — PROPOFOL 100 MG: 10 INJECTION, EMULSION INTRAVENOUS at 14:22

## 2019-01-04 RX ADMIN — EPHEDRINE SULFATE 5 MG: 50 INJECTION, SOLUTION INTRAMUSCULAR; INTRAVENOUS; SUBCUTANEOUS at 15:15

## 2019-01-04 RX ADMIN — FENTANYL CITRATE 50 MCG: 50 INJECTION, SOLUTION INTRAMUSCULAR; INTRAVENOUS at 14:51

## 2019-01-04 RX ADMIN — PROPOFOL 100 MG: 10 INJECTION, EMULSION INTRAVENOUS at 14:25

## 2019-01-04 RX ADMIN — EPHEDRINE SULFATE 5 MG: 50 INJECTION, SOLUTION INTRAMUSCULAR; INTRAVENOUS; SUBCUTANEOUS at 15:06

## 2019-01-04 RX ADMIN — LIDOCAINE HYDROCHLORIDE ANHYDROUS 50 MG: 10 INJECTION, SOLUTION INFILTRATION at 14:19

## 2019-01-04 RX ADMIN — OXYCODONE AND ACETAMINOPHEN 1 TABLET: 5; 325 TABLET ORAL at 16:08

## 2019-01-04 RX ADMIN — SODIUM CHLORIDE: 0.9 INJECTION, SOLUTION INTRAVENOUS at 12:41

## 2019-01-04 NOTE — INTERIM OP NOTE
ENDOVASCULAR LASER THERAPY (EVLT)  Postoperative Note  PATIENT NAME: Kristin Benson  : 1960  MRN: [id number]  [facility] OR ROOM 05    Surgery Date: 2019    Preop Diagnosis:  Varicose veins of bilateral lower extremities with pain [I83 813]    Post-Op Diagnosis Codes: * Varicose veins of bilateral lower extremities with pain [I83 813]    Procedure(s) (LRB):  ENDOVASCULAR LASER THERAPY (EVLT) (Left)    Surgeon(s) and Role:     * Mary Mayo DO - Primary    Specimens:  * No specimens in log *  IVF: 800ml  Estimated Blood Loss:   Minimal    Anesthesia Type:   General     Findings:   Successful closure of left greater saphenous vein following laser ablation  Post ablation left common femoral vein is easily compressible and free of thrombus    Total stabs:  14  5F/442N/315I      Complications:   None    SIGNATURE: Mary Mayo DO   DATE: 2019   TIME: 3:34 PM

## 2019-01-04 NOTE — H&P
H & P    Plan: L GSV EVLT + stab phlebectomies  Operative site/varicosities marked    Assessment/Plan:     Varicose veins of bilateral lower extremities with pain   History of bilateral symptomatic varicose veins despite compression stockings  Venous reflux study does demonstrate bilateral greater saphenous vein incompetence  At this time recommend left greater saphenous vein EVLT and stab phlebectomies  The procedure, risks, benefits and alternatives were discussed in detail  Patient is agreeable to proceed  Written consent was obtained  Patient prefers to have procedure performed middle/late September          Diagnoses and all orders for this visit:     Varicose veins of bilateral lower extremities with pain  -     Case request operating room: ENDOVASCULAR LASER THERAPY (EVLT); Standing  -     Basic metabolic panel; Future  -     CBC and Platelet; Future  -     EKG 12 lead; Future  -     Case request operating room: ENDOVASCULAR LASER THERAPY (EVLT)     Other orders  -     Diet NPO; Sips with meds; Standing  -     Void on call to OR; Standing  -     Insert peripheral IV; Standing  -     chlorhexidine (PERIDEX) 0 12 % oral rinse 15 mL; Swish and spit 15 mL every 12 (twelve) hours             Subjective:       Patient ID: Chaya Rodriguez is a 63 y o  male      Patient had LEVDR on 7/10  He is c/o swelling and heaviness in the the bilat legs L>R, He has been wearing Rx compression most days but his bilat legs remain painful at night  He denies any open wounds or sores        Juliette Montana is a 59-year-old [employer] employee who presents to the office in follow-up following a trial of compression stockings for bilateral lower extremity symptomatic varicose veins  He has no personal / family history of DVT  He has worn compression stockings with minimal relief  He  Complains of heaviness /throbbing with  progress swelling as today progress    He denies any episodes of superficial thrombophlebitis         The following portions of the patient's history were reviewed and updated as appropriate: allergies, current medications, past family history, past medical history, past social history, past surgical history and problem list      Review of Systems   Constitutional: Negative  HENT: Negative  Eyes: Negative  Respiratory: Negative  Cardiovascular: Positive for leg swelling  Gastrointestinal: Negative  Endocrine: Negative  Genitourinary: Negative  Musculoskeletal: Negative  Skin: Negative  Allergic/Immunologic: Negative  Neurological: Negative  Hematological: Negative  Psychiatric/Behavioral: Negative  Vitals       Vitals:     08/06/18 1606   BP: 120/70   BP Location: Left arm   Patient Position: Sitting   Cuff Size: Adult   Pulse: 72   Resp: 18   Temp: 98 3 °F (36 8 °C)   TempSrc: Tympanic   Weight: 133 kg (293 lb)   Height: 5' 10" (1 778 m)                Patient Active Problem List   Diagnosis    Diabetes mellitus ([facility] [address] )    Hypertension    Hyperlipidemia    Status post revision of total replacement of right knee    Varicose veins of bilateral lower extremities with pain    Intervertebral disc disorders with radiculopathy, lumbar region    Chronic pain of right knee    Pes anserine bursitis         Surgical History         Past Surgical History:   Procedure Laterality Date    CARPAL TUNNEL RELEASE Left      HERNIA REPAIR        JOINT REPLACEMENT         right knee revision, infection total of 4 surgeries     JOINT REPLACEMENT         left knee     KIDNEY STONE SURGERY         x3    KS COLONOSCOPY FLX DX W/COLLJ SPEC WHEN PFRMD N/A 2/9/2018     Procedure: EGD AND COLONOSCOPY;  Surgeon: Cherylene Needy, MD;  Location: [facility]  GI [facility];   Service: Gastroenterology    KS REVISE KNEE JOINT REPLACE,1 PART Right 3/20/2017     Procedure: REVISION TOTAL KNEE ARTHROPLASTY INCLUDING FEMORAL STEM REVISION;  Surgeon: George Shahid MD;  Location: [facility];  Service: Orthopedics   Carlos Bledsoe CT REVISE KNEE JOINT REPLACE,ALL PARTS Right 4/6/2016     Procedure: REVISION TOTAL KNEE ARTHROPLASTY INCLUDING FEMORAL AND TIBIAL COMPONENTS;  Surgeon: Chas Klein MD;  Location: [facility];  Service: Orthopedics    ULNAR TUNNEL RELEASE                      Family History   Problem Relation Age of Onset    Diabetes Mother      Hypertension Mother      Hypertension Father           Social History   Social History            Social History    Marital status:        Spouse name: N/A    Number of children: N/A    Years of education: N/A          Occupational History    Not on file       Social History Main Topics    Smoking status: Former Smoker    Smokeless tobacco: Never Used         Comment: quit 40 years ago    Alcohol use 8 4 oz/week       14 Cans of beer per week    Drug use: No    Sexual activity: Not on file           Other Topics Concern    Not on file          Social History Narrative    No narrative on file                  Allergies   Allergen Reactions    Doxycycline Photosensitivity    Other         Bee stings    Oxycodone Other (See Comments)       Flu like symptons            Current Outpatient Prescriptions:     allopurinol (ZYLOPRIM) 100 mg tablet, Take 100 mg by mouth daily  , Disp: , Rfl:     amLODIPine (NORVASC) 5 mg tablet, Take 10 mg by mouth daily  , Disp: , Rfl:     atorvastatin (LIPITOR) 10 mg tablet, Take 20 mg by mouth daily  , Disp: , Rfl:     cholecalciferol (VITAMIN D3) 1,000 units tablet, Take 1,000 Units by mouth daily, Disp: , Rfl:     Dulaglutide (TRULICITY) 1 5 OF/1 3HU SOPN, Inject under the skin once a week Indications: sundays    , Disp: , Rfl:     Elastic Bandages & Supports ([address]) MISC, by Does not apply route daily Knee High 20-30mmHg  For varicose veins/ venous insuf, Disp: 2 each, Rfl: 3    Empagliflozin (JARDIANCE) 10 MG TABS, Take 1 tablet by mouth daily, Disp: , Rfl:     fenofibrate (TRICOR) 145 mg tablet, Take 145 mg by mouth daily  , Disp: , Rfl:     labetalol (NORMODYNE) 100 mg tablet, Take 100 mg by mouth 2 (two) times a day, Disp: , Rfl:     lisinopril (ZESTRIL) 40 mg tablet, Take 40 mg by mouth daily  , Disp: , Rfl:     metFORMIN (GLUCOPHAGE) 1000 MG tablet, Take 1,000 mg by mouth 2 (two) times a day with meals  , Disp: , Rfl:     Misc Natural Products (TURMERIC CURCUMIN) CAPS, Take by mouth, Disp: , Rfl:     Multiple Vitamin (MULTIVITAMIN) tablet, Take 1 tablet by mouth daily  , Disp: , Rfl:     enoxaparin (LOVENOX) 40 mg/0 4 mL, Inject 0 4 mL under the skin daily for 30 days, Disp: 12 mL, Rfl: 0     Imaging:  I have reviewed the imaging and the findings are:   CONCLUSION:     Impression:  RIGHT LIMB:  Deep venous incompetence is noted  The great saphenous vein is incompetent  The great saphenous vein exits the saphenous compartment in the thigh  The small saphenous vein is competent and does not communicate with the  popliteal vein  There is no evidence of incompetent perforators in the thigh or calf  There is no evidence of deep vein thrombosis in the CFV, the proximal PFV, the  femoral vein and the popliteal vein      LEFT LIMB:  Deep venous incompetence is noted  The great saphenous vein is incompetent  The great saphenous vein remains in the saphenous compartment in the thigh  The small saphenous vein is competent and does not communicate with the  popliteal vein  There is no evidence of incompetent perforators in the thigh or calf  There is no evidence of deep vein thrombosis in the CFV, the proximal PFV, the  femoral vein and the popliteal vein      Objective:        /70 (BP Location: Left arm, Patient Position: Sitting, Cuff Size: Adult)   Pulse 72   Temp 98 3 °F (36 8 °C) (Tympanic)   Resp 18   Ht 5' 10" (1 778 m)   Wt 133 kg (293 lb)   BMI 42 04 kg/m²             Physical Exam   Constitutional: He is oriented to person, place, and time  He appears well-developed and well-nourished   No distress  HENT:   Head: Normocephalic and atraumatic  Eyes: Conjunctivae and EOM are normal  No scleral icterus  Neck: Normal range of motion  Neck supple  No tracheal deviation present  Cardiovascular: Normal rate, regular rhythm and normal heart sounds  Pulmonary/Chest: Effort normal and breath sounds normal    Abdominal: Soft  He exhibits no distension  Mass: no appreciable aortic pulstion/aneurysm  There is no tenderness  There is no rebound and no guarding  Musculoskeletal: Normal range of motion  Neurological: He is alert and oriented to person, place, and time  Skin: Skin is warm and dry  Bilateral lower extremity with prominent truncal varicosities  Psychiatric: He has a normal mood and affect   His behavior is normal

## 2019-01-04 NOTE — DISCHARGE INSTRUCTIONS
DISCHARGE INSTRUCTIONS  LEG SURGERY    Following discharge from the hospital, you may have some questions about your operation, your activities or your general condition  These instructions may answer some of your questions and help you adjust during the first few weeks following your operation  ACTIVITY:  Limit your physical activity to slow walking  Avoid climbing or heavy lifting for the first four weeks after surgery  Initially some discomfort will be felt when walking as the skin and muscle tissues heal  You may require help with walking or feel more secure with someone to lean on  Walking up steps and normal activities may be resumed, as you feel ready  You should not drive a car for one week following discharge from the hospital   You may ride in a car for short trips upon discharge  DIET:  Resume your normal diet  Try to eat low fat and low cholesterol foods  Good nutrition is important for healing of your incision  INCISION:  You may include the operated area in a shower on the third day following surgery  Wash your incision daily with soap and water  Pat the incision dry and leave open to air  Do not apply lotions, ointments, creams or powders to incision  Sitting in a tub is not recommended for the first two weeks following surgery or if you have any open wounds  It is normal to have swelling or discoloration around the incision  If increasing redness or pain develops, call our office immediately  Numbness in the region of the incision may occur following the surgery  This normally resolves in six to twelve months  If any of your incisions are open and require dressing changes, you will be given instruction for your daily incision care  If you are not able to change the dressings, a visiting nurse will be arranged  Your physician may have chosen to use a type of adhesive glue to close your incision  There are stitches present under the skin which will absorb on their own  The glue is used to cover the incision, assist in closure, and prevent contamination  This adhesive will darken and peel away on its own within one to two weeks  Alternatively, your surgeon may have chosen to use skin staples to close your incision which will be removed in the office at the time of your follow-up appointment  You may wash the incision with the staples present  LEG SWELLING: Most patients have noticeable leg swelling after leg surgery  This usually disappears within a few weeks  If swelling is present, elevate the leg whenever possible  Avoid sitting with the leg hanging down for prolonged time periods  Walking is beneficial   An ACE bandage or support stocking may be helpful, but this should be discussed with your physician prior to use  FOLLOW UP STUDIES:  Doppler ultrasound studies are very important to your post-operative care  Your surgeon will arrange for them at your first postoperative visit  Repeat studies are then scheduled every three months for the first year and periodically after this  PLEASE CALL THE OFFICE IF YOU HAVE ANY QUESTIONS    [phone] Efren Salinas [phone] [facility] [phone]  [address] [place] [address] , [address], [place], [address]  [address], [place], [address]  [address]  [address], [facility], [address]  [address] [facility], [facility], [address] [facility]  [address], [address], [address]e [address]  [address], [address] [facility], [address] [facility] [address]   [address] [facility], [address] [facility], [address]  [address] [facility], [address] [facility], [facility], Gerry Castellanos [address]

## 2019-01-04 NOTE — ANESTHESIA POSTPROCEDURE EVALUATION
Post-Op Assessment Note      CV Status:  Stable    Mental Status:  Alert and awake    Hydration Status:  Euvolemic    PONV Controlled:  Controlled    Airway Patency:  Patent    Post Op Vitals Reviewed: Yes          Staff: CRNA           BP   146/73   Temp  98 6   Pulse  72   Resp   18   SpO2   95

## 2019-01-04 NOTE — ANESTHESIA PREPROCEDURE EVALUATION
Review of Systems/Medical History    Chart reviewed  History of anesthetic complications PONV    Cardiovascular  EKG reviewed, Exercise tolerance (METS): >4,  Hyperlipidemia, Hypertension ,    Pulmonary  Negative pulmonary ROS        GI/Hepatic  Negative GI/hepatic ROS          Kidney stones,        Endo/Other  Diabetes well controlled type 2 Oral agent,      GYN       Hematology  No anemia ,     Musculoskeletal    Arthritis     Neurology   Psychology           Physical Exam    Airway    Mallampati score: IV  TM Distance: >3 FB  Neck ROM: full     Dental   No notable dental hx     Cardiovascular      Pulmonary      Other Findings        Anesthesia Plan  ASA Score- 3     Anesthesia Type- general with ASA Monitors  Additional Monitors:   Airway Plan:     Comment: ETT if prone, LMA if supine  Plan Factors-    Induction- intravenous  Postoperative Plan-     Informed Consent- Anesthetic plan and risks discussed with patient  I personally reviewed this patient with the CRNA  Discussed and agreed on the Anesthesia Plan with the CRNA  Jamee Guthrie Pt reports that no nausea after anesthesia  He felt nauseated one immediately prior to induction  This was helped by an IV anti-emetic though he is not sure which one

## 2019-01-07 ENCOUNTER — OFFICE VISIT (OUTPATIENT)
Dept: VASCULAR SURGERY | Facility: CLINIC | Age: 59
End: 2019-01-07

## 2019-01-07 VITALS
RESPIRATION RATE: 18 BRPM | HEIGHT: 70 IN | SYSTOLIC BLOOD PRESSURE: 142 MMHG | BODY MASS INDEX: 43.38 KG/M2 | DIASTOLIC BLOOD PRESSURE: 80 MMHG | TEMPERATURE: 97.3 F | HEART RATE: 76 BPM | WEIGHT: 303 LBS

## 2019-01-07 DIAGNOSIS — I83.813 VARICOSE VEINS OF BILATERAL LOWER EXTREMITIES WITH PAIN: Primary | ICD-10-CM

## 2019-01-07 PROCEDURE — 99024 POSTOP FOLLOW-UP VISIT: CPT | Performed by: NURSE PRACTITIONER

## 2019-01-07 NOTE — LETTER
January 7, 2019     Patient: Darryn Kaur   YOB: 1960   Date of Visit: 1/7/2019       To Whom it May Concern:    Darryn Kaur is under my professional care  He was seen in my office on 1/7/2019  He may return to work on 1/8/2019  Return to work as of 1/8/2018 with light duty/as tolerated, restrictions to include no heavy lifting greater than 15-20 lb for the next 7 days  If you have any questions or concerns, please don't hesitate to call           Sincerely,          Bard Torres, JILLIAN        CC: No Recipients

## 2019-01-07 NOTE — PROGRESS NOTES
Assessment/Plan:    Varicose veins of bilateral lower extremities with pain  -Symptomatic varicose veins bilateral lower extremities  -s/p L GSV EVLT and stab phlebectomies x14 on 1/4/19 by Dr Neal Horne  -Doing well  Advance activities as tolerated  -Routine postop duplex scheduled for 1/10  Denies any CP/SOB  -Return to daily use of compression stockings as soon as he is comfortably able to do so  -Followup in 4 weeks with Dr Neal Horne for recheck and to discuss treatment of right leg       Diagnoses and all orders for this visit:    Varicose veins of bilateral lower extremities with pain          Subjective:      Patient ID: Fercho Condon is a 63 y o  male  Pt is here for a post op dressing removal of his EVLT on 1/4/19 by Dr Neal Horne  Surgical site is slightly discolored no active bleeding  Sites are clean and dry  Pt is currently taking Lipitor 40 mg  Symptomatic varicose veins to bilateral lower extremities  Status post left great saphenous vein EVLT and stab phlebectomies x14 on 1/4/2018 by Dr Neal Horne  Denies any chest pain/shortness of breath  Routine postop duplex scheduled for 1/10/2019  Ecchymosis to the left thigh  Stab sites healing as expected  Continues with pain/swelling to the RLE  The following portions of the patient's history were reviewed and updated as appropriate: allergies, current medications, past family history, past medical history, past social history, past surgical history and problem list     Review of Systems   Constitutional: Negative  HENT: Negative  Eyes: Negative  Respiratory: Negative  Cardiovascular: Negative  Gastrointestinal: Negative  Endocrine: Negative  Genitourinary: Negative  Musculoskeletal: Negative  Allergic/Immunologic: Negative  Neurological: Negative  Hematological: Negative  Psychiatric/Behavioral: Negative  Objective:     Physical Exam   Constitutional: He is oriented to person, place, and time  No distress  Cardiovascular:   Pulses:       Dorsalis pedis pulses are 2+ on the left side  Pulmonary/Chest: Effort normal and breath sounds normal    Musculoskeletal: He exhibits no edema  Neurological: He is alert and oriented to person, place, and time  Skin:   Ecchymosis to the left thigh  Stab sites healing as expected           Vitals:    01/07/19 0905   BP: 142/80   BP Location: Left arm   Patient Position: Sitting   Cuff Size: Adult   Pulse: 76   Resp: 18   Temp: (!) 97 3 °F (36 3 °C)   TempSrc: Tympanic   Weight: (!) 137 kg (303 lb)   Height: 5' 10" (1 778 m)       Patient Active Problem List   Diagnosis    [facility]    Hypertension    Hyperlipidemia    Status post revision of total replacement of right knee    Varicose veins of bilateral lower extremities with pain    Intervertebral disc disorders with radiculopathy, lumbar region    Chronic pain of right knee    Pes anserine bursitis    Acute osteomyelitis of toe of left foot (HCC)    Acquired deformity of left foot    Hammer toe of left foot    Diabetic ulcer of toe of left foot associated with type 2 diabetes mellitus, with necrosis of bone (HCC)    Cellulitis of second toe of left foot    Left foot pain    Diabetic polyneuropathy associated with type 2 diabetes mellitus (HCC)    Acute osteomyelitis of toe, left (HCC)    Acute osteomyelitis of left ankle or foot (HCC)    Tinea pedis of both feet       Past Surgical History:   Procedure Laterality Date    CARPAL TUNNEL RELEASE Left     COLONOSCOPY      CORRECTION HAMMER TOE      Left foot 2nd toe    CYSTOSCOPY      HERNIA REPAIR Left     inguinal    JOINT REPLACEMENT      right knee revision, infection total of 4 surgeries     JOINT REPLACEMENT      left knee     KIDNEY STONE SURGERY      -large 27 mm stone-took x3 surgeries to resolve    FL AMPUTATION TOE,I-P JT Left 12/14/2018    Procedure: AMPUTATION TOE;  Surgeon: Sangeeta Mansfield DPM;  [facility];  Service: Podiatry    AR COLONOSCOPY FLX DX W/COLLJ SPEC WHEN PFRMD N/A 2/9/2018    Procedure: EGD AND COLONOSCOPY;  Surgeon: Lupe Vanegas MD;  [facility]; Service: Gastroenterology    AR ENDOVENOUS LASER, 1ST VEIN Left 1/4/2019    Procedure: ENDOVASCULAR LASER THERAPY (EVLT); Surgeon: Clara Burger DO;  [facility];  Service: Vascular    AR REVISE KNEE JOINT REPLACE,1 PART Right 3/20/2017    Procedure: REVISION TOTAL KNEE ARTHROPLASTY INCLUDING FEMORAL STEM REVISION;  Surgeon: Melissa Patel MD;  [facility];  Service: Orthopedics    AR REVISE KNEE JOINT REPLACE,ALL PARTS Right 4/6/2016    Procedure: REVISION TOTAL KNEE ARTHROPLASTY INCLUDING FEMORAL AND TIBIAL COMPONENTS;  Surgeon: Melissa Patel MD;  [facility];  Service: Orthopedics    TONSILLECTOMY      ULNAR TUNNEL RELEASE      WISDOM TOOTH EXTRACTION         Family History   Problem Relation Age of Onset    Diabetes Mother     Hypertension Mother     Hypertension Father     No Known Problems Sister     No Known Problems Son     No Known Problems Son        Social History     Social History    Marital status:      Spouse name: N/A    Number of children: N/A    Years of education: N/A     Occupational History    Not on file       Social History Main Topics    Smoking status: Former Smoker     Quit date: 1975    Smokeless tobacco: Never Used    Alcohol use Yes      Comment: 2-3 drinks per night    Drug use: No    Sexual activity: Yes     Other Topics Concern    Not on file     Social History Narrative    No narrative on file       Allergies   Allergen Reactions    Bee Venom      Uses Epi-pen    Doxycycline Photosensitivity         Current Outpatient Prescriptions:     allopurinol (ZYLOPRIM) 100 mg tablet, Take 100 mg by mouth every morning  , Disp: , Rfl:     amLODIPine (NORVASC) 5 mg tablet, Take 5 mg by mouth 2 (two) times a day  , Disp: , Rfl:     atorvastatin (LIPITOR) 40 mg tablet, Take 40 mg by mouth daily at bedtime, Disp: , Rfl:     cholecalciferol (VITAMIN D3) 1,000 units tablet, Take 1,000 Units by mouth daily at bedtime  , Disp: , Rfl:     ciclopirox (LOPROX) 0 77 % cream, Apply topically 2 (two) times a day for 20 days, Disp: 45 g, Rfl: 2    Dulaglutide (TRULICITY) 1 5 QD/0 3PU SOPN, Inject 1 5 mg under the skin once a week  , Disp: , Rfl:     Empagliflozin (JARDIANCE) 10 MG TABS, Take 10 mg by mouth every morning  , Disp: , Rfl:     fenofibrate (TRIGLIDE) 160 MG tablet, Take 160 mg by mouth every morning, Disp: , Rfl:     labetalol (NORMODYNE) 100 mg tablet, Take 100 mg by mouth 2 (two) times a day, Disp: , Rfl:     lisinopril (ZESTRIL) 40 mg tablet, Take 40 mg by mouth every morning  , Disp: , Rfl:     metFORMIN (GLUCOPHAGE) 1000 MG tablet, Take 1,000 mg by mouth 2 (two) times a day with meals  , Disp: , Rfl:     Multiple Vitamin (MULTIVITAMIN) tablet, Take 1 tablet by mouth every morning  , Disp: , Rfl:     oxyCODONE-acetaminophen (PERCOCET) 5-325 mg per tablet, Take 1 tablet by mouth every 4 (four) hours as needed for moderate pain for up to 10 days Max Daily Amount: 6 tablets, Disp: 25 tablet, Rfl: 0

## 2019-01-07 NOTE — LETTER
January 7, 2019     Lacey Le DO  [address] [facility]  [address]    Patient: Chaya Rodriguez   YOB: 1960   Date of Visit: 1/7/2019       Dear Dr Marixa Pinedo: Thank you for referring Chaya Rodriguez to me for evaluation  Below are my notes for this consultation  If you have questions, please do not hesitate to call me  I look forward to following your patient along with you  Sincerely,        Frankey Gum, CRNP        CC: No Recipients  Frankey Gum, CRNP  1/7/2019  9:43 AM  Sign at close encounter  Assessment/Plan:    Varicose veins of bilateral lower extremities with pain  -Symptomatic varicose veins bilateral lower extremities  -s/p L GSV EVLT and stab phlebectomies x14 on 1/4/19 by Dr Say Mayfield  -Doing well  Advance activities as tolerated  -Routine postop duplex scheduled for 1/10  Denies any CP/SOB  -Return to daily use of compression stockings as soon as he is comfortably able to do so  -Followup in 4 weeks with Dr Say Mayfield for recheck and to discuss treatment of right leg       Diagnoses and all orders for this visit:    Varicose veins of bilateral lower extremities with pain          Subjective:      Patient ID: Chaya Rodriguez is a 63 y o  male  Pt is here for a post op dressing removal of his EVLT on 1/4/19 by Dr Say Mayfield  Surgical site is slightly discolored no active bleeding  Sites are clean and dry  Pt is currently taking Lipitor 40 mg  Symptomatic varicose veins to bilateral lower extremities  Status post left great saphenous vein EVLT and stab phlebectomies x14 on 1/4/2018 by Dr Say Mayfield  Denies any chest pain/shortness of breath  Routine postop duplex scheduled for 1/10/2019  Ecchymosis to the left thigh  Stab sites healing as expected  Continues with pain/swelling to the RLE            The following portions of the patient's history were reviewed and updated as appropriate: allergies, current medications, past family history, past medical history, past social history, past surgical history and problem list     Review of Systems   Constitutional: Negative  HENT: Negative  Eyes: Negative  Respiratory: Negative  Cardiovascular: Negative  Gastrointestinal: Negative  Endocrine: Negative  Genitourinary: Negative  Musculoskeletal: Negative  Allergic/Immunologic: Negative  Neurological: Negative  Hematological: Negative  Psychiatric/Behavioral: Negative  Objective:     Physical Exam   Constitutional: He is oriented to person, place, and time  No distress  Cardiovascular:   Pulses:       Dorsalis pedis pulses are 2+ on the left side  Pulmonary/Chest: Effort normal and breath sounds normal    Musculoskeletal: He exhibits no edema  Neurological: He is alert and oriented to person, place, and time  Skin:   Ecchymosis to the left thigh  Stab sites healing as expected           Vitals:    01/07/19 0905   BP: 142/80   BP Location: Left arm   Patient Position: Sitting   Cuff Size: Adult   Pulse: 76   Resp: 18   Temp: (!) 97 3 °F (36 3 °C)   TempSrc: Tympanic   Weight: (!) 137 kg (303 lb)   Height: 5' 10" (1 778 m)       Patient Active Problem List   Diagnosis    [facility]    Hypertension    Hyperlipidemia    Status post revision of total replacement of right knee    Varicose veins of bilateral lower extremities with pain    Intervertebral disc disorders with radiculopathy, lumbar region    Chronic pain of right knee    Pes anserine bursitis    Acute osteomyelitis of toe of left foot (HCC)    Acquired deformity of left foot    Hammer toe of left foot    Diabetic ulcer of toe of left foot associated with type 2 diabetes mellitus, with necrosis of bone (HCC)    Cellulitis of second toe of left foot    Left foot pain    Diabetic polyneuropathy associated with type 2 diabetes mellitus (HCC)    Acute osteomyelitis of toe, left (HCC)    Acute osteomyelitis of left ankle or foot ([facility] )    Tinea pedis of both feet       Past Surgical History:   Procedure Laterality Date    CARPAL TUNNEL RELEASE Left     COLONOSCOPY      CORRECTION HAMMER TOE      Left foot 2nd toe    CYSTOSCOPY      HERNIA REPAIR Left     inguinal    JOINT REPLACEMENT      right knee revision, infection total of 4 surgeries     JOINT REPLACEMENT      left knee     KIDNEY STONE SURGERY      -large 27 mm stone-took x3 surgeries to resolve    NH AMPUTATION TOE,I-P JT Left 12/14/2018    Procedure: AMPUTATION TOE;  Surgeon: Michael Hawley DPM;  Location: [facility];  Service: Podiatry    NH COLONOSCOPY FLX DX W/COLLJ SPEC WHEN PFRMD N/A 2/9/2018    Procedure: EGD AND COLONOSCOPY;  Surgeon: Leo Lyons MD;  [facility]; Service: Gastroenterology    NH ENDOVENOUS LASER, 1ST VEIN Left 1/4/2019    Procedure: ENDOVASCULAR LASER THERAPY (EVLT); Surgeon: Marixa Dunn DO;  [facility];  Service: Vascular    NH REVISE KNEE JOINT REPLACE,1 PART Right 3/20/2017    Procedure: REVISION TOTAL KNEE ARTHROPLASTY INCLUDING FEMORAL STEM REVISION;  Surgeon: Chas Klein MD;  [facility];  Service: Orthopedics    NH REVISE KNEE JOINT REPLACE,ALL PARTS Right 4/6/2016    Procedure: REVISION TOTAL KNEE ARTHROPLASTY INCLUDING FEMORAL AND TIBIAL COMPONENTS;  Surgeon: Chas Klein MD;  [facility];  Service: Orthopedics    TONSILLECTOMY      ULNAR TUNNEL RELEASE      WISDOM TOOTH EXTRACTION         Family History   Problem Relation Age of Onset    Diabetes Mother     Hypertension Mother     Hypertension Father     No Known Problems Sister     No Known Problems Son     No Known Problems Son        Social History     Social History    Marital status:      Spouse name: N/A    Number of children: N/A    Years of education: N/A     Occupational History    Not on file       Social History Main Topics    Smoking status: Former Smoker     Quit date: 1975    Smokeless tobacco: Never Used    Alcohol use Yes      Comment: 2-3 drinks per night    Drug use: No    Sexual activity: Yes     Other Topics Concern    Not on file     Social History Narrative    No narrative on file       Allergies   Allergen Reactions    Bee Venom      Uses Epi-pen    Doxycycline Photosensitivity         Current Outpatient Prescriptions:     allopurinol (ZYLOPRIM) 100 mg tablet, Take 100 mg by mouth every morning  , Disp: , Rfl:     amLODIPine (NORVASC) 5 mg tablet, Take 5 mg by mouth 2 (two) times a day  , Disp: , Rfl:     atorvastatin (LIPITOR) 40 mg tablet, Take 40 mg by mouth daily at bedtime, Disp: , Rfl:     cholecalciferol (VITAMIN D3) 1,000 units tablet, Take 1,000 Units by mouth daily at bedtime  , Disp: , Rfl:     ciclopirox (LOPROX) 0 77 % cream, Apply topically 2 (two) times a day for 20 days, Disp: 45 g, Rfl: 2    Dulaglutide (TRULICITY) 1 5 PJ/3 9LW SOPN, Inject 1 5 mg under the skin once a week  , Disp: , Rfl:     Empagliflozin (JARDIANCE) 10 MG TABS, Take 10 mg by mouth every morning  , Disp: , Rfl:     fenofibrate (TRIGLIDE) 160 MG tablet, Take 160 mg by mouth every morning, Disp: , Rfl:     labetalol (NORMODYNE) 100 mg tablet, Take 100 mg by mouth 2 (two) times a day, Disp: , Rfl:     lisinopril (ZESTRIL) 40 mg tablet, Take 40 mg by mouth every morning  , Disp: , Rfl:     metFORMIN (GLUCOPHAGE) 1000 MG tablet, Take 1,000 mg by mouth 2 (two) times a day with meals  , Disp: , Rfl:     Multiple Vitamin (MULTIVITAMIN) tablet, Take 1 tablet by mouth every morning  , Disp: , Rfl:     oxyCODONE-acetaminophen (PERCOCET) 5-325 mg per tablet, Take 1 tablet by mouth every 4 (four) hours as needed for moderate pain for up to 10 days Max Daily Amount: 6 tablets, Disp: 25 tablet, Rfl: 0

## 2019-01-07 NOTE — OP NOTE
OPERATIVE REPORT  PATIENT NAME: Era Marie    :  1960  MRN: [id number]  Pt [facility]    SURGERY DATE: 2019    Surgeon(s) and Role:     * Mary Mayo DO - Primary    Preop Diagnosis:  Varicose veins of bilateral lower extremities with pain [I83 813]    Post-Op Diagnosis Codes: * Varicose veins of bilateral lower extremities with pain [I83 813]    Procedure(s) (LRB):  ENDOVASCULAR LASER THERAPY (EVLT) (Left)    Specimen(s):  * No specimens in log *    IV fluids:  800 mL  Estimated Blood Loss:   Minimal    Drains:       Anesthesia Type:   General    Operative Indications:  Varicose veins of bilateral lower extremities with pain [I83 813]  Toro Ceron is a 45-year-old [employer] employee who presented to the office with complaints of painful left lower extremity varicosities  Symptoms persisted despite compression therapy  He underwent a venous reflux study which was remarkable for superficial venous incompetence of the greater saphenous vein  Left greater saphenous vein EVLT and stab phlebectomies were recommended  The procedure, risks, benefits, alternatives and anticipated postop course were discussed in detail  All questions were answered to satisfaction  Patient was agreeable to proceed  Written consent was obtained the patient now brought to the [facility] for the above-mentioned procedure  Operative Findings:  Successful closure of left greater saphenous vein following laser ablation  Post ablation left common femoral vein is easily compressible and free of thrombus  Total stabs:  14  4C/856G/653M    Complications:   None    Procedure and Technique:  The patient was properly identified in the preop holding area  Patient's name, laterality, and nature procedure confirmed  The operative site as well as the truncal varicosities were marked on the skin    Patient was brought to the operating room where he was positioned supine on the OR table  After adequate induction general anesthesia the patient was intubated without difficulty  The left lower extremity was circumferentially prepped and draped in usual sterile fashion  A formal time-out was performed and all were in agreement  The ultrasound was brought onto the field   The greater saphenous vein was marked from the knee to the groin using the ultrasound  A micropuncture needle was next used to access the left greater saphenous vein  An 018 wire was advanced through the needle  The needle was removed over the wire and a micro sheath inserted  A J wire was next advanced into the external iliac vein  The micro sheath was next exchanged out for the laser sheath over the wire  The inner dilator and wire were removed  The laser fiber was advanced through sheath  Under ultrasound guidance the tip of the laser was positioned approximately 3 cm from the saphenofemoral junction  Nancy venous/the mass and anesthesia was next infiltrated along the greater saphenous vein  Following which the laser was activated and slowly withdrawn over 138 sec at 7 w  Ultrasound confirmed successful closure of the left greater saphenous vein with left common femoral vein easily compressible and free of thrombus  We now turned attention to the bulky varicosities along the left medial thigh  Stab incisions were carried out followed by avulsion of the varicosities times 14  Manual pressure was held for hemostasis  The incisions were reapproximated using Steri-Strips  The leg was next dressed using 4 x 4 gauze, Kerlix, Ace wrap, and Coban from the foot up to the proximal thigh  All needles, instruments, and sponge counts reported correct  Patient tolerated procedure well  He was awakened, extubated and transferred to recovery room in stable condition       I was present for the entire procedure    Patient Disposition:  PACU     SIGNATURE: Katherine Clemons DO  DATE: January 7, 2019  TIME: 10:22 AM

## 2019-01-07 NOTE — PATIENT INSTRUCTIONS
Symptomatic varicose veins to bilateral lower extremity status post left leg venous ablation and phlebectomies by Dr Say Mayfield on 1/4/2019  -your left leg looks good  Advance your activities as tolerated, avoid heavy lifting greater than 15-20 lb for the next week  Listen to your body, start low in go, if you have pain rest as needed     -your routine postop study is scheduled for Thursday at 9:00 a m  here  If this is abnormal we will notify you, otherwise no news is good news  -return to daily use of your compression stockings as soon as you are comfortably able to do so    Elevation can help manage any increased swelling  -follow-up in 4 weeks with Dr Glenda munguia to discuss treatment of the right leg

## 2019-01-07 NOTE — ASSESSMENT & PLAN NOTE
-Symptomatic varicose veins bilateral lower extremities  -s/p L GSV EVLT and stab phlebectomies x14 on 1/4/19 by Dr Robert Haywood  -Doing well  Advance activities as tolerated  -Routine postop duplex scheduled for 1/10    Denies any CP/SOB  -Return to daily use of compression stockings as soon as he is comfortably able to do so  -Followup in 4 weeks with Dr Robert Haywood for recheck and to discuss treatment of right leg

## 2019-01-10 ENCOUNTER — HOSPITAL ENCOUNTER (OUTPATIENT)
Dept: NON INVASIVE DIAGNOSTICS | Facility: CLINIC | Age: 59
Discharge: HOME | End: 2019-01-10
Payer: COMMERCIAL

## 2019-01-10 DIAGNOSIS — I83.813 VARICOSE VEINS OF BOTH LOWER EXTREMITIES WITH PAIN: ICD-10-CM

## 2019-01-10 PROCEDURE — 93971 EXTREMITY STUDY: CPT | Performed by: SURGERY

## 2019-01-10 PROCEDURE — 93971 EXTREMITY STUDY: CPT

## 2019-01-11 ENCOUNTER — HOSPITAL ENCOUNTER (OUTPATIENT)
Dept: RADIOLOGY | Facility: CLINIC | Age: 59
Discharge: HOME | End: 2019-01-11
Attending: ANESTHESIOLOGY | Admitting: ANESTHESIOLOGY
Payer: COMMERCIAL

## 2019-01-11 VITALS
HEART RATE: 78 BPM | OXYGEN SATURATION: 98 % | RESPIRATION RATE: 18 BRPM | TEMPERATURE: 98.1 F | SYSTOLIC BLOOD PRESSURE: 126 MMHG | DIASTOLIC BLOOD PRESSURE: 79 MMHG

## 2019-01-11 DIAGNOSIS — M51.16 INTERVERTEBRAL DISC DISORDER WITH RADICULOPATHY OF LUMBAR REGION: ICD-10-CM

## 2019-01-11 PROCEDURE — 64483 NJX AA&/STRD TFRM EPI L/S 1: CPT | Performed by: ANESTHESIOLOGY

## 2019-01-11 PROCEDURE — 64484 NJX AA&/STRD TFRM EPI L/S EA: CPT | Performed by: ANESTHESIOLOGY

## 2019-01-11 RX ORDER — METHYLPREDNISOLONE ACETATE 80 MG/ML
80 INJECTION, SUSPENSION INTRA-ARTICULAR; INTRALESIONAL; INTRAMUSCULAR; PARENTERAL; SOFT TISSUE ONCE
Status: COMPLETED | OUTPATIENT
Start: 2019-01-11 | End: 2019-01-11

## 2019-01-11 RX ORDER — BUPIVACAINE HCL/PF 2.5 MG/ML
30 VIAL (ML) INJECTION ONCE
Status: COMPLETED | OUTPATIENT
Start: 2019-01-11 | End: 2019-01-11

## 2019-01-11 RX ORDER — 0.9 % SODIUM CHLORIDE 0.9 %
10 VIAL (ML) INJECTION ONCE
Status: COMPLETED | OUTPATIENT
Start: 2019-01-11 | End: 2019-01-11

## 2019-01-11 RX ADMIN — Medication 2 ML: at 11:15

## 2019-01-11 RX ADMIN — SODIUM CHLORIDE 5 ML: 9 INJECTION, SOLUTION INTRAMUSCULAR; INTRAVENOUS; SUBCUTANEOUS at 11:11

## 2019-01-11 RX ADMIN — METHYLPREDNISOLONE ACETATE 80 MG: 80 INJECTION, SUSPENSION INTRA-ARTICULAR; INTRALESIONAL; INTRAMUSCULAR; PARENTERAL; SOFT TISSUE at 11:15

## 2019-01-11 RX ADMIN — IOHEXOL 1 ML: 300 INJECTION, SOLUTION INTRAVENOUS at 11:15

## 2019-01-11 RX ADMIN — Medication 5 ML: at 11:11

## 2019-01-11 NOTE — DISCHARGE INSTR - LAB
Epidural Steroid Injection   WHAT YOU NEED TO KNOW:   An epidural steroid injection (DAVIE) is a procedure to inject steroid medicine into the epidural space  The epidural space is between your spinal cord and vertebrae  Steroids reduce inflammation and fluid buildup in your spine that may be causing pain  You may be given pain medicine along with the steroids  ACTIVITY  · Do not drive or operate machinery today  · No strenuous activity today - bending, lifting, etc   · You may resume normal activites starting tomorrow - start slowly and as tolerated  · You may shower today, but no tub baths or hot tubs  · You may have numbness for several hours from the local anesthetic  Please use caution and common sense, especially with weight-bearing activities  CARE OF THE INJECTION SITE  · If you have soreness or pain, apply ice to the area today (20 minutes on/20 minutes off)  · Starting tomorrow, you may use warm, moist heat or ice if needed  · You may have an increase or change in your discomfort for 36-48 hours after your treatment  · Apply ice and continue with any pain medication you have been prescribed  · Notify the [facility] if you have any of the following: redness, drainage, swelling, headache, stiff neck or fever above 100°F     SPECIAL INSTRUCTIONS  · Our office will contact you in approximately 7 days for a progress report  MEDICATIONS  · Continue to take all routine medications  · Our office may have instructed you to hold some medications  If you have a problem specifically related to your procedure, please call our office at [phone]  Problems not related to your procedure should be directed to your primary care physician

## 2019-01-18 ENCOUNTER — TELEPHONE (OUTPATIENT)
Dept: PAIN MEDICINE | Facility: CLINIC | Age: 59
End: 2019-01-18

## 2019-01-22 ENCOUNTER — OFFICE VISIT (OUTPATIENT)
Dept: OBGYN CLINIC | Facility: CLINIC | Age: 59
End: 2019-01-22
Payer: COMMERCIAL

## 2019-01-22 VITALS
SYSTOLIC BLOOD PRESSURE: 169 MMHG | HEART RATE: 71 BPM | HEIGHT: 70 IN | BODY MASS INDEX: 41.52 KG/M2 | WEIGHT: 290 LBS | DIASTOLIC BLOOD PRESSURE: 84 MMHG

## 2019-01-22 DIAGNOSIS — M75.22 BICEPS TENDINITIS OF LEFT UPPER EXTREMITY: Primary | ICD-10-CM

## 2019-01-22 PROCEDURE — 99213 OFFICE O/P EST LOW 20 MIN: CPT | Performed by: ORTHOPAEDIC SURGERY

## 2019-01-22 NOTE — LETTER
January 22, 2019     Patient: Cyn Zapata   YOB: 1960   Date of Visit: 1/22/2019       To Whom it May Concern:    Cyn Zapata is under my professional care  He was seen in my office on 1/22/2019  He is to remain on current light duty restrictions of no lifting greater then 20 pounds  If you have any questions or concerns, please don't hesitate to call           Sincerely,          Dillon Espinoza MD        CC: No Recipients

## 2019-01-30 ENCOUNTER — OFFICE VISIT (OUTPATIENT)
Dept: UROLOGY | Facility: CLINIC | Age: 59
End: 2019-01-30
Payer: COMMERCIAL

## 2019-01-30 VITALS
BODY MASS INDEX: 42.66 KG/M2 | DIASTOLIC BLOOD PRESSURE: 84 MMHG | HEART RATE: 66 BPM | SYSTOLIC BLOOD PRESSURE: 142 MMHG | HEIGHT: 70 IN | WEIGHT: 298 LBS

## 2019-01-30 DIAGNOSIS — N40.0 BENIGN PROSTATIC HYPERPLASIA WITHOUT LOWER URINARY TRACT SYMPTOMS: Primary | ICD-10-CM

## 2019-01-30 PROCEDURE — 99213 OFFICE O/P EST LOW 20 MIN: CPT | Performed by: PHYSICIAN ASSISTANT

## 2019-01-30 NOTE — PROGRESS NOTES
UROLOGY PROGRESS NOTE   Patient Identifiers: Darryn Kaur (MRN [id number])  Date of Service: 1/30/2019    Subjective:      69-year-old man history of BPH  PSA 0 5  Urine is clear  AUA index score is 6  Uses sildenafil troches his p r n  Maureen Joshi Gets up once a night  No other complaints  Patient has  no complaints  Objective:     VITALS:    Vitals:    01/30/19 1532   BP: 142/84   Pulse: 66     AUA SYMPTOM SCORE      Most Recent Value   AUA SYMPTOM SCORE   How often have you had a sensation of not emptying your bladder completely after you finished urinating? 0   How often have you had to urinate again less than two hours after you finished urinating? 3   How often have you found you stopped and started again several times when you urinate?  0   How often have you found it difficult to postpone urination? 2   How often have you had a weak urinary stream?  0   How often have you had to push or strain to begin urination? 0   How many times did you most typically get up to urinate from the time you went to bed at night until the time you got up in the morning?   1   Quality of Life: If you were to spend the rest of your life with your urinary condition just the way it is now, how would you feel about that?  3   AUA SYMPTOM SCORE  6            LABS:  Lab Results   Component Value Date    HGB 14 9 12/12/2018    HCT 45 3 12/12/2018    WBC 5 90 12/12/2018     12/12/2018   ]    Lab Results   Component Value Date     10/10/2015    K 3 9 01/02/2019     01/02/2019    CO2 26 01/02/2019    BUN 15 01/02/2019    CREATININE 1 06 01/02/2019    CALCIUM 9 5 01/02/2019    GLUCOSE 120 10/10/2015   ]        INPATIENT MEDS:    Current Outpatient Prescriptions:     allopurinol (ZYLOPRIM) 100 mg tablet, Take 100 mg by mouth every morning  , Disp: , Rfl:     amLODIPine (NORVASC) 5 mg tablet, Take 5 mg by mouth 2 (two) times a day  , Disp: , Rfl:     atorvastatin (LIPITOR) 40 mg tablet, Take 40 mg by mouth daily at bedtime, Disp: , Rfl:     cholecalciferol (VITAMIN D3) 1,000 units tablet, Take 1,000 Units by mouth daily at bedtime  , Disp: , Rfl:     Dulaglutide (TRULICITY) 1 5 YY/1 2WI SOPN, Inject 1 5 mg under the skin once a week  , Disp: , Rfl:     Empagliflozin (JARDIANCE) 10 MG TABS, Take 10 mg by mouth every morning  , Disp: , Rfl:     fenofibrate (TRIGLIDE) 160 MG tablet, Take 160 mg by mouth every morning, Disp: , Rfl:     labetalol (NORMODYNE) 100 mg tablet, Take 100 mg by mouth 2 (two) times a day, Disp: , Rfl:     lisinopril (ZESTRIL) 40 mg tablet, Take 40 mg by mouth every morning  , Disp: , Rfl:     metFORMIN (GLUCOPHAGE) 1000 MG tablet, Take 1,000 mg by mouth 2 (two) times a day with meals  , Disp: , Rfl:     Multiple Vitamin (MULTIVITAMIN) tablet, Take 1 tablet by mouth every morning  , Disp: , Rfl:     ciclopirox (LOPROX) 0 77 % cream, Apply topically 2 (two) times a day for 20 days, Disp: 45 g, Rfl: 2      Physical Exam:   /84 (BP Location: Right arm, Patient Position: Sitting, Cuff Size: Adult)   Pulse 66   Ht 5' 10" (1 778 m)   Wt 135 kg (298 lb)   BMI 42 76 kg/m²   GEN: no acute distress    RESP: breathing comfortably with no accessory muscle use    ABD: soft, non-tender, non-distended   INCISION:    EXT: no significant peripheral edema   (Male): Penis circumcised, phallus normal, meatus patent  Testicles descended into scrotum bilaterally without masses nor tenderness  No inguinal hernias bilaterally  CHRISTEL: Prostate is enlarged at 35 grams  The prostate is not boggy  The prostate is not tender  No nodules noted      RADIOLOGY:     None     Assessment:    1   BPH     Plan:   - follow-up 1 year PSA prior to visit  -  -  -

## 2019-02-05 ENCOUNTER — EVALUATION (OUTPATIENT)
Dept: PHYSICAL THERAPY | Facility: CLINIC | Age: 59
End: 2019-02-05
Payer: COMMERCIAL

## 2019-02-05 DIAGNOSIS — M54.12 CERVICAL RADICULOPATHY: Primary | ICD-10-CM

## 2019-02-05 DIAGNOSIS — M75.22 BICEPS TENDINITIS OF LEFT UPPER EXTREMITY: Primary | ICD-10-CM

## 2019-02-05 DIAGNOSIS — M54.12 CERVICAL RADICULOPATHY: ICD-10-CM

## 2019-02-05 PROCEDURE — 97110 THERAPEUTIC EXERCISES: CPT | Performed by: PHYSICAL THERAPIST

## 2019-02-05 PROCEDURE — 97162 PT EVAL MOD COMPLEX 30 MIN: CPT | Performed by: PHYSICAL THERAPIST

## 2019-02-05 NOTE — PROGRESS NOTES
PT Evaluation     Today's date: 2019  Patient name: Era Marie  : 1960  MRN: [id number]  Referring provider: Christophe Alegria MD  Dx:   Encounter Diagnosis     ICD-10-CM    1  Biceps tendinitis of left upper extremity M75 22 Ambulatory referral to Physical Therapy                  Assessment  Assessment details: Pt presents with signs and symptoms synonymous of admitting diagnosis of L elbow distal bicep tendonitis, as well as possible C4-5 C5-6 Cervical Radiculopathy that responded to Flexion bias and traction intervention  Pt presents with pain, decreased strength, decreased range, decreased joint mobility, flexibility, as well as tolerance to activity and postural awareness  Pt would benefit skilled PT intervention in order to address these impairments in order to be able to perform all desired activities with minimal to nil symptom exacerbation  Pt seen his Cervical Screen as Direct Access and primary PT will reach out to referring physician to proceed with possible CS Rx for PT in tandem with Elbow  If pt does not find improvement in CS within 30 days will refer out appropriately to Pain management as he is an established pt for LS  Thank you very much for this referral      Impairments: abnormal or restricted ROM, lacks appropriate home exercise program, pain with function and poor posture   Understanding of Dx/Px/POC: good   Prognosis: good    Goals  STG 4 Weeks:  Decrease pain at worst to 4/10, 50% centralized  Improve range to CS L rot by 5, Wrist range flex/ext by 5  Improve strength to DNF 20", L UE 4-   Independent with HEP  LTG 8 Weeks:  Decrease pain at worst to 2/10 90% centralized  Improve range to CS rot equal to R  Wrist range by 10 from IE  Improve strength to DNF 30" L UE to 4 or better     Able to perform all desired activities with minimal to nil symptom exacerbation      Plan  Patient would benefit from: skilled physical therapy  Planned modality interventions: cryotherapy and thermotherapy: hydrocollator packs  Planned therapy interventions: joint mobilization, manual therapy, functional ROM exercises, graded exercise, graded motor, home exercise program, therapeutic training, therapeutic exercise, therapeutic activities, stretching, strengthening, patient education, postural training, neuromuscular re-education and flexibility  Frequency: 2x week  Duration in weeks: 8  Treatment plan discussed with: patient        Subjective Evaluation    History of Present Illness  Date of onset: 2019  Mechanism of injury: Pt is a 63 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 states that 4-5 months ago 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 and he felt an immediate sharp twinge in his L elbow  He states he had immediate pain that was a 10/10  He states that he thought he just pulled something and it would go away and now 4-5 months later it is still present  He reports that it is still bothering him up to a  with certain lifts he performs and when he sleeps he feels as though the neck and shoulder as well as elbow on the L side will cause him grief as well  Pt reports the symptoms will go down to his 3,4,5 digits  Pt reports that he met with Dr Deb Granados last week after having an MRI which primary focus was a partially/internal torn distal bicep tendon  Pt reports that he denies medication and was encouraged to try PT intervention and thus presents today  Pt reports that his goals at this time are to be able to alleviate pain and return to performing all of his normal recreational and vocational demands 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, 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      Quality of life: good    Pain  Current pain ratin  At best pain ratin  At worst pain ratin  Quality: burning and sharp  Relieving factors: rest  Aggravating factors: overhead activity (certain twisting lift)  Progression: no change      Diagnostic Tests  MRI studies: abnormal  Patient Goals  Patient goals for therapy: decreased pain, increased motion, increased strength and return to sport/leisure activities          Objective     Active Range of Motion     Left Elbow   Flexion: 140 degrees   Extension: -3 degrees   Forearm supination: 75 degrees   Forearm pronation: 70 degrees     Right Elbow   Flexion: 140 degrees   Extension: -3 degrees   Forearm supination: 90 degrees   Forearm pronation: 70 degrees     Additional Active Range of Motion Details  Forward head, rounded shoulders  CS Screen repeated   Flex 50 mild st   Ext 30 to L shoulder  SB L 30 tightness sharpness in shoulder  SB R 30  Mild symptoms to shoulder and   Rot L 55 R 70  B/L Sensation intact to light touch C3,4,5,6,7,8,T1,T2  Wrist ROM  L flex/ext 60-0-60  R flex/ext 80-0-60  Strength  Shoulder (ROM WFL)  L Flex 5 Abd 4- ER 5 IR 5  R Flex 5 Abd 5 ER 5 IR 5  Elbow  L Flex 5 Ext 5 Sup 4-* Pro 5  R Flex 5 Ext 5 Suo/Pro 5   Wrist   L Flex 3+ *Ext 5  R Flex 5 Ext 5   L 123 3# R 123 4#  Joint Mobility CS grade C2-4 C2, C4-7 C1  Palpation: Pain with L C4-5-6  STs (-) Sharp Sanya, Alar Lig intact   + Ulnar NLTT  On L + Distraction  L (-) Tinel at median cubital/bicep fossa, mild inflammation at distal biceps  (-) Tinel at clavicle  R  All (-)  DNF 10" before form failure             Precautions: DA Cervical, DM2, HTN  Daily Treatment Diary       Manual 2/5            Distraction 10 min            IASTM to Distal L biceps tendon L                                                    Exercise Diary             Scap Add (DA) 10" x 10            Supine DNF (DA) 10" x 10            CS flexion (DA) 6" x 20            Wrist Flex/Ext ST 20" x 4            Elbow AROM Sup/Pro  2#           Wrist AROM Flex Ext Ecc  2#           Scaption  2#           Tband Row + Ext  RTB/GTB           Tband ER postural  RTB                                     Seated DNF (DA)             Supine DNF + CS Flex (DA) Modalities             HP/CP to CS (DA) prn             HP/CP to Elbow prn

## 2019-02-07 ENCOUNTER — OFFICE VISIT (OUTPATIENT)
Dept: VASCULAR SURGERY | Facility: CLINIC | Age: 59
End: 2019-02-07
Payer: COMMERCIAL

## 2019-02-07 ENCOUNTER — OFFICE VISIT (OUTPATIENT)
Dept: PHYSICAL THERAPY | Facility: CLINIC | Age: 59
End: 2019-02-07
Payer: COMMERCIAL

## 2019-02-07 VITALS
HEART RATE: 62 BPM | DIASTOLIC BLOOD PRESSURE: 80 MMHG | HEIGHT: 70 IN | BODY MASS INDEX: 41.37 KG/M2 | TEMPERATURE: 97.3 F | WEIGHT: 289 LBS | SYSTOLIC BLOOD PRESSURE: 122 MMHG

## 2019-02-07 DIAGNOSIS — I83.813 VARICOSE VEINS OF BILATERAL LOWER EXTREMITIES WITH PAIN: Primary | ICD-10-CM

## 2019-02-07 DIAGNOSIS — M54.12 CERVICAL RADICULOPATHY: ICD-10-CM

## 2019-02-07 DIAGNOSIS — M75.22 BICEPS TENDINITIS OF LEFT UPPER EXTREMITY: Primary | ICD-10-CM

## 2019-02-07 PROCEDURE — 97140 MANUAL THERAPY 1/> REGIONS: CPT | Performed by: PHYSICAL THERAPIST

## 2019-02-07 PROCEDURE — 99214 OFFICE O/P EST MOD 30 MIN: CPT | Performed by: SURGERY

## 2019-02-07 RX ORDER — CHLORHEXIDINE GLUCONATE 0.12 MG/ML
15 RINSE ORAL EVERY 12 HOURS SCHEDULED
Status: CANCELLED | OUTPATIENT
Start: 2019-02-07

## 2019-02-07 RX ORDER — CEFAZOLIN SODIUM 2 G/50ML
2000 SOLUTION INTRAVENOUS ONCE
Status: CANCELLED | OUTPATIENT
Start: 2019-02-07 | End: 2019-02-07

## 2019-02-07 NOTE — PROGRESS NOTES
Assessment/Plan:    Varicose veins of bilateral lower extremities with pain   Patient returns for routine office visit status post left greater saphenous vein EVLT and stab phlebectomies  He is quite pleased with his results  He continues to complain of painful right lower extremity varicosities despite compression stockings  His previous venous reflux study did  demonstrate superficial venous incompetence  Recommend right greater saphenous vein EVLT and stab phlebectomies  The procedure, risks, benefits, alternatives and anticipated postop course were discussed in detail  All questions were answered to satisfaction  Patient was agreeable to proceed  Written consent was obtained  Diagnoses and all orders for this visit:    Varicose veins of bilateral lower extremities with pain  -     Case request operating room: ENDOVASCULAR LASER THERAPY (EVLT)   And stab phlebectomy; Standing  -     Basic metabolic panel; Future  -     CBC and Platelet; Future  -     Case request operating room: ENDOVASCULAR LASER THERAPY (EVLT)   And stab phlebectomy    Other orders  -     Diet NPO; Sips with meds; Standing  -     Void on call to OR; Standing  -     Insert peripheral IV; Standing  -     chlorhexidine (PERIDEX) 0 12 % oral rinse 15 mL; Swish and spit 15 mL every 12 (twelve) hours   -     ceFAZolin (ANCEF) IVPB (premix) 2,000 mg; Infuse 2,000 mg into a venous catheter once           Subjective:      Patient ID: Gustavo Hoffmann is a 63 y o  male  Patient is s/p left leg EVLT done on 1/4/19 and presents today for 4 week follow up visit  Patient denies any pain and swelling  He states the leg had healed well and he is feeling well  Patient offers no complains or concerns at this time  Kezia Floyd returns to the office for routine 6 week office visit following left greater saphenous vein EVLT and stab phlebectomies  He is quite pleased with his results    He continues to however complain of right lower extremity pain associated with bulky truncal varicosities  His prior venous reflux study did demonstrate superficial venous incompetence  Symptoms have persisted  despite compression  Stockings  He is interested in proceeding forward with intervention for his painful right lower extremity varicosities  The following portions of the patient's history were reviewed and updated as appropriate: allergies, current medications, past family history, past medical history, past social history, past surgical history and problem list     Review of Systems   Constitutional: Negative  HENT: Negative  Eyes: Negative  Respiratory: Negative  Cardiovascular: Negative  Gastrointestinal: Negative  Endocrine: Negative  Genitourinary: Negative  Musculoskeletal: Negative  Skin: Negative  Allergic/Immunologic: Negative  Neurological: Negative  Hematological: Negative  Psychiatric/Behavioral: Negative  I have personally reviewed the ROS entered by MA and agree as documented  Objective:      /80 (BP Location: Right arm, Patient Position: Sitting)   Pulse 62   Temp (!) 97 3 °F (36 3 °C) (Tympanic)   Ht 5' 10" (1 778 m)   Wt 131 kg (289 lb)   BMI 41 47 kg/m²          Physical Exam   Constitutional: He is oriented to person, place, and time  He appears well-developed and well-nourished  No distress  HENT:   Head: Normocephalic and atraumatic  Eyes: Conjunctivae and EOM are normal  No scleral icterus  Neck: Normal range of motion  Neck supple  No tracheal deviation present  Cardiovascular: Normal rate, regular rhythm and normal heart sounds  Pulmonary/Chest: Effort normal and breath sounds normal    Abdominal: Soft  He exhibits no distension  Mass: no appreciable aortic pulstion/aneurysm  There is no tenderness  There is no rebound and no guarding  Musculoskeletal: Normal range of motion  Neurological: He is alert and oriented to person, place, and time     Skin: Skin is warm and dry  Truncal varicosities along the right medial aspect of the distal thigh and calf  Psychiatric: He has a normal mood and affect  His behavior is normal            Lower extremity venous reflux study:  CONCLUSION:     Impression:  RIGHT LIMB:  Deep venous incompetence is noted  The great saphenous vein is incompetent  The great saphenous vein exits the saphenous compartment in the thigh  The small saphenous vein is competent and does not communicate with the  popliteal vein  There is no evidence of incompetent perforators in the thigh or calf  There is no evidence of deep vein thrombosis in the CFV, the proximal PFV, the  femoral vein and the popliteal vein

## 2019-02-07 NOTE — ASSESSMENT & PLAN NOTE
Patient returns for routine office visit status post left greater saphenous vein EVLT and stab phlebectomies  He is quite pleased with his results  He continues to complain of painful right lower extremity varicosities despite compression stockings  His previous venous reflux study did  demonstrate superficial venous incompetence  Recommend right greater saphenous vein EVLT and stab phlebectomies  The procedure, risks, benefits, alternatives and anticipated postop course were discussed in detail  All questions were answered to satisfaction  Patient was agreeable to proceed  Written consent was obtained

## 2019-02-07 NOTE — PROGRESS NOTES
Daily Note     Today's date: 2019  Patient name: Ho Foote  : 1960  MRN: [id number]  Referring provider: Nereida Guzman MD  Dx:   Encounter Diagnosis     ICD-10-CM    1  Biceps tendinitis of left upper extremity M75 22    2  Cervical radiculopathy M54 12                   Subjective: Pt presents today stating the exercises are going well in regards of his neck and he hasn't been pressing hard with the elbow  Elbow is feeling well, neck HEP is going okay, staying compliant  Communication with referring made and in accord for being treatment of CS  Objective: See treatment diary below      Assessment: responded well to outlined activities and sustaining centralization during the process  Continue to progress as able        Precautions: DA Cervical, DM2, HTN  Daily Treatment Diary       Manual            Distraction 10 min 6           DTM to Distal L biceps tendon L  6                                                  Exercise Diary             Scap Add (DA) 10" x 10 10" x 10           Supine DNF (DA) 10" x 10 10" x 10           CS flexion (DA) 6" x 20 6" x 20           Wrist Flex/Ext ST 20" x 4 20" x 4           Elbow AROM Sup/Pro  2# 2 x 10           Wrist AROM Flex Ext Ecc  2# 2 x 10           Scaption  2# 2 x 10           Tband Row + Ext  RTB/GTB           Tband ER postural  RTB                                     Seated DNF (DA)  10" x 10           Supine DNF + CS Flex (DA)  10" x 10                                                                                                                   Modalities             HP/CP to CS (DA) prn  10 min           HP/CP to Elbow prn

## 2019-02-07 NOTE — PATIENT INSTRUCTIONS
Varicose Veins   AMBULATORY CARE:   Varicose veins  are veins that become large, twisted, and swollen  They are common on the back of the calves, knees, and thighs  Varicose veins are caused by valves in your veins that do not work properly  This causes blood to collect and increase pressure in the veins of your legs  The increased pressure causes your veins to stretch, get larger, swell, and twist        Common symptoms include the following: Your symptoms may be worse after you stand or sit for long periods of time  You may have any of the following:  · Blue, purple, or bulging veins in your legs     · Pain, swelling, or muscle cramps in your legs    · Feeling of fatigue or heaviness in your legs    · Cramping in your legs  Seek care immediately if:   · You have a wound that does not heal or is infected  · You have an injury that has broken your skin and caused your varicose veins to bleed  · Your leg is swollen and hard  · You notice that your legs or feet are turning blue or black  · Your leg feels warm, tender, and painful  It may look swollen and red  Contact your healthcare provider if:   · You have pain in your leg that does not go away or gets worse  · You notice sudden large bruising on your legs  · You have a rash on your leg  · Your symptoms keep you from doing your daily activities  · You have questions or concerns about your condition or care  Treatment of varicose veins  aims to decrease symptoms, improve appearance, and prevent further problems  Treatment will depend on which veins are affected and how severe your condition is  You may need procedures to treat or remove your varicose veins  For example, your healthcare provider may inject a solution or use a laser to close the varicose veins  Surgery to remove long veins may also be done  Ask your healthcare provider for more information about procedures used to treat varicose veins    Manage varicose veins:   · Do not sit or stand for long periods of time  This can cause the blood to collect in your legs and make your symptoms worse  Bend or rotate your ankles several times every hour  Walk around for a few minutes every hour to get blood moving in your legs  · Do not cross your legs when you sit  This decreases blood flow to your feet and can make your symptoms worse  · Do not wear tight clothing or shoes  Do not wear high-heeled shoes  Do not wear clothes that are tight around the waist or knees  · Maintain a healthy weight  Being overweight or obese can make your varicose veins worse  Ask your healthcare provider how much you should weigh  Ask him or her to help you create a weight loss plan if you are overweight  · Wear pressure stockings as directed  The stockings are tight and put pressure on your legs  They improve blood flow and help prevent clots  · Elevate your legs  Keep them above the level of your heart for 15 to 30 minutes several times a day  You can also prop the end of your bed up slightly to elevate your legs while you sleep  This will help blood to flow back to your heart  · Get regular exercise  Talk to your healthcare provider about the best exercise plan for you  Exercise can improve blood flow to your legs and feet  Follow up with your healthcare provider as directed:  Write down your questions so you remember to ask them during your visits  © 2017 [employer] Information is for End User's use only and may not be sold, redistributed or otherwise used for commercial purposes  All illustrations and images included in CareNotes® are the copyrighted property of [employer]  or [employer]  The above information is an  only  It is not intended as medical advice for individual conditions or treatments  Talk to your doctor, nurse or pharmacist before following any medical regimen to see if it is safe and effective for you

## 2019-02-13 ENCOUNTER — APPOINTMENT (OUTPATIENT)
Dept: PHYSICAL THERAPY | Facility: CLINIC | Age: 59
End: 2019-02-13
Payer: COMMERCIAL

## 2019-02-14 ENCOUNTER — OFFICE VISIT (OUTPATIENT)
Dept: PHYSICAL THERAPY | Facility: CLINIC | Age: 59
End: 2019-02-14
Payer: COMMERCIAL

## 2019-02-14 DIAGNOSIS — M75.22 BICEPS TENDINITIS OF LEFT UPPER EXTREMITY: Primary | ICD-10-CM

## 2019-02-14 DIAGNOSIS — M54.12 CERVICAL RADICULOPATHY: ICD-10-CM

## 2019-02-14 PROCEDURE — 97110 THERAPEUTIC EXERCISES: CPT | Performed by: PHYSICAL THERAPIST

## 2019-02-14 PROCEDURE — 97140 MANUAL THERAPY 1/> REGIONS: CPT | Performed by: PHYSICAL THERAPIST

## 2019-02-14 PROCEDURE — 97112 NEUROMUSCULAR REEDUCATION: CPT | Performed by: PHYSICAL THERAPIST

## 2019-02-14 NOTE — PROGRESS NOTES
Daily Note     Today's date: 2019  Patient name: Nalini Moreno  : 1960  MRN: [id number]  Referring provider: Zoë Mcneill MD  Dx:   Encounter Diagnosis     ICD-10-CM    1  Biceps tendinitis of left upper extremity M75 22    2  Cervical radiculopathy M54 12                   Subjective: Pt presents today stating the week has been good but over the weekend he had a significant neck pain which caused him a significant amount of grief while twisting his head looking up  He has not had any issues with his elbow  Objective: See treatment diary below      Assessment:  Sustained centralization, improved postural awareness  Consider band work nv as able        Precautions: DA Cervical, DM2, HTN  Daily Treatment Diary       Manual           Distraction 10 min 6 6 min          DTM to Distal L biceps tendon L  6 6 min                                                 Exercise Diary             Scap Add (DA) 10" x 10 10" x 10 10" x 10          Supine DNF (DA) 10" x 10 10" x 10 10" x 10          CS flexion (DA) 6" x 20 6" x 20 6" x 20           Wrist Flex/Ext ST 20" x 4 20" x 4 20" x 4          Elbow AROM Sup/Pro  2# 2 x 10 3# 2 x 10          Wrist AROM Flex Ext Ecc  2# 2 x 10 3# 2 x 10          Scaption  2# 2 x 10 3# 2 x 10          Tband Row + Ext  RTB/GTB           Tband ER postural  RTB                                     Seated DNF (DA)  10" x 10 10" x 10          Supine DNF + CS Flex (DA)                                                                                                                     Modalities             HP/CP to CS (DA) prn  10 min declined          HP/CP to Elbow prn

## 2019-02-18 ENCOUNTER — OFFICE VISIT (OUTPATIENT)
Dept: PHYSICAL THERAPY | Facility: CLINIC | Age: 59
End: 2019-02-18
Payer: COMMERCIAL

## 2019-02-18 DIAGNOSIS — M75.22 BICEPS TENDINITIS OF LEFT UPPER EXTREMITY: Primary | ICD-10-CM

## 2019-02-18 DIAGNOSIS — M54.12 CERVICAL RADICULOPATHY: ICD-10-CM

## 2019-02-18 PROCEDURE — 97110 THERAPEUTIC EXERCISES: CPT | Performed by: PHYSICAL THERAPIST

## 2019-02-18 PROCEDURE — 97140 MANUAL THERAPY 1/> REGIONS: CPT | Performed by: PHYSICAL THERAPIST

## 2019-02-18 NOTE — PROGRESS NOTES
Daily Note     Today's date: 2019  Patient name: Wilton Harp  : 1960  MRN: [id number]  Referring provider: Ally Sim MD  Dx:   Encounter Diagnosis     ICD-10-CM    1  Biceps tendinitis of left upper extremity M75 22    2  Cervical radiculopathy M54 12                   Subjective: Pt presents today stating that he is feeling much better this week, but still having some mild irritation at the base of his neck and some residual irritability as well t/o the arm  Objective: See treatment diary below      Assessment:  Mild sensitivity with DB activity, consider band work n v  If tolerated as pt's symptoms were elevated        Precautions: DA Cervical, DM2, HTN  Daily Treatment Diary       Manual          Distraction 10 min 6 6 min 6 min         DTM to Distal L biceps tendon L  6 6 min 6 min                                                Exercise Diary             Scap Add (DA) 10" x 10 10" x 10 10" x 10 10" x 10         Supine DNF (DA) 10" x 10 10" x 10 10" x 10 10" x 10         CS flexion (DA) 6" x 20 6" x 20 6" x 20  6"x  20         Wrist Flex/Ext ST 20" x 4 20" x 4 20" x 4 20" x 4         Elbow AROM Sup/Pro  2# 2 x 10 3# 2 x 10 4# 2 x 10         Wrist AROM Flex Ext Ecc  2# 2 x 10 3# 2 x 10 4# 2 x 10         Scaption  2# 2 x 10 3# 2 x 10 4# 2 x 10         Tband Row + Ext  RTB/GTB  nv         Tband ER postural  RTB  nv                                   Seated DNF (DA)  10" x 10 10" x 10 10" x 10         Supine DNF + CS Flex (DA)                                                                                                                     Modalities             HP/CP to CS (DA) prn  10 min declined          HP/CP to Elbow prn

## 2019-02-20 ENCOUNTER — APPOINTMENT (OUTPATIENT)
Dept: PHYSICAL THERAPY | Facility: CLINIC | Age: 59
End: 2019-02-20
Payer: COMMERCIAL

## 2019-02-26 ENCOUNTER — OFFICE VISIT (OUTPATIENT)
Dept: PHYSICAL THERAPY | Facility: CLINIC | Age: 59
End: 2019-02-26
Payer: COMMERCIAL

## 2019-02-26 DIAGNOSIS — M54.12 CERVICAL RADICULOPATHY: ICD-10-CM

## 2019-02-26 DIAGNOSIS — M75.22 BICEPS TENDINITIS OF LEFT UPPER EXTREMITY: Primary | ICD-10-CM

## 2019-02-26 PROCEDURE — 97140 MANUAL THERAPY 1/> REGIONS: CPT | Performed by: PHYSICAL THERAPIST

## 2019-02-26 PROCEDURE — 97012 MECHANICAL TRACTION THERAPY: CPT | Performed by: PHYSICAL THERAPIST

## 2019-02-26 PROCEDURE — 97112 NEUROMUSCULAR REEDUCATION: CPT | Performed by: PHYSICAL THERAPIST

## 2019-02-26 PROCEDURE — 97110 THERAPEUTIC EXERCISES: CPT | Performed by: PHYSICAL THERAPIST

## 2019-02-26 NOTE — PROGRESS NOTES
Daily Note     Today's date: 2019  Patient name: Lizbet Oconnor  : 1960  MRN: [id number]  Referring provider: Idalia Cobian MD  Dx:   Encounter Diagnosis     ICD-10-CM    1  Biceps tendinitis of left upper extremity M75 22    2  Cervical radiculopathy M54 12                   Subjective: Notes dizziness and radiating pain with L rotation and neck pain provoked with looking down and to the R      Objective: See treatment diary below  Some relief noted with manual traction so mechanical traction attempted      Assessment: Tolerated treatment fair  Patient would benefit from continued PT      Plan: Continue per plan of care       Precautions: DA Cervical, DM2, HTN  Daily Treatment Diary       Manual         Distraction 10 min 6 6 min 6 min 2'        DTM to Distal L biceps tendon L  6 6 min 6 min 6'                                               Exercise Diary             Scap Add (DA) 10" x 10 10" x 10 10" x 10 10" x 10 :10/10        Supine DNF (DA) 10" x 10 10" x 10 10" x 10 10" x 10 :10/10        CS flexion (DA) 6" x 20 6" x 20 6" x 20  6"x  20 :06/20        Wrist Flex/Ext ST 20" x 4 20" x 4 20" x 4 20" x 4 :20/4        Elbow AROM Sup/Pro  2# 2 x 10 3# 2 x 10 4# 2 x 10 4# 20        Wrist AROM Flex Ext Ecc  2# 2 x 10 3# 2 x 10 4# 2 x 10 4# 20        Scaption  2# 2 x 10 3# 2 x 10 4# 2 x 10 4# 20        Tband Row + Ext  RTB/GTB  nv GTB 20        Tband ER postural  RTB  nv GTB 20                                  Seated DNF (DA)  10" x 10 10" x 10 10" x 10 :10;10        Supine DNF + CS Flex (DA)                                                                                                                     Modalities             HP/CP to CS (DA) prn  10 min Declined          Mech Tract     20# 10'        HP/CP to Elbow prn                 Supervised for elbow exercises by LK, PTA 3-10

## 2019-02-28 ENCOUNTER — OFFICE VISIT (OUTPATIENT)
Dept: PHYSICAL THERAPY | Facility: CLINIC | Age: 59
End: 2019-02-28
Payer: COMMERCIAL

## 2019-02-28 DIAGNOSIS — M75.22 BICEPS TENDINITIS OF LEFT UPPER EXTREMITY: Primary | ICD-10-CM

## 2019-02-28 DIAGNOSIS — M54.12 CERVICAL RADICULOPATHY: ICD-10-CM

## 2019-02-28 PROCEDURE — 97140 MANUAL THERAPY 1/> REGIONS: CPT | Performed by: PHYSICAL THERAPIST

## 2019-02-28 PROCEDURE — 97110 THERAPEUTIC EXERCISES: CPT | Performed by: PHYSICAL THERAPIST

## 2019-02-28 PROCEDURE — 97012 MECHANICAL TRACTION THERAPY: CPT | Performed by: PHYSICAL THERAPIST

## 2019-02-28 NOTE — PROGRESS NOTES
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, he reports that was difficult and had to take breaks due to this  His neck was sore s/p traction  UE symptom have been less intense  But his elbow is very sore secondary to lifting several tables today and lots of lifting over the last few days  Objective: See treatment diary below  Some relief noted with manual traction so mechanical traction attempted      Assessment: Pt follows up with physician next week, pt indicated he wanted to wait in regards of that to schedule more  Session heavily modified secondary to soreness in elbow and neck  Focus on MT intervention and traction  Plan: Continue per plan of care       Precautions: DA Cervical, DM2, HTN  Daily Treatment Diary       Manual        Distraction 10 min 6 6 min 6 min 2'        DTM to Distal L biceps tendon L  6 6 min 6 min 6' 10 mins                                              Exercise Diary             Scap Add (DA) 10" x 10 10" x 10 10" x 10 10" x 10 :10/10 nv       Supine DNF (DA) 10" x 10 10" x 10 10" x 10 10" x 10 :10/10 nv       CS flexion (DA) 6" x 20 6" x 20 6" x 20  6"x  20 :06/20 nv       Wrist Flex/Ext ST 20" x 4 20" x 4 20" x 4 20" x 4 :20/4 nv       Elbow AROM Sup/Pro  2# 2 x 10 3# 2 x 10 4# 2 x 10 4# 20 nv       Wrist AROM Flex Ext Ecc  2# 2 x 10 3# 2 x 10 4# 2 x 10 4# 20 nv       Scaption  2# 2 x 10 3# 2 x 10 4# 2 x 10 4# 20 nv       Tband Row + Ext  RTB/GTB  nv GTB 20 nv       Tband ER postural  RTB  nv GTB 20 nv                                 Seated DNF (DA)  10" x 10 10" x 10 10" x 10 :10;10 nv Supine DNF + CS Flex (DA)                                                                                                                     Modalities             HP/CP to CS (DA) prn  10 min Declined          Mech Tract     20# 10' 20# 10 min       HP/CP to Elbow prn

## 2019-03-07 ENCOUNTER — TELEPHONE (OUTPATIENT)
Dept: VASCULAR SURGERY | Facility: CLINIC | Age: 59
End: 2019-03-07

## 2019-03-07 DIAGNOSIS — I83.813 VARICOSE VEINS OF LEG WITH PAIN, BILATERAL: Primary | ICD-10-CM

## 2019-03-07 NOTE — TELEPHONE ENCOUNTER
Talked to patient to schedule surgery on 4-5-19 at [facility]  With Dr Mayo  Patient was told he will need H&P by PCP  Patient was told nothing to eat or drink after midnight of 4/24  Patient was told to go for labs   SJ

## 2019-03-18 ENCOUNTER — TELEPHONE (OUTPATIENT)
Dept: VASCULAR SURGERY | Facility: CLINIC | Age: 59
End: 2019-03-18

## 2019-03-18 NOTE — TELEPHONE ENCOUNTER
Patient called to cancelled his surgery (EVLT Right Upper Leg) for 4-5-19 at [facility] with Dr Shanon Mendoza Patient called said something came up  Told Patient we will call him back with a new date   LLF

## 2019-03-19 ENCOUNTER — OFFICE VISIT (OUTPATIENT)
Dept: OBGYN CLINIC | Facility: CLINIC | Age: 59
End: 2019-03-19
Payer: COMMERCIAL

## 2019-03-19 VITALS
HEART RATE: 90 BPM | DIASTOLIC BLOOD PRESSURE: 84 MMHG | SYSTOLIC BLOOD PRESSURE: 166 MMHG | WEIGHT: 288 LBS | HEIGHT: 70 IN | BODY MASS INDEX: 41.23 KG/M2

## 2019-03-19 DIAGNOSIS — M54.12 CERVICAL RADICULOPATHY: Primary | ICD-10-CM

## 2019-03-19 PROCEDURE — 99213 OFFICE O/P EST LOW 20 MIN: CPT | Performed by: ORTHOPAEDIC SURGERY

## 2019-03-19 NOTE — LETTER
March 19, 2019     Patient: Kesha Waggoner   YOB: 1960   Date of Visit: 3/19/2019       To Whom it May Concern:    Kesha Waggoner is under my professional care  He was seen in my office on 3/19/2019  He may return to work on 3/19/19 without restrictions  Full Duty  If you have any questions or concerns, please don't hesitate to call           Sincerely,          Tommi Boas, MD        CC: No Recipients

## 2019-03-19 NOTE — PROGRESS NOTES
Patient Name:  Digna Davila  MRN:  [id number]    Assessment & Plan     Left Biceps Tendinitis  Cervical pain/radiculopathy    1  Patient was instructed to continue home exercise program as instructed by physical therapy for distal biceps tendinitis  Take Aleve prn for pain relief  2  As for his cervical symptoms, due to patient failing formal physical therapy over the past 6 weeks, an MRI of his cervical spine will be ordered and he will follow up with Dr Twan Riley, who he sees for his lumbar spine, for further evaluation and treatment   3  He will continue full duty at work  Note provided today  4  He will follow up on an as needed basis for his left elbow      Subjective     62 y/o male who presents today for a follow up visit for his left elbow  He has been treating conservatively for distal biceps tendinitis  Patient states that he has been complaint with formal physical therapy and they have added exercises for cervical pain and radicular symptoms due to increased pain  Patient reports that the pain in his cervical spine intermittently radiates to his scapula and trapezius  This pain is much worse when it comes on than the left elbow  He still notes intermittent symptoms in his distal biceps mainly with lifting objects and is "mild" in nature  General ROS:  Negative for fever or chills  Objective     /84   Pulse 90   Ht 5' 10" (1 778 m)   Wt 131 kg (288 lb)   BMI 41 32 kg/m²     Left Elbow:  Skin is intact  No gross deformity or swelling  Mild tenderness over cubital fossa  ROM: intact and full  Normal strength with elbow flexion, extension  Pain with resisted elbow flexion  Patient is neurovascular intact distally  2+ Radial pulse           Scribe Attestation    I,:   George Youngblood am acting as a scribe while in the presence of the attending physician :        I,:   Gautam Perry MD personally performed the services described in this documentation    as scribed in my presence :

## 2019-03-20 NOTE — PROGRESS NOTES
PT Discharge    Today's date: 3/20/2019  Patient name: Fabricio Sidhu  : 1960  MRN: [id number]  Referring provider: Judy Pat MD  Dx:   Encounter Diagnosis     ICD-10-CM    1  Biceps tendinitis of left upper extremity M75 22    2  Cervical radiculopathy M54 12        Start Time: 1435  Stop Time: 1515  Total time in clinic (min): 40 minutes    Assessment/Plan   Pt has not been present since 3/20/19  Pt's chart will be DC in compliance of facility policy as all Charts are DC within 30 days of last scheduled visit      Subjective    Objective    Flowsheet Rows      Most Recent Value   PT/OT G-Codes   Current Score  57   Projected Score  73

## 2019-03-21 ENCOUNTER — TELEPHONE (OUTPATIENT)
Dept: VASCULAR SURGERY | Facility: CLINIC | Age: 59
End: 2019-03-21

## 2019-03-26 DIAGNOSIS — I83.813 VARICOSE VEINS OF BOTH LOWER EXTREMITIES WITH PAIN: Primary | ICD-10-CM

## 2019-03-26 NOTE — TELEPHONE ENCOUNTER
Called patient to scheduled to surgery on 6-14-19 at [facility] with Dr Marissa Newsome  Patient was told to get the labs done  and H&P by PCP, NPO from midnight , hospital will call day before PM 2-7 and reviewed instructions  SJ

## 2019-05-01 ENCOUNTER — TELEPHONE (OUTPATIENT)
Dept: VASCULAR SURGERY | Facility: CLINIC | Age: 59
End: 2019-05-01

## 2019-05-09 ENCOUNTER — APPOINTMENT (OUTPATIENT)
Dept: LAB | Facility: CLINIC | Age: 59
End: 2019-05-09
Payer: COMMERCIAL

## 2019-05-09 ENCOUNTER — TRANSCRIBE ORDERS (OUTPATIENT)
Dept: LAB | Facility: CLINIC | Age: 59
End: 2019-05-09

## 2019-05-09 DIAGNOSIS — E11.9 DIABETES MELLITUS WITHOUT COMPLICATION (HCC): ICD-10-CM

## 2019-05-09 DIAGNOSIS — M19.90 ACUTE ARTHRITIS: ICD-10-CM

## 2019-05-09 DIAGNOSIS — I10 HYPERTENSION, UNSPECIFIED TYPE: ICD-10-CM

## 2019-05-09 DIAGNOSIS — N40.0 BENIGN PROSTATIC HYPERPLASIA, UNSPECIFIED WHETHER LOWER URINARY TRACT SYMPTOMS PRESENT: ICD-10-CM

## 2019-05-09 DIAGNOSIS — R35.0 URINARY FREQUENCY: ICD-10-CM

## 2019-05-09 DIAGNOSIS — D64.9 ANEMIA, UNSPECIFIED TYPE: Primary | ICD-10-CM

## 2019-05-09 DIAGNOSIS — E03.9 HYPOTHYROIDISM, ACQUIRED: ICD-10-CM

## 2019-05-09 DIAGNOSIS — D64.9 ANEMIA, UNSPECIFIED TYPE: ICD-10-CM

## 2019-05-09 LAB
ALBUMIN SERPL BCP-MCNC: 4.1 G/DL (ref 3.5–5)
ALP SERPL-CCNC: 54 U/L (ref 46–116)
ALT SERPL W P-5'-P-CCNC: 81 U/L (ref 12–78)
ANION GAP SERPL CALCULATED.3IONS-SCNC: 12 MMOL/L (ref 4–13)
AST SERPL W P-5'-P-CCNC: 54 U/L (ref 5–45)
BACTERIA UR QL AUTO: ABNORMAL /HPF
BASOPHILS # BLD AUTO: 0.06 THOUSANDS/ΜL (ref 0–0.1)
BASOPHILS NFR BLD AUTO: 1 % (ref 0–1)
BILIRUB SERPL-MCNC: 0.4 MG/DL (ref 0.2–1)
BILIRUB UR QL STRIP: NEGATIVE
BUN SERPL-MCNC: 15 MG/DL (ref 5–25)
CALCIUM SERPL-MCNC: 9.6 MG/DL (ref 8.3–10.1)
CHLORIDE SERPL-SCNC: 105 MMOL/L (ref 100–108)
CHOLEST SERPL-MCNC: 157 MG/DL (ref 50–200)
CLARITY UR: CLEAR
CO2 SERPL-SCNC: 22 MMOL/L (ref 21–32)
COLOR UR: YELLOW
CREAT SERPL-MCNC: 0.95 MG/DL (ref 0.6–1.3)
EOSINOPHIL # BLD AUTO: 0.31 THOUSAND/ΜL (ref 0–0.61)
EOSINOPHIL NFR BLD AUTO: 5 % (ref 0–6)
ERYTHROCYTE [DISTWIDTH] IN BLOOD BY AUTOMATED COUNT: 12.5 % (ref 11.6–15.1)
EST. AVERAGE GLUCOSE BLD GHB EST-MCNC: 157 MG/DL
GFR SERPL CREATININE-BSD FRML MDRD: 88 ML/MIN/1.73SQ M
GLUCOSE P FAST SERPL-MCNC: 175 MG/DL (ref 65–99)
GLUCOSE UR STRIP-MCNC: ABNORMAL MG/DL
HBA1C MFR BLD: 7.1 % (ref 4.2–6.3)
HCT VFR BLD AUTO: 42.8 % (ref 36.5–49.3)
HDLC SERPL-MCNC: 46 MG/DL (ref 40–60)
HGB BLD-MCNC: 14.8 G/DL (ref 12–17)
HGB UR QL STRIP.AUTO: NEGATIVE
IMM GRANULOCYTES # BLD AUTO: 0.04 THOUSAND/UL (ref 0–0.2)
IMM GRANULOCYTES NFR BLD AUTO: 1 % (ref 0–2)
KETONES UR STRIP-MCNC: ABNORMAL MG/DL
LDLC SERPL CALC-MCNC: 65 MG/DL (ref 0–100)
LEUKOCYTE ESTERASE UR QL STRIP: ABNORMAL
LYMPHOCYTES # BLD AUTO: 2.25 THOUSANDS/ΜL (ref 0.6–4.47)
LYMPHOCYTES NFR BLD AUTO: 38 % (ref 14–44)
MCH RBC QN AUTO: 31.4 PG (ref 26.8–34.3)
MCHC RBC AUTO-ENTMCNC: 34.6 G/DL (ref 31.4–37.4)
MCV RBC AUTO: 91 FL (ref 82–98)
MONOCYTES # BLD AUTO: 0.45 THOUSAND/ΜL (ref 0.17–1.22)
MONOCYTES NFR BLD AUTO: 8 % (ref 4–12)
NEUTROPHILS # BLD AUTO: 2.8 THOUSANDS/ΜL (ref 1.85–7.62)
NEUTS SEG NFR BLD AUTO: 47 % (ref 43–75)
NITRITE UR QL STRIP: NEGATIVE
NON-SQ EPI CELLS URNS QL MICRO: ABNORMAL /HPF
NONHDLC SERPL-MCNC: 111 MG/DL
NRBC BLD AUTO-RTO: 0 /100 WBCS
PH UR STRIP.AUTO: 6 [PH]
PLATELET # BLD AUTO: 189 THOUSANDS/UL (ref 149–390)
PMV BLD AUTO: 9.6 FL (ref 8.9–12.7)
POTASSIUM SERPL-SCNC: 4.1 MMOL/L (ref 3.5–5.3)
PROT SERPL-MCNC: 7.4 G/DL (ref 6.4–8.2)
PROT UR STRIP-MCNC: NEGATIVE MG/DL
RBC # BLD AUTO: 4.72 MILLION/UL (ref 3.88–5.62)
RBC #/AREA URNS AUTO: ABNORMAL /HPF
SODIUM SERPL-SCNC: 139 MMOL/L (ref 136–145)
SP GR UR STRIP.AUTO: 1.02 (ref 1–1.03)
T4 FREE SERPL-MCNC: 1.11 NG/DL (ref 0.76–1.46)
TRIGL SERPL-MCNC: 228 MG/DL
TSH SERPL DL<=0.05 MIU/L-ACNC: 2.67 UIU/ML (ref 0.36–3.74)
UROBILINOGEN UR QL STRIP.AUTO: 0.2 E.U./DL
WBC # BLD AUTO: 5.91 THOUSAND/UL (ref 4.31–10.16)
WBC #/AREA URNS AUTO: ABNORMAL /HPF

## 2019-05-09 PROCEDURE — 85025 COMPLETE CBC W/AUTO DIFF WBC: CPT

## 2019-05-09 PROCEDURE — 83036 HEMOGLOBIN GLYCOSYLATED A1C: CPT | Performed by: INTERNAL MEDICINE

## 2019-05-09 PROCEDURE — 80061 LIPID PANEL: CPT

## 2019-05-09 PROCEDURE — 81001 URINALYSIS AUTO W/SCOPE: CPT | Performed by: INTERNAL MEDICINE

## 2019-05-09 PROCEDURE — 84439 ASSAY OF FREE THYROXINE: CPT

## 2019-05-09 PROCEDURE — 84443 ASSAY THYROID STIM HORMONE: CPT

## 2019-05-09 PROCEDURE — 80053 COMPREHEN METABOLIC PANEL: CPT

## 2019-05-09 PROCEDURE — 36415 COLL VENOUS BLD VENIPUNCTURE: CPT | Performed by: INTERNAL MEDICINE

## 2019-05-23 ENCOUNTER — ANESTHESIA EVENT (OUTPATIENT)
Dept: PERIOP | Facility: AMBULARY SURGERY CENTER | Age: 59
End: 2019-05-23
Payer: COMMERCIAL

## 2019-06-07 ENCOUNTER — HOSPITAL ENCOUNTER (OUTPATIENT)
Dept: ULTRASOUND IMAGING | Facility: AMBULARY SURGERY CENTER | Age: 59
Setting detail: OUTPATIENT SURGERY
Discharge: HOME | End: 2019-06-07
Payer: COMMERCIAL

## 2019-06-07 ENCOUNTER — HOSPITAL ENCOUNTER (OUTPATIENT)
Facility: AMBULARY SURGERY CENTER | Age: 59
Setting detail: OUTPATIENT SURGERY
Discharge: HOME | End: 2019-06-07
Attending: SURGERY | Admitting: SURGERY
Payer: COMMERCIAL

## 2019-06-07 ENCOUNTER — ANESTHESIA (OUTPATIENT)
Dept: PERIOP | Facility: AMBULARY SURGERY CENTER | Age: 59
End: 2019-06-07
Payer: COMMERCIAL

## 2019-06-07 VITALS
RESPIRATION RATE: 18 BRPM | TEMPERATURE: 97.4 F | OXYGEN SATURATION: 93 % | HEART RATE: 71 BPM | HEIGHT: 70 IN | DIASTOLIC BLOOD PRESSURE: 81 MMHG | BODY MASS INDEX: 41.52 KG/M2 | SYSTOLIC BLOOD PRESSURE: 151 MMHG | WEIGHT: 290 LBS

## 2019-06-07 DIAGNOSIS — I83.813 VARICOSE VEINS OF BILATERAL LOWER EXTREMITIES WITH PAIN: Primary | ICD-10-CM

## 2019-06-07 DIAGNOSIS — I83.899 VARICOSE VEINS OF LOWER EXTREMITIES WITH COMPLICATIONS: ICD-10-CM

## 2019-06-07 PROCEDURE — NC001 PR NO CHARGE: Performed by: SURGERY

## 2019-06-07 PROCEDURE — 82948 REAGENT STRIP/BLOOD GLUCOSE: CPT

## 2019-06-07 PROCEDURE — 37799 UNLISTED PX VASCULAR SURGERY: CPT | Performed by: SURGERY

## 2019-06-07 PROCEDURE — 93971 EXTREMITY STUDY: CPT

## 2019-06-07 PROCEDURE — 36478 ENDOVENOUS LASER 1ST VEIN: CPT | Performed by: SURGERY

## 2019-06-07 RX ORDER — LIDOCAINE HYDROCHLORIDE 10 MG/ML
0.5 INJECTION, SOLUTION EPIDURAL; INFILTRATION; INTRACAUDAL; PERINEURAL ONCE AS NEEDED
Status: DISCONTINUED | OUTPATIENT
Start: 2019-06-07 | End: 2019-06-07 | Stop reason: HOSPADM

## 2019-06-07 RX ORDER — CEFAZOLIN SODIUM 1 G/50ML
1000 SOLUTION INTRAVENOUS EVERY 8 HOURS
Status: DISCONTINUED | OUTPATIENT
Start: 2019-06-07 | End: 2019-06-07 | Stop reason: HOSPADM

## 2019-06-07 RX ORDER — LIDOCAINE HYDROCHLORIDE 10 MG/ML
INJECTION, SOLUTION INFILTRATION; PERINEURAL AS NEEDED
Status: DISCONTINUED | OUTPATIENT
Start: 2019-06-07 | End: 2019-06-07 | Stop reason: SURG

## 2019-06-07 RX ORDER — PROPOFOL 10 MG/ML
INJECTION, EMULSION INTRAVENOUS AS NEEDED
Status: DISCONTINUED | OUTPATIENT
Start: 2019-06-07 | End: 2019-06-07 | Stop reason: SURG

## 2019-06-07 RX ORDER — CEFAZOLIN SODIUM 2 G/50ML
2000 SOLUTION INTRAVENOUS ONCE
Status: DISCONTINUED | OUTPATIENT
Start: 2019-06-07 | End: 2019-06-07 | Stop reason: HOSPADM

## 2019-06-07 RX ORDER — FENTANYL CITRATE/PF 50 MCG/ML
25 SYRINGE (ML) INJECTION
Status: DISCONTINUED | OUTPATIENT
Start: 2019-06-07 | End: 2019-06-07 | Stop reason: HOSPADM

## 2019-06-07 RX ORDER — OXYCODONE HYDROCHLORIDE AND ACETAMINOPHEN 5; 325 MG/1; MG/1
2 TABLET ORAL EVERY 6 HOURS PRN
Qty: 25 TABLET | Refills: 0 | Status: SHIPPED | OUTPATIENT
Start: 2019-06-07 | End: 2019-06-17

## 2019-06-07 RX ORDER — FENTANYL CITRATE 50 UG/ML
INJECTION, SOLUTION INTRAMUSCULAR; INTRAVENOUS AS NEEDED
Status: DISCONTINUED | OUTPATIENT
Start: 2019-06-07 | End: 2019-06-07 | Stop reason: SURG

## 2019-06-07 RX ORDER — MIDAZOLAM HYDROCHLORIDE 1 MG/ML
INJECTION INTRAMUSCULAR; INTRAVENOUS AS NEEDED
Status: DISCONTINUED | OUTPATIENT
Start: 2019-06-07 | End: 2019-06-07 | Stop reason: SURG

## 2019-06-07 RX ORDER — ONDANSETRON 2 MG/ML
4 INJECTION INTRAMUSCULAR; INTRAVENOUS ONCE AS NEEDED
Status: DISCONTINUED | OUTPATIENT
Start: 2019-06-07 | End: 2019-06-07 | Stop reason: HOSPADM

## 2019-06-07 RX ORDER — DEXAMETHASONE SODIUM PHOSPHATE 4 MG/ML
INJECTION, SOLUTION INTRA-ARTICULAR; INTRALESIONAL; INTRAMUSCULAR; INTRAVENOUS; SOFT TISSUE AS NEEDED
Status: DISCONTINUED | OUTPATIENT
Start: 2019-06-07 | End: 2019-06-07 | Stop reason: SURG

## 2019-06-07 RX ORDER — ONDANSETRON 2 MG/ML
INJECTION INTRAMUSCULAR; INTRAVENOUS AS NEEDED
Status: DISCONTINUED | OUTPATIENT
Start: 2019-06-07 | End: 2019-06-07 | Stop reason: SURG

## 2019-06-07 RX ORDER — METOCLOPRAMIDE HYDROCHLORIDE 5 MG/ML
INJECTION INTRAMUSCULAR; INTRAVENOUS AS NEEDED
Status: DISCONTINUED | OUTPATIENT
Start: 2019-06-07 | End: 2019-06-07 | Stop reason: SURG

## 2019-06-07 RX ORDER — SODIUM CHLORIDE, SODIUM LACTATE, POTASSIUM CHLORIDE, CALCIUM CHLORIDE 600; 310; 30; 20 MG/100ML; MG/100ML; MG/100ML; MG/100ML
125 INJECTION, SOLUTION INTRAVENOUS CONTINUOUS
Status: DISCONTINUED | OUTPATIENT
Start: 2019-06-07 | End: 2019-06-07 | Stop reason: HOSPADM

## 2019-06-07 RX ORDER — OXYCODONE HYDROCHLORIDE AND ACETAMINOPHEN 5; 325 MG/1; MG/1
1 TABLET ORAL EVERY 4 HOURS PRN
Status: DISCONTINUED | OUTPATIENT
Start: 2019-06-07 | End: 2019-06-07 | Stop reason: HOSPADM

## 2019-06-07 RX ADMIN — SODIUM CHLORIDE, SODIUM LACTATE, POTASSIUM CHLORIDE, AND CALCIUM CHLORIDE: .6; .31; .03; .02 INJECTION, SOLUTION INTRAVENOUS at 07:01

## 2019-06-07 RX ADMIN — FENTANYL CITRATE 50 MCG: 50 INJECTION, SOLUTION INTRAMUSCULAR; INTRAVENOUS at 07:41

## 2019-06-07 RX ADMIN — FENTANYL CITRATE 100 MCG: 50 INJECTION, SOLUTION INTRAMUSCULAR; INTRAVENOUS at 07:28

## 2019-06-07 RX ADMIN — LIDOCAINE HYDROCHLORIDE ANHYDROUS 50 MG: 10 INJECTION, SOLUTION INFILTRATION at 07:32

## 2019-06-07 RX ADMIN — ONDANSETRON 4 MG: 2 INJECTION INTRAMUSCULAR; INTRAVENOUS at 08:06

## 2019-06-07 RX ADMIN — MIDAZOLAM HYDROCHLORIDE 2 MG: 1 INJECTION, SOLUTION INTRAMUSCULAR; INTRAVENOUS at 07:28

## 2019-06-07 RX ADMIN — Medication 3000 MG: at 07:29

## 2019-06-07 RX ADMIN — DEXAMETHASONE SODIUM PHOSPHATE 4 MG: 4 INJECTION, SOLUTION INTRAMUSCULAR; INTRAVENOUS at 07:48

## 2019-06-07 RX ADMIN — METOCLOPRAMIDE HYDROCHLORIDE 10 MG: 5 INJECTION INTRAMUSCULAR; INTRAVENOUS at 07:54

## 2019-06-07 RX ADMIN — PROPOFOL 300 MG: 10 INJECTION, EMULSION INTRAVENOUS at 07:32

## 2019-06-08 PROCEDURE — NC001 PR NO CHARGE: Performed by: SURGERY

## 2019-06-11 ENCOUNTER — OFFICE VISIT (OUTPATIENT)
Dept: VASCULAR SURGERY | Facility: CLINIC | Age: 59
End: 2019-06-11

## 2019-06-11 VITALS
BODY MASS INDEX: 41.61 KG/M2 | TEMPERATURE: 97.1 F | HEIGHT: 70 IN | DIASTOLIC BLOOD PRESSURE: 86 MMHG | SYSTOLIC BLOOD PRESSURE: 144 MMHG | HEART RATE: 88 BPM

## 2019-06-11 DIAGNOSIS — I83.813 VARICOSE VEINS OF BILATERAL LOWER EXTREMITIES WITH PAIN: Primary | ICD-10-CM

## 2019-06-11 PROCEDURE — 99024 POSTOP FOLLOW-UP VISIT: CPT | Performed by: NURSE PRACTITIONER

## 2019-06-14 ENCOUNTER — HOSPITAL ENCOUNTER (OUTPATIENT)
Dept: NON INVASIVE DIAGNOSTICS | Facility: CLINIC | Age: 59
Discharge: HOME | End: 2019-06-14
Payer: COMMERCIAL

## 2019-06-14 DIAGNOSIS — I83.813 VARICOSE VEINS OF BOTH LOWER EXTREMITIES WITH PAIN: ICD-10-CM

## 2019-06-14 PROCEDURE — 93971 EXTREMITY STUDY: CPT | Performed by: SURGERY

## 2019-06-14 PROCEDURE — 93971 EXTREMITY STUDY: CPT

## 2019-06-17 LAB — GLUCOSE SERPL-MCNC: 153 MG/DL (ref 65–140)

## 2019-06-18 ENCOUNTER — TELEPHONE (OUTPATIENT)
Dept: VASCULAR SURGERY | Facility: CLINIC | Age: 59
End: 2019-06-18

## 2019-06-18 ENCOUNTER — HOSPITAL ENCOUNTER (OUTPATIENT)
Dept: ULTRASOUND IMAGING | Facility: HOSPITAL | Age: 59
Discharge: HOME | End: 2019-06-18
Payer: COMMERCIAL

## 2019-06-18 DIAGNOSIS — I83.813 VARICOSE VEINS OF BILATERAL LOWER EXTREMITIES WITH PAIN: ICD-10-CM

## 2019-06-18 DIAGNOSIS — I83.813 VARICOSE VEINS OF BILATERAL LOWER EXTREMITIES WITH PAIN: Primary | ICD-10-CM

## 2019-06-18 PROCEDURE — 93971 EXTREMITY STUDY: CPT

## 2019-06-18 PROCEDURE — 93971 EXTREMITY STUDY: CPT | Performed by: SURGERY

## 2019-06-26 ENCOUNTER — HOSPITAL ENCOUNTER (OUTPATIENT)
Dept: ULTRASOUND IMAGING | Facility: HOSPITAL | Age: 59
Discharge: HOME | End: 2019-06-26
Attending: INTERNAL MEDICINE
Payer: COMMERCIAL

## 2019-06-26 DIAGNOSIS — N20.0 KIDNEY STONE: ICD-10-CM

## 2019-06-26 PROCEDURE — 76770 US EXAM ABDO BACK WALL COMP: CPT

## 2019-07-03 ENCOUNTER — OFFICE VISIT (OUTPATIENT)
Dept: UROLOGY | Facility: CLINIC | Age: 59
End: 2019-07-03
Payer: COMMERCIAL

## 2019-07-03 VITALS
SYSTOLIC BLOOD PRESSURE: 132 MMHG | WEIGHT: 290 LBS | DIASTOLIC BLOOD PRESSURE: 74 MMHG | HEART RATE: 75 BPM | BODY MASS INDEX: 41.52 KG/M2 | HEIGHT: 70 IN

## 2019-07-03 DIAGNOSIS — R10.9 FLANK PAIN: Primary | ICD-10-CM

## 2019-07-03 PROCEDURE — 99203 OFFICE O/P NEW LOW 30 MIN: CPT | Performed by: PHYSICIAN ASSISTANT

## 2019-07-03 NOTE — PROGRESS NOTES
1  Flank pain  CT renal stone study abdomen pelvis wo contrast     Assessment and plan:       1  Nephrolithiasis  - reviewed with the patient that his ultrasound reveals a nonobstructing left intrarenal calculus  There is no evidence of obstruction as there was no hydronephrosis and bilateral ureteral jets were detected  We discussed that ultrasound is unable to visualize that there is a ureteral stone  Plan will be to obtain a CT stone study for further evaluation  In the meantime encouraged him to hydrate aggressively  Patient was offered Maximus Hamilton in the interim however deferred  Patient will follow up in the near future to review CT  If patient has ureteral stone, discussion for ureteroscopy  If not, can discussed extracorporeal shockwave lithotripsy for intrarenal calculus  2  Erectile dysfunction  - continue sildenafil trochees as needed      Desiree Ghotra PA-C      Chief Complaint     F/u kidney stones    History of Present Illness     Dana Dobbins is a 63 y o  male presenting today for nephrolithiasis  Patient states that he has a previous history of nephrolithiasis  Previously had required staged ureteroscopy with Dr Kobi Muse per patient's report  He is unsure when this occurred, however states this has been more than 5 years  Patient reports that he was having left flank pain starting last week, 06/16/2019  Pain was significant on the 1st day, as well noted some dark colored urine  His pain is since slowly subsided and now wishes a dull ache  Denies any persistent hematuria wall  Patient did not visualize any passage of stones  Denies nausea, vomiting fevers, chills  Patient had a recent ultrasound of the kidney and bladder (06/26/2019) which revealed a 7 mm left mid renal stone  There is no evidence of obstructive uropathy  Patient's most recent PSA is 0 5  Patient has known erectile dysfunction uses sildenafil troches as needed      Laboratory     Lab Results Component Value Date    CREATININE 0 95 05/09/2019       Lab Results   Component Value Date    PSA 0 5 01/02/2019    PSA 0 4 01/05/2018    PSA 0 8 01/03/2017       Review of Systems     Review of Systems   Constitutional: Negative for activity change, appetite change, chills, diaphoresis, fatigue, fever and unexpected weight change  Respiratory: Negative for chest tightness and shortness of breath  Cardiovascular: Negative for chest pain, palpitations and leg swelling  Gastrointestinal: Negative for abdominal distention, abdominal pain, constipation, diarrhea, nausea and vomiting  Genitourinary: Positive for flank pain and hematuria  Negative for decreased urine volume, difficulty urinating, dysuria, enuresis, frequency, genital sores and urgency  Musculoskeletal: Negative for back pain, gait problem and myalgias  Skin: Negative for color change, pallor, rash and wound  Psychiatric/Behavioral: Negative for behavioral problems  The patient is not nervous/anxious  Allergies     Allergies   Allergen Reactions    Bee Venom Anaphylaxis     Uses Epi-pen    Doxycycline Photosensitivity       Physical Exam     Physical Exam   Constitutional: He is oriented to person, place, and time  He appears well-developed and well-nourished  No distress  HENT:   Head: Normocephalic and atraumatic  Eyes: Conjunctivae are normal    Neck: Normal range of motion  No tracheal deviation present  Abdominal:   Overweight   Musculoskeletal: Normal range of motion  He exhibits no edema or deformity  Neurological: He is alert and oriented to person, place, and time  Skin: Skin is warm and dry  No rash noted  He is not diaphoretic  No erythema  Psychiatric: He has a normal mood and affect   His behavior is normal          Vital Signs     Vitals:    07/03/19 1117   BP: 132/74   Pulse: 75   Weight: 132 kg (290 lb)   Height: 5' 10" (1 778 m)         Current Medications       Current Outpatient Medications:   allopurinol (ZYLOPRIM) 100 mg tablet, Take 100 mg by mouth every morning  , Disp: , Rfl:     amLODIPine (NORVASC) 5 mg tablet, Take 5 mg by mouth 2 (two) times a day  , Disp: , Rfl:     atorvastatin (LIPITOR) 40 mg tablet, Take 40 mg by mouth daily at bedtime, Disp: , Rfl:     cholecalciferol (VITAMIN D3) 1,000 units tablet, Take 1,000 Units by mouth daily at bedtime  , Disp: , Rfl:     Dulaglutide (TRULICITY) 1 5 EI/8 7XP SOPN, Inject 1 5 mg under the skin once a week Takes on Sunday, Disp: , Rfl:     Empagliflozin (JARDIANCE) 10 MG TABS, Take 10 mg by mouth every morning  , Disp: , Rfl:     fenofibrate (TRIGLIDE) 160 MG tablet, Take 160 mg by mouth every morning, Disp: , Rfl:     labetalol (NORMODYNE) 100 mg tablet, Take 100 mg by mouth 2 (two) times a day, Disp: , Rfl:     lisinopril (ZESTRIL) 40 mg tablet, Take 40 mg by mouth every morning  , Disp: , Rfl:     metFORMIN (GLUCOPHAGE) 1000 MG tablet, Take 1,000 mg by mouth 2 (two) times a day with meals  , Disp: , Rfl:     Multiple Vitamin (MULTIVITAMIN) tablet, Take 1 tablet by mouth every morning  , Disp: , Rfl:       Active Problems     Patient Active Problem List   Diagnosis    [facility]    Hypertension    Hyperlipidemia    Status post revision of total replacement of right knee    Varicose veins of bilateral lower extremities with pain    Intervertebral disc disorders with radiculopathy, lumbar region    Chronic pain of right knee    Pes anserine bursitis    Acute osteomyelitis of toe of left foot (HCC)    Acquired deformity of left foot    Hammer toe of left foot    Diabetic ulcer of toe of left foot associated with type 2 diabetes mellitus, with necrosis of bone (HCC)    Cellulitis of second toe of left foot    Left foot pain    Diabetic polyneuropathy associated with type 2 diabetes mellitus (HCC)    Acute osteomyelitis of toe, left (HCC)    Acute osteomyelitis of left ankle or foot (HCC)    Tinea pedis of both feet         Past Medical History     Past Medical History:   Diagnosis Date    Anemia     s/p knee sx    Anesthesia complication     Developed hives in the PACU s/p knee replacement    Arthritis     Diabetes mellitus (HCC)     niddm    Diabetic neuropathy (HCC)     Left foot burning and tingling    DJD (degenerative joint disease)     Edema     lower legs-ankles    Hyperlipidemia     Hypertension     Kidney stone     27mm    Mechanical complication of internal orthopedic device (HCC)     knee    Obesity     Osteomyelitis (HCC)     left 2nd toe    PONV (postoperative nausea and vomiting)     patient requests to be premedicated for N/V    PVD (peripheral vascular disease) ([facility] )     Valve issue with circulation of the lower legs- to have laser surgery         Surgical History     Past Surgical History:   Procedure Laterality Date    CARPAL TUNNEL RELEASE Left     COLONOSCOPY      CORRECTION HAMMER TOE      Left foot 2nd toe    CYSTOSCOPY      HERNIA REPAIR Left     inguinal    JOINT REPLACEMENT      right knee revision, infection total of 4 surgeries     JOINT REPLACEMENT      left knee     KIDNEY STONE SURGERY      -large 27 mm stone-took x3 surgeries to resolve    PA AMPUTATION TOE,I-P JT Left 12/14/2018    Procedure: AMPUTATION TOE;  Surgeon: Mandeep Jefferson DPM;  Location: [facility];  Service: Podiatry    PA COLONOSCOPY FLX DX W/COLLJ SPEC WHEN PFRMD N/A 2/9/2018    Procedure: EGD AND COLONOSCOPY;  Surgeon: Rachna Mejia MD;  [facility]; Service: Gastroenterology    PA ENDOVENOUS LASER, 1ST VEIN Left 1/4/2019    Procedure: ENDOVASCULAR LASER THERAPY (EVLT);   Surgeon: DO Ty;  [facility];  Service: Vascular    PA ENDOVENOUS LASER, 1ST VEIN Right 6/7/2019    Procedure: ENDOVASCULAR LASER THERAPY (EVLT)   And stab phlebectomy;  Surgeon: DO Ty;  [facility];  Service: Vascular    PA REVISE KNEE JOINT REPLACE,1 PART Right 3/20/2017    Procedure: REVISION TOTAL KNEE ARTHROPLASTY INCLUDING FEMORAL STEM REVISION;  Surgeon: Tayo Donato MD;  [facility];  Service: Orthopedics    AL REVISE KNEE JOINT REPLACE,ALL PARTS Right 4/6/2016    Procedure: REVISION TOTAL KNEE ARTHROPLASTY INCLUDING FEMORAL AND TIBIAL COMPONENTS;  Surgeon: Tayo Donato MD;  [facility];  Service: Orthopedics    TONSILLECTOMY      ULNAR TUNNEL RELEASE      WISDOM TOOTH EXTRACTION           Family History     Family History   Problem Relation Age of Onset    Diabetes Mother     Hypertension Mother     Hypertension Father     No Known Problems Sister     No Known Problems Son     No Known Problems Son          Social History     Social History       Radiology     RENAL ULTRASOUND     INDICATION:   N20 0: Calculus of kidney      COMPARISON: Prior ultrasound report 7/2/15  Imaging not currently available      TECHNIQUE:   Ultrasound of the retroperitoneum was performed with a curvilinear transducer utilizing volumetric sweeps and still imaging techniques       FINDINGS:     KIDNEYS:  Symmetric, moderately enlarged but generally stable compared to size as reported on prior  Right kidney:  13 1 x 7 3 cm  Left kidney:  13 9 x 7 3 cm      Right kidney  Normal echogenicity and contour  No suspicious masses detected  No hydronephrosis  No shadowing calculi  No perinephric fluid collections      Left kidney  Normal echogenicity and contour  No suspicious masses detected  No hydronephrosis  Shadowing echogenic focus in the mid kidney measuring 7 mm  No perinephric fluid collections      URETERS:  Nonvisualized      BLADDER:   Normally distended  No focal thickening or mass lesions  Bilateral ureteral jets detected         IMPRESSION:     Nonobstructing shadowing focus, 7 mm left mid kidney  Symmetric mild renal enlargement, grossly stable compared to prior reports

## 2019-07-08 ENCOUNTER — HOSPITAL ENCOUNTER (OUTPATIENT)
Dept: CT IMAGING | Facility: HOSPITAL | Age: 59
Discharge: HOME | End: 2019-07-08
Payer: COMMERCIAL

## 2019-07-08 DIAGNOSIS — R10.9 FLANK PAIN: ICD-10-CM

## 2019-07-08 PROCEDURE — 74176 CT ABD & PELVIS W/O CONTRAST: CPT

## 2019-07-10 ENCOUNTER — TELEPHONE (OUTPATIENT)
Dept: UROLOGY | Facility: CLINIC | Age: 59
End: 2019-07-10

## 2019-07-10 ENCOUNTER — TELEPHONE (OUTPATIENT)
Dept: UROLOGY | Facility: MEDICAL CENTER | Age: 59
End: 2019-07-10

## 2019-07-10 NOTE — TELEPHONE ENCOUNTER
Telephone Encounter          Called patient to review CT findings  Left message on VM stating no obstructing stones, however diverticulitis present  Instructed him to f/u with PCP           Electronically signed by Ila Hansen PA-C at 7/10/2019  3:24 PM

## 2019-07-10 NOTE — TELEPHONE ENCOUNTER
Called patient to review CT findings  Left message on VM stating no obstructing stones, however diverticulitis present  Instructed him to f/u with PCP

## 2019-07-10 NOTE — TELEPHONE ENCOUNTER
Patient of Dr Lola Limon, last seen at [facility] by RENE Sullivan  [facility] called to advise results for patient's CT renal stone study are now in [facility] for review

## 2019-07-11 ENCOUNTER — OFFICE VISIT (OUTPATIENT)
Dept: UROLOGY | Facility: CLINIC | Age: 59
End: 2019-07-11
Payer: COMMERCIAL

## 2019-07-11 VITALS
WEIGHT: 280 LBS | SYSTOLIC BLOOD PRESSURE: 130 MMHG | HEIGHT: 70 IN | DIASTOLIC BLOOD PRESSURE: 74 MMHG | BODY MASS INDEX: 40.09 KG/M2

## 2019-07-11 DIAGNOSIS — N20.0 NEPHROLITHIASIS: Primary | ICD-10-CM

## 2019-07-11 PROCEDURE — 99214 OFFICE O/P EST MOD 30 MIN: CPT | Performed by: PHYSICIAN ASSISTANT

## 2019-07-11 NOTE — PROGRESS NOTES
Assessment/Plan:    64 y/o M w/ HTN, HLD, DM, obesity, and chronic venous insufficiency s/p L EVLT w/ phlebs Jan 2019, s/p R GSV EVLT w/ phlebs 6/7/19 by Dr Felipe Burt, seen for routine followup  Varicose veins of bilateral lower extremities with pain  -Doing well s/p BLE EVLT and phlebectomies  -Reports improvement in preoperative lower extremity pain  -Continues with edema  -Advised he return to daily use of 20-30 mmHg compression stockings to help manage swelling and prevent recurrent varicosities  -We discussed the benefits of frequent elevation, regular activity and weight loss  -Instructed to apply moisturizing lotion to the legs nightly to maintain skin integrity  -Followup as needed       Diagnoses and all orders for this visit:    Varicose veins of bilateral lower extremities with pain  -     Compression Stocking    Other orders  -     ciprofloxacin (CIPRO) 500 mg tablet  -     amLODIPine (NORVASC) 10 mg tablet  -     metroNIDAZOLE (FLAGYL) 500 mg tablet          Subjective:      Patient ID: Riley Ware is a 63 y o  male  Patient with symptomatic varicosities to bilateral lower extremities  History of left EVLT in January 2019 with good result, now status post right great saphenous vein EVLT with stab phlebectomies on 6/7/2019, both by Dr Felipe Burt  Patient reports good improvement in preoperative lower extremity pain  He denies any pain or swelling  However, on exam he has pitting edema to bilateral lower extremities  He is not wearing compression stockings  He does not elevate  Pt is here for a S/P right leg EVLT w/stab phlebs on 6/7/19  Pt denies any pain or swelling to his right leg  Pt not wearing compressions at this time  Pt is currently taking Lipitor          The following portions of the patient's history were reviewed and updated as appropriate: allergies, current medications, past family history, past medical history, past social history, past surgical history and problem list     Review of Systems   Constitutional: Negative  HENT: Negative  Eyes: Negative  Respiratory: Negative  Cardiovascular: Negative  Gastrointestinal: Negative  Endocrine: Negative  Genitourinary: Negative  Musculoskeletal: Positive for back pain  Skin: Negative  Allergic/Immunologic: Negative  Neurological: Negative  Hematological: Negative  Psychiatric/Behavioral: Negative  Objective:       Physical Exam   Constitutional: He is oriented to person, place, and time  He appears well-nourished  No distress  HENT:   Head: Normocephalic and atraumatic  Eyes: No scleral icterus  Neck: Neck supple  No JVD present  Pulmonary/Chest: Effort normal  No respiratory distress  Abdominal:   obese   Musculoskeletal: He exhibits edema (+1 edema BLE)  Neurological: He is alert and oriented to person, place, and time  Skin: Skin is warm and dry  Psychiatric: He has a normal mood and affect  I have reviewed and made appropriate changes to the review of systems input by the medical assistant      Vitals:    07/15/19 1532   BP: 142/86   BP Location: Left arm   Patient Position: Sitting   Cuff Size: Adult   Pulse: 60   Resp: 16   Temp: 99 2 °F (37 3 °C)   TempSrc: Tympanic   Weight: 132 kg (292 lb)   Height: 5' 10" (1 778 m)       Patient Active Problem List   Diagnosis    Diabetes mellitus ([facility] [address] )    Hypertension    Hyperlipidemia    Status post revision of total replacement of right knee    Varicose veins of bilateral lower extremities with pain    Intervertebral disc disorders with radiculopathy, lumbar region    Chronic pain of right knee    Pes anserine bursitis    Acute osteomyelitis of toe of left foot (HCC)    Acquired deformity of left foot    Hammer toe of left foot    Diabetic ulcer of toe of left foot associated with type 2 diabetes mellitus, with necrosis of bone (HCC)    Cellulitis of second toe of left foot    Left foot pain    Diabetic polyneuropathy associated with type 2 diabetes mellitus (HCC)    Acute osteomyelitis of toe, left (HCC)    Acute osteomyelitis of left ankle or foot (HCC)    Tinea pedis of both feet    Nephrolithiasis       Past Surgical History:   Procedure Laterality Date    CARPAL TUNNEL RELEASE Left     COLONOSCOPY      CORRECTION HAMMER TOE      Left foot 2nd toe    CYSTOSCOPY      HERNIA REPAIR Left     inguinal    JOINT REPLACEMENT      right knee revision, infection total of 4 surgeries     JOINT REPLACEMENT      left knee     KIDNEY STONE SURGERY      -large 27 mm stone-took x3 surgeries to resolve    UT AMPUTATION TOE,I-P JT Left 12/14/2018    Procedure: AMPUTATION TOE;  Surgeon: Luigi Ayala DPM;  Location: [facility];  Service: Podiatry    UT COLONOSCOPY FLX DX W/COLLJ SPEC WHEN PFRMD N/A 2/9/2018    Procedure: EGD AND COLONOSCOPY;  Surgeon: Lee Jiménez MD;  [facility]; Service: Gastroenterology    UT ENDOVENOUS LASER, 1ST VEIN Left 1/4/2019    Procedure: ENDOVASCULAR LASER THERAPY (EVLT);   Surgeon: DO Ty;  [facility];  Service: Vascular    UT ENDOVENOUS LASER, 1ST VEIN Right 6/7/2019    Procedure: ENDOVASCULAR LASER THERAPY (EVLT)   And stab phlebectomy;  Surgeon: DO Ty;  [facility];  Service: Vascular    UT REVISE KNEE JOINT REPLACE,1 PART Right 3/20/2017    Procedure: REVISION TOTAL KNEE ARTHROPLASTY INCLUDING FEMORAL STEM REVISION;  Surgeon: Justin Hernández MD;  [facility];  Service: Orthopedics    UT REVISE KNEE JOINT REPLACE,ALL PARTS Right 4/6/2016    Procedure: REVISION TOTAL KNEE ARTHROPLASTY INCLUDING FEMORAL AND TIBIAL COMPONENTS;  Surgeon: Justin Hernández MD;  [facility];  Service: Orthopedics    TONSILLECTOMY      ULNAR TUNNEL RELEASE      WISDOM TOOTH EXTRACTION         Family History   Problem Relation Age of Onset    Diabetes Mother     Hypertension Mother     Hypertension Father     No Known Problems Sister     No Known Problems Son     No Known Problems Son        Social History     Socioeconomic History    Marital status:       Spouse name: Not on file    Number of children: Not on file    Years of education: Not on file    Highest education level: Not on file   Occupational History    Not on file   Social Needs    Financial resource strain: Not on file    Food insecurity:     Worry: Not on file     Inability: Not on file    Transportation needs:     Medical: Not on file     Non-medical: Not on file   Tobacco Use    Smoking status: Former Smoker     Last attempt to quit: 1975     Years since quittin 5    Smokeless tobacco: Never Used   Substance and Sexual Activity    Alcohol use: Yes     Frequency: 2-3 times a week     Drinks per session: 1 or 2     Binge frequency: Never     Comment: 2-3 drinks per night    Drug use: No    Sexual activity: Yes   Lifestyle    Physical activity:     Days per week: Not on file     Minutes per session: Not on file    Stress: Not on file   Relationships    Social connections:     Talks on phone: Not on file     Gets together: Not on file     Attends Hinduism service: Not on file     Active member of club or organization: Not on file     Attends meetings of clubs or organizations: Not on file     Relationship status: Not on file    Intimate partner violence:     Fear of current or ex partner: Not on file     Emotionally abused: Not on file     Physically abused: Not on file     Forced sexual activity: Not on file   Other Topics Concern    Not on file   Social History Narrative    Not on file       Allergies   Allergen Reactions    Bee Venom Anaphylaxis     Uses Epi-pen    Doxycycline Photosensitivity         Current Outpatient Medications:     allopurinol (ZYLOPRIM) 100 mg tablet, Take 100 mg by mouth every morning  , Disp: , Rfl:     amLODIPine (NORVASC) 5 mg tablet, Take 5 mg by mouth 2 (two) times a day  , Disp: , Rfl:    atorvastatin (LIPITOR) 40 mg tablet, Take 40 mg by mouth daily at bedtime, Disp: , Rfl:     cholecalciferol (VITAMIN D3) 1,000 units tablet, Take 1,000 Units by mouth daily at bedtime  , Disp: , Rfl:     ciprofloxacin (CIPRO) 500 mg tablet, , Disp: , Rfl:     Dulaglutide (TRULICITY) 1 5 BL/3 5AW SOPN, Inject 1 5 mg under the skin once a week Takes on Sunday, Disp: , Rfl:     Empagliflozin (JARDIANCE) 10 MG TABS, Take 10 mg by mouth every morning  , Disp: , Rfl:     fenofibrate (TRIGLIDE) 160 MG tablet, Take 160 mg by mouth every morning, Disp: , Rfl:     labetalol (NORMODYNE) 100 mg tablet, Take 100 mg by mouth 2 (two) times a day, Disp: , Rfl:     lisinopril (ZESTRIL) 40 mg tablet, Take 40 mg by mouth every morning  , Disp: , Rfl:     metFORMIN (GLUCOPHAGE) 1000 MG tablet, Take 1,000 mg by mouth 2 (two) times a day with meals  , Disp: , Rfl:     metroNIDAZOLE (FLAGYL) 500 mg tablet, , Disp: , Rfl:     Multiple Vitamin (MULTIVITAMIN) tablet, Take 1 tablet by mouth every morning  , Disp: , Rfl:     SILDENAFIL CITRATE PO, SUCK ON one aniya 30 minutes BEFORE sexual activity AS NEEDED, Disp: , Rfl: 4    amLODIPine (NORVASC) 10 mg tablet, , Disp: , Rfl:

## 2019-07-11 NOTE — PROGRESS NOTES
1  Nephrolithiasis  Case request operating room: CYSTOSCOPY URETEROSCOPY WITH LITHOTRIPSY HOLMIUM LASER, RETROGRADE PYELOGRAM AND INSERTION STENT URETERAL    Case request operating room: CYSTOSCOPY URETEROSCOPY WITH LITHOTRIPSY HOLMIUM LASER, RETROGRADE PYELOGRAM AND INSERTION STENT URETERAL       Assessment and plan:       1  Nephrolithiasis  - I reviewed with the patient that he has nonobstructing left intrarenal calculi  We discussed with the largest is measuring approximately 6 mm  We reviewed options for his kidney stones including observation, ESWL, and ureteroscopy  Due to patient's body habitus, I do not feel he would be a good candidate for ESWL  Patient has previously had ureteroscopy in the past without complication  Patient has elected to continue with ureteroscopy for stone removal   - we reviewed the risks of the procedure including but not limited to cardiopulmonary complications, bleeding, infection, damage to nearby structures, need for nephrostomy tube, and need for additional procedures  Patient verbalized understanding   - cystoscopy, left ureteroscopy, holmium laser, basket extraction, retrograde pyelogram, and left ureteral stent insertion will be arranged in the near future  He is aware to contact us in the interim with any signs and symptoms of passing stone  I encourage patient to contact his primary care provider for further workup and treatment for his diverticulitis noted on recent CT  Wendy Nayak PA-C      Chief Complaint     F/u kidney stones    History of Present Illness     Nicole Shepherd is a 63 y o  male presenting for follow-up of nephrolithiasis    Patient states that he has a previous history of nephrolithiasis  Previously had required staged ureteroscopy with Dr Chucky Smalls per patient's report  He is unsure when this occurred, however states this has been more than 5 years      Patient reports that he was having left flank pain starting 06/16/2019  Pain was significant on the 1st day, as well noted some dark colored urine  His pain is since slowly subsided and now wishes a dull ache  Patient had a recent CT stone study (07/08/2019)  This is negative for evidence of obstructing  He had multiple small nonobstructing left intrarenal calculi, the largest measuring approximately 6 mm in his upper pole  CT did detect mild diverticulitis in the left colon  Patient states that he has been feeling better over the past few days  Denies any significant flank pain, dysuria, gross hematuria, nausea, vomiting, fevers, or chills  Denies any interval passage of stones  Laboratory     Lab Results   Component Value Date    CREATININE 0 95 05/09/2019     Lab Results   Component Value Date    PSA 0 5 01/02/2019    PSA 0 4 01/05/2018    PSA 0 8 01/03/2017       Review of Systems     Review of Systems   Constitutional: Negative for activity change, appetite change, chills, diaphoresis, fatigue, fever and unexpected weight change  Respiratory: Negative for chest tightness and shortness of breath  Cardiovascular: Negative for chest pain, palpitations and leg swelling  Gastrointestinal: Negative for abdominal distention, abdominal pain, constipation, diarrhea, nausea and vomiting  Genitourinary: Negative for decreased urine volume, difficulty urinating, dysuria, enuresis, flank pain, frequency, genital sores, hematuria and urgency  Musculoskeletal: Negative for back pain, gait problem and myalgias  Skin: Negative for color change, pallor, rash and wound  Psychiatric/Behavioral: Negative for behavioral problems  The patient is not nervous/anxious  Allergies     Allergies   Allergen Reactions    Bee Venom Anaphylaxis     Uses Epi-pen    Doxycycline Photosensitivity       Physical Exam     Physical Exam   Constitutional: He is oriented to person, place, and time  He appears well-developed and well-nourished  No distress     HENT:   Head: Normocephalic and atraumatic  Eyes: Conjunctivae are normal    Neck: Normal range of motion  No tracheal deviation present  Pulmonary/Chest: Effort normal    Abdominal:   Obese   Musculoskeletal: Normal range of motion  He exhibits no deformity  Neurological: He is alert and oriented to person, place, and time  Skin: Skin is warm and dry  No rash noted  He is not diaphoretic  No erythema  Psychiatric: He has a normal mood and affect  His behavior is normal        Vital Signs     Vitals:    07/11/19 1448   BP: 130/74   BP Location: Left arm   Patient Position: Sitting   Cuff Size: Adult   Weight: 127 kg (280 lb)   Height: 5' 10" (1 778 m)         Current Medications       Current Outpatient Medications:     allopurinol (ZYLOPRIM) 100 mg tablet, Take 100 mg by mouth every morning  , Disp: , Rfl:     amLODIPine (NORVASC) 5 mg tablet, Take 5 mg by mouth 2 (two) times a day  , Disp: , Rfl:     atorvastatin (LIPITOR) 40 mg tablet, Take 40 mg by mouth daily at bedtime, Disp: , Rfl:     cholecalciferol (VITAMIN D3) 1,000 units tablet, Take 1,000 Units by mouth daily at bedtime  , Disp: , Rfl:     Dulaglutide (TRULICITY) 1 5 UF/6 5XI SOPN, Inject 1 5 mg under the skin once a week Takes on Sunday, Disp: , Rfl:     Empagliflozin (JARDIANCE) 10 MG TABS, Take 10 mg by mouth every morning  , Disp: , Rfl:     fenofibrate (TRIGLIDE) 160 MG tablet, Take 160 mg by mouth every morning, Disp: , Rfl:     lisinopril (ZESTRIL) 40 mg tablet, Take 40 mg by mouth every morning  , Disp: , Rfl:     metFORMIN (GLUCOPHAGE) 1000 MG tablet, Take 1,000 mg by mouth 2 (two) times a day with meals  , Disp: , Rfl:     Multiple Vitamin (MULTIVITAMIN) tablet, Take 1 tablet by mouth every morning  , Disp: , Rfl:     SILDENAFIL CITRATE PO, SUCK ON one aniya 30 minutes BEFORE sexual activity AS NEEDED, Disp: , Rfl: 4    labetalol (NORMODYNE) 100 mg tablet, Take 100 mg by mouth 2 (two) times a day, Disp: , Rfl:       Active Problems Patient Active Problem List   Diagnosis    Diabetes mellitus ([facility] [address] )    Hypertension    Hyperlipidemia    Status post revision of total replacement of right knee    Varicose veins of bilateral lower extremities with pain    Intervertebral disc disorders with radiculopathy, lumbar region    Chronic pain of right knee    Pes anserine bursitis    Acute [facility]omyelitis of toe of left foot ([facility])    Acquired deformity of left foot    Hammer toe of left foot    Diabetic ulcer of toe of left foot associated with type 2 diabetes mellitus, with necrosis of bone ([facility])    Cellulitis of second toe of left foot    Left foot pain    Diabetic polyneuropathy associated with type 2 diabetes mellitus ([facility])    Acute osteomyelitis of toe, left ([facility])    Acute osteomyelitis of left ankle or foot ([facility] [address] )    Tinea pedis of both feet         Past Medical History     Past Medical History:   Diagnosis Date    Anemia     s/p knee sx    Anesthesia complication     Developed hives in the PACU s/p knee replacement    Arthritis     Diabetes mellitus ([facility] [address] )     niddm    Diabetic neuropathy ([facility])     Left foot burning and tingling    DJD (degenerative joint disease)     Edema     lower legs-ankles    Hyperlipidemia     Hypertension     Kidney stone     27mm    Mechanical complication of internal orthopedic device ([facility])     knee    Obesity     Osteomyelitis ([facility])     left 2nd toe    PONV (postoperative nausea and vomiting)     patient requests to be premedicated for N/V    PVD (peripheral vascular disease) ([facility] [address] )     Valve issue with circulation of the lower legs- to have laser surgery         Surgical History     Past Surgical History:   Procedure Laterality Date    CARPAL TUNNEL RELEASE Left     COLONOSCOPY      CORRECTION HAMMER TOE      Left foot 2nd toe    CYSTOSCOPY      HERNIA REPAIR Left     inguinal    JOINT REPLACEMENT      right knee revision, infection total of 4 surgeries     JOINT REPLACEMENT      left knee     KIDNEY STONE SURGERY      -large 27 mm stone-took x3 surgeries to resolve    MO AMPUTATION TOE,I-P JT Left 12/14/2018    Procedure: AMPUTATION TOE;  Surgeon: Catarino Sheppard DPM;  Location: [address] [place] [address];  Service: Podiatry    MO COLONOSCOPY FLX DX W/COLLJ SPEC WHEN PFRMD N/A 2/9/2018    Procedure: EGD AND COLONOSCOPY;  Surgeon: Toribio Kelsey MD;  [facility]; Service: Gastroenterology    MO ENDOVENOUS LASER, 1ST VEIN Left 1/4/2019    Procedure: ENDOVASCULAR LASER THERAPY (EVLT); Surgeon: Salvador Bianchi DO;  [facility];  Service: Vascular    MO ENDOVENOUS LASER, 1ST VEIN Right 6/7/2019    Procedure: ENDOVASCULAR LASER THERAPY (EVLT)   And stab phlebectomy;  Surgeon: Salvador Bianchi DO;  [facility];  Service: Vascular    MO REVISE KNEE JOINT REPLACE,1 PART Right 3/20/2017    Procedure: REVISION TOTAL KNEE ARTHROPLASTY INCLUDING FEMORAL STEM REVISION;  Surgeon: Iveth Montero MD;  [facility];  Service: Orthopedics    MO REVISE KNEE JOINT REPLACE,ALL PARTS Right 4/6/2016    Procedure: REVISION TOTAL KNEE ARTHROPLASTY INCLUDING FEMORAL AND TIBIAL COMPONENTS;  Surgeon: Iveth Montero MD;  [facility];  Service: Orthopedics    TONSILLECTOMY      ULNAR TUNNEL RELEASE      WISDOM TOOTH EXTRACTION           Family History     Family History   Problem Relation Age of Onset    Diabetes Mother     Hypertension Mother     Hypertension Father     No Known Problems Sister     No Known Problems Son     No Known Problems Son          Social History     Social History       Radiology   CT ABDOMEN AND PELVIS WITHOUT IV CONTRAST - LOW DOSE RENAL STONE      INDICATION:   R10 9: Unspecified abdominal pain      COMPARISON:  7/10/2015     TECHNIQUE:  Low dose thin section CT examination of the abdomen and pelvis was performed without intravenous or oral contrast according to a protocol specifically designed to evaluate for urinary tract calculus    Axial, sagittal, and coronal 2D   reformatted images were created from the source data and submitted for interpretation  Evaluation for pathology in the abdomen and pelvis that is unrelated to urinary tract calculi is limited       Radiation dose length product (DLP) for this visit:  1640 mGy-cm   This examination, like all CT scans performed in the [facility], was performed utilizing techniques to minimize radiation dose exposure, including the use of iterative   reconstruction and automated exposure control       FINDINGS:     RIGHT KIDNEY AND URETER:  No urinary tract calculi  No hydronephrosis or hydroureter      LEFT KIDNEY AND URETER:  There are multiple small nonobstructing intrarenal calculi identified  The largest of these is within the upper pole measuring 6 mm  No ureteral calculi  No hydronephrosis      URINARY BLADDER:   Unremarkable      No significant abnormality in the visualized lung bases      The liver is mildly enlarged and diffusely decreased in attenuation consistent with diffuse hepatic steatosis  Mild focal fatty sparing adjacent to the gallbladder  No calcified stones  Unremarkable spleen, pancreas and adrenal glands  No calcified gallstones or gallbladder wall thickening noted  No ascites or bulky lymphadenopathy on this limited noncontrast study  Colonic diverticulosis mainly involving the left colon and sigmoid colon  There is mild focal edema adjacent to a mid left colon diverticula especially for mild diverticulitis  No evidence of perforation or abscess  Limited evaluation demonstrates no evidence to suggest acute appendicitis  Lumbar degenerative disc disease and endplate         IMPRESSION:     Nonobstructing calculi within the left kidney  No hydronephrosis      Colonic diverticulosis mainly involving the left colon and sigmoid colon mild focal inflammation posterior to the mid aspect of the left colon suggesting mild diverticulitis    No evidence of perforation or abscess formation      Hepatic steatosis with mild hepatomegaly

## 2019-07-12 ENCOUNTER — TELEPHONE (OUTPATIENT)
Dept: UROLOGY | Facility: CLINIC | Age: 59
End: 2019-07-12

## 2019-07-12 ENCOUNTER — PREP FOR PROCEDURE (OUTPATIENT)
Dept: UROLOGY | Facility: CLINIC | Age: 59
End: 2019-07-12

## 2019-07-12 DIAGNOSIS — Z01.818 PREOP EXAMINATION: ICD-10-CM

## 2019-07-12 DIAGNOSIS — N20.0 NEPHROLITHIASIS: Primary | ICD-10-CM

## 2019-07-12 NOTE — TELEPHONE ENCOUNTER
I spoke with pt this morning and scheduled him for a procedure with Dr Maurisio Del Valle on 7/26/19 at the [address] [place] [address]  I verbally went over all of the instructions and prep for this procedure  Pt will have a urine culture done as soon as possible at a [facility] facility  Per pt 's request I mailed him his procedure packet  He was instructed to give me a call with any questions regarding this procedure

## 2019-07-15 ENCOUNTER — OFFICE VISIT (OUTPATIENT)
Dept: VASCULAR SURGERY | Facility: CLINIC | Age: 59
End: 2019-07-15

## 2019-07-15 VITALS
WEIGHT: 292 LBS | TEMPERATURE: 99.2 F | RESPIRATION RATE: 16 BRPM | HEIGHT: 70 IN | SYSTOLIC BLOOD PRESSURE: 142 MMHG | HEART RATE: 60 BPM | DIASTOLIC BLOOD PRESSURE: 86 MMHG | BODY MASS INDEX: 41.8 KG/M2

## 2019-07-15 DIAGNOSIS — I83.813 VARICOSE VEINS OF BILATERAL LOWER EXTREMITIES WITH PAIN: Primary | ICD-10-CM

## 2019-07-15 PROCEDURE — 99024 POSTOP FOLLOW-UP VISIT: CPT | Performed by: NURSE PRACTITIONER

## 2019-07-15 RX ORDER — AMLODIPINE BESYLATE 10 MG/1
TABLET ORAL
COMMUNITY
Start: 2019-07-12 | End: 2021-06-21 | Stop reason: DRUGHIGH

## 2019-07-15 RX ORDER — METRONIDAZOLE 500 MG/1
TABLET ORAL
COMMUNITY
Start: 2019-07-12 | End: 2019-11-22 | Stop reason: ALTCHOICE

## 2019-07-15 RX ORDER — CIPROFLOXACIN 500 MG/1
TABLET, FILM COATED ORAL
COMMUNITY
Start: 2019-07-12 | End: 2021-10-25 | Stop reason: ALTCHOICE

## 2019-07-15 NOTE — PATIENT INSTRUCTIONS
Symptomatic varicose veins/chronic venous insufficiency status post right leg venous surgery by Dr Cachorro Beltran on 6/7/2019, status post left leg venous surgery 01/2019  -your bilateral lower extremity venous symptoms have improved postop  In order to prevent recurrent symptoms and recurrent varicosities I recommend you return to use of 20-30 mmHg knee-high compression stockings on a daily basis during waking hours  -if 20-30 mmHg knee-high compression stockings are too tight/difficult to tolerate, try less tight over-the-counter compression    Any compression is better than no compression oral  -other things we discussed that may help manage your symptoms:  Periodic elevation, regular physical activity and maintenance of a healthy weight  -apply moisturizing lotion to your legs nightly to help maintain skin integrity  -if you develop any recurrent symptoms notify our office, otherwise will be happy to see you on as needed basis

## 2019-07-19 ENCOUNTER — HOSPITAL ENCOUNTER (OUTPATIENT)
Facility: HOSPITAL | Age: 59
Setting detail: OBSERVATION
Discharge: HOME | End: 2019-07-19
Attending: EMERGENCY MEDICINE | Admitting: INTERNAL MEDICINE
Payer: COMMERCIAL

## 2019-07-19 ENCOUNTER — APPOINTMENT (EMERGENCY)
Dept: RADIOLOGY | Facility: HOSPITAL | Age: 59
End: 2019-07-19
Payer: COMMERCIAL

## 2019-07-19 VITALS
TEMPERATURE: 98.8 F | WEIGHT: 295.2 LBS | SYSTOLIC BLOOD PRESSURE: 135 MMHG | DIASTOLIC BLOOD PRESSURE: 84 MMHG | BODY MASS INDEX: 42.36 KG/M2 | HEART RATE: 71 BPM | RESPIRATION RATE: 18 BRPM | OXYGEN SATURATION: 94 %

## 2019-07-19 DIAGNOSIS — R07.9 CHEST PAIN: Primary | ICD-10-CM

## 2019-07-19 DIAGNOSIS — T78.40XA ALLERGIC REACTION, INITIAL ENCOUNTER: ICD-10-CM

## 2019-07-19 PROBLEM — I83.813 VARICOSE VEINS OF BILATERAL LOWER EXTREMITIES WITH PAIN: Status: RESOLVED | Noted: 2018-04-05 | Resolved: 2019-07-19

## 2019-07-19 LAB
ANION GAP SERPL CALCULATED.3IONS-SCNC: 11 MMOL/L (ref 4–13)
ATRIAL RATE: 69 BPM
BASOPHILS # BLD AUTO: 0.02 THOUSANDS/ΜL (ref 0–0.1)
BASOPHILS NFR BLD AUTO: 0 % (ref 0–1)
BUN SERPL-MCNC: 16 MG/DL (ref 5–25)
CALCIUM SERPL-MCNC: 9.7 MG/DL (ref 8.3–10.1)
CHLORIDE SERPL-SCNC: 104 MMOL/L (ref 100–108)
CO2 SERPL-SCNC: 24 MMOL/L (ref 21–32)
CREAT SERPL-MCNC: 0.95 MG/DL (ref 0.6–1.3)
EOSINOPHIL # BLD AUTO: 0.12 THOUSAND/ΜL (ref 0–0.61)
EOSINOPHIL NFR BLD AUTO: 2 % (ref 0–6)
ERYTHROCYTE [DISTWIDTH] IN BLOOD BY AUTOMATED COUNT: 12.8 % (ref 11.6–15.1)
GFR SERPL CREATININE-BSD FRML MDRD: 88 ML/MIN/1.73SQ M
GLUCOSE SERPL-MCNC: 208 MG/DL (ref 65–140)
GLUCOSE SERPL-MCNC: 223 MG/DL (ref 65–140)
GLUCOSE SERPL-MCNC: 356 MG/DL (ref 65–140)
HCT VFR BLD AUTO: 44 % (ref 36.5–49.3)
HGB BLD-MCNC: 15 G/DL (ref 12–17)
IMM GRANULOCYTES # BLD AUTO: 0.03 THOUSAND/UL (ref 0–0.2)
IMM GRANULOCYTES NFR BLD AUTO: 1 % (ref 0–2)
LYMPHOCYTES # BLD AUTO: 1.91 THOUSANDS/ΜL (ref 0.6–4.47)
LYMPHOCYTES NFR BLD AUTO: 34 % (ref 14–44)
MCH RBC QN AUTO: 31.5 PG (ref 26.8–34.3)
MCHC RBC AUTO-ENTMCNC: 34.1 G/DL (ref 31.4–37.4)
MCV RBC AUTO: 92 FL (ref 82–98)
MONOCYTES # BLD AUTO: 0.54 THOUSAND/ΜL (ref 0.17–1.22)
MONOCYTES NFR BLD AUTO: 10 % (ref 4–12)
NEUTROPHILS # BLD AUTO: 3.08 THOUSANDS/ΜL (ref 1.85–7.62)
NEUTS SEG NFR BLD AUTO: 53 % (ref 43–75)
NRBC BLD AUTO-RTO: 0 /100 WBCS
P AXIS: 50 DEGREES
PLATELET # BLD AUTO: 194 THOUSANDS/UL (ref 149–390)
PMV BLD AUTO: 9.4 FL (ref 8.9–12.7)
POTASSIUM SERPL-SCNC: 3.4 MMOL/L (ref 3.5–5.3)
PR INTERVAL: 158 MS
QRS AXIS: -28 DEGREES
QRSD INTERVAL: 100 MS
QT INTERVAL: 414 MS
QTC INTERVAL: 434 MS
RBC # BLD AUTO: 4.76 MILLION/UL (ref 3.88–5.62)
SODIUM SERPL-SCNC: 139 MMOL/L (ref 136–145)
T WAVE AXIS: 31 DEGREES
TROPONIN I SERPL-MCNC: <0.02 NG/ML
VENTRICULAR RATE: 66 BPM
WBC # BLD AUTO: 5.7 THOUSAND/UL (ref 4.31–10.16)

## 2019-07-19 PROCEDURE — 99236 HOSP IP/OBS SAME DATE HI 85: CPT | Performed by: INTERNAL MEDICINE

## 2019-07-19 PROCEDURE — 84484 ASSAY OF TROPONIN QUANT: CPT | Performed by: PHYSICIAN ASSISTANT

## 2019-07-19 PROCEDURE — 96374 THER/PROPH/DIAG INJ IV PUSH: CPT

## 2019-07-19 PROCEDURE — 99284 EMERGENCY DEPT VISIT MOD MDM: CPT | Performed by: PHYSICIAN ASSISTANT

## 2019-07-19 PROCEDURE — 71046 X-RAY EXAM CHEST 2 VIEWS: CPT

## 2019-07-19 PROCEDURE — 80048 BASIC METABOLIC PNL TOTAL CA: CPT | Performed by: PHYSICIAN ASSISTANT

## 2019-07-19 PROCEDURE — 93010 ELECTROCARDIOGRAM REPORT: CPT | Performed by: INTERNAL MEDICINE

## 2019-07-19 PROCEDURE — 36415 COLL VENOUS BLD VENIPUNCTURE: CPT | Performed by: PHYSICIAN ASSISTANT

## 2019-07-19 PROCEDURE — 85025 COMPLETE CBC W/AUTO DIFF WBC: CPT | Performed by: PHYSICIAN ASSISTANT

## 2019-07-19 PROCEDURE — 96375 TX/PRO/DX INJ NEW DRUG ADDON: CPT

## 2019-07-19 PROCEDURE — 84484 ASSAY OF TROPONIN QUANT: CPT | Performed by: INTERNAL MEDICINE

## 2019-07-19 PROCEDURE — 93005 ELECTROCARDIOGRAM TRACING: CPT

## 2019-07-19 PROCEDURE — 99220 PR INITIAL OBSERVATION CARE/DAY 70 MINUTES: CPT | Performed by: INTERNAL MEDICINE

## 2019-07-19 PROCEDURE — 82948 REAGENT STRIP/BLOOD GLUCOSE: CPT

## 2019-07-19 PROCEDURE — 99284 EMERGENCY DEPT VISIT MOD MDM: CPT

## 2019-07-19 RX ORDER — METRONIDAZOLE 500 MG/1
500 TABLET ORAL ONCE
Status: COMPLETED | OUTPATIENT
Start: 2019-07-19 | End: 2019-07-19

## 2019-07-19 RX ORDER — LISINOPRIL 10 MG/1
40 TABLET ORAL EVERY MORNING
Status: DISCONTINUED | OUTPATIENT
Start: 2019-07-19 | End: 2019-07-19 | Stop reason: HOSPADM

## 2019-07-19 RX ORDER — HYDROXYZINE HYDROCHLORIDE 25 MG/1
25 TABLET, FILM COATED ORAL EVERY 6 HOURS PRN
Status: DISCONTINUED | OUTPATIENT
Start: 2019-07-19 | End: 2019-07-19 | Stop reason: HOSPADM

## 2019-07-19 RX ORDER — PREDNISONE 20 MG/1
40 TABLET ORAL DAILY
Status: DISCONTINUED | OUTPATIENT
Start: 2019-07-19 | End: 2019-07-19 | Stop reason: HOSPADM

## 2019-07-19 RX ORDER — LABETALOL 100 MG/1
100 TABLET, FILM COATED ORAL 2 TIMES DAILY
Status: DISCONTINUED | OUTPATIENT
Start: 2019-07-19 | End: 2019-07-19 | Stop reason: HOSPADM

## 2019-07-19 RX ORDER — DIPHENHYDRAMINE HYDROCHLORIDE 50 MG/ML
25 INJECTION INTRAMUSCULAR; INTRAVENOUS ONCE
Status: DISCONTINUED | OUTPATIENT
Start: 2019-07-19 | End: 2019-07-19 | Stop reason: HOSPADM

## 2019-07-19 RX ORDER — ONDANSETRON 2 MG/ML
4 INJECTION INTRAMUSCULAR; INTRAVENOUS EVERY 6 HOURS PRN
Status: DISCONTINUED | OUTPATIENT
Start: 2019-07-19 | End: 2019-07-19 | Stop reason: HOSPADM

## 2019-07-19 RX ORDER — DIPHENHYDRAMINE HCL 25 MG
25 TABLET ORAL EVERY 6 HOURS PRN
Status: DISCONTINUED | OUTPATIENT
Start: 2019-07-19 | End: 2019-07-19

## 2019-07-19 RX ORDER — MELATONIN
1000
Status: DISCONTINUED | OUTPATIENT
Start: 2019-07-19 | End: 2019-07-19 | Stop reason: HOSPADM

## 2019-07-19 RX ORDER — DIPHENHYDRAMINE HYDROCHLORIDE 50 MG/ML
25 INJECTION INTRAMUSCULAR; INTRAVENOUS ONCE
Status: COMPLETED | OUTPATIENT
Start: 2019-07-19 | End: 2019-07-19

## 2019-07-19 RX ORDER — METHYLPREDNISOLONE SODIUM SUCCINATE 125 MG/2ML
125 INJECTION, POWDER, LYOPHILIZED, FOR SOLUTION INTRAMUSCULAR; INTRAVENOUS ONCE
Status: COMPLETED | OUTPATIENT
Start: 2019-07-19 | End: 2019-07-19

## 2019-07-19 RX ORDER — DIPHENHYDRAMINE HCL 25 MG
25 TABLET ORAL EVERY 6 HOURS PRN
Qty: 30 TABLET | Refills: 0 | Status: SHIPPED | OUTPATIENT
Start: 2019-07-19 | End: 2022-01-20

## 2019-07-19 RX ORDER — LORATADINE 10 MG/1
10 TABLET ORAL DAILY
Status: DISCONTINUED | OUTPATIENT
Start: 2019-07-19 | End: 2019-07-19 | Stop reason: HOSPADM

## 2019-07-19 RX ORDER — FENOFIBRATE 145 MG/1
145 TABLET, COATED ORAL DAILY
Status: DISCONTINUED | OUTPATIENT
Start: 2019-07-19 | End: 2019-07-19 | Stop reason: HOSPADM

## 2019-07-19 RX ORDER — AMLODIPINE BESYLATE 5 MG/1
10 TABLET ORAL DAILY
Status: DISCONTINUED | OUTPATIENT
Start: 2019-07-19 | End: 2019-07-19 | Stop reason: HOSPADM

## 2019-07-19 RX ORDER — CIPROFLOXACIN 500 MG/1
500 TABLET, FILM COATED ORAL ONCE
Status: COMPLETED | OUTPATIENT
Start: 2019-07-19 | End: 2019-07-19

## 2019-07-19 RX ORDER — ATORVASTATIN CALCIUM 40 MG/1
40 TABLET, FILM COATED ORAL
Status: DISCONTINUED | OUTPATIENT
Start: 2019-07-19 | End: 2019-07-19 | Stop reason: HOSPADM

## 2019-07-19 RX ORDER — ACETAMINOPHEN 325 MG/1
650 TABLET ORAL EVERY 6 HOURS PRN
Status: DISCONTINUED | OUTPATIENT
Start: 2019-07-19 | End: 2019-07-19 | Stop reason: HOSPADM

## 2019-07-19 RX ORDER — DIPHENHYDRAMINE HYDROCHLORIDE 50 MG/ML
25 INJECTION INTRAMUSCULAR; INTRAVENOUS EVERY 6 HOURS PRN
Status: DISCONTINUED | OUTPATIENT
Start: 2019-07-19 | End: 2019-07-19 | Stop reason: HOSPADM

## 2019-07-19 RX ORDER — PREDNISONE 20 MG/1
40 TABLET ORAL DAILY
Qty: 4 TABLET | Refills: 0 | Status: SHIPPED | OUTPATIENT
Start: 2019-07-20 | End: 2019-07-22

## 2019-07-19 RX ORDER — ALLOPURINOL 100 MG/1
100 TABLET ORAL EVERY MORNING
Status: DISCONTINUED | OUTPATIENT
Start: 2019-07-19 | End: 2019-07-19 | Stop reason: HOSPADM

## 2019-07-19 RX ADMIN — METHYLPREDNISOLONE SODIUM SUCCINATE 125 MG: 125 INJECTION, POWDER, FOR SOLUTION INTRAMUSCULAR; INTRAVENOUS at 02:17

## 2019-07-19 RX ADMIN — LORATADINE 10 MG: 10 TABLET ORAL at 08:58

## 2019-07-19 RX ADMIN — LABETALOL HYDROCHLORIDE 100 MG: 100 TABLET, FILM COATED ORAL at 08:58

## 2019-07-19 RX ADMIN — HYDROXYZINE HYDROCHLORIDE 25 MG: 25 TABLET ORAL at 09:50

## 2019-07-19 RX ADMIN — INSULIN LISPRO 1 UNITS: 100 INJECTION, SOLUTION INTRAVENOUS; SUBCUTANEOUS at 07:08

## 2019-07-19 RX ADMIN — DIPHENHYDRAMINE HYDROCHLORIDE 25 MG: 50 INJECTION, SOLUTION INTRAMUSCULAR; INTRAVENOUS at 06:55

## 2019-07-19 RX ADMIN — LISINOPRIL 40 MG: 10 TABLET ORAL at 09:48

## 2019-07-19 RX ADMIN — FAMOTIDINE 20 MG: 10 INJECTION, SOLUTION INTRAVENOUS at 09:13

## 2019-07-19 RX ADMIN — DIPHENHYDRAMINE HYDROCHLORIDE 25 MG: 50 INJECTION, SOLUTION INTRAMUSCULAR; INTRAVENOUS at 11:45

## 2019-07-19 RX ADMIN — AMLODIPINE BESYLATE 10 MG: 5 TABLET ORAL at 08:57

## 2019-07-19 RX ADMIN — ALLOPURINOL 100 MG: 100 TABLET ORAL at 08:59

## 2019-07-19 RX ADMIN — CIPROFLOXACIN HYDROCHLORIDE 500 MG: 500 TABLET, FILM COATED ORAL at 09:49

## 2019-07-19 RX ADMIN — Medication 1 TABLET: at 08:58

## 2019-07-19 RX ADMIN — FAMOTIDINE 20 MG: 10 INJECTION, SOLUTION INTRAVENOUS at 01:49

## 2019-07-19 RX ADMIN — METRONIDAZOLE 500 MG: 500 TABLET, FILM COATED ORAL at 09:49

## 2019-07-19 RX ADMIN — PREDNISONE 40 MG: 20 TABLET ORAL at 08:59

## 2019-07-19 RX ADMIN — FENOFIBRATE 145 MG: 145 TABLET, COATED ORAL at 08:56

## 2019-07-19 RX ADMIN — DIPHENHYDRAMINE HYDROCHLORIDE 25 MG: 50 INJECTION, SOLUTION INTRAMUSCULAR; INTRAVENOUS at 01:49

## 2019-07-19 RX ADMIN — INSULIN LISPRO 4 UNITS: 100 INJECTION, SOLUTION INTRAVENOUS; SUBCUTANEOUS at 11:49

## 2019-07-19 NOTE — ASSESSMENT & PLAN NOTE
· Sounds atypical  Denies any significant cardiac hx but does have risk factors  · EKG NSR 66 bpm, no obvious ischemic changes  · Initial troponin negative  Trend   Monitor on telemetry  · Check echo

## 2019-07-19 NOTE — UTILIZATION REVIEW
Notification of Observation Care Status/Observation Authorization Request    This is a Notification of Observation Care Status to our facility [address] [facility],[address]  Be advised that this patient was admitted to our facility under Observation Status  Please contact the Utilization Review Department where the patient is receiving care services for additional admission information  Place of Service Code: 25  Place of Service Name: [facility]  CPT Code for Observation: CPT Rubén Rufino / CPT 78422    Presentation Date & Time: 7/19/2019  1:17 AM  Observation Admission Date & Time: 07/19/19 5629  Discharge Date & Time: 7/19/2019  2:00 PM   Discharge Disposition (if discharged): Home/Self Care  Attending Physician: Emelia Nicholas, [address] [[id number]]  Admission Orders (From admission, onward)    Ordered        07/19/19 0227  Place in Observation (expected length of stay for this patient is less than two midnights)  Once             Facility: [place] [address] Utilization Review Department  Phone: [phone]; Fax [phone]  [email or web address]  ATTENTION: Please call with any questions or concerns to [phone]  and carefully listen to the prompts so that you are directed to the right person  Send all requests for admission clinical reviews, approved or denied determinations and any other requests to fax [phone]   All voicemails are confidential

## 2019-07-19 NOTE — ASSESSMENT & PLAN NOTE
Lab Results   Component Value Date    HGBA1C 7 1 (H) 05/09/2019       No results for input(s): POCGLU in the last 72 hours  Blood Sugar Average: Last 72 hrs:  · Recent A1C indicating reasonable control  · Continue PO jardiance   Hold metformin and trulicty   · SSI/accuchecks

## 2019-07-19 NOTE — UTILIZATION REVIEW
Initial Clinical Review    Admission: Date/Time/Statement: 7/19/19 @ 0227 Observation Written     Orders Placed This Encounter   Procedures    Place in Observation (expected length of stay for this patient is less than two midnights)     Standing Status:   Standing     Number of Occurrences:   1     Order Specific Question:   Admitting Physician     Answer:   Faith Organ     Order Specific Question:   Level of Care     Answer:   Med Surg [16]     ED Arrival Information     Expected Arrival Acuity Means of Arrival Escorted By Service Admission Type    - 7/19/2019 01:07 Urgent Walk-In Self Hospitalist Urgent    Arrival Complaint    -        Chief Complaint   Patient presents with    Allergic Reaction     Pt started abx 3 days ago for diverticulitis  Pt now presents with eyelid swelling, facial flushing, and hives  Assessment/Plan: 63 y o  male presents with allergic reaction x 48 hours and chest heaviness that started around 0100  Patient reports noticing a gradual onset of generalized skin itching, eye itching and hives that started Wednesday  He admits to starting cipro/flagyl on Monday for diverticulitis but he has tolerated these antibiotics in past without issue  Patient's symptoms progressed to such irritation and prior to bed earlier this evening he tried his epi-pen (typically used for bee sting anaphylaxis) which did not help at all  In fact, he feels it made his symptoms worse as he then felt like his face became swollen and tight, tongue became swollen and itching worsened  He denied throat itching and shortness of breath at any time  He then felt like his chest was tight and that something was stuck in his central chest area  Allergic reaction  Assessment & Plan  · Unclear etiology  Started with hives, itchy eyes  Began about 48 hours ago     ? Only new medications were cipro/flagyl but pt has taken these meds before, denies any new foods, insect stings, environmental exposures, travel  · Tried epi-pen with worsening symptoms including CP, swollen tongue and throat  · Feels somewhat improved with IV solumedrol, pepcid and benadryl  Will continue and switch to PO prednisone  Add claritin     Chest pain  Assessment & Plan  · Sounds atypical  Denies any significant cardiac hx but does have risk factors  · EKG NSR 66 bpm, no obvious ischemic changes  · Initial troponin negative  Trend  Monitor on telemetry  · Check echo      Varicose veins of bilateral lower extremities with pain  Assessment & Plan  · Follows OP with vascular  · Recent EVLT  · Recommend compression stockings and elevation      Hyperlipidemia  Assessment & Plan  · Continue home medications      Hypertension  Assessment & Plan  · Continue home meds     Diabetes mellitus ([facility] [address] )  Assessment & Plan  Blood Sugar Average: Last 72 hrs:  · Recent A1C indicating reasonable control  · Continue PO jardiance   Hold metformin and trulicty   · SSI/accuchecks  VTE Prophylaxis: Enoxaparin (Lovenox)  / sequential compression device    Anticipated Length of Stay:  Patient will be admitted on an Observation basis with an anticipated length of stay of  < 2 midnights    ED Triage Vitals   Temperature Pulse Respirations Blood Pressure SpO2   07/19/19 0119 07/19/19 0120 07/19/19 0120 07/19/19 0120 07/19/19 0120   98 8 °F (37 1 °C) 85 20 117/61 97 %      Temp Source Heart Rate Source Patient Position - Orthostatic VS BP Location FiO2 (%)   07/19/19 0119 07/19/19 0120 07/19/19 0120 07/19/19 0120 --   Oral Monitor Sitting Left arm       Pain Score       --               Wt Readings from Last 1 Encounters:   07/19/19 134 kg (295 lb 3 1 oz)     Additional Vital Signs:   Date/Time  Temp  Pulse  Resp  BP  SpO2  O2 Device  Patient Position - Orthostatic VS   07/19/19 0245    68      97 %       07/19/19 0200    68    155/74  98 %       07/19/19 0120    85  20  117/61  97 %  None (Room air)  Sitting   07/19/19 0119  98 8 °F (37 1 °C)             Pertinent Labs/Diagnostic Test Results:   Results from last 7 days   Lab Units 07/19/19  0147   WBC Thousand/uL 5 70   HEMOGLOBIN g/dL 15 0   HEMATOCRIT % 44 0   PLATELETS Thousands/uL 194   NEUTROS ABS Thousands/µL 3 08     Results from last 7 days   Lab Units 07/19/19  0147   SODIUM mmol/L 139   POTASSIUM mmol/L 3 4*   CHLORIDE mmol/L 104   CO2 mmol/L 24   ANION GAP mmol/L 11   BUN mg/dL 16   CREATININE mg/dL 0 95   EGFR ml/min/1 73sq m 88   CALCIUM mg/dL 9 7     Results from last 7 days   Lab Units 07/19/19  0707   POC GLUCOSE mg/dl 208*     Results from last 7 days   Lab Units 07/19/19  0147   GLUCOSE RANDOM mg/dL 223*     Results from last 7 days   Lab Units 07/19/19  0411 07/19/19  0147   TROPONIN I ng/mL <0 02 <0 02     7/19 CXR:  PENDING  7/19 EKG:  NSER  ED Treatment:   Medication Administration from 07/19/2019 0107 to 07/19/2019 0747       Date/Time Order Dose Route Action     07/19/2019 0149 diphenhydrAMINE (BENADRYL) injection 25 mg 25 mg Intravenous Given     07/19/2019 0149 famotidine (PEPCID) injection 20 mg 20 mg Intravenous Given     07/19/2019 0217 methylPREDNISolone sodium succinate (Solu-MEDROL) injection 125 mg 125 mg Intravenous Given     07/19/2019 0655 diphenhydrAMINE (BENADRYL) injection 25 mg 25 mg Intravenous Given     07/19/2019 0708 insulin lispro (HumaLOG) 100 units/mL subcutaneous injection 1-5 Units 1 Units Subcutaneous Given        Past Medical History:   Diagnosis Date    Anemia     s/p knee sx    Anesthesia complication     Developed hives in the PACU s/p knee replacement    Arthritis     Diabetes mellitus ([facility] [address] )     niddm    Diabetic neuropathy (HCC)     Left foot burning and tingling    Diverticulitis     DJD (degenerative joint disease)     Edema     lower legs-ankles    Hyperlipidemia     Hypertension     Kidney stone     27mm    Mechanical complication of internal orthopedic device ([facility] [address] )     knee    Nephrolithiasis 7/12/2019    Added automatically from request for surgery [id number]    Obesity     Osteomyelitis ([facility] )     left 2nd toe    PONV (postoperative nausea and vomiting)     patient requests to be premedicated for N/V    PVD (peripheral vascular disease) ([facility] )     Valve issue with circulation of the lower legs- to have laser surgery     Present on Admission:   Hypertension   Hyperlipidemia   Diabetes mellitus ([facility] )   Varicose veins of bilateral lower extremities with pain      Admitting Diagnosis: Allergic reaction [T78 40XA]  Age/Sex: 63 y o  male  Admission Orders:  Marcelo cho controlled diet  Accuchecks QAC and QHS with coverage  oob as angelica  Echo  Telemetry, trop q3h   Current Facility-Administered Medications:  acetaminophen 650 mg Oral Q6H PRN   al mag oxide-diphenhydramine-lidocaine viscous 10 mL Swish & Swallow Once   allopurinol 100 mg Oral QAM   amLODIPine 10 mg Oral Daily   atorvastatin 40 mg Oral HS   cholecalciferol 1,000 Units Oral HS   diphenhydrAMINE 25 mg Intravenous Q6H PRN x1 thus far   Empagliflozin 10 mg Oral QAM   famotidine 20 mg Intravenous Q12H [facility]   fenofibrate 145 mg Oral Daily   insulin lispro 1-5 Units Subcutaneous TID AC   insulin lispro 1-5 Units Subcutaneous HS   labetalol 100 mg Oral BID   lisinopril 40 mg Oral QAM   loratadine 10 mg Oral Daily   multivitamin-minerals 1 tablet Oral Daily   ondansetron 4 mg Intravenous Q6H PRN   predniSONE 40 mg Oral Daily     Network Utilization Review Department  Phone: [phone]; Fax [phone]  [email or web address]  ATTENTION: Please call with any questions or concerns to [phone]  and carefully listen to the prompts so that you are directed to the right person  Send all requests for admission clinical reviews, approved or denied determinations and any other requests to fax [phone]   All voicemails are confidential

## 2019-07-19 NOTE — ED PROVIDER NOTES
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 therefore carries an EpiPen  Tonight before bed at 10:00 p m  He used his EpiPen  Patient states rash and itching in the right leg where medication was administered has improved however other areas seem worse  Patient describes a swelling tightness in his face  States he woke up about 1 hour ago with chest pain described as heaviness  Patient has a history of diabetes, high cholesterol, high blood pressure  Patient also admits to some swelling in his lower legs which is not new but a little worse recently  He does follow with the vascular surgeon and was seen there earlier this week for this  Patient denies new allergic contacts  Not recall being stung by a bee  States few days ago he was started on Cipro and Flagyl for diverticulitis however he has been on that multiple times past without issue  History provided by:  Patient      Prior to Admission Medications   Prescriptions Last Dose Informant Patient Reported? Taking?    Dulaglutide (TRULICITY) 1 5 IT/4 7EY SOPN  Self Yes Yes   Sig: Inject 1 5 mg under the skin once a week Takes on Sunday   Empagliflozin (JARDIANCE) 10 MG TABS  Self Yes Yes   Sig: Take 10 mg by mouth every morning     Multiple Vitamin (MULTIVITAMIN) tablet  Self Yes Yes   Sig: Take 1 tablet by mouth every morning     SILDENAFIL CITRATE PO  Self Yes No   Sig: SUCK ON one aniya 30 minutes BEFORE sexual activity AS NEEDED   allopurinol (ZYLOPRIM) 100 mg tablet  Self Yes Yes   Sig: Take 100 mg by mouth every morning     amLODIPine (NORVASC) 10 mg tablet Self Yes Yes   amLODIPine (NORVASC) 5 mg tablet Not Taking at Unknown time Self Yes No   Sig: Take 5 mg by mouth 2 (two) times a day     atorvastatin (LIPITOR) 40 mg tablet  Self Yes Yes   Sig: Take 40 mg by mouth daily at bedtime   cholecalciferol (VITAMIN D3) 1,000 units tablet  Self Yes Yes   Sig: Take 1,000 Units by mouth daily at bedtime     ciprofloxacin (CIPRO) 500 mg tablet  Self Yes Yes   fenofibrate (TRIGLIDE) 160 MG tablet  Self Yes Yes   Sig: Take 160 mg by mouth every morning   labetalol (NORMODYNE) 100 mg tablet  Self Yes Yes   Sig: Take 100 mg by mouth 2 (two) times a day   lisinopril (ZESTRIL) 40 mg tablet  Self Yes Yes   Sig: Take 40 mg by mouth every morning     metFORMIN (GLUCOPHAGE) 1000 MG tablet  Self Yes Yes   Sig: Take 1,000 mg by mouth 2 (two) times a day with meals     metroNIDAZOLE (FLAGYL) 500 mg tablet  Self Yes Yes      Facility-Administered Medications: None       Past Medical History:   Diagnosis Date    Anemia     s/p knee sx    Anesthesia complication     Developed hives in the PACU s/p knee replacement    Arthritis     [facility] )     niddm    Diabetic neuropathy (HCC)     Left foot burning and tingling    Diverticulitis     DJD (degenerative joint disease)     Edema     lower legs-ankles    Hyperlipidemia     Hypertension     Kidney stone     27mm    Mechanical complication of internal orthopedic device (HCC)     knee    Obesity     Osteomyelitis (HCC)     left 2nd toe    PONV (postoperative nausea and vomiting)     patient requests to be premedicated for N/V    PVD (peripheral vascular disease) ([facility] [address] )     Valve issue with circulation of the lower legs- to have laser surgery       Past Surgical History:   Procedure Laterality Date    CARPAL TUNNEL RELEASE Left     COLONOSCOPY      CORRECTION HAMMER TOE      Left foot 2nd toe    CYSTOSCOPY      HERNIA REPAIR Left     inguinal    JOINT REPLACEMENT      right knee revision, infection total of 4 surgeries     JOINT REPLACEMENT      left knee     KIDNEY STONE SURGERY      -large 27 mm stone-took x3 surgeries to resolve    AR AMPUTATION TOE,I-P JT Left 2018    Procedure: AMPUTATION TOE;  Surgeon: Tana Pires DPM;  Location: [facility];  Service: Podiatry    AR COLONOSCOPY FLX DX W/COLLJ SPEC WHEN PFRMD N/A 2018    Procedure: EGD AND COLONOSCOPY;  Surgeon: Jenny Ragland MD;  [facility]; Service: Gastroenterology    AR ENDOVENOUS LASER, 1ST VEIN Left 2019    Procedure: ENDOVASCULAR LASER THERAPY (EVLT); Surgeon: Marcus Ireland DO;  [facility];  Service: Vascular    AR ENDOVENOUS LASER, 1ST VEIN Right 2019    Procedure: ENDOVASCULAR LASER THERAPY (EVLT)   And stab phlebectomy;  Surgeon: Marcus Ireland DO;  [facility];  Service: Vascular    AR REVISE KNEE JOINT REPLACE,1 PART Right 3/20/2017    Procedure: REVISION TOTAL KNEE ARTHROPLASTY INCLUDING FEMORAL STEM REVISION;  Surgeon: Evalyn Collet, MD;  [facility];  Service: Orthopedics    AR REVISE KNEE JOINT REPLACE,ALL PARTS Right 2016    Procedure: REVISION TOTAL KNEE ARTHROPLASTY INCLUDING FEMORAL AND TIBIAL COMPONENTS;  Surgeon: Evalyn Collet, MD;  [facility];  Service: Orthopedics    TONSILLECTOMY      ULNAR TUNNEL RELEASE      WISDOM TOOTH EXTRACTION         Family History   Problem Relation Age of Onset    Diabetes Mother     Hypertension Mother     Hypertension Father     No Known Problems Sister     No Known Problems Son     No Known Problems Son      I have reviewed and agree with the history as documented      Social History     Tobacco Use    Smoking status: Former Smoker     Last attempt to quit: 1975     Years since quittin 5    Smokeless tobacco: Never Used   Substance Use Topics    Alcohol use: Yes     Frequency: 2-3 times a week     Drinks per session: 1 or 2     Binge frequency: Never     Comment: 2-3 drinks per night    Drug use: No        Review of Systems   Constitutional: Negative for chills and fever  HENT: Positive for facial swelling  Respiratory: Negative for shortness of breath  Cardiovascular: Positive for chest pain and leg swelling  Musculoskeletal: Negative for back pain  Skin: Positive for rash  Neurological: Negative for syncope  All other systems reviewed and are negative  Physical Exam  Physical Exam   Constitutional: He appears well-developed and well-nourished  HENT:   Right Ear: External ear normal    Left Ear: External ear normal    Mouth/Throat: No trismus in the jaw  No posterior oropharyngeal erythema  Mild swelling of the tongue, speech is normal   Eyes: Conjunctivae are normal    Neck: Neck supple  Cardiovascular: Normal rate, regular rhythm and normal heart sounds  Pulmonary/Chest: Effort normal and breath sounds normal    Abdominal: Soft  Bowel sounds are normal  He exhibits no distension  There is no tenderness  There is no CVA tenderness  Musculoskeletal: He exhibits edema (+1)  Neurological: He is alert  Skin: Skin is warm and dry  Rash (face, arms, chest, lower back, mildly on legs) noted  No ecchymosis, no petechiae and no purpura noted  Rash is urticarial ( face is red with mild swelling along the lower half, lips are not obviously swollen as in angioedema)  Rash is not pustular and not vesicular  There is erythema  Psychiatric: He has a normal mood and affect  Nursing note and vitals reviewed        Vital Signs  ED Triage Vitals   Temperature Pulse Respirations Blood Pressure SpO2   07/19/19 0119 07/19/19 0120 07/19/19 0120 07/19/19 0120 07/19/19 0120   98 8 °F (37 1 °C) 85 20 117/61 97 %      Temp Source Heart Rate Source Patient Position - Orthostatic VS BP Location FiO2 (%)   07/19/19 0119 07/19/19 0120 07/19/19 0120 07/19/19 0120 --   Oral Monitor Sitting Left arm       Pain Score       --                  Vitals:    07/19/19 0120 07/19/19 0200   BP: 117/61 155/74 Pulse: 85 68   Patient Position - Orthostatic VS: Sitting          Visual Acuity      ED Medications  Medications   diphenhydrAMINE (BENADRYL) injection 25 mg (25 mg Intravenous Given 7/19/19 0149)   famotidine (PEPCID) injection 20 mg (20 mg Intravenous Given 7/19/19 0149)   methylPREDNISolone sodium succinate (Solu-MEDROL) injection 125 mg (125 mg Intravenous Given 7/19/19 0217)       Diagnostic Studies  Results Reviewed     Procedure Component Value Units Date/Time    Troponin I [[id number]]     Lab Status:  No result Specimen:  Blood     Troponin I [[id number]]  (Normal) Collected:  07/19/19 0147    Lab Status:  Final result Specimen:  Blood from Arm, Left Updated:  07/19/19 0213     Troponin I <0 02 ng/mL     Basic metabolic panel [[id number]]  (Abnormal) Collected:  07/19/19 0147    Lab Status:  Final result Specimen:  Blood from Arm, Left Updated:  07/19/19 0209     Sodium 139 mmol/L      Potassium 3 4 mmol/L      Chloride 104 mmol/L      CO2 24 mmol/L      ANION GAP 11 mmol/L      BUN 16 mg/dL      Creatinine 0 95 mg/dL      Glucose 223 mg/dL      Calcium 9 7 mg/dL      eGFR 88 ml/min/1 73sq m     Narrative:       [place] guidelines for Chronic Kidney Disease (CKD):     Stage 1 with normal or high GFR (GFR > 90 mL/min/1 73 square meters)    Stage 2 Mild CKD (GFR = 60-89 mL/min/1 73 square meters)    Stage 3A Moderate CKD (GFR = 45-59 mL/min/1 73 square meters)    Stage 3B Moderate CKD (GFR = 30-44 mL/min/1 73 square meters)    Stage 4 Severe CKD (GFR = 15-29 mL/min/1 73 square meters)    Stage 5 End Stage CKD (GFR <15 mL/min/1 73 square meters)  Note: GFR calculation is accurate only with a steady state creatinine    CBC and differential [[id number]] Collected:  07/19/19 0147    Lab Status:  Final result Specimen:  Blood from Arm, Left Updated:  07/19/19 0155     WBC 5 70 Thousand/uL      RBC 4 76 Million/uL      Hemoglobin 15 0 g/dL      Hematocrit 44 0 %      MCV 92 fL MCH 31 5 pg      MCHC 34 1 g/dL      RDW 12 8 %      MPV 9 4 fL      Platelets 268 Thousands/uL      nRBC 0 /100 WBCs      Neutrophils Relative 53 %      Immat GRANS % 1 %      Lymphocytes Relative 34 %      Monocytes Relative 10 %      Eosinophils Relative 2 %      Basophils Relative 0 %      Neutrophils Absolute 3 08 Thousands/µL      Immature Grans Absolute 0 03 Thousand/uL      Lymphocytes Absolute 1 91 Thousands/µL      Monocytes Absolute 0 54 Thousand/µL      Eosinophils Absolute 0 12 Thousand/µL      Basophils Absolute 0 02 Thousands/µL                  XR chest 2 views   ED Interpretation by Kimberlee Aguirre PA-C (07/19 0225)   NAD                 Procedures  ECG 12 Lead Documentation Only  Date/Time: 7/19/2019 1:48 AM  Performed by: Kimberlee Aguirre PA-C  Authorized by: Kimberlee Aguirre PA-C     Indications / Diagnosis:  Chest pain  ECG reviewed by me, the ED Provider: yes    Patient location:  ED  Interpretation:     Interpretation: normal    Rate:     ECG rate:  66    ECG rate assessment: normal    Rhythm:     Rhythm: sinus rhythm    Ectopy:     Ectopy: none    QRS:     QRS axis:  Normal  Conduction:     Conduction: normal    ST segments:     ST segments:  Normal  T waves:     T waves: normal             ED Course         HEART Risk Score      Most Recent Value   History  1 Filed at: 07/19/2019 0226   ECG  0 Filed at: 07/19/2019 0226   Age  1 Filed at: 07/19/2019 0226   Risk Factors  2 Filed at: 07/19/2019 0226   Troponin  0 Filed at: 07/19/2019 0226   Heart Score Risk Calculator   History  1 Filed at: 07/19/2019 0226   ECG  0 Filed at: 07/19/2019 0226   Age  1 Filed at: 07/19/2019 0226   Risk Factors  2 Filed at: 07/19/2019 0226   Troponin  0 Filed at: 07/19/2019 0226   HEART Score  4 Filed at: 07/19/2019 0226   HEART Score  4 Filed at: 07/19/2019 0226                            MDM  Number of Diagnoses or Management Options  Allergic reaction, initial encounter: new and requires workup  Chest pain: new and requires workup     Amount and/or Complexity of Data Reviewed  Clinical lab tests: ordered and reviewed  Tests in the radiology section of CPT®: ordered and reviewed  Discuss the patient with other providers: yes    Risk of Complications, Morbidity, and/or Mortality  Presenting problems: high  Diagnostic procedures: low  Management options: moderate  General comments: 60-year-old male with multiple cardiac risk factors presents after taking EPI for allergic reaction resulting in chest pain that woke him from sleep as well as increased itching and facial swelling  Patient Progress  Patient progress: improved (Patient states he is no longer itchy)      Disposition  Final diagnoses:   Chest pain   Allergic reaction, initial encounter     Time reflects when diagnosis was documented in both MDM as applicable and the Disposition within this note     Time User Action Codes Description Comment    7/19/2019  2:26 AM Rigoberto CALABRESE Add [R07 9] Chest pain     7/19/2019  2:26 AM Berna Burris Add [T78 40XA] Allergic reaction, initial encounter       ED Disposition     ED Disposition Condition Date/Time Comment    Admit Stable Fri Jul 19, 2019  2:26 AM Case was discussed with Jacob Taylor and the patient's admission status was agreed to be Admission Status: observation status to the service of Dr Christel Sun   Follow-up Information    None         Patient's Medications   Discharge Prescriptions    No medications on file     No discharge procedures on file      ED Provider  Electronically Signed by           Carol Dash PA-C  07/19/19 7740

## 2019-07-19 NOTE — ASSESSMENT & PLAN NOTE
· Unclear etiology  Started with hives, itchy eyes  Began about 48 hours ago  · Only new medications were cipro/flagyl but pt has taken these meds before, denies any new foods, insect stings, environmental exposures, travel  · Tried epi-pen with worsening symptoms including CP, swollen tongue and throat  · Feels somewhat improved with IV solumedrol, pepcid and benadryl  Will continue and switch to PO prednisone   Add claritin

## 2019-07-19 NOTE — H&P
H&P- Corky Cord 1960, 63 y o  male MRN: [id number]    Unit/Bed#: ED 09 Encounter: [id number]    Primary Care Provider: Jia Garcia DO   Date and time admitted to hospital: 7/19/2019  1:17 AM      * Allergic reaction  Assessment & Plan  · Unclear etiology  Started with hives, itchy eyes  Began about 48 hours ago  · Only new medications were cipro/flagyl but pt has taken these meds before, denies any new foods, insect stings, environmental exposures, travel  · Tried epi-pen with worsening symptoms including CP, swollen tongue and throat  · Feels somewhat improved with IV solumedrol, pepcid and benadryl  Will continue and switch to PO prednisone  Add claritin    Chest pain  Assessment & Plan  · Sounds atypical  Denies any significant cardiac hx but does have risk factors  · EKG NSR 66 bpm, no obvious ischemic changes  · Initial troponin negative  Trend  Monitor on telemetry  · Check echo     Varicose veins of bilateral lower extremities with pain  Assessment & Plan  · Follows OP with vascular  · Recent EVLT  · Recommend compression stockings and elevation     Hyperlipidemia  Assessment & Plan  · Continue home medications     Hypertension  Assessment & Plan  · Continue home meds    Diabetes mellitus ([facility] [address] )  Assessment & Plan  Lab Results   Component Value Date    HGBA1C 7 1 (H) 05/09/2019       No results for input(s): POCGLU in the last 72 hours  Blood Sugar Average: Last 72 hrs:  · Recent A1C indicating reasonable control  · Continue PO jardiance  Hold metformin and trulicty   · SSI/accuchecks      VTE Prophylaxis: Enoxaparin (Lovenox)  / sequential compression device   Code Status: FULL CODE  POLST: POLST form is not discussed and not completed at this time  Discussion with family: patient    Anticipated Length of Stay:  Patient will be admitted on an Observation basis with an anticipated length of stay of  < 2 midnights     Justification for Hospital Stay: CP r/o, monitoring of allergic reaction    Total Time for Visit, including Counseling / Coordination of Care: 30 minutes  Greater than 50% of this total time spent on direct patient counseling and coordination of care  Chief Complaint:   Allergic reaction, chest heaviness    History of Present Illness:    Joe Lakhani is a 63 y o  male with DM2, HTN, HLD, chronic LE edema with recent EVLT 6/2019 with Dr Tia Sanchez, diverticulitis and nephrolithiasis who presents with allergic reaction x 48 hours and chest heaviness that started around 0100  Patient reports noticing a gradual onset of generalized skin itching, eye itching and hives that started Wednesday  He admits to starting cipro/flagyl on Monday for diverticulitis but he has tolerated these antibiotics in past without issue  Patient's symptoms progressed to such irritation and prior to bed earlier this evening he tried his epi-pen (typically used for bee sting anaphylaxis) which did not help at all  In fact, he feels it made his symptoms worse as he then felt like his face became swollen and tight, tongue became swollen and itching worsened  He denied throat itching and shortness of breath at any time  He then felt like his chest was tight and that something was stuck in his central chest area  He denies any PMHx of CAD  Admits to normal stress test many years ago  He is typically very active without any exertional symptoms  Review of Systems:    Review of Systems   HENT: Positive for facial swelling  Negative for trouble swallowing  Eyes: Positive for itching  Negative for discharge  Respiratory: Positive for chest tightness  Negative for cough, shortness of breath, wheezing and stridor  Cardiovascular: Positive for leg swelling  Negative for chest pain and palpitations  Gastrointestinal: Negative  Genitourinary: Negative  Musculoskeletal: Negative  Skin: Negative for rash         + hives over b/l arms   Neurological: Negative      Psychiatric/Behavioral: Negative  All other systems reviewed and are negative  Past Medical and Surgical History:     Past Medical History:   Diagnosis Date    Anemia     s/p knee sx    Anesthesia complication     Developed hives in the PACU s/p knee replacement    Arthritis     Diabetes mellitus (HCC)     niddm    Diabetic neuropathy (HCC)     Left foot burning and tingling    Diverticulitis     DJD (degenerative joint disease)     Edema     lower legs-ankles    Hyperlipidemia     Hypertension     Kidney stone     27mm    Mechanical complication of internal orthopedic device ([facility] [address] )     knee    Nephrolithiasis 7/12/2019    Added automatically from request for surgery [id number]    Obesity     Osteomyelitis ([facility] [address] )     left 2nd toe    PONV (postoperative nausea and vomiting)     patient requests to be premedicated for N/V    PVD (peripheral vascular disease) ([facility] [address] )     Valve issue with circulation of the lower legs- to have laser surgery       Past Surgical History:   Procedure Laterality Date    CARPAL TUNNEL RELEASE Left     COLONOSCOPY      CORRECTION HAMMER TOE      Left foot 2nd toe    CYSTOSCOPY      HERNIA REPAIR Left     inguinal    JOINT REPLACEMENT      right knee revision, infection total of 4 surgeries     JOINT REPLACEMENT      left knee     KIDNEY STONE SURGERY      -large 27 mm stone-took x3 surgeries to resolve    NM AMPUTATION TOE,I-P JT Left 12/14/2018    Procedure: AMPUTATION TOE;  Surgeon: Courtney Pryor DPM;  Location: [facility];  Service: Podiatry    NM COLONOSCOPY FLX DX W/COLLJ SPEC WHEN PFRMD N/A 2/9/2018    Procedure: EGD AND COLONOSCOPY;  Surgeon: Kimberly Schrader MD;  [facility]: [facility]  GI LAB; Service: Gastroenterology    NM ENDOVENOUS LASER, 1ST VEIN Left 1/4/2019    Procedure: ENDOVASCULAR LASER THERAPY (EVLT);   Surgeon: DO Ty;  Location: [facility] MAIN OR;  Service: Vascular    NM ENDOVENOUS LASER, 1ST VEIN Right 6/7/2019    Procedure: ENDOVASCULAR LASER THERAPY (EVLT)   And stab phlebectomy;  Surgeon: Janae Marina DO;  Location: [facility] MAIN OR;  Service: Vascular    CT REVISE KNEE JOINT REPLACE,1 PART Right 3/20/2017    Procedure: REVISION TOTAL KNEE ARTHROPLASTY INCLUDING FEMORAL STEM REVISION;  Surgeon: Sofi Veliz MD;  Location: [facility] MAIN OR;  Service: Orthopedics    CT REVISE KNEE JOINT REPLACE,ALL PARTS Right 4/6/2016    Procedure: REVISION TOTAL KNEE ARTHROPLASTY INCLUDING FEMORAL AND TIBIAL COMPONENTS;  Surgeon: Sofi Veliz MD;  [facility];  Service: Orthopedics    TONSILLECTOMY      ULNAR TUNNEL RELEASE      WISDOM TOOTH EXTRACTION         Meds/Allergies:    Prior to Admission medications    Medication Sig Start Date End Date Taking? Authorizing Provider   allopurinol (ZYLOPRIM) 100 mg tablet Take 100 mg by mouth every morning     Yes Historical Provider, MD   amLODIPine (NORVASC) 10 mg tablet  7/12/19  Yes Historical Provider, MD   atorvastatin (LIPITOR) 40 mg tablet Take 40 mg by mouth daily at bedtime   Yes Historical Provider, MD   cholecalciferol (VITAMIN D3) 1,000 units tablet Take 1,000 Units by mouth daily at bedtime     Yes Historical Provider, MD   ciprofloxacin (CIPRO) 500 mg tablet  7/12/19  Yes Historical Provider, MD   Dulaglutide (TRULICITY) 1 5 SI/5 6PE SOPN Inject 1 5 mg under the skin once a week Takes on Sunday   Yes Historical Provider, MD   Empagliflozin (JARDIANCE) 10 MG TABS Take 10 mg by mouth every morning     Yes Historical Provider, MD   fenofibrate (TRIGLIDE) 160 MG tablet Take 160 mg by mouth every morning   Yes Historical Provider, MD   labetalol (NORMODYNE) 100 mg tablet Take 100 mg by mouth 2 (two) times a day   Yes Historical Provider, MD   lisinopril (ZESTRIL) 40 mg tablet Take 40 mg by mouth every morning     Yes Historical Provider, MD   metFORMIN (GLUCOPHAGE) 1000 MG tablet Take 1,000 mg by mouth 2 (two) times a day with meals     Yes Historical Provider, MD   metroNIDAZOLE (FLAGYL) 500 mg tablet  7/12/19  Yes Historical Provider, MD   Multiple Vitamin (MULTIVITAMIN) tablet Take 1 tablet by mouth every morning     Yes Historical Provider, MD   amLODIPine (NORVASC) 5 mg tablet Take 5 mg by mouth 2 (two) times a day      Historical Provider, MD   SILDENAFIL CITRATE PO SUCK ON one aniya 30 minutes BEFORE sexual activity AS NEEDED 19   Historical Provider, MD     I have reviewed home medications using allscripts  Allergies: Allergies   Allergen Reactions    Bee Venom Anaphylaxis     Uses Epi-pen    Doxycycline Photosensitivity       Social History:     Marital Status:    Occupation: works at [employer]   Patient Pre-hospital Living Situation: at home   Patient Pre-hospital Level of Mobility: independent  Patient Pre-hospital Diet Restrictions: diabetic   Substance Use History:   Social History     Substance and Sexual Activity   Alcohol Use Yes    Frequency: 2-3 times a week    Drinks per session: 1 or 2    Binge frequency: Never    Comment: 2-3 drinks per night     Social History     Tobacco Use   Smoking Status Former Smoker    Last attempt to quit:     Years since quittin 5   Smokeless Tobacco Never Used     Social History     Substance and Sexual Activity   Drug Use No       Family History:    non-contributory    Physical Exam:     Vitals:   Blood Pressure: 155/74 (19 0200)  Pulse: 68 (19 0245)  Temperature: 98 8 °F (37 1 °C) (19)  Temp Source: Oral (19)  Respirations: 20 (19)  Weight - Scale: 134 kg (295 lb 3 1 oz) (19)  SpO2: 97 % (19 0245)    Physical Exam   Constitutional: He is oriented to person, place, and time  No distress  HENT:   Mouth/Throat: No oropharyngeal exudate  Eyes:   Eyes puffy, no appreciable drainage    Cardiovascular: Normal rate and regular rhythm  Pulmonary/Chest: Effort normal and breath sounds normal  He has no wheezes  Abdominal: Soft  Bowel sounds are normal  He exhibits no distension  Obese    Musculoskeletal: He exhibits edema (2+)  Neurological: He is alert and oriented to person, place, and time  Skin: Skin is warm and dry  No rash noted  He is not diaphoretic  Hives noted to arms, back, abdomen   Psychiatric: He has a normal mood and affect  Nursing note and vitals reviewed  Additional Data:     Lab Results: I have personally reviewed pertinent reports  Results from last 7 days   Lab Units 07/19/19  0147   WBC Thousand/uL 5 70   HEMOGLOBIN g/dL 15 0   HEMATOCRIT % 44 0   PLATELETS Thousands/uL 194   NEUTROS PCT % 53   LYMPHS PCT % 34   MONOS PCT % 10   EOS PCT % 2     Results from last 7 days   Lab Units 07/19/19  0147   SODIUM mmol/L 139   POTASSIUM mmol/L 3 4*   CHLORIDE mmol/L 104   CO2 mmol/L 24   BUN mg/dL 16   CREATININE mg/dL 0 95   ANION GAP mmol/L 11   CALCIUM mg/dL 9 7   GLUCOSE RANDOM mg/dL 223*                       Imaging: I have personally reviewed pertinent reports  XR chest 2 views   ED Interpretation by Vilma Valdovinos PA-C (07/19 0225)   NAD          EKG, Pathology, and Other Studies Reviewed on Admission:   · EKG: NSR 66 bpm    Allscripts / Epic Records Reviewed: Yes     ** Please Note: This note has been constructed using a voice recognition system   **

## 2019-07-19 NOTE — DISCHARGE SUMMARY
Discharge Summary - [facility] Internal Medicine    Patient Information: Moises Marte 63 y o  male MRN: [id number]  Unit/Bed#: ED 09 Encounter: [id number]    Discharging Physician / Practitioner: Terrell Orta MD  PCP: Roxana Fishman DO  Admission Date: 7/19/2019  Discharge Date: 07/19/19    Reason for Admission: allergic reaction    Discharge Diagnoses:     Principal Problem (Resolved): Allergic reaction  Active Problems:    Diabetes mellitus ([address] )    Hypertension    Hyperlipidemia  Resolved Problems:    Varicose veins of bilateral lower extremities with pain    Chest pain      Consultations During Hospital Stay:  · None    Procedures Performed:     · None    Significant Findings / Test Results:     · None    Incidental Findings:   · None     Test Results Pending at Discharge (will require follow up): · None     Outpatient Tests Requested:  · None    Complications:  None    Hospital Course:     Moises Marte is a 63 y o  male patient who originally presented to the hospital on 7/19/2019 with DM2, HTN, HLD, chronic LE edema with recent EVLT 6/2019 with Dr Jed Leroy, diverticulitis and nephrolithiasis who presents with allergic reaction x 48 hours and chest heaviness that started around 0100       Patient reports noticing a gradual onset of generalized skin itching, eye itching and hives that started Wednesday  He admits to starting cipro/flagyl on Monday for diverticulitis but he has tolerated these antibiotics in past without issue  Patient's symptoms progressed to such irritation and prior to bed earlier this evening he tried his epi-pen (typically used for bee sting anaphylaxis) which did not help at all  In fact, he feels it made his symptoms worse as he then felt like his face became swollen and tight, tongue became swollen and itching worsened  He denied throat itching and shortness of breath at any time  He then felt like his chest was tight and that something was stuck in his central chest area   He denies any PMHx of CAD  Admits to normal stress test many years ago  He is typically very active without any exertional symptoms  Pt was kept in obs  Treated with steroids, benadryl, pepcid  He improved significantly overnight and will be going home on prednisone for 2 more days and prn benadryl  His rash is resolved and his chest pain is also resolved  His Troponins were negative x3, no issues on tele  Can followup outpt with Cardiology in a routine manner  Condition at Discharge: good     Discharge Day Visit / Exam:     * Please refer to separate progress for these details *    Discussion with Family: Yes      Discharge instructions/Information to patient and family:   See after visit summary for information provided to patient and family  Provisions for Follow-Up Care:  See after visit summary for information related to follow-up care and any pertinent home health orders  Disposition:     Home    For Discharges to [facility] SNF:   · Not Applicable to this Patient - Not Applicable to this Patient    Planned Readmission: none    Discharge Statement:  I spent 45 minutes discharging the patient  This time was spent on the day of discharge  I had direct contact with the patient on the day of discharge  Greater than 50% of the total time was spent examining patient, answering all patient questions, arranging and discussing plan of care with patient as well as directly providing post-discharge instructions  Additional time then spent on discharge activities  Discharge Medications:  See after visit summary for reconciled discharge medications provided to patient and family

## 2019-07-22 ENCOUNTER — APPOINTMENT (OUTPATIENT)
Dept: LAB | Facility: CLINIC | Age: 59
End: 2019-07-22
Payer: COMMERCIAL

## 2019-07-22 ENCOUNTER — ANESTHESIA EVENT (OUTPATIENT)
Dept: PERIOP | Facility: AMBULARY SURGERY CENTER | Age: 59
End: 2019-07-22
Payer: COMMERCIAL

## 2019-07-22 DIAGNOSIS — Z01.818 PREOP EXAMINATION: ICD-10-CM

## 2019-07-22 DIAGNOSIS — N20.0 NEPHROLITHIASIS: ICD-10-CM

## 2019-07-22 PROCEDURE — 87086 URINE CULTURE/COLONY COUNT: CPT

## 2019-07-22 NOTE — TELEPHONE ENCOUNTER
I called pt to see if he had gone for his urine culture yet because  This needs to be done as soon as possible for his procedure this Friday  I did speak to pt and he said he was planning on going for it today  I will wait for results

## 2019-07-23 PROBLEM — N20.0 NEPHROLITHIASIS: Status: ACTIVE | Noted: 2019-07-12

## 2019-07-23 LAB — BACTERIA UR CULT: NORMAL

## 2019-07-25 NOTE — ANESTHESIA PREPROCEDURE EVALUATION
Review of Systems/Medical History  Patient summary reviewed  Chart reviewed  History of anesthetic complications PONV    Cardiovascular  EKG reviewed, Exercise tolerance (METS): >4,  Hyperlipidemia, Hypertension ,    Pulmonary  Negative pulmonary ROS        GI/Hepatic  Negative GI/hepatic ROS          Kidney stones,        Endo/Other  Diabetes well controlled type 2 Oral agent,   Obesity  morbid obesity   GYN       Hematology  Anemia ,     Musculoskeletal    Arthritis     Neurology  Negative neurology ROS      Psychology   Negative psychology ROS              Physical Exam    Airway    Mallampati score: II  TM Distance: >3 FB  Neck ROM: full     Dental       Cardiovascular      Pulmonary      Other Findings        Anesthesia Plan  ASA Score- 2     Anesthesia Type- general with ASA Monitors  Additional Monitors:   Airway Plan: LMA  Plan Factors-    Induction- intravenous  Postoperative Plan-     Informed Consent- Anesthetic plan and risks discussed with patient  I personally reviewed this patient with the CRNA  Discussed and agreed on the Anesthesia Plan with the CRNA  Rivas Pan

## 2019-07-26 ENCOUNTER — ANESTHESIA (OUTPATIENT)
Dept: PERIOP | Facility: AMBULARY SURGERY CENTER | Age: 59
End: 2019-07-26
Payer: COMMERCIAL

## 2019-07-26 ENCOUNTER — HOSPITAL ENCOUNTER (OUTPATIENT)
Facility: AMBULARY SURGERY CENTER | Age: 59
Setting detail: OUTPATIENT SURGERY
Discharge: HOME | End: 2019-07-26
Attending: UROLOGY | Admitting: UROLOGY
Payer: COMMERCIAL

## 2019-07-26 ENCOUNTER — APPOINTMENT (OUTPATIENT)
Dept: RADIOLOGY | Facility: AMBULARY SURGERY CENTER | Age: 59
End: 2019-07-26
Payer: COMMERCIAL

## 2019-07-26 ENCOUNTER — TELEPHONE (OUTPATIENT)
Dept: UROLOGY | Facility: CLINIC | Age: 59
End: 2019-07-26

## 2019-07-26 VITALS
HEART RATE: 60 BPM | OXYGEN SATURATION: 96 % | RESPIRATION RATE: 16 BRPM | DIASTOLIC BLOOD PRESSURE: 89 MMHG | TEMPERATURE: 97.2 F | SYSTOLIC BLOOD PRESSURE: 167 MMHG

## 2019-07-26 DIAGNOSIS — N20.0 NEPHROLITHIASIS: Primary | ICD-10-CM

## 2019-07-26 LAB — GLUCOSE SERPL-MCNC: 145 MG/DL (ref 65–140)

## 2019-07-26 PROCEDURE — C1769 GUIDE WIRE: HCPCS | Performed by: UROLOGY

## 2019-07-26 PROCEDURE — 74420 UROGRAPHY RTRGR +-KUB: CPT

## 2019-07-26 PROCEDURE — C2617 STENT, NON-COR, TEM W/O DEL: HCPCS | Performed by: UROLOGY

## 2019-07-26 PROCEDURE — 52356 CYSTO/URETERO W/LITHOTRIPSY: CPT | Performed by: UROLOGY

## 2019-07-26 PROCEDURE — C1758 CATHETER, URETERAL: HCPCS | Performed by: UROLOGY

## 2019-07-26 PROCEDURE — 82948 REAGENT STRIP/BLOOD GLUCOSE: CPT

## 2019-07-26 PROCEDURE — NC001 PR NO CHARGE: Performed by: UROLOGY

## 2019-07-26 DEVICE — STENT URETERAL 6 FR 26CM INLAY OPTIMA: Type: IMPLANTABLE DEVICE | Site: URINARY BLADDER | Status: FUNCTIONAL

## 2019-07-26 RX ORDER — TAMSULOSIN HYDROCHLORIDE 0.4 MG/1
0.4 CAPSULE ORAL
Qty: 15 CAPSULE | Refills: 0 | Status: SHIPPED | OUTPATIENT
Start: 2019-07-26 | End: 2021-06-21 | Stop reason: ALTCHOICE

## 2019-07-26 RX ORDER — ONDANSETRON 2 MG/ML
INJECTION INTRAMUSCULAR; INTRAVENOUS AS NEEDED
Status: DISCONTINUED | OUTPATIENT
Start: 2019-07-26 | End: 2019-07-26 | Stop reason: SURG

## 2019-07-26 RX ORDER — OXYCODONE HYDROCHLORIDE 5 MG/1
5 TABLET ORAL EVERY 4 HOURS PRN
Status: DISCONTINUED | OUTPATIENT
Start: 2019-07-26 | End: 2019-07-26 | Stop reason: HOSPADM

## 2019-07-26 RX ORDER — SODIUM CHLORIDE 9 MG/ML
INJECTION, SOLUTION INTRAVENOUS AS NEEDED
Status: DISCONTINUED | OUTPATIENT
Start: 2019-07-26 | End: 2019-07-26 | Stop reason: HOSPADM

## 2019-07-26 RX ORDER — PHENAZOPYRIDINE HYDROCHLORIDE 200 MG/1
200 TABLET, FILM COATED ORAL 3 TIMES DAILY PRN
Qty: 10 TABLET | Refills: 0 | Status: SHIPPED | OUTPATIENT
Start: 2019-07-26 | End: 2019-07-29

## 2019-07-26 RX ORDER — SODIUM CHLORIDE, SODIUM LACTATE, POTASSIUM CHLORIDE, CALCIUM CHLORIDE 600; 310; 30; 20 MG/100ML; MG/100ML; MG/100ML; MG/100ML
125 INJECTION, SOLUTION INTRAVENOUS CONTINUOUS
Status: DISCONTINUED | OUTPATIENT
Start: 2019-07-26 | End: 2019-07-26 | Stop reason: HOSPADM

## 2019-07-26 RX ORDER — FENTANYL CITRATE 50 UG/ML
INJECTION, SOLUTION INTRAMUSCULAR; INTRAVENOUS AS NEEDED
Status: DISCONTINUED | OUTPATIENT
Start: 2019-07-26 | End: 2019-07-26 | Stop reason: SURG

## 2019-07-26 RX ORDER — MAGNESIUM HYDROXIDE 1200 MG/15ML
LIQUID ORAL AS NEEDED
Status: DISCONTINUED | OUTPATIENT
Start: 2019-07-26 | End: 2019-07-26 | Stop reason: HOSPADM

## 2019-07-26 RX ORDER — METOCLOPRAMIDE HYDROCHLORIDE 5 MG/ML
10 INJECTION INTRAMUSCULAR; INTRAVENOUS ONCE AS NEEDED
Status: DISCONTINUED | OUTPATIENT
Start: 2019-07-26 | End: 2019-07-26 | Stop reason: HOSPADM

## 2019-07-26 RX ORDER — FENTANYL CITRATE/PF 50 MCG/ML
50 SYRINGE (ML) INJECTION
Status: COMPLETED | OUTPATIENT
Start: 2019-07-26 | End: 2019-07-26

## 2019-07-26 RX ORDER — DEXAMETHASONE SODIUM PHOSPHATE 10 MG/ML
INJECTION, SOLUTION INTRAMUSCULAR; INTRAVENOUS AS NEEDED
Status: DISCONTINUED | OUTPATIENT
Start: 2019-07-26 | End: 2019-07-26

## 2019-07-26 RX ORDER — DOCUSATE SODIUM 100 MG/1
100 CAPSULE, LIQUID FILLED ORAL 2 TIMES DAILY
Qty: 30 CAPSULE | Refills: 0 | Status: SHIPPED | OUTPATIENT
Start: 2019-07-26 | End: 2021-06-21 | Stop reason: ALTCHOICE

## 2019-07-26 RX ORDER — CEFAZOLIN SODIUM 2 G/50ML
2000 SOLUTION INTRAVENOUS ONCE
Status: DISCONTINUED | OUTPATIENT
Start: 2019-07-26 | End: 2019-07-26 | Stop reason: HOSPADM

## 2019-07-26 RX ORDER — HYDROCODONE BITARTRATE AND ACETAMINOPHEN 5; 325 MG/1; MG/1
1 TABLET ORAL EVERY 6 HOURS PRN
Qty: 5 TABLET | Refills: 0 | Status: SHIPPED | OUTPATIENT
Start: 2019-07-26 | End: 2019-08-05

## 2019-07-26 RX ORDER — MIDAZOLAM HYDROCHLORIDE 1 MG/ML
INJECTION INTRAMUSCULAR; INTRAVENOUS AS NEEDED
Status: DISCONTINUED | OUTPATIENT
Start: 2019-07-26 | End: 2019-07-26 | Stop reason: SURG

## 2019-07-26 RX ORDER — CEPHALEXIN 500 MG/1
500 CAPSULE ORAL EVERY 6 HOURS SCHEDULED
Qty: 3 CAPSULE | Refills: 0 | Status: SHIPPED | OUTPATIENT
Start: 2019-07-26 | End: 2019-07-27

## 2019-07-26 RX ORDER — ONDANSETRON 2 MG/ML
4 INJECTION INTRAMUSCULAR; INTRAVENOUS ONCE AS NEEDED
Status: DISCONTINUED | OUTPATIENT
Start: 2019-07-26 | End: 2019-07-26 | Stop reason: HOSPADM

## 2019-07-26 RX ORDER — PROPOFOL 10 MG/ML
INJECTION, EMULSION INTRAVENOUS AS NEEDED
Status: DISCONTINUED | OUTPATIENT
Start: 2019-07-26 | End: 2019-07-26 | Stop reason: SURG

## 2019-07-26 RX ADMIN — MIDAZOLAM HYDROCHLORIDE 2 MG: 1 INJECTION, SOLUTION INTRAMUSCULAR; INTRAVENOUS at 12:35

## 2019-07-26 RX ADMIN — FENTANYL CITRATE 50 MCG: 50 INJECTION INTRAMUSCULAR; INTRAVENOUS at 13:43

## 2019-07-26 RX ADMIN — SODIUM CHLORIDE, SODIUM LACTATE, POTASSIUM CHLORIDE, AND CALCIUM CHLORIDE: .6; .31; .03; .02 INJECTION, SOLUTION INTRAVENOUS at 12:38

## 2019-07-26 RX ADMIN — FENTANYL CITRATE 50 MCG: 50 INJECTION INTRAMUSCULAR; INTRAVENOUS at 13:48

## 2019-07-26 RX ADMIN — ONDANSETRON 4 MG: 2 INJECTION INTRAMUSCULAR; INTRAVENOUS at 12:44

## 2019-07-26 RX ADMIN — PROPOFOL 200 MG: 10 INJECTION, EMULSION INTRAVENOUS at 12:40

## 2019-07-26 RX ADMIN — LIDOCAINE HYDROCHLORIDE 50 MG: 20 INJECTION, SOLUTION INTRAVENOUS at 12:40

## 2019-07-26 RX ADMIN — FENTANYL CITRATE 50 MCG: 50 INJECTION, SOLUTION INTRAMUSCULAR; INTRAVENOUS at 13:07

## 2019-07-26 RX ADMIN — Medication 3000 MG: at 12:23

## 2019-07-26 RX ADMIN — FENTANYL CITRATE 50 MCG: 50 INJECTION INTRAMUSCULAR; INTRAVENOUS at 13:56

## 2019-07-26 RX ADMIN — FENTANYL CITRATE 50 MCG: 50 INJECTION, SOLUTION INTRAMUSCULAR; INTRAVENOUS at 12:50

## 2019-07-26 RX ADMIN — FENTANYL CITRATE 50 MCG: 50 INJECTION INTRAMUSCULAR; INTRAVENOUS at 13:51

## 2019-07-26 NOTE — TELEPHONE ENCOUNTER
Post Op Note:    Called and spoke  Rhonda Roland is a 63 y o  male s/p left ureteroscopy  performed 07/26/19  Rhonda Roland is a patient of Dr Ester Rowland and is seen at the [facility] office  How would you rate your pain on a scale from 1 to 10, 10 being the worst pain ever? Pain is controlled at this time  Advised to use narcotic for severe pain, for mild to moderate pain use Ibuprofen/ NSAIDS  Advised to use pyridium for painful urination and to take Flomax one tablet at night for stent colic  Have you had a fever? No ,  Advised to contact office for fever 100 6 or higher  Have your bowel movements been regular? Advised to start Colace one tablet twice a day  Reviewed with patient stent with string removal and he is comfortable removing his stent at home on Tuesday  Instructed patient to call office to confirm he removed his stent  Scheduled for 2 month follow up on 9/26/19 at 2 pm with MARCELO Robertson/ ptv  Appt card and orders mailed to patient  No other questions at this time

## 2019-07-26 NOTE — TELEPHONE ENCOUNTER
Status post ureteroscopy  Stent on string  Instructed to remove at home on Tuesday      Please schedule follow-up 2 months advanced practitioner KUB ultrasound prior to visit

## 2019-07-26 NOTE — H&P
UROLOGY HISTORY AND PHYSICAL     Patient Identifiers: Robyn Mendoza (MRN [id number])      Date of Service: 7/26/2019        ASSESSMENT:     63 y o  old male with  recurrent nephrolithiasis, history of prior PCNL presents with a small left intrarenal calculus, non radiopaque  I met with Rene Bull and his wife preoperatively we discussed management options for his left intrarenal stone      Discussed options for management of the patient's ureteral stone  We discussed surgical options including ureteroscopy and shock wave lithotripsy  In addition we discussed conservative management with medical expulsive therapy  The patient has elected to undergo ureteroscopy  I discussed with the patients risks and benefits and alternatives to ureteroscopy with laser lithotripsy  The risks include bleeding, infection, injury to the urethra, bladder, ureter or kidney, risk of a staged procedure, risk of stricture, risk of residual fragments, risk of loss of kidney, risks of anesthesia including DVT, PE, MI and death  The patient understands that a ureteral stent will likely be left in place at the time of the procedure  We reviewed the expected postoperative care          PLAN:     To OR for left ureteroscopy      History of Present Illness:     Robyn Mendoza is a 63 y o  old with a history of recurrent nephrolithiasis, left intrarenal calculus presents for ureteroscopy    Past Medical, Past Surgical History:     Past Medical History:   Diagnosis Date    Anemia     s/p knee sx    Anesthesia complication     Developed hives in the PACU s/p knee replacement    Arthritis     Diabetes mellitus ([address] )     niddm    Diabetic neuropathy ([address] )     Left foot burning and tingling    Diverticulitis     DJD (degenerative joint disease)     Edema     lower legs-ankles    Hyperlipidemia     Hypertension     Kidney stone     27mm    Mechanical complication of internal orthopedic device (HCC)     knee    Nephrolithiasis 7/12/2019    Added automatically from request for surgery [id number]    Obesity     Osteomyelitis ([facility] [address] )     left 2nd toe    PONV (postoperative nausea and vomiting)     patient requests to be premedicated for N/V    PVD (peripheral vascular disease) ([facility] [address] )     Valve issue with circulation of the lower legs- to have laser surgery   :    Past Surgical History:   Procedure Laterality Date    CARPAL TUNNEL RELEASE Left     COLONOSCOPY      CORRECTION HAMMER TOE      Left foot 2nd toe    CYSTOSCOPY      HERNIA REPAIR Left     inguinal    JOINT REPLACEMENT      right knee revision, infection total of 4 surgeries     JOINT REPLACEMENT      left knee     KIDNEY STONE SURGERY      -large 27 mm stone-took x3 surgeries to resolve    MT AMPUTATION TOE,I-P JT Left 12/14/2018    Procedure: AMPUTATION TOE;  Surgeon: Cesario Chaudhari DPM;  [facility]: [facility];  Service: Podiatry    MT COLONOSCOPY FLX DX W/COLLJ SPEC WHEN PFRMD N/A 2/9/2018    Procedure: EGD AND COLONOSCOPY;  Surgeon: Monique Lew MD;  [facility] GI [facility]; Service: Gastroenterology    MT ENDOVENOUS LASER, 1ST VEIN Left 1/4/2019    Procedure: ENDOVASCULAR LASER THERAPY (EVLT);   Surgeon: Braden Silverio DO;  Location[facility] MA[facility] OR;  Service: Vascular    MT ENDOVENOUS LASER, 1ST VEIN Right 6/7/2019    Procedure: ENDOVASCULAR LASER THERAPY (EVLT)   And stab phlebectomy;  Surgeon: Braden Silverio DO;  [facility] MA[facility];  Service: Vascular    MT REVISE KNEE JOINT REPLACE,1 PART Right 3/20/2017    Procedure: REVISION TOTAL KNEE ARTHROPLASTY INCLUDING FEMORAL STEM REVISION;  Surgeon: Matty Zazueta MD;  [facility];  Service: Orthopedics    MT REVISE KNEE JOINT REPLACE,ALL PARTS Right 4/6/2016    Procedure: REVISION TOTAL KNEE ARTHROPLASTY INCLUDING FEMORAL AND TIBIAL COMPONENTS;  Surgeon: Matty Zazueta MD;  [facility];  Service: Orthopedics    TONSILLECTOMY      ULNAR TUNNEL RELEASE      WISDOM TOOTH EXTRACTION :    Medications, Allergies:   No current facility-administered medications for this encounter  Allergies: Allergies   Allergen Reactions    Bee Venom Anaphylaxis     Uses Epi-pen    Doxycycline Photosensitivity    Ciprofloxacin Rash   :    Social and Family History:   Social History:   Social History     Tobacco Use    Smoking status: Former Smoker     Last attempt to quit:      Years since quittin 5    Smokeless tobacco: Never Used   Substance Use Topics    Alcohol use: Yes     Frequency: 2-3 times a week     Drinks per session: 1 or 2     Binge frequency: Never     Comment: 2-3 drinks per night    Drug use: No        Social History     Tobacco Use   Smoking Status Former Smoker    Last attempt to quit:     Years since quittin 5   Smokeless Tobacco Never Used       Family History:  Family History   Problem Relation Age of Onset    Diabetes Mother     Hypertension Mother     Hypertension Father     No Known Problems Sister     No Known Problems Son     No Known Problems Son    :     Review of Systems:     General: Fever, chills, or night sweats: negative  Cardiac: Negative for chest pain  Pulmonary: Negative for shortness of breath  Gastrointestinal: Abdominal pain negative  Nausea, vomiting, or diarrhea negative  Genitourinary: See HPI above  Patient does nothave hematuria  All other systems queried were negative  Physical Exam:   General: Patient is pleasant and in NAD  Awake and alert  There were no vitals taken for this visit  Cardiac: Peripheral edema: negative  Pulmonary: Non-labored breathing  Abdomen: Soft, non-tender, non-distended  No surgical scars  No masses, tenderness, hernias noted  Genitourinary: negative CVA tenderness, negative suprapubic tenderness        Labs:     Lab Results   Component Value Date    HGB 15 0 2019    HCT 44 0 2019    WBC 5 70 2019     2019   ]    Lab Results   Component Value Date     10/10/2015    K 3 4 (L) 07/19/2019     07/19/2019    CO2 24 07/19/2019    BUN 16 07/19/2019    CREATININE 0 95 07/19/2019    CALCIUM 9 7 07/19/2019    GLUCOSE 120 10/10/2015   ]    Imaging:   I personally reviewed the images and report of the following studies, and reviewed them with the patient:        Thank you for allowing me to participate in this patients care  Please do not hesitate to call with any additional questions    Jesse Guzman MD

## 2019-07-26 NOTE — OP NOTE
Operative Note     PATIENT:  Feng Armstrong (MRN [id number])    DATE OF PROCEDURE:   7/26/2019    PRE-OP DIAGNOSIS:   1) Left renal calculus    POST-OP DIAGNOSIS:   1) Left renal calculus    PROCEDURES PERFORMED:  1) Cystoscopy  2) Left retrograde pyelography with fluoroscopic interpretation  3) Left ureteroscopy with laser lithotripsy of stone  4) Left ureteral stent placement     SURGEON:  Markus Guillory MD    NOTE:  There were no qualified teaching residents to assist with this case    ANESTHESIA: General     COMPLICATIONS:   None    ANTIBIOTICS:  Cefazolin    INTRAOPERATIVE THROMBOEMBOLISM PROPHYLAXIS:  Pneumatic compression stockings     FINDINGS:  Small stones were present, adherent to the mucosa of the interpolar, upper pole, and lower polar calyces  Each of these were completely dusted and a postoperative stent was left in place on a string  INDICATIONS FOR PROCEDURE:  Feng Armstrong is an 63 y o  old male with Left renal calculus  After discussing the options, the patient elected to undergo ureteroscopy and ureteral stent placement  We discussed the procedure in detail, the alternatives, and the risks, and they signed informed consent to proceed  PROCEDURE IN DETAIL:   The patient was identified and brought to the OR  Antibiotic prophylaxis and DVT prophylaxis were administered  They were placed in the comfortable dorsal lithotomy position with care to pad all pressure points  They were prepped and draped in the usual sterile fashion using hibiclens  A surgical time out was performed with all in the room in agreement with the correct patient, procedure, indications, and laterality  A 21-Bulgarian rigid cystoscope was used to enter the bladder  The bladder was inspected in its entirety and there were no lesions noted  The ureteral orifices were identified in their normal orthotopic positions       The Left ureteral orifice was identified and a 5 Fr open ended catheter was placed into the ureteral orifice    A retrograde pyelogram was performed with the findings as described above  A Sensor wire was advanced up to the kidney under fluoroscopic guidance  Leaving this safety wire in place, the bladder was drained  A second working wire was then placed, and over this a 12/14 ureteral access sheath was sequentially passed under fluoroscopic guidance  A  8 5 Italian digital flexible ureteroscope was advanced up the ureter under vision   The stone was encountered in the Interpolar calyx, as well as smaller stones in the upper and lower pole calices  Each of these were non impacted and adherent to the urothelium  The stone was not noted to be impacted  A holmium laser fiber was passed through the ureteroscope and laser lithotripsy was commenced at settings of 0 7 J and 7 Hz  The stones were fragmented to very small pieces  The ureteroscope was backed down the ureter under vision and there were no residual fragments and the ureter was noted to be intact with no injury and no edema where the stone was located  A JJ stent was then passed up the wire  under fluoroscopic guidance into the kidney with a good curl noted in the kidney and in the bladder  An externalized tethering string was left in place and secured to the skin  The bladder was drained  The patient was placed back supine, awakened from general anesthesia and brought to recovery room in stable condition  ESTIMATED BLOOD LOSS:  Minimal      SPECIMENS:   * No orders in the log *     IMPLANTS:   Implant Name Type Inv  Item Serial No   Lot No  LRB No  Used   URETERAL STENT 6 FR X 26 CM OPTIMA INLAY - [id number]  URETERAL STENT 6 FR X 26 CM OPTIMA INLAY  [employer] [id number] Left 1        COMPLICATIONS: None    DISPOSITION: PACU    PLAN:  Patient was instructed to remove his stent at home on Tuesday  He will follow up in 2 months with a postoperative KUB and renal ultrasound

## 2019-07-26 NOTE — DISCHARGE INSTRUCTIONS
Gala Randhawa:    Two day your collection of stones within the left kidney were all fragmented with a laser fiber  You actually had stones in 3 different part of your kidneys, all of which were successfully addressed  Believe your stent in place until Tuesday, you may remove it at home following the directions below:      WHAT IS A STENT? At the end of the procedure, your doctor may place a stent into your ureter  A stent is a thin, flexible piece of plastic that will hold open your ureter while the remaining small pieces of stone pass  This allows your kidney to drain easily and prevents you from having to pass these small stone pieces on your own, which could be painful  The stent is about 12 inches long and looks and feels like a thin piece of spaghetti  AFTER THE PROCEDURE  After the procedure you may experience the following symptoms  All of these are normal and should resolve within 1 or 2 days after your stent is removed  1) Urinary frequency (urinating more often than usual)  2) Urinary urgency (the sensation that you need to urinate right away)  3) Painful urination (this can be pain in your bladder or in your back when  you urinate)  4) Blood in your urine ( a stent can irritate the lining of your bladder causing it to bleed)  5) Back/Flank pain, especially with urination  You will receive a prescription for narcotic pain medication after the procedure  You will also receive a prescription for tamsulosin which you will take once a day for 2 weeks to help relax your ureter and decrease stent discomfort  You will also need to purchase a stool softener (i e  Colace) or mild laxative (i e  Miralax) as the narcotic pain medication can make you constipated  This is important as constipation can exacerbate stent related symptoms  STENT REMOVAL  In some cases, your doctor will leave strings attached to your stent  The strings will be taped to your skin after the procedure   The strings will allow you to remove the thin flexible stent while you are at home  Normally, the stent can be removed 3-5 days after your procedure; your physician will tell you the specific date after your procedure  On the day you are supposed to remove your stent, do the followin) When you wake up in the morning, take 1-2 pain pills with food  Start your antibiotic pill the morning of schedule stent removal if prescribed  2) One hour later sit on the toilet or in the bath tub  3) Take a deep breath in and while exhaling, pull the string  4)  Dispose of the stent in the garbage  5)   Alternatively, you will come back for an office procedure to remove the stent by placing a small camera into your bladder to remove the stent  During the next 4-8 hours after removing your stent, you may experience additional blood in your urine, pain with urination or back/side pain  You should take the pain medication you were prescribed to help you with the pain, as well as continue the Flomax  If the pain is severe, you are vomiting, and/or have a fever > 101 4 please call the clinic

## 2019-07-27 ENCOUNTER — DOCUMENTATION (OUTPATIENT)
Dept: OTHER | Facility: HOSPITAL | Age: 59
End: 2019-07-27

## 2019-07-27 NOTE — PROGRESS NOTES
Patient underwent ureteroscopy  Stent on sTring  Patient noticing today, complete incontinence  Stent may become dislodged  Not having fevers or chills  Recommended continuation of pyridium and Flomax  If the incontinence continues, patient given instructions for stent removal  Has appointment on Tuesday but if stent is removed at home, requested that the patient reach out to the office on Monday to cancel his Tuesday appointment

## 2019-07-30 NOTE — TELEPHONE ENCOUNTER
Called and spoke to patient ,  He states he removed his stent at home on Sunday  He had spoken to on call provider due to stent issues and was instructed to remove it  He is having no problems at this time  Follow up as scheduled

## 2019-08-20 ENCOUNTER — APPOINTMENT (OUTPATIENT)
Dept: RADIOLOGY | Facility: AMBULARY SURGERY CENTER | Age: 59
End: 2019-08-20
Attending: ORTHOPAEDIC SURGERY
Payer: COMMERCIAL

## 2019-08-20 ENCOUNTER — OFFICE VISIT (OUTPATIENT)
Dept: OBGYN CLINIC | Facility: CLINIC | Age: 59
End: 2019-08-20
Payer: COMMERCIAL

## 2019-08-20 VITALS
DIASTOLIC BLOOD PRESSURE: 87 MMHG | WEIGHT: 279 LBS | BODY MASS INDEX: 39.94 KG/M2 | SYSTOLIC BLOOD PRESSURE: 143 MMHG | HEART RATE: 73 BPM | HEIGHT: 70 IN

## 2019-08-20 DIAGNOSIS — M25.561 CHRONIC PAIN OF RIGHT KNEE: Primary | ICD-10-CM

## 2019-08-20 DIAGNOSIS — G89.29 CHRONIC PAIN OF RIGHT KNEE: Primary | ICD-10-CM

## 2019-08-20 DIAGNOSIS — G89.29 CHRONIC PAIN OF RIGHT KNEE: ICD-10-CM

## 2019-08-20 DIAGNOSIS — M25.561 CHRONIC PAIN OF RIGHT KNEE: ICD-10-CM

## 2019-08-20 PROCEDURE — 73560 X-RAY EXAM OF KNEE 1 OR 2: CPT

## 2019-08-20 PROCEDURE — 99213 OFFICE O/P EST LOW 20 MIN: CPT | Performed by: ORTHOPAEDIC SURGERY

## 2019-08-20 NOTE — PROGRESS NOTES
Assessment:  1  Chronic pain of right knee  Ambulatory referral to Orthopedic Surgery    CANCELED: XR knee 3 vw right non injury       Plan:  1  Painful revision total knee arthroplasty should be evaluated by joint reconstruction subspecialist   2  Explained that treatment options may be limited at this point  3  May have some extensor mechanism pain related to his more recent surgical exposure, but symptoms have been fairly consistent since the initial surgery  4  Will defer to subspecialist for further recommendations  The above stated was discussed in layman's terms and the patient expressed understanding  All questions were answered to the patient's satisfaction  Scribe Attestation    I,:   Vincent Huizar am acting as a scribe while in the presence of the attending physician :        I,:   Keila Hall MD personally performed the services described in this documentation    as scribed in my presence :              Subjective:   Riley Ware is a 63 y o  male who presents today for initial evaluation of right knee pain  Patient had multiple knee surgeries  His original right total knee arthroplasty was done by Maritza Doe at [place] [address] , followed by 3 additional surgeries by Dr Rosalind Payton  His last revision surgery was in 2017, and he describes an approach that sounds like a quadriceps snip at his most recent surgery  He has had pes bursa injections by [facility] which provide some temporary relief  He states by the end of the day his knee is very swollen and painful  He complains of anterior knee pain  Pain increased with weight bearing activities and stairs and is relieved by rest   He avoids stair climbing  He denies any thigh pain or radicular pain from the lumbar spine  He states occasionally the knee will "kick out" on him while walking        Review of systems negative unless otherwise specified in HPI    Past Medical History:   Diagnosis Date    Anemia     s/p knee sx    Anesthesia complication     Developed hives in the PACU s/p knee replacement    Arthritis     Diabetes mellitus ([facility] )     niddm    Diabetic neuropathy (HCC)     Left foot burning and tingling    Diverticulitis     DJD (degenerative joint disease)     Edema     lower legs-ankles    Hyperlipidemia     Hypertension     Kidney stone     27mm    Mechanical complication of internal orthopedic device ([facility] )     knee    Nephrolithiasis 7/12/2019    Added automatically from request for surgery [id number]    Obesity     [facility]     left 2nd toe    PONV (postoperative nausea and vomiting)     patient requests to be premedicated for N/V    PVD (peripheral vascular disease) ([facility] )     Valve issue with circulation of the lower legs- to have laser surgery       Past Surgical History:   Procedure Laterality Date    CARPAL TUNNEL RELEASE Left     COLONOSCOPY      CORRECTION HAMMER TOE      Left foot 2nd toe    CYSTOSCOPY      FL RETROGRADE PYELOGRAM  7/26/2019    HERNIA REPAIR Left     inguinal    JOINT REPLACEMENT      right knee revision, infection total of 4 surgeries     JOINT REPLACEMENT      left knee     KIDNEY STONE SURGERY      -large 27 mm stone-took x3 surgeries to resolve    NE AMPUTATION TOE,I-P JT Left 12/14/2018    Procedure: AMPUTATION TOE;  Surgeon: Fransisca Begum DPM;  Location: [facility];  Service: Podiatry    NE COLONOSCOPY FLX DX W/COLLJ SPEC WHEN PFRMD N/A 2/9/2018    Procedure: EGD AND COLONOSCOPY;  Surgeon: Ayana Laurent MD;  [facility]; Service: Gastroenterology    NE CYSTO/URETERO W/LITHOTRIPSY &INDWELL STENT INSRT Left 7/26/2019    Procedure: CYSTOSCOPY URETEROSCOPY WITH LITHOTRIPSY HOLMIUM LASER, RETROGRADE PYELOGRAM AND INSERTION STENT URETERAL;  Surgeon: Enoch Graves MD;  [facility];  Service: Urology    NE ENDOVENOUS LASER, 1ST VEIN Left 1/4/2019    Procedure: ENDOVASCULAR LASER THERAPY (EVLT);   Surgeon: Renetta Crawley DO;  [facility];  Service: Vascular    ID ENDOVENOUS LASER, 1ST VEIN Right 2019    Procedure: ENDOVASCULAR LASER THERAPY (EVLT)   And stab phlebectomy;  Surgeon: Forrest Vazquez DO;  [facility];  Service: Vascular    ID REVISE KNEE JOINT REPLACE,1 PART Right 3/20/2017    Procedure: REVISION TOTAL KNEE ARTHROPLASTY INCLUDING FEMORAL STEM REVISION;  Surgeon: Justin Hernández MD;  [facility];  Service: Orthopedics    ID REVISE KNEE JOINT REPLACE,ALL PARTS Right 2016    Procedure: REVISION TOTAL KNEE ARTHROPLASTY INCLUDING FEMORAL AND TIBIAL COMPONENTS;  Surgeon: Justin Hernández MD;  [facility];  Service: Orthopedics    TONSILLECTOMY      ULNAR TUNNEL RELEASE      WISDOM TOOTH EXTRACTION         Family History   Problem Relation Age of Onset    Diabetes Mother     Hypertension Mother     Hypertension Father     No Known Problems Sister     No Known Problems Son     No Known Problems Son        Social History     Occupational History    Not on file   Tobacco Use    Smoking status: Former Smoker     Last attempt to quit: 1975     Years since quittin 6    Smokeless tobacco: Never Used   Substance and Sexual Activity    Alcohol use: Yes     Frequency: 2-3 times a week     Drinks per session: 1 or 2     Binge frequency: Never     Comment: 2-3 drinks per night    Drug use: No    Sexual activity: Yes         Current Outpatient Medications:     allopurinol (ZYLOPRIM) 100 mg tablet, Take 100 mg by mouth every morning  , Disp: , Rfl:     amLODIPine (NORVASC) 10 mg tablet, , Disp: , Rfl:     amLODIPine (NORVASC) 5 mg tablet, Take 5 mg by mouth 2 (two) times a day  , Disp: , Rfl:     atorvastatin (LIPITOR) 40 mg tablet, Take 40 mg by mouth daily at bedtime, Disp: , Rfl:     cholecalciferol (VITAMIN D3) 1,000 units tablet, Take 1,000 Units by mouth daily at bedtime  , Disp: , Rfl:     ciprofloxacin (CIPRO) 500 mg tablet, , Disp: , Rfl:     diphenhydrAMINE (BENADRYL) 25 mg tablet, Take 1 tablet (25 mg total) by mouth every 6 (six) hours as needed for itching, Disp: 30 tablet, Rfl: 0    docusate sodium (COLACE) 100 mg capsule, Take 1 capsule (100 mg total) by mouth 2 (two) times a day for 15 days, Disp: 30 capsule, Rfl: 0    Dulaglutide (TRULICITY) 1 5 NA/0 5JF SOPN, Inject 1 5 mg under the skin once a week Takes on Sunday, Disp: , Rfl:     Empagliflozin (JARDIANCE) 10 MG TABS, Take 10 mg by mouth every morning  , Disp: , Rfl:     fenofibrate (TRIGLIDE) 160 MG tablet, Take 160 mg by mouth every morning, Disp: , Rfl:     labetalol (NORMODYNE) 100 mg tablet, Take 100 mg by mouth 2 (two) times a day, Disp: , Rfl:     lisinopril (ZESTRIL) 40 mg tablet, Take 40 mg by mouth every morning  , Disp: , Rfl:     metFORMIN (GLUCOPHAGE) 1000 MG tablet, Take 1,000 mg by mouth 2 (two) times a day with meals  , Disp: , Rfl:     metroNIDAZOLE (FLAGYL) 500 mg tablet, , Disp: , Rfl:     Multiple Vitamin (MULTIVITAMIN) tablet, Take 1 tablet by mouth every morning  , Disp: , Rfl:     SILDENAFIL CITRATE PO, SUCK ON one aniya 30 minutes BEFORE sexual activity AS NEEDED, Disp: , Rfl: 4    tamsulosin (FLOMAX) 0 4 mg, Take 1 capsule (0 4 mg total) by mouth daily with dinner, Disp: 15 capsule, Rfl: 0    Allergies   Allergen Reactions    Bee Venom Anaphylaxis     Uses Epi-pen    Doxycycline Photosensitivity    Ciprofloxacin Rash            Vitals:    08/20/19 0803   BP: 143/87   Pulse: 73       Objective:                    Right Knee Exam     Tenderness   The patient is experiencing no tenderness  Range of Motion   Extension: 0   Flexion: 110     Tests   Varus: negative Valgus: positive (1+ in full extension)    Other   Erythema: absent  Swelling: none  Effusion: no effusion present    Comments:  Patellofemoral tracking is normal            Diagnostics, reviewed and taken today if performed as documented:     The attending physician has personally reviewed the pertinent films in PACS and interpretation is as follows: Right knee x-rays 8/20/19: Revision total knee arthroplasty in satisfactory alignment  No acute osseous abnormalities  Procedures, if performed today:    Procedures    None performed      Portions of the record may have been created with voice recognition software  Occasional wrong word or "sound a like" substitutions may have occurred due to the inherent limitations of voice recognition software  Read the chart carefully and recognize, using context, where substitutions have occurred

## 2019-08-29 ENCOUNTER — TELEPHONE (OUTPATIENT)
Dept: OBGYN CLINIC | Facility: MEDICAL CENTER | Age: 59
End: 2019-08-29

## 2019-08-29 NOTE — TELEPHONE ENCOUNTER
I called and spoke to patient  I made him aware that unfortunately, Dr Martines has declined to take him on as a patient and suggested to return to Dr Tre Jimenez  Patient verbalized understanding was understandably disappointed       # [phone]

## 2019-09-20 ENCOUNTER — HOSPITAL ENCOUNTER (OUTPATIENT)
Dept: RADIOLOGY | Facility: HOSPITAL | Age: 59
Discharge: HOME | End: 2019-09-20
Attending: UROLOGY
Payer: COMMERCIAL

## 2019-09-20 ENCOUNTER — HOSPITAL ENCOUNTER (OUTPATIENT)
Dept: ULTRASOUND IMAGING | Facility: HOSPITAL | Age: 59
Discharge: HOME | End: 2019-09-20
Attending: UROLOGY
Payer: COMMERCIAL

## 2019-09-20 DIAGNOSIS — N20.0 NEPHROLITHIASIS: ICD-10-CM

## 2019-09-20 PROCEDURE — 76770 US EXAM ABDO BACK WALL COMP: CPT

## 2019-09-20 PROCEDURE — 74018 RADEX ABDOMEN 1 VIEW: CPT

## 2019-09-25 NOTE — PROGRESS NOTES
There are no diagnoses linked to this encounter  Assessment and plan:       1  Nephrolithiasis  - Patient underwent elective ureteroscopy on 07/26/2019   - Follow-up imaging is negative for any urinary tract calculi or hydronephrosis  - He will return in 1 year with KUB and ultrasound prior to visit  He will contact our office or proceed to the emergency department in the interim if he becomes symptomatic of any passage of stones  Neymar Koehler PA-C      Chief Complaint     Chief Complaint   Patient presents with    Nephrolithiasis         History of Present Illness     Corky Mcguire is a 63 y o  male patient under Dr Ramsey Bardales for history of recurrent nephrolithiasis, history of PCNL, who underwent elective ureteroscopy for a small left intrarenal calculus on 07/26/2019  Stent became dislodged and the patient removed it by himself on 07/28/2019  He returns today for follow-up with ultrasound and KUB performed 09/20/2019  Ultrasound revealed no residual urinary tract calculi or hydronephrosis  Bilateral jets are detected  X-ray was also negative for any radiopaque urinary tract calculi  He has been asymptomatic since stent removal       Laboratory     Lab Results   Component Value Date    CREATININE 0 95 07/19/2019       Lab Results   Component Value Date    PSA 0 5 01/02/2019    PSA 0 4 01/05/2018    PSA 0 8 01/03/2017       No results found for this or any previous visit (from the past 1 hour(s))  Review of Systems     Review of Systems   Constitutional: Negative for chills and fever  HENT: Negative  Eyes: Negative  Respiratory: Negative for shortness of breath  Cardiovascular: Negative for chest pain  Gastrointestinal: Negative for constipation, diarrhea, nausea and vomiting  Genitourinary: Negative for difficulty urinating, dysuria, enuresis, flank pain, frequency, hematuria and urgency                   Allergies     Allergies   Allergen Reactions    Bee Venom Anaphylaxis     Uses Epi-pen    Doxycycline Photosensitivity    Ciprofloxacin Rash       Physical Exam     Physical Exam   Constitutional: He is oriented to person, place, and time  He appears well-developed and well-nourished  No distress  HENT:   Head: Normocephalic and atraumatic  Right Ear: External ear normal    Left Ear: External ear normal    Nose: Nose normal    Eyes: Right eye exhibits no discharge  Left eye exhibits no discharge  No scleral icterus  Cardiovascular: Normal rate  Pulmonary/Chest: Effort normal    Genitourinary:   Genitourinary Comments: Negative for CVA tenderness bilaterally   Musculoskeletal:   Ambulates independently   Neurological: He is alert and oriented to person, place, and time  Skin: Skin is warm and dry  He is not diaphoretic  Psychiatric: He has a normal mood and affect  His behavior is normal  Judgment and thought content normal    Nursing note and vitals reviewed          Vital Signs     Vitals:    09/26/19 1347   BP: 148/70   BP Location: Left arm   Patient Position: Sitting   Cuff Size: Standard   Pulse: 80   Weight: 131 kg (289 lb)   Height: 5' 10" (1 778 m)         Current Medications       Current Outpatient Medications:     allopurinol (ZYLOPRIM) 300 mg tablet, , Disp: , Rfl:     amLODIPine (NORVASC) 10 mg tablet, , Disp: , Rfl:     amLODIPine (NORVASC) 5 mg tablet, Take 5 mg by mouth 2 (two) times a day  , Disp: , Rfl:     atorvastatin (LIPITOR) 40 mg tablet, Take 40 mg by mouth daily at bedtime, Disp: , Rfl:     cholecalciferol (VITAMIN D3) 1,000 units tablet, Take 1,000 Units by mouth daily at bedtime  , Disp: , Rfl:     ciprofloxacin (CIPRO) 500 mg tablet, , Disp: , Rfl:     diphenhydrAMINE (BENADRYL) 25 mg tablet, Take 1 tablet (25 mg total) by mouth every 6 (six) hours as needed for itching, Disp: 30 tablet, Rfl: 0    Dulaglutide (TRULICITY) 1 5 WILLETT/6 2XC SOPN, Inject 1 5 mg under the skin once a week Takes on Sunday, Disp: , Rfl:    Empagliflozin (JARDIANCE) 10 MG TABS, Take 10 mg by mouth every morning  , Disp: , Rfl:     fenofibrate (TRIGLIDE) 160 MG tablet, Take 160 mg by mouth every morning, Disp: , Rfl:     labetalol (NORMODYNE) 100 mg tablet, Take 100 mg by mouth 2 (two) times a day, Disp: , Rfl:     lisinopril (ZESTRIL) 40 mg tablet, Take 40 mg by mouth every morning  , Disp: , Rfl:     metFORMIN (GLUCOPHAGE) 1000 MG tablet, Take 1,000 mg by mouth 2 (two) times a day with meals  , Disp: , Rfl:     metroNIDAZOLE (FLAGYL) 500 mg tablet, , Disp: , Rfl:     Multiple Vitamin (MULTIVITAMIN) tablet, Take 1 tablet by mouth every morning  , Disp: , Rfl:     SILDENAFIL CITRATE PO, SUCK ON one aniya 30 minutes BEFORE sexual activity AS NEEDED, Disp: , Rfl: 4    tamsulosin (FLOMAX) 0 4 mg, Take 1 capsule (0 4 mg total) by mouth daily with dinner, Disp: 15 capsule, Rfl: 0    allopurinol (ZYLOPRIM) 100 mg tablet, Take 100 mg by mouth every morning  , Disp: , Rfl:     docusate sodium (COLACE) 100 mg capsule, Take 1 capsule (100 mg total) by mouth 2 (two) times a day for 15 days (Patient not taking: Reported on 9/26/2019), Disp: 30 capsule, Rfl: 0      Active Problems     Patient Active Problem List   Diagnosis    Diabetes mellitus ([address] )    Hypertension    Hyperlipidemia    Status post revision of total replacement of right knee    Intervertebral disc disorders with radiculopathy, lumbar region    Pes anserine bursitis    Acquired deformity of left foot    Hammer toe of left foot    Tinea pedis of both feet    Nephrolithiasis         Past Medical History     Past Medical History:   Diagnosis Date    Anemia     s/p knee sx    Anesthesia complication     Developed hives in the PACU s/p knee replacement    Arthritis     Diabetes mellitus ([address] )     niddm    Diabetic neuropathy (HCC)     Left foot burning and tingling    Diverticulitis     DJD (degenerative joint disease)     Edema     lower legs-ankles    Hyperlipidemia     Hypertension     Kidney stone     27mm    Mechanical complication of internal [address] device [address] [address]     knee    Nephrolithiasis 7/12/2019    Added automatically from request for surgery [id number]    Obesity     Osteomyelitis ([facility] [address]     left 2nd toe    PONV (postoperative nausea and vomiting)     patient requests to be premedicated for N/V    PVD (peripheral vascular disease) ([facility] [address]     Valve issue with circulation of the lower legs- to have laser surgery         Surgical History     Past Surgical History:   Procedure Laterality Date    CARPAL TUNNEL RELEASE Left     COLONOSCOPY      CORRECTION HAMMER TOE      Left foot 2nd toe    CYSTOSCOPY      FL RETROGRADE PYELOGRAM  7/26/2019    HERNIA REPAIR Left     inguinal    JOINT REPLACEMENT      right knee revision, infection total of 4 surgeries     JOINT REPLACEMENT      left knee     KIDNEY STONE SURGERY      -large 27 mm stone-took x3 surgeries to resolve    ID AMPUTATION TOE,I-P JT Left 12/14/2018    Procedure: AMPUTATION TOE;  Surgeon: Ailyn Herrera DPM;  Location: [facility];  Service: Podiatry    ID COLONOSCOPY FLX DX W/COLLJ SPEC WHEN PFRMD N/A 2/9/2018    Procedure: EGD AND COLONOSCOPY;  Surgeon: Leslye Mckeon MD;  [facility]; Service: Gastroenterology    ID CYSTO/URETERO W/LITHOTRIPSY &INDWELL STENT INSRT Left 7/26/2019    Procedure: CYSTOSCOPY URETEROSCOPY WITH LITHOTRIPSY HOLMIUM LASER, RETROGRADE PYELOGRAM AND INSERTION STENT URETERAL;  Surgeon: Soledad Goodell, MD;  [facility];  Service: Urology    ID ENDOVENOUS LASER, 1ST VEIN Left 1/4/2019    Procedure: ENDOVASCULAR LASER THERAPY (EVLT);   Surgeon: Karishma Madrigal DO;  [facility];  Service: Vascular    ID ENDOVENOUS LASER, 1ST VEIN Right 6/7/2019    Procedure: ENDOVASCULAR LASER THERAPY (EVLT)   And stab phlebectomy;  Surgeon: Karishma Madrigal DO;  [facility];  Service: Vascular    ID REVISE KNEE JOINT REPLACE,1 PART Right 3/20/2017    Procedure: REVISION TOTAL KNEE ARTHROPLASTY INCLUDING FEMORAL STEM REVISION;  Surgeon: Jaqueline Alvarado MD;  [facility];  Service: Orthopedics    IN REVISE KNEE JOINT REPLACE,ALL PARTS Right 4/6/2016    Procedure: REVISION TOTAL KNEE ARTHROPLASTY INCLUDING FEMORAL AND TIBIAL COMPONENTS;  Surgeon: Jaqueline Alvarado MD;  [facility];  Service: Orthopedics    TONSILLECTOMY      ULNAR TUNNEL RELEASE      WISDOM TOOTH EXTRACTION           Family History     Family History   Problem Relation Age of Onset    Diabetes Mother     Hypertension Mother     Hypertension Father     No Known Problems Sister     No Known Problems Son     No Known Problems Son          Social History     Social History       Radiology     RENAL ULTRASOUND     INDICATION:   N20 0: Calculus of kidney  Kidney stones  Lithotripsy 2 months ago      COMPARISON: CT performed July 8, 2019  Prior ultrasound performed June 26, 2019      TECHNIQUE:   Ultrasound of the retroperitoneum was performed with a curvilinear transducer utilizing volumetric sweeps and still imaging techniques       FINDINGS:     KIDNEYS:  Symmetric and normal size  Right kidney:  13 2 cm  Left kidney:  13 9 cm      Right kidney  Normal echogenicity and contour  No suspicious masses detected  No hydronephrosis  No shadowing calculi  No perinephric fluid collections      Left kidney  Normal echogenicity and contour  No suspicious masses detected  No hydronephrosis  No shadowing calculi  No perinephric fluid collections      URETERS:  Nonvisualized      BLADDER:   Normally distended  No focal thickening or mass lesions  Bilateral ureteral jets detected      Visualized hepatic parenchyma is echogenic suggestive of hepatic steatosis      IMPRESSION:     Unremarkable examination  No sonographically detectable urinary tract calculi or obstructive uropathy       ABDOMEN     INDICATION:   N20 0: Calculus of kidney      COMPARISON:  CT 7/8/2019     VIEWS:  AP supine        FINDINGS:     No radiopaque renal calculi seen      No radiopaque ureteral calculi identified      Nonobstructive bowel gas pattern      No acute osseous abnormality is seen      IMPRESSION:     No radiopaque urinary tract calculi

## 2019-09-26 ENCOUNTER — OFFICE VISIT (OUTPATIENT)
Dept: UROLOGY | Facility: CLINIC | Age: 59
End: 2019-09-26
Payer: COMMERCIAL

## 2019-09-26 VITALS
BODY MASS INDEX: 41.37 KG/M2 | DIASTOLIC BLOOD PRESSURE: 70 MMHG | HEIGHT: 70 IN | WEIGHT: 289 LBS | SYSTOLIC BLOOD PRESSURE: 148 MMHG | HEART RATE: 80 BPM

## 2019-09-26 DIAGNOSIS — N20.0 NEPHROLITHIASIS: Primary | ICD-10-CM

## 2019-09-26 PROCEDURE — 99213 OFFICE O/P EST LOW 20 MIN: CPT | Performed by: PHYSICIAN ASSISTANT

## 2019-09-26 RX ORDER — ALLOPURINOL 300 MG/1
TABLET ORAL
COMMUNITY
Start: 2019-08-12 | End: 2021-08-04 | Stop reason: SDUPTHER

## 2019-10-23 DIAGNOSIS — M25.561 CHRONIC PAIN OF RIGHT KNEE: Primary | ICD-10-CM

## 2019-10-23 DIAGNOSIS — G89.29 CHRONIC PAIN OF RIGHT KNEE: Primary | ICD-10-CM

## 2019-10-23 NOTE — PROGRESS NOTES
Spoke to patient about ongoing chronic right knee pain and discussed performing right knee geniculate nerve block in anticipation of radiofrequency ablation which he would like to try    Order placed

## 2019-10-24 DIAGNOSIS — N52.1 ERECTILE DYSFUNCTION DUE TO DISEASES CLASSIFIED ELSEWHERE: Primary | ICD-10-CM

## 2019-10-24 NOTE — TELEPHONE ENCOUNTER
I spoke with Lala Dang Ph from [employer]  Patient presented to them with an  prescription for the Sildenafil 30mg troches  The pharmacist stated that they can accommodate either dosage form (tablet or troches) but the medicinal affect should be the same  I spoke with the patient and he offered that he was really taking TWO troches to get the desired response  He is agreeable with taking Sildenafil 50mg #30 tablets for $44 99  Request for same is being queued and forwarded to the Advanced Practitioner covering the [facility] for approval     Patient has an upcoming office visit scheduled for 20 with Veronica Meza PA-C in the [facility]

## 2019-10-24 NOTE — TELEPHONE ENCOUNTER
Patient last seen by Ulysses Farias at [facility], managed by Dr Bret Garcia from [employer] calling in regards to a refill of SILDENAFIL CITRATE PO        He can be reached at [phone]

## 2019-10-25 RX ORDER — SILDENAFIL 50 MG/1
TABLET, FILM COATED ORAL
Qty: 30 TABLET | Refills: 1 | Status: SHIPPED | OUTPATIENT
Start: 2019-10-25 | End: 2020-03-24 | Stop reason: SDUPTHER

## 2019-11-13 ENCOUNTER — APPOINTMENT (OUTPATIENT)
Dept: LAB | Facility: CLINIC | Age: 59
End: 2019-11-13
Payer: COMMERCIAL

## 2019-11-13 ENCOUNTER — TRANSCRIBE ORDERS (OUTPATIENT)
Dept: LAB | Facility: CLINIC | Age: 59
End: 2019-11-13

## 2019-11-13 DIAGNOSIS — E11.9 DIABETES MELLITUS WITHOUT COMPLICATION (HCC): ICD-10-CM

## 2019-11-13 DIAGNOSIS — I10 HYPERTENSION, UNSPECIFIED TYPE: ICD-10-CM

## 2019-11-13 DIAGNOSIS — E78.2 MIXED HYPERLIPIDEMIA: ICD-10-CM

## 2019-11-13 DIAGNOSIS — E11.9 DIABETES MELLITUS WITHOUT COMPLICATION (HCC): Primary | ICD-10-CM

## 2019-11-13 LAB
ALBUMIN SERPL BCP-MCNC: 4.2 G/DL (ref 3.5–5)
ALP SERPL-CCNC: 61 U/L (ref 46–116)
ALT SERPL W P-5'-P-CCNC: 76 U/L (ref 12–78)
ANION GAP SERPL CALCULATED.3IONS-SCNC: 14 MMOL/L (ref 4–13)
AST SERPL W P-5'-P-CCNC: 48 U/L (ref 5–45)
BILIRUB SERPL-MCNC: 0.5 MG/DL (ref 0.2–1)
BUN SERPL-MCNC: 12 MG/DL (ref 5–25)
CALCIUM SERPL-MCNC: 9.2 MG/DL (ref 8.3–10.1)
CHLORIDE SERPL-SCNC: 102 MMOL/L (ref 100–108)
CO2 SERPL-SCNC: 23 MMOL/L (ref 21–32)
CREAT SERPL-MCNC: 0.83 MG/DL (ref 0.6–1.3)
EST. AVERAGE GLUCOSE BLD GHB EST-MCNC: 146 MG/DL
GFR SERPL CREATININE-BSD FRML MDRD: 97 ML/MIN/1.73SQ M
GLUCOSE P FAST SERPL-MCNC: 162 MG/DL (ref 65–99)
HBA1C MFR BLD: 6.7 % (ref 4.2–6.3)
LDLC SERPL DIRECT ASSAY-MCNC: 105 MG/DL (ref 0–100)
POTASSIUM SERPL-SCNC: 3.8 MMOL/L (ref 3.5–5.3)
PROT SERPL-MCNC: 7.4 G/DL (ref 6.4–8.2)
SODIUM SERPL-SCNC: 139 MMOL/L (ref 136–145)

## 2019-11-13 PROCEDURE — 83721 ASSAY OF BLOOD LIPOPROTEIN: CPT

## 2019-11-13 PROCEDURE — 36415 COLL VENOUS BLD VENIPUNCTURE: CPT | Performed by: INTERNAL MEDICINE

## 2019-11-13 PROCEDURE — 80053 COMPREHEN METABOLIC PANEL: CPT

## 2019-11-13 PROCEDURE — 83036 HEMOGLOBIN GLYCOSYLATED A1C: CPT | Performed by: INTERNAL MEDICINE

## 2019-11-22 ENCOUNTER — HOSPITAL ENCOUNTER (OUTPATIENT)
Dept: RADIOLOGY | Facility: CLINIC | Age: 59
Discharge: HOME | End: 2019-11-22
Attending: ANESTHESIOLOGY
Payer: COMMERCIAL

## 2019-11-22 VITALS
RESPIRATION RATE: 18 BRPM | OXYGEN SATURATION: 97 % | SYSTOLIC BLOOD PRESSURE: 169 MMHG | DIASTOLIC BLOOD PRESSURE: 91 MMHG | HEART RATE: 67 BPM | TEMPERATURE: 97.8 F

## 2019-11-22 DIAGNOSIS — M25.561 CHRONIC PAIN OF RIGHT KNEE: ICD-10-CM

## 2019-11-22 DIAGNOSIS — G89.29 CHRONIC PAIN OF RIGHT KNEE: ICD-10-CM

## 2019-11-22 DIAGNOSIS — M79.18 MYOFASCIAL PAIN: Primary | ICD-10-CM

## 2019-11-22 PROCEDURE — 64450 NJX AA&/STRD OTHER PN/BRANCH: CPT | Performed by: ANESTHESIOLOGY

## 2019-11-22 RX ORDER — BUPIVACAINE HCL/PF 2.5 MG/ML
10 VIAL (ML) INJECTION ONCE
Status: COMPLETED | OUTPATIENT
Start: 2019-11-22 | End: 2019-11-22

## 2019-11-22 RX ORDER — METHOCARBAMOL 750 MG/1
750 TABLET, FILM COATED ORAL
Qty: 30 TABLET | Refills: 0 | Status: SHIPPED | OUTPATIENT
Start: 2019-11-22 | End: 2020-02-03

## 2019-11-22 RX ADMIN — BUPIVACAINE HYDROCHLORIDE 3 ML: 2.5 INJECTION, SOLUTION EPIDURAL; INFILTRATION; INTRACAUDAL at 10:37

## 2019-11-22 NOTE — DISCHARGE INSTR - LAB

## 2019-11-22 NOTE — H&P
History of Present Illness: The patient is a 63 y o  male who presents with complaints of chronic right-sided knee pain and is here today for geniculate nerve blocks      Patient Active Problem List   Diagnosis    Diabetes mellitus ([facility] )    Hypertension    Hyperlipidemia    Status post revision of total replacement of right knee    Intervertebral disc disorders with radiculopathy, lumbar region    Pes anserine bursitis    Acquired deformity of left foot    Hammer toe of left foot    Tinea pedis of both feet    Nephrolithiasis       Past Medical History:   Diagnosis Date    Anemia     s/p knee sx    Anesthesia complication     Developed hives in the PACU s/p knee replacement    Arthritis     Diabetes mellitus ([address] )     niddm    Diabetic neuropathy (HCC)     Left foot burning and tingling    Diverticulitis     DJD (degenerative joint disease)     Edema     lower legs-ankles    Hyperlipidemia     Hypertension     Kidney stone     27mm    Mechanical complication of internal orthopedic device ([facility] [address] )     knee    Nephrolithiasis 7/12/2019    Added automatically from request for surgery [id number]    Obesity     Osteomyelitis ([address] )     left 2nd toe    PONV (postoperative nausea and vomiting)     patient requests to be premedicated for N/V    PVD (peripheral vascular disease) ([facility] [address] )     Valve issue with circulation of the lower legs- to have laser surgery       Past Surgical History:   Procedure Laterality Date    CARPAL TUNNEL RELEASE Left     COLONOSCOPY      CORRECTION HAMMER TOE      Left foot 2nd toe    CYSTOSCOPY      FL RETROGRADE PYELOGRAM  7/26/2019    HERNIA REPAIR Left     inguinal    JOINT REPLACEMENT      right knee revision, infection total of 4 surgeries     JOINT REPLACEMENT      left knee     KIDNEY STONE SURGERY      -large 27 mm stone-took x3 surgeries to resolve    RI AMPUTATION TOE,I-P JT Left 12/14/2018    Procedure: AMPUTATION TOE;  Surgeon: Suzan Franco DPM;  Location: [facility] MAIN OR;  Service: Podiatry    NH COLONOSCOPY FLX DX W/COLLJ SPEC WHEN PFRMD N/A 2/9/2018    Procedure: EGD AND COLONOSCOPY;  Surgeon: Kelsie Cavanaugh MD;  [facility]; Service: Gastroenterology    NH CYSTO/URETERO W/LITHOTRIPSY &INDWELL STENT INSRT Left 7/26/2019    Procedure: CYSTOSCOPY URETEROSCOPY WITH LITHOTRIPSY HOLMIUM LASER, RETROGRADE PYELOGRAM AND INSERTION STENT URETERAL;  Surgeon: Mario Alberto Betts MD;  [facility];  Service: Urology    NH ENDOVENOUS LASER, 1ST VEIN Left 1/4/2019    Procedure: ENDOVASCULAR LASER THERAPY (EVLT);   Surgeon: Caroline Monique DO;  [facility];  Service: Vascular    NH ENDOVENOUS LASER, 1ST VEIN Right 6/7/2019    Procedure: ENDOVASCULAR LASER THERAPY (EVLT)   And stab phlebectomy;  Surgeon: Caroline Monique DO;  [facility];  Service: Vascular    NH REVISE KNEE JOINT REPLACE,1 PART Right 3/20/2017    Procedure: REVISION TOTAL KNEE ARTHROPLASTY INCLUDING FEMORAL STEM REVISION;  Surgeon: Aaron Gottlieb MD;  [facility];  Service: Orthopedics    NH REVISE KNEE JOINT REPLACE,ALL PARTS Right 4/6/2016    Procedure: REVISION TOTAL KNEE ARTHROPLASTY INCLUDING FEMORAL AND TIBIAL COMPONENTS;  Surgeon: Aaron Gottlieb MD;  [facility];  Service: Orthopedics    TONSILLECTOMY      ULNAR TUNNEL RELEASE      WISDOM TOOTH EXTRACTION           Current Outpatient Medications:     allopurinol (ZYLOPRIM) 100 mg tablet, Take 100 mg by mouth every morning  , Disp: , Rfl:     allopurinol (ZYLOPRIM) 300 mg tablet, , Disp: , Rfl:     amLODIPine (NORVASC) 10 mg tablet, , Disp: , Rfl:     amLODIPine (NORVASC) 5 mg tablet, Take 5 mg by mouth 2 (two) times a day  , Disp: , Rfl:     atorvastatin (LIPITOR) 40 mg tablet, Take 40 mg by mouth daily at bedtime, Disp: , Rfl:     cholecalciferol (VITAMIN D3) 1,000 units tablet, Take 1,000 Units by mouth daily at bedtime  , Disp: , Rfl:     ciprofloxacin (CIPRO) 500 mg tablet, , Disp: , Rfl:     diphenhydrAMINE (BENADRYL) 25 mg tablet, Take 1 tablet (25 mg total) by mouth every 6 (six) hours as needed for itching, Disp: 30 tablet, Rfl: 0    docusate sodium (COLACE) 100 mg capsule, Take 1 capsule (100 mg total) by mouth 2 (two) times a day for 15 days (Patient not taking: Reported on 9/26/2019), Disp: 30 capsule, Rfl: 0    Dulaglutide (TRULICITY) 1 5 MV/5 5EH SOPN, Inject 1 5 mg under the skin once a week Takes on Sunday, Disp: , Rfl:     Empagliflozin (JARDIANCE) 10 MG TABS, Take 10 mg by mouth every morning  , Disp: , Rfl:     fenofibrate (TRIGLIDE) 160 MG tablet, Take 160 mg by mouth every morning, Disp: , Rfl:     labetalol (NORMODYNE) 100 mg tablet, Take 100 mg by mouth 2 (two) times a day, Disp: , Rfl:     lisinopril (ZESTRIL) 40 mg tablet, Take 40 mg by mouth every morning  , Disp: , Rfl:     metFORMIN (GLUCOPHAGE) 1000 MG tablet, Take 1,000 mg by mouth 2 (two) times a day with meals  , Disp: , Rfl:     Multiple Vitamin (MULTIVITAMIN) tablet, Take 1 tablet by mouth every morning  , Disp: , Rfl:     sildenafil (VIAGRA) 50 MG tablet, Take 1 tablets by mouth one (1) hour prior to intercourse , Disp: 30 tablet, Rfl: 1    tamsulosin (FLOMAX) 0 4 mg, Take 1 capsule (0 4 mg total) by mouth daily with dinner, Disp: 15 capsule, Rfl: 0    Current Facility-Administered Medications:     bupivacaine (PF) (MARCAINE) 0 25 % injection 10 mL, 10 mL, Perineural, Once, Nelly Benito MD    Allergies   Allergen Reactions    Bee Venom Anaphylaxis     Uses Epi-pen    Doxycycline Photosensitivity    Ciprofloxacin Rash       Physical Exam:   Vitals:    11/22/19 1021   BP: 154/81   Pulse: 66   Resp: 22   Temp: 97 8 °F (36 6 °C)   SpO2: 95%     General: Awake, Alert, Oriented x 3, Mood and affect appropriate  Respiratory: Respirations even and unlabored  Cardiovascular: Peripheral pulses intact; no edema  Musculoskeletal Exam:   Right knee pain    ASA Score: 2    Patient/Chart Verification  Patient ID Verified: Verbal  ID Band Applied: No  Consents Confirmed: Procedural, To be obtained in the Pre-Procedure area  H&P( within 30 days) Verified: To be obtained in the Pre-Procedure area  Allergies Reviewed: Yes  Anticoag/NSAID held?: No  Currently on antibiotics?: No    Assessment:   1   Chronic pain of right knee        Plan: Right Knee Geniculate Nerve Block

## 2019-12-23 ENCOUNTER — TELEPHONE (OUTPATIENT)
Dept: RADIOLOGY | Facility: CLINIC | Age: 59
End: 2019-12-23

## 2019-12-23 NOTE — TELEPHONE ENCOUNTER
Patient had to be rescheduled for his procedure because of the insurance change  We can't even start a case to authorize until 1/2/20  He was scheduled on the third so there was no time to get an Shelley Sarah  He would like to know if you can do a note for "light duty" for him and to also include walking as tolerated  If you do a note, can you let me know, because he will come up for it  Thank you

## 2019-12-27 ENCOUNTER — TELEPHONE (OUTPATIENT)
Dept: RADIOLOGY | Facility: CLINIC | Age: 59
End: 2019-12-27

## 2019-12-27 ENCOUNTER — HOSPITAL ENCOUNTER (OUTPATIENT)
Dept: RADIOLOGY | Facility: CLINIC | Age: 59
Discharge: HOME | End: 2019-12-27
Attending: ANESTHESIOLOGY
Payer: COMMERCIAL

## 2019-12-27 VITALS
HEART RATE: 74 BPM | DIASTOLIC BLOOD PRESSURE: 96 MMHG | SYSTOLIC BLOOD PRESSURE: 168 MMHG | TEMPERATURE: 97.6 F | OXYGEN SATURATION: 97 % | RESPIRATION RATE: 20 BRPM

## 2019-12-27 DIAGNOSIS — M25.561 RIGHT KNEE PAIN: ICD-10-CM

## 2019-12-27 PROCEDURE — 64640 INJECTION TREATMENT OF NERVE: CPT | Performed by: ANESTHESIOLOGY

## 2019-12-27 RX ORDER — BUPIVACAINE HCL/PF 2.5 MG/ML
10 VIAL (ML) INJECTION ONCE
Status: COMPLETED | OUTPATIENT
Start: 2019-12-27 | End: 2019-12-27

## 2019-12-27 RX ORDER — METHYLPREDNISOLONE ACETATE 80 MG/ML
80 INJECTION, SUSPENSION INTRA-ARTICULAR; INTRALESIONAL; INTRAMUSCULAR; PARENTERAL; SOFT TISSUE ONCE
Status: COMPLETED | OUTPATIENT
Start: 2019-12-27 | End: 2019-12-27

## 2019-12-27 RX ORDER — 0.9 % SODIUM CHLORIDE 0.9 %
10 VIAL (ML) INJECTION ONCE
Status: COMPLETED | OUTPATIENT
Start: 2019-12-27 | End: 2019-12-27

## 2019-12-27 RX ADMIN — METHYLPREDNISOLONE ACETATE 20 MG: 80 INJECTION, SUSPENSION INTRA-ARTICULAR; INTRALESIONAL; INTRAMUSCULAR; PARENTERAL; SOFT TISSUE at 10:05

## 2019-12-27 RX ADMIN — SODIUM CHLORIDE 10 ML: 9 INJECTION, SOLUTION INTRAMUSCULAR; INTRAVENOUS; SUBCUTANEOUS at 09:49

## 2019-12-27 RX ADMIN — Medication 3 ML: at 09:49

## 2019-12-27 RX ADMIN — BUPIVACAINE HYDROCHLORIDE 3 ML: 2.5 INJECTION, SOLUTION EPIDURAL; INFILTRATION; INTRACAUDAL at 10:05

## 2019-12-27 RX ADMIN — Medication 10 ML: at 09:49

## 2019-12-27 NOTE — H&P
History of Present Illness: The patient is a 63 y o  male who presents with complaints of chronic right knee pain and is here today for right knee genicular RFA      Patient Active Problem List   Diagnosis    Diabetes mellitus ([facility] )    Hypertension    Hyperlipidemia    Status post revision of total replacement of right knee    Intervertebral disc disorders with radiculopathy, lumbar region    Pes anserine bursitis    Acquired deformity of left foot    Hammer toe of left foot    Tinea pedis of both feet    Nephrolithiasis    Chronic pain of right knee       Past Medical History:   Diagnosis Date    Anemia     s/p knee sx    Anesthesia complication     Developed hives in the PACU s/p knee replacement    Arthritis     Diabetes mellitus ([address] )     niddm    Diabetic neuropathy (HCC)     Left foot burning and tingling    Diverticulitis     DJD (degenerative joint disease)     Edema     lower legs-ankles    Hyperlipidemia     Hypertension     Kidney stone     27mm    Mechanical complication of internal orthopedic device ([facility] [address] )     knee    Nephrolithiasis 7/12/2019    Added automatically from request for surgery [id number]    Obesity     Osteomyelitis ([address] )     left 2nd toe    PONV (postoperative nausea and vomiting)     patient requests to be premedicated for N/V    PVD (peripheral vascular disease) ([facility] [address] )     Valve issue with circulation of the lower legs- to have laser surgery       Past Surgical History:   Procedure Laterality Date    CARPAL TUNNEL RELEASE Left     COLONOSCOPY      CORRECTION HAMMER TOE      Left foot 2nd toe    CYSTOSCOPY      FL RETROGRADE PYELOGRAM  7/26/2019    HERNIA REPAIR Left     inguinal    JOINT REPLACEMENT      right knee revision, infection total of 4 surgeries     JOINT REPLACEMENT      left knee     KIDNEY STONE SURGERY      -large 27 mm stone-took x3 surgeries to resolve    MA AMPUTATION TOE,I-P JT Left 12/14/2018    Procedure: AMPUTATION TOE;  Surgeon: Vonda Perez Markus Dumont DPM;  [facility]: [facility];  Service: Podiatry    CA COLONOSCOPY FLX DX W/COLLJ SPEC WHEN PFRMD N/A 2/9/2018    Procedure: EGD AND COLONOSCOPY;  Surgeon: Emmanuel Desouza MD;  [facility]; Service: Gastroenterology    CA CYSTO/URETERO W/LITHOTRIPSY &INDWELL STENT INSRT Left 7/26/2019    Procedure: CYSTOSCOPY URETEROSCOPY WITH LITHOTRIPSY HOLMIUM LASER, RETROGRADE PYELOGRAM AND INSERTION STENT URETERAL;  Surgeon: Baylee Washington MD;  [facility];  Service: Urology    CA ENDOVENOUS LASER, 1ST VEIN Left 1/4/2019    Procedure: ENDOVASCULAR LASER THERAPY (EVLT);   Surgeon: Viji Amezcua DO;  [facility];  Service: Vascular    CA ENDOVENOUS LASER, 1ST VEIN Right 6/7/2019    Procedure: ENDOVASCULAR LASER THERAPY (EVLT)   And stab phlebectomy;  Surgeon: Viji Amezcua DO;  [facility];  Service: Vascular    CA REVISE KNEE JOINT REPLACE,1 PART Right 3/20/2017    Procedure: REVISION TOTAL KNEE ARTHROPLASTY INCLUDING FEMORAL STEM REVISION;  Surgeon: Kuldip Rick MD;  [facility];  Service: Orthopedics    CA REVISE KNEE JOINT REPLACE,ALL PARTS Right 4/6/2016    Procedure: REVISION TOTAL KNEE ARTHROPLASTY INCLUDING FEMORAL AND TIBIAL COMPONENTS;  Surgeon: Kuldip Rick MD;  [facility];  Service: Orthopedics    TONSILLECTOMY      ULNAR TUNNEL RELEASE      WISDOM TOOTH EXTRACTION           Current Outpatient Medications:     allopurinol (ZYLOPRIM) 100 mg tablet, Take 100 mg by mouth every morning  , Disp: , Rfl:     allopurinol (ZYLOPRIM) 300 mg tablet, , Disp: , Rfl:     amLODIPine (NORVASC) 10 mg tablet, , Disp: , Rfl:     amLODIPine (NORVASC) 5 mg tablet, Take 5 mg by mouth 2 (two) times a day  , Disp: , Rfl:     atorvastatin (LIPITOR) 40 mg tablet, Take 40 mg by mouth daily at bedtime, Disp: , Rfl:     cholecalciferol (VITAMIN D3) 1,000 units tablet, Take 1,000 Units by mouth daily at bedtime  , Disp: , Rfl:    ciprofloxacin (CIPRO) 500 mg tablet, , Disp: , Rfl:     diphenhydrAMINE (BENADRYL) 25 mg tablet, Take 1 tablet (25 mg total) by mouth every 6 (six) hours as needed for itching, Disp: 30 tablet, Rfl: 0    docusate sodium (COLACE) 100 mg capsule, Take 1 capsule (100 mg total) by mouth 2 (two) times a day for 15 days (Patient not taking: Reported on 9/26/2019), Disp: 30 capsule, Rfl: 0    Dulaglutide (TRULICITY) 1 5 HG/7 6KO SOPN, Inject 1 5 mg under the skin once a week Takes on Sunday, Disp: , Rfl:     Empagliflozin (JARDIANCE) 10 MG TABS, Take 10 mg by mouth every morning  , Disp: , Rfl:     fenofibrate (TRIGLIDE) 160 MG tablet, Take 160 mg by mouth every morning, Disp: , Rfl:     labetalol (NORMODYNE) 100 mg tablet, Take 100 mg by mouth 2 (two) times a day, Disp: , Rfl:     lisinopril (ZESTRIL) 40 mg tablet, Take 40 mg by mouth every morning  , Disp: , Rfl:     metFORMIN (GLUCOPHAGE) 1000 MG tablet, Take 1,000 mg by mouth 2 (two) times a day with meals  , Disp: , Rfl:     methocarbamol (ROBAXIN) 750 mg tablet, Take 1 tablet (750 mg total) by mouth daily at bedtime as needed for muscle spasms, Disp: 30 tablet, Rfl: 0    Multiple Vitamin (MULTIVITAMIN) tablet, Take 1 tablet by mouth every morning  , Disp: , Rfl:     sildenafil (VIAGRA) 50 MG tablet, Take 1 tablets by mouth one (1) hour prior to intercourse , Disp: 30 tablet, Rfl: 1    tamsulosin (FLOMAX) 0 4 mg, Take 1 capsule (0 4 mg total) by mouth daily with dinner, Disp: 15 capsule, Rfl: 0    Current Facility-Administered Medications:     bupivacaine (PF) (MARCAINE) 0 25 % injection 10 mL, 10 mL, Perineural, Once, Jordan Christianson MD    lidocaine (PF) (XYLOCAINE-MPF) 2 % injection 10 mL, 10 mL, Infiltration, Once, Jordan Christianson MD    methylPREDNISolone acetate (DEPO-MEDROL) injection 80 mg, 80 mg, Perineural, Once, Jordan Christianson MD    sodium chloride (PF) 0 9 % injection 10 mL, 10 mL, Infiltration, Once, Jordan Christianson MD    Allergies Allergen Reactions    Bee Venom Anaphylaxis     Uses Epi-pen    Doxycycline Photosensitivity    Ciprofloxacin Rash       Physical Exam:   Vitals:    12/27/19 0920   BP: 149/87   Pulse: 71   Resp: 18   Temp: 97 6 °F (36 4 °C)   SpO2: 95%     General: Awake, Alert, Oriented x 3, Mood and affect appropriate  Respiratory: Respirations even and unlabored  Cardiovascular: Peripheral pulses intact; no edema  Musculoskeletal Exam:   Right knee pain    ASA Score: 2    Patient/Chart Verification  Patient ID Verified: Verbal  ID Band Applied: No  Consents Confirmed: Procedural, To be obtained in the Pre-Procedure area  H&P( within 30 days) Verified: To be obtained in the Pre-Procedure area  Allergies Reviewed: Yes  Anticoag/NSAID held?: No  Currently on antibiotics?: No    Assessment:   1   Right knee pain        Plan: R Knee Geniculate RFA

## 2019-12-27 NOTE — DISCHARGE INSTR - LAB

## 2019-12-30 NOTE — TELEPHONE ENCOUNTER
Pt reports he's moving in the right direction, the pain from the knee down is less, he still has some pain just above the knee  I explained to the pt it takes 6 wks to see full effect from ablation  His inj sites are fine  Pt given 6 wk f/u post RFA for 2/3/20 at 2:45 w/ FQ

## 2020-01-03 ENCOUNTER — TELEPHONE (OUTPATIENT)
Dept: RADIOLOGY | Facility: CLINIC | Age: 60
End: 2020-01-03

## 2020-01-03 NOTE — TELEPHONE ENCOUNTER
Patient states that he has been feeling ok up to 1/3/20  Patient states that he  has some soreness which is making him limp  Patient states that his pain level is 8/10

## 2020-01-29 ENCOUNTER — OFFICE VISIT (OUTPATIENT)
Dept: UROLOGY | Facility: CLINIC | Age: 60
End: 2020-01-29
Payer: COMMERCIAL

## 2020-01-29 VITALS
DIASTOLIC BLOOD PRESSURE: 70 MMHG | HEIGHT: 70 IN | WEIGHT: 296.7 LBS | SYSTOLIC BLOOD PRESSURE: 138 MMHG | BODY MASS INDEX: 42.48 KG/M2 | HEART RATE: 76 BPM

## 2020-01-29 DIAGNOSIS — N40.0 BENIGN PROSTATIC HYPERPLASIA WITHOUT LOWER URINARY TRACT SYMPTOMS: Primary | ICD-10-CM

## 2020-01-29 PROCEDURE — 99213 OFFICE O/P EST LOW 20 MIN: CPT | Performed by: PHYSICIAN ASSISTANT

## 2020-02-03 ENCOUNTER — TELEPHONE (OUTPATIENT)
Dept: PAIN MEDICINE | Facility: CLINIC | Age: 60
End: 2020-02-03

## 2020-02-03 ENCOUNTER — OFFICE VISIT (OUTPATIENT)
Dept: PAIN MEDICINE | Facility: CLINIC | Age: 60
End: 2020-02-03
Payer: COMMERCIAL

## 2020-02-03 VITALS
DIASTOLIC BLOOD PRESSURE: 72 MMHG | HEART RATE: 76 BPM | TEMPERATURE: 98 F | WEIGHT: 296 LBS | BODY MASS INDEX: 42.47 KG/M2 | SYSTOLIC BLOOD PRESSURE: 154 MMHG

## 2020-02-03 DIAGNOSIS — Z96.651 STATUS POST REVISION OF TOTAL REPLACEMENT OF RIGHT KNEE: ICD-10-CM

## 2020-02-03 DIAGNOSIS — M25.561 CHRONIC PAIN OF RIGHT KNEE: Primary | ICD-10-CM

## 2020-02-03 DIAGNOSIS — G89.29 CHRONIC PAIN OF RIGHT KNEE: Primary | ICD-10-CM

## 2020-02-03 PROCEDURE — 99214 OFFICE O/P EST MOD 30 MIN: CPT | Performed by: ANESTHESIOLOGY

## 2020-02-03 RX ORDER — TRAMADOL HYDROCHLORIDE 50 MG/1
50 TABLET ORAL 2 TIMES DAILY PRN
Qty: 30 TABLET | Refills: 0 | Status: SHIPPED | OUTPATIENT
Start: 2020-02-03 | End: 2021-06-21 | Stop reason: ALTCHOICE

## 2020-02-03 NOTE — TELEPHONE ENCOUNTER
Pharmacy called and stated they received a script for Tramadol  Pt is allergic and they would like s different script      [facility] can be reached at [phone]

## 2020-02-03 NOTE — PROGRESS NOTES
Assessment:  1  Chronic pain of right knee    2  Status post revision of total replacement of right knee        Plan:  Unfortunately, he persists with significant pain in the right knee  I advised him that I would like him to begin a regimen consisting of Tylenol 1000 mg twice daily in conjunction with tramadol 50 mg twice daily  A new prescription for tramadol was sent to the pharmacy  He will call with an update next week on how this is working  There are risks associated with opioid medications, including dependence, addiction and tolerance  The patient understands and agrees to use these medications only as prescribed  Potential side effects of the medications include, but are not limited to, constipation, drowsiness, addiction, impaired judgment and risk of fatal overdose if not taken as prescribed  The patient was warned against driving while taking sedation medications  Sharing medications is a felony  At this point in time, the patient is showing no signs of addiction, abuse, diversion or suicidal ideation  [place] Prescription Drug Monitoring Program report was reviewed and was appropriate     My impressions and treatment recommendations were discussed in detail with the patient who verbalized understanding and had no further questions  Discharge instructions were provided  I personally saw and examined the patient and I agree with the above discussed plan of care  No orders of the defined types were placed in this encounter  New Medications Ordered This Visit   Medications    traMADol (ULTRAM) 50 mg tablet     Sig: Take 1 tablet (50 mg total) by mouth 2 (two) times a day as needed for moderate pain     Dispense:  30 tablet     Refill:  0       History of Present Illness:  Sanju Castro is a 63 y o  male who presents for a follow up office visit in regards to Back Pain (6 week f/u RFA on 12/2019) and Knee Pain       The patient has a history of chronic right knee pain is status post right knee geniculate radiofrequency ablation on December 27th  He reports no relief  He continues with ongoing pain in the knee that is pressure-like  Uses ibuprofen 800 mg daily which does provide some mild relief but he has been having some stomach irritation as well  I have personally reviewed and/or updated the patient's past medical history, past surgical history, family history, social history, current medications, allergies, and vital signs today  Review of Systems   Respiratory: Negative for shortness of breath  Cardiovascular: Negative for chest pain  Gastrointestinal: Negative for constipation, diarrhea, nausea and vomiting  Musculoskeletal: Positive for gait problem and joint swelling  Negative for arthralgias and myalgias  Skin: Negative for rash  Neurological: Negative for dizziness, seizures and weakness  All other systems reviewed and are negative        Patient Active Problem List   Diagnosis    Diabetes mellitus ([address] )    Hypertension    Hyperlipidemia    Status post revision of total replacement of right knee    Intervertebral disc disorders with radiculopathy, lumbar region    Pes anserine bursitis    Acquired deformity of left foot    Hammer toe of left foot    Tinea pedis of both feet    Nephrolithiasis    Chronic pain of right knee       Past Medical History:   Diagnosis Date    Anemia     s/p knee sx    Anesthesia complication     Developed hives in the PACU s/p knee replacement    Arthritis     Diabetes mellitus ([address] )     niddm    Diabetic neuropathy (HCC)     Left foot burning and tingling    Diverticulitis     DJD (degenerative joint disease)     Edema     lower legs-ankles    Hyperlipidemia     Hypertension     Kidney stone     27mm    Mechanical complication of internal orthopedic device ([address] )     knee    Nephrolithiasis 7/12/2019    Added automatically from request for surgery [id number]    Obesity     Osteomyelitis ([address] )     left 2nd toe    PONV (postoperative nausea and vomiting)     patient requests to be premedicated for N/V    PVD (peripheral vascular disease) ([facility] [address] )     Valve issue with circulation of the lower legs- to have laser surgery       Past Surgical History:   Procedure Laterality Date    CARPAL TUNNEL RELEASE Left     COLONOSCOPY      CORRECTION HAMMER TOE      Left foot 2nd toe    CYSTOSCOPY      FL RETROGRADE PYELOGRAM  7/26/2019    HERNIA REPAIR Left     inguinal    JOINT REPLACEMENT      right knee revision, infection total of 4 surgeries     JOINT REPLACEMENT      left knee     KIDNEY STONE SURGERY      -large 27 mm stone-took x3 surgeries to resolve    MD AMPUTATION TOE,I-P JT Left 12/14/2018    Procedure: AMPUTATION TOE;  Surgeon: Syed Sorensen DPM;  Location: [address] [place] [address];  Service: Podiatry    MD COLONOSCOPY FLX DX W/COLLJ SPEC WHEN PFRMD N/A 2/9/2018    Procedure: EGD AND COLONOSCOPY;  Surgeon: Scooter Mauro MD;  [facility]; Service: Gastroenterology    MD CYSTO/URETERO W/LITHOTRIPSY &INDWELL STENT INSRT Left 7/26/2019    Procedure: CYSTOSCOPY URETEROSCOPY WITH LITHOTRIPSY HOLMIUM LASER, RETROGRADE PYELOGRAM AND INSERTION STENT URETERAL;  Surgeon: Rey Alfonso MD;  [facility];  Service: Urology    MD ENDOVENOUS LASER, 1ST VEIN Left 1/4/2019    Procedure: ENDOVASCULAR LASER THERAPY (EVLT);   Surgeon: Sravan Philip DO;  [facility];  Service: Vascular    MD ENDOVENOUS LASER, 1ST VEIN Right 6/7/2019    Procedure: ENDOVASCULAR LASER THERAPY (EVLT)   And stab phlebectomy;  Surgeon: Sravan Philip DO;  [facility];  Service: Vascular    MD REVISE KNEE JOINT REPLACE,1 PART Right 3/20/2017    Procedure: REVISION TOTAL KNEE ARTHROPLASTY INCLUDING FEMORAL STEM REVISION;  Surgeon: Bharath Ken MD;  [facility];  Service: Orthopedics    MD REVISE KNEE JOINT REPLACE,ALL PARTS Right 4/6/2016    Procedure: REVISION TOTAL KNEE ARTHROPLASTY INCLUDING FEMORAL AND TIBIAL COMPONENTS;  Surgeon: Vena Blizzard, MD;  [facility];  Service: Orthopedics    TONSILLECTOMY      ULNAR TUNNEL RELEASE      WISDOM TOOTH EXTRACTION         Family History   Problem Relation Age of Onset    Diabetes Mother     Hypertension Mother     Hypertension Father     No Known Problems Sister     No Known Problems Son     No Known Problems Son        Social History     Occupational History    Not on file   Tobacco Use    Smoking status: Former Smoker     Last attempt to quit: 1975     Years since quittin 1    Smokeless tobacco: Never Used   Substance and Sexual Activity    Alcohol use: Yes     Frequency: 2-3 times a week     Drinks per session: 1 or 2     Binge frequency: Never     Comment: 2-3 drinks per night    Drug use: No    Sexual activity: Yes       Current Outpatient Medications on File Prior to Visit   Medication Sig    allopurinol (ZYLOPRIM) 100 mg tablet Take 100 mg by mouth every morning      allopurinol (ZYLOPRIM) 300 mg tablet     amLODIPine (NORVASC) 10 mg tablet     amLODIPine (NORVASC) 5 mg tablet Take 5 mg by mouth 2 (two) times a day      atorvastatin (LIPITOR) 40 mg tablet Take 40 mg by mouth daily at bedtime    cholecalciferol (VITAMIN D3) 1,000 units tablet Take 1,000 Units by mouth daily at bedtime      ciprofloxacin (CIPRO) 500 mg tablet     diphenhydrAMINE (BENADRYL) 25 mg tablet Take 1 tablet (25 mg total) by mouth every 6 (six) hours as needed for itching    Dulaglutide (TRULICITY) 1 5 OR/3 9VW SOPN Inject 1 5 mg under the skin once a week Takes on     Empagliflozin (JARDIANCE) 10 MG TABS Take 10 mg by mouth every morning      fenofibrate (TRIGLIDE) 160 MG tablet Take 160 mg by mouth every morning    labetalol (NORMODYNE) 100 mg tablet Take 100 mg by mouth 2 (two) times a day    lisinopril (ZESTRIL) 40 mg tablet Take 40 mg by mouth every morning      metFORMIN (GLUCOPHAGE) 1000 MG tablet Take 1,000 mg by mouth 2 (two) times a day with meals   Multiple Vitamin (MULTIVITAMIN) tablet Take 1 tablet by mouth every morning      sildenafil (VIAGRA) 50 MG tablet Take 1 tablets by mouth one (1) hour prior to intercourse   docusate sodium (COLACE) 100 mg capsule Take 1 capsule (100 mg total) by mouth 2 (two) times a day for 15 days (Patient not taking: Reported on 9/26/2019)    methocarbamol (ROBAXIN) 750 mg tablet Take 1 tablet (750 mg total) by mouth daily at bedtime as needed for muscle spasms (Patient not taking: Reported on 1/29/2020)    tamsulosin (FLOMAX) 0 4 mg Take 1 capsule (0 4 mg total) by mouth daily with dinner (Patient not taking: Reported on 1/29/2020)     No current facility-administered medications on file prior to visit  Allergies   Allergen Reactions    Bee Venom Anaphylaxis     Uses Epi-pen    Doxycycline Photosensitivity       Physical Exam:    /72   Pulse 76   Temp 98 °F (36 7 °C) (Oral)   Wt 134 kg (296 lb)   BMI 42 47 kg/m²     Constitutional:normal, well developed, well nourished, alert, in no distress and non-toxic and no overt pain behavior   and obese  Eyes:anicteric  HEENT:grossly intact  Neck:supple, symmetric, trachea midline and no masses   Pulmonary:even and unlabored  Cardiovascular:No edema or pitting edema present  Skin:Normal without rashes or lesions and well hydrated  Psychiatric:Mood and affect appropriate  Neurologic:Cranial Nerves II-XII grossly intact  Musculoskeletal:antalgic

## 2020-02-03 NOTE — TELEPHONE ENCOUNTER
I s/w pt and said that the pharmacy called that he has an allergy to tramadol and FQ just sent a Rx over  Pt said he does not have an allergy to tramadol, only doxycycline and bees  He said he took tramadol for his knee sx 2-3 yrs ago  I s/w Scot Zarate at [facility] and made her aware of the same  She will update their records and contact pt once Rx is ready

## 2020-02-04 ENCOUNTER — TELEPHONE (OUTPATIENT)
Dept: PAIN MEDICINE | Facility: CLINIC | Age: 60
End: 2020-02-04

## 2020-02-04 NOTE — TELEPHONE ENCOUNTER
[employer] called regarding patient's prior authorization they state patient does not need Prior Auth, there is a 5 day approved claim at pharmacy, when that runs out, you should be able to put in a 30/15  If we have any questions please call [phone]    Message left at 9:55am 2/4/2020

## 2020-02-04 NOTE — PROGRESS NOTES
UROLOGY PROGRESS NOTE   Patient Identifiers: Jesus Harding (MRN [id number])  Date of Service: 2/3/2020    Subjective:     52-year-old man history of BPH and kidney stones  His last PSA was 0 5  Current PSA is pending  He had a ureteroscopy in July  There were no residual stone seen on his most recent ultrasound  Reason for visit:  BPH follow-up    Objective:     VITALS:    Vitals:    01/29/20 1528   BP: 138/70   Pulse: 76     AUA SYMPTOM SCORE      Most Recent Value   AUA SYMPTOM SCORE   How often have you had a sensation of not emptying your bladder completely after you finished urinating? 0   How often have you had to urinate again less than two hours after you finished urinating? 3   How often have you found you stopped and started again several times when you urinate?  0   How often have you found it difficult to postpone urination? 3   How often have you had a weak urinary stream?  0   How often have you had to push or strain to begin urination? 0   How many times did you most typically get up to urinate from the time you went to bed at night until the time you got up in the morning?   2   Quality of Life: If you were to spend the rest of your life with your urinary condition just the way it is now, how would you feel about that?  3   AUA SYMPTOM SCORE  8            LABS:  Lab Results   Component Value Date    HGB 15 0 07/19/2019    HCT 44 0 07/19/2019    WBC 5 70 07/19/2019     07/19/2019   ]    Lab Results   Component Value Date     10/10/2015    K 3 8 11/13/2019     11/13/2019    CO2 23 11/13/2019    BUN 12 11/13/2019    CREATININE 0 83 11/13/2019    CALCIUM 9 2 11/13/2019    GLUCOSE 120 10/10/2015   ]        INPATIENT MEDS:    Current Outpatient Medications:     allopurinol (ZYLOPRIM) 300 mg tablet, , Disp: , Rfl:     amLODIPine (NORVASC) 5 mg tablet, Take 5 mg by mouth 2 (two) times a day  , Disp: , Rfl:     atorvastatin (LIPITOR) 40 mg tablet, Take 40 mg by mouth daily at bedtime, Disp: , Rfl:     cholecalciferol (VITAMIN D3) 1,000 units tablet, Take 1,000 Units by mouth daily at bedtime  , Disp: , Rfl:     diphenhydrAMINE (BENADRYL) 25 mg tablet, Take 1 tablet (25 mg total) by mouth every 6 (six) hours as needed for itching, Disp: 30 tablet, Rfl: 0    Dulaglutide (TRULICITY) 1 5 RR/6 8FV SOPN, Inject 1 5 mg under the skin once a week Takes on Sunday, Disp: , Rfl:     Empagliflozin (JARDIANCE) 10 MG TABS, Take 10 mg by mouth every morning  , Disp: , Rfl:     fenofibrate (TRIGLIDE) 160 MG tablet, Take 160 mg by mouth every morning, Disp: , Rfl:     labetalol (NORMODYNE) 100 mg tablet, Take 100 mg by mouth 2 (two) times a day, Disp: , Rfl:     lisinopril (ZESTRIL) 40 mg tablet, Take 40 mg by mouth every morning  , Disp: , Rfl:     metFORMIN (GLUCOPHAGE) 1000 MG tablet, Take 1,000 mg by mouth 2 (two) times a day with meals  , Disp: , Rfl:     Multiple Vitamin (MULTIVITAMIN) tablet, Take 1 tablet by mouth every morning  , Disp: , Rfl:     sildenafil (VIAGRA) 50 MG tablet, Take 1 tablets by mouth one (1) hour prior to intercourse , Disp: 30 tablet, Rfl: 1    allopurinol (ZYLOPRIM) 100 mg tablet, Take 100 mg by mouth every morning  , Disp: , Rfl:     amLODIPine (NORVASC) 10 mg tablet, , Disp: , Rfl:     ciprofloxacin (CIPRO) 500 mg tablet, , Disp: , Rfl:     docusate sodium (COLACE) 100 mg capsule, Take 1 capsule (100 mg total) by mouth 2 (two) times a day for 15 days (Patient not taking: Reported on 9/26/2019), Disp: 30 capsule, Rfl: 0    tamsulosin (FLOMAX) 0 4 mg, Take 1 capsule (0 4 mg total) by mouth daily with dinner (Patient not taking: Reported on 1/29/2020), Disp: 15 capsule, Rfl: 0    traMADol (ULTRAM) 50 mg tablet, Take 1 tablet (50 mg total) by mouth 2 (two) times a day as needed for moderate pain, Disp: 30 tablet, Rfl: 0      Physical Exam:   /70 (BP Location: Left arm, Patient Position: Sitting, Cuff Size: Standard)   Pulse 76   Ht 5' 10" (1 778 m) Wt 135 kg (296 lb 11 2 oz)   BMI 42 57 kg/m²   GEN: no acute distress    RESP: breathing comfortably with no accessory muscle use    ABD: soft, non-tender, non-distended   INCISION:    EXT: no significant peripheral edema   (Male): Penis circumcised, phallus normal, meatus patent  Testicles descended into scrotum bilaterally without masses nor tenderness  No inguinal hernias bilaterally  CHRISTEL: Prostate is enlarged at 35 grams  The prostate is not boggy  The prostate is not tender  No nodules noted      RADIOLOGY:     None     Assessment:    1  BPH   2   History kidney stones     Plan:   - follow-up in 1 year with PSA prior to visit  -  -  -

## 2020-02-04 NOTE — TELEPHONE ENCOUNTER
Patient informed office that he went to  his Tramadol 50mg at [facility]  Patient was informed that he needs a prior authorization to fill the whole script  Patient was provided a 5 day supply of medication  Patient has [address] [facility] at [facility]  Please call patient once medication is approved

## 2020-03-24 DIAGNOSIS — N52.1 ERECTILE DYSFUNCTION DUE TO DISEASES CLASSIFIED ELSEWHERE: ICD-10-CM

## 2020-03-24 RX ORDER — SILDENAFIL 100 MG/1
TABLET, FILM COATED ORAL
Qty: 30 TABLET | Refills: 4 | OUTPATIENT
Start: 2020-03-24 | End: 2021-07-16

## 2020-03-24 NOTE — TELEPHONE ENCOUNTER
The patient has an upcoming office visit scheduled for 9/30/2020 with Tavo Moy PA-C in the [place] location but will run out of medication until then    Request for same, #30 count supply of Sildenafil 100mg with 4 refills was queued and forwarded to the Advanced Practitioner covering the [place] location for approval

## 2020-03-24 NOTE — TELEPHONE ENCOUNTER
Patient left a message on the Medication Refill voice mail line requesting a new prescription for Sildenafil  He was given 50mg in the past and now takes 2 tablets  He is now requesting #30 count supply of Sildenafil 100mg to [facility]

## 2020-05-08 ENCOUNTER — TRANSCRIBE ORDERS (OUTPATIENT)
Dept: LAB | Facility: CLINIC | Age: 60
End: 2020-05-08

## 2020-05-08 ENCOUNTER — APPOINTMENT (OUTPATIENT)
Dept: LAB | Facility: CLINIC | Age: 60
End: 2020-05-08
Payer: COMMERCIAL

## 2020-05-08 DIAGNOSIS — E11.9 DIABETES MELLITUS WITHOUT COMPLICATION (HCC): ICD-10-CM

## 2020-05-08 DIAGNOSIS — M19.90 SENILE ARTHRITIS: ICD-10-CM

## 2020-05-08 DIAGNOSIS — D64.9 ANEMIA, UNSPECIFIED TYPE: ICD-10-CM

## 2020-05-08 DIAGNOSIS — N40.0 BENIGN PROSTATIC HYPERPLASIA, UNSPECIFIED WHETHER LOWER URINARY TRACT SYMPTOMS PRESENT: ICD-10-CM

## 2020-05-08 DIAGNOSIS — R79.89 ELEVATED LFTS: ICD-10-CM

## 2020-05-08 DIAGNOSIS — A02.0 INTESTINAL INFECTION DUE TO ARIZONA GROUP OF PARACOLON BACILLI: ICD-10-CM

## 2020-05-08 DIAGNOSIS — E03.9 MYXEDEMA HEART DISEASE: ICD-10-CM

## 2020-05-08 DIAGNOSIS — R35.0 URINARY FREQUENCY: ICD-10-CM

## 2020-05-08 DIAGNOSIS — I10 ESSENTIAL HYPERTENSION, MALIGNANT: ICD-10-CM

## 2020-05-08 DIAGNOSIS — D64.9 ANEMIA, UNSPECIFIED TYPE: Primary | ICD-10-CM

## 2020-05-08 DIAGNOSIS — I51.9 MYXEDEMA HEART DISEASE: ICD-10-CM

## 2020-05-08 LAB
ALBUMIN SERPL BCP-MCNC: 4.2 G/DL (ref 3.5–5)
ALP SERPL-CCNC: 61 U/L (ref 46–116)
ALT SERPL W P-5'-P-CCNC: 97 U/L (ref 12–78)
ANION GAP SERPL CALCULATED.3IONS-SCNC: 13 MMOL/L (ref 4–13)
AST SERPL W P-5'-P-CCNC: 66 U/L (ref 5–45)
BACTERIA UR QL AUTO: ABNORMAL /HPF
BASOPHILS # BLD AUTO: 0.03 THOUSANDS/ΜL (ref 0–0.1)
BASOPHILS NFR BLD AUTO: 1 % (ref 0–1)
BILIRUB SERPL-MCNC: 0.66 MG/DL (ref 0.2–1)
BILIRUB UR QL STRIP: NEGATIVE
BUN SERPL-MCNC: 14 MG/DL (ref 5–25)
CALCIUM SERPL-MCNC: 9.4 MG/DL (ref 8.3–10.1)
CHLORIDE SERPL-SCNC: 101 MMOL/L (ref 100–108)
CHOLEST SERPL-MCNC: 146 MG/DL (ref 50–200)
CLARITY UR: CLEAR
CO2 SERPL-SCNC: 24 MMOL/L (ref 21–32)
COLOR UR: YELLOW
CREAT SERPL-MCNC: 1 MG/DL (ref 0.6–1.3)
EOSINOPHIL # BLD AUTO: 0.16 THOUSAND/ΜL (ref 0–0.61)
EOSINOPHIL NFR BLD AUTO: 3 % (ref 0–6)
ERYTHROCYTE [DISTWIDTH] IN BLOOD BY AUTOMATED COUNT: 13.1 % (ref 11.6–15.1)
EST. AVERAGE GLUCOSE BLD GHB EST-MCNC: 171 MG/DL
GFR SERPL CREATININE-BSD FRML MDRD: 83 ML/MIN/1.73SQ M
GLUCOSE P FAST SERPL-MCNC: 179 MG/DL (ref 65–99)
GLUCOSE UR STRIP-MCNC: ABNORMAL MG/DL
HBA1C MFR BLD: 7.6 %
HCT VFR BLD AUTO: 42.6 % (ref 36.5–49.3)
HDLC SERPL-MCNC: 49 MG/DL
HGB BLD-MCNC: 14.4 G/DL (ref 12–17)
HGB UR QL STRIP.AUTO: NEGATIVE
IMM GRANULOCYTES # BLD AUTO: 0.02 THOUSAND/UL (ref 0–0.2)
IMM GRANULOCYTES NFR BLD AUTO: 0 % (ref 0–2)
KETONES UR STRIP-MCNC: NEGATIVE MG/DL
LDLC SERPL CALC-MCNC: 73 MG/DL (ref 0–100)
LDLC SERPL DIRECT ASSAY-MCNC: 78 MG/DL (ref 0–100)
LEUKOCYTE ESTERASE UR QL STRIP: ABNORMAL
LYMPHOCYTES # BLD AUTO: 2.08 THOUSANDS/ΜL (ref 0.6–4.47)
LYMPHOCYTES NFR BLD AUTO: 37 % (ref 14–44)
MCH RBC QN AUTO: 31.4 PG (ref 26.8–34.3)
MCHC RBC AUTO-ENTMCNC: 33.8 G/DL (ref 31.4–37.4)
MCV RBC AUTO: 93 FL (ref 82–98)
MONOCYTES # BLD AUTO: 0.59 THOUSAND/ΜL (ref 0.17–1.22)
MONOCYTES NFR BLD AUTO: 10 % (ref 4–12)
NEUTROPHILS # BLD AUTO: 2.8 THOUSANDS/ΜL (ref 1.85–7.62)
NEUTS SEG NFR BLD AUTO: 49 % (ref 43–75)
NITRITE UR QL STRIP: NEGATIVE
NON-SQ EPI CELLS URNS QL MICRO: ABNORMAL /HPF
NONHDLC SERPL-MCNC: 97 MG/DL
NRBC BLD AUTO-RTO: 0 /100 WBCS
PH UR STRIP.AUTO: 6 [PH]
PLATELET # BLD AUTO: 167 THOUSANDS/UL (ref 149–390)
PMV BLD AUTO: 9.5 FL (ref 8.9–12.7)
POTASSIUM SERPL-SCNC: 4.4 MMOL/L (ref 3.5–5.3)
PROT SERPL-MCNC: 7.4 G/DL (ref 6.4–8.2)
PROT UR STRIP-MCNC: NEGATIVE MG/DL
PSA SERPL-MCNC: 0.4 NG/ML (ref 0–4)
RBC # BLD AUTO: 4.59 MILLION/UL (ref 3.88–5.62)
RBC #/AREA URNS AUTO: ABNORMAL /HPF
SODIUM SERPL-SCNC: 138 MMOL/L (ref 136–145)
SP GR UR STRIP.AUTO: 1.01 (ref 1–1.03)
T4 FREE SERPL-MCNC: 1.11 NG/DL (ref 0.76–1.46)
TRIGL SERPL-MCNC: 122 MG/DL
TSH SERPL DL<=0.05 MIU/L-ACNC: 2.2 UIU/ML (ref 0.36–3.74)
URATE SERPL-MCNC: 4.6 MG/DL (ref 4.2–8)
UROBILINOGEN UR QL STRIP.AUTO: 0.2 E.U./DL
WBC # BLD AUTO: 5.68 THOUSAND/UL (ref 4.31–10.16)
WBC #/AREA URNS AUTO: ABNORMAL /HPF

## 2020-05-08 PROCEDURE — 84403 ASSAY OF TOTAL TESTOSTERONE: CPT

## 2020-05-08 PROCEDURE — 81001 URINALYSIS AUTO W/SCOPE: CPT | Performed by: INTERNAL MEDICINE

## 2020-05-08 PROCEDURE — 83721 ASSAY OF BLOOD LIPOPROTEIN: CPT

## 2020-05-08 PROCEDURE — 84402 ASSAY OF FREE TESTOSTERONE: CPT

## 2020-05-08 PROCEDURE — 86677 HELICOBACTER PYLORI ANTIBODY: CPT

## 2020-05-08 PROCEDURE — 84439 ASSAY OF FREE THYROXINE: CPT

## 2020-05-08 PROCEDURE — G0103 PSA SCREENING: HCPCS

## 2020-05-08 PROCEDURE — 36415 COLL VENOUS BLD VENIPUNCTURE: CPT

## 2020-05-08 PROCEDURE — 80053 COMPREHEN METABOLIC PANEL: CPT

## 2020-05-08 PROCEDURE — 84550 ASSAY OF BLOOD/URIC ACID: CPT

## 2020-05-08 PROCEDURE — 80061 LIPID PANEL: CPT

## 2020-05-08 PROCEDURE — 83036 HEMOGLOBIN GLYCOSYLATED A1C: CPT

## 2020-05-08 PROCEDURE — 85025 COMPLETE CBC W/AUTO DIFF WBC: CPT

## 2020-05-08 PROCEDURE — 84443 ASSAY THYROID STIM HORMONE: CPT

## 2020-05-09 LAB
H PYLORI IGG SER IA-ACNC: 0.42 INDEX VALUE (ref 0–0.79)
H PYLORI IGM SER-ACNC: <9 UNITS (ref 0–8.9)
TESTOST FREE SERPL-MCNC: 9.7 PG/ML (ref 7.2–24)
TESTOST SERPL-MCNC: 457 NG/DL (ref 264–916)

## 2020-05-13 LAB — MISCELLANEOUS LAB TEST RESULT: NORMAL

## 2020-05-17 ENCOUNTER — HOSPITAL ENCOUNTER (OUTPATIENT)
Dept: ULTRASOUND IMAGING | Facility: HOSPITAL | Age: 60
Discharge: HOME | End: 2020-05-17
Attending: INTERNAL MEDICINE
Payer: COMMERCIAL

## 2020-05-17 DIAGNOSIS — R79.89 ELEVATED LFTS: ICD-10-CM

## 2020-05-17 PROCEDURE — 76700 US EXAM ABDOM COMPLETE: CPT

## 2020-07-31 ENCOUNTER — OFFICE VISIT (OUTPATIENT)
Dept: PODIATRY | Facility: CLINIC | Age: 60
End: 2020-07-31

## 2020-07-31 VITALS
HEIGHT: 70 IN | RESPIRATION RATE: 17 BRPM | BODY MASS INDEX: 42.37 KG/M2 | WEIGHT: 296 LBS | SYSTOLIC BLOOD PRESSURE: 154 MMHG | HEART RATE: 76 BPM | DIASTOLIC BLOOD PRESSURE: 72 MMHG

## 2020-07-31 DIAGNOSIS — M79.671 RIGHT FOOT PAIN: Primary | ICD-10-CM

## 2020-07-31 DIAGNOSIS — M21.962 ACQUIRED DEFORMITY OF LEFT FOOT: ICD-10-CM

## 2020-07-31 DIAGNOSIS — M10.071 IDIOPATHIC GOUT INVOLVING TOE OF RIGHT FOOT, UNSPECIFIED CHRONICITY: ICD-10-CM

## 2020-09-07 NOTE — PROGRESS NOTES
Assessment/Plan:  Patient evaluated, discussed with the patient the treatment options for gouty attacks, patient refused any lab the insurance coverage, patient stated he will obtain Mobic over-the-counter and ambulate in surgical shoe patient return to office if he noticed any signs of infection  Diagnoses and all orders for this visit:    Right foot pain    Idiopathic gout involving toe of right foot, unspecified chronicity    Acquired deformity of left foot          Subjective:      Patient ID: Milton Antoine is a 62 y o  male  Diabetic patient, past medical history significant of a previous and gout, presents with pain the right 5th digit, patient states he thinks it is another gout attack, pain is localized digit with redness and swelling, patient denies trauma to the foot, patient denies constitutional symptoms  Patient has a past medical history of amputation on the left side      The following portions of the patient's history were reviewed and updated as appropriate: allergies, current medications, past family history, past medical history, past social history, past surgical history and problem list     Review of Systems   Constitutional: Negative  Respiratory: Negative  Cardiovascular: Negative  Musculoskeletal: Positive for arthralgias and gait problem  Skin: Positive for wound                 Historical Information   Past Medical History:   Diagnosis Date    Anemia     s/p knee sx    Anesthesia complication     Developed hives in the PACU s/p knee replacement    Arthritis     Diabetes mellitus ([address] )     niddm    Diabetic neuropathy (HCC)     Left foot burning and tingling    Diverticulitis     DJD (degenerative joint disease)     Edema     lower legs-ankles    Hyperlipidemia     Hypertension     Kidney stone     27mm    Mechanical complication of internal orthopedic device ([address] )     knee    Nephrolithiasis 7/12/2019    Added automatically from request for surgery [id number]    Obesity     Osteomyelitis (HCC)     left 2nd toe    PONV (postoperative nausea and vomiting)     patient requests to be premedicated for N/V    PVD (peripheral vascular disease) ([address] )     Valve issue with circulation of the lower legs- to have laser surgery     Past Surgical History:   Procedure Laterality Date    CARPAL TUNNEL RELEASE Left     COLONOSCOPY      CORRECTION HAMMER TOE      Left foot 2nd toe    CYSTOSCOPY      FL RETROGRADE PYELOGRAM  7/26/2019    HERNIA REPAIR Left     inguinal    JOINT REPLACEMENT      right knee revision, infection total of 4 surgeries     JOINT REPLACEMENT      left knee     KIDNEY STONE SURGERY      -large 27 mm stone-took x3 surgeries to resolve    PA AMPUTATION TOE,I-P JT Left 12/14/2018    Procedure: AMPUTATION TOE;  Surgeon: Philip Milligan DPM;  Location: [address] [place] [address];  Service: Podiatry    PA COLONOSCOPY FLX DX W/COLLJ SPEC WHEN PFRMD N/A 2/9/2018    Procedure: EGD AND COLONOSCOPY;  Surgeon: Catrachita Quinteros MD;  [facility]; Service: Gastroenterology    PA CYSTO/URETERO W/LITHOTRIPSY &INDWELL STENT INSRT Left 7/26/2019    Procedure: CYSTOSCOPY URETEROSCOPY WITH LITHOTRIPSY HOLMIUM LASER, RETROGRADE PYELOGRAM AND INSERTION STENT URETERAL;  Surgeon: Yohan Frias MD;  [facility];  Service: Urology    PA ENDOVENOUS LASER, 1ST VEIN Left 1/4/2019    Procedure: ENDOVASCULAR LASER THERAPY (EVLT);   Surgeon: Aurea Lal DO;  [facility];  Service: Vascular    PA ENDOVENOUS LASER, 1ST VEIN Right 6/7/2019    Procedure: ENDOVASCULAR LASER THERAPY (EVLT)   And stab phlebectomy;  Surgeon: Aurea Lal DO;  [facility];  Service: Vascular    PA REVISE KNEE JOINT REPLACE,1 PART Right 3/20/2017    Procedure: REVISION TOTAL KNEE ARTHROPLASTY INCLUDING FEMORAL STEM REVISION;  Surgeon: Tari Brambila MD;  [facility];  Service: Orthopedics    PA REVISE KNEE JOINT REPLACE,ALL PARTS Right 4/6/2016 Procedure: REVISION TOTAL KNEE ARTHROPLASTY INCLUDING FEMORAL AND TIBIAL COMPONENTS;  Surgeon: Jose Marques MD;  [facility];  Service: Orthopedics    TONSILLECTOMY      ULNAR TUNNEL RELEASE      WISDOM TOOTH EXTRACTION       Social History   Social History     Substance and Sexual Activity   Alcohol Use Yes    Frequency: 2-3 times a week    Drinks per session: 1 or 2    Binge frequency: Never    Comment: 2-3 drinks per night     Social History     Substance and Sexual Activity   Drug Use No     Social History     Tobacco Use   Smoking Status Former Smoker    Last attempt to quit: Ana Maria Jauregui Years since quittin 7   Smokeless Tobacco Never Used     Family History:   Family History   Problem Relation Age of Onset    Diabetes Mother     Hypertension Mother     Hypertension Father     No Known Problems Sister     No Known Problems Son     No Known Problems Son        Meds/Allergies   all medications and allergies reviewed  Allergies   Allergen Reactions    Bee Venom Anaphylaxis     Uses Epi-pen    Doxycycline Photosensitivity       Objective:      /72   Pulse 76   Resp 17   Ht 5' 10" (1 778 m)   Wt 134 kg (296 lb)   BMI 42 47 kg/m²          Physical Exam  Constitutional:       General: He is not in acute distress  Appearance: He is well-developed  He is not ill-appearing, toxic-appearing or diaphoretic  HENT:      Head: Normocephalic  Cardiovascular:      Pulses:           Dorsalis pedis pulses are 1+ on the right side and 1+ on the left side  Posterior tibial pulses are 0 on the right side and 0 on the left side        Comments: Palpable DP pulse, nonpalpable PT pulse, CFT is less than 3 seconds, temperature gradient within normal limit, a trophic skin changes noted with skin thinning in shiny, patient report occasional claudication to bilateral calf, localized edema foot and ankle Q9  Pulmonary:      Effort: Pulmonary effort is normal    Musculoskeletal: General: Tenderness and deformity present  Comments: Pain and range of motion of the 5th digit on the right with edema and erythema localized irritation no open lesion, no clinical signs of infection at this time   Skin:     General: Skin is dry  Capillary Refill: Capillary refill takes 2 to 3 seconds  Comments: Trophic skin changes bilateral foot and ankle, alternating hypopigmentation and hyperpigmentation patches around the foot and ankle on anterior leg, skin is shiny and thin, absent hair growth, atrophy of fat pad  Neurological:      Mental Status: He is alert and oriented to person, place, and time  Sensory: Sensory deficit present  Motor: Atrophy present        Deep Tendon Reflexes: Reflexes abnormal       Foot Exam    Right Foot/Ankle     Neurovascular  Dorsalis pedis: 1+  Posterior tibial: 0      Left Foot/Ankle      Neurovascular  Dorsalis pedis: 1+  Posterior tibial: 0

## 2020-12-10 ENCOUNTER — OFFICE VISIT (OUTPATIENT)
Dept: PODIATRY | Facility: CLINIC | Age: 60
End: 2020-12-10
Payer: COMMERCIAL

## 2020-12-10 VITALS
DIASTOLIC BLOOD PRESSURE: 82 MMHG | BODY MASS INDEX: 42.37 KG/M2 | SYSTOLIC BLOOD PRESSURE: 138 MMHG | HEIGHT: 70 IN | WEIGHT: 296 LBS | HEART RATE: 80 BPM | RESPIRATION RATE: 16 BRPM

## 2020-12-10 DIAGNOSIS — E11.621 DIABETIC ULCER OF LEFT MIDFOOT ASSOCIATED WITH TYPE 2 DIABETES MELLITUS, LIMITED TO BREAKDOWN OF SKIN (HCC): Primary | ICD-10-CM

## 2020-12-10 DIAGNOSIS — E11.42 DIABETIC POLYNEUROPATHY ASSOCIATED WITH TYPE 2 DIABETES MELLITUS (HCC): ICD-10-CM

## 2020-12-10 DIAGNOSIS — L97.421 DIABETIC ULCER OF LEFT MIDFOOT ASSOCIATED WITH TYPE 2 DIABETES MELLITUS, LIMITED TO BREAKDOWN OF SKIN (HCC): Primary | ICD-10-CM

## 2020-12-10 DIAGNOSIS — M21.41 ACQUIRED FLAT FOOT, RIGHT: ICD-10-CM

## 2020-12-10 DIAGNOSIS — M21.962 ACQUIRED DEFORMITY OF LEFT FOOT: ICD-10-CM

## 2020-12-10 DIAGNOSIS — M21.961 ACQUIRED DEFORMITY OF RIGHT FOOT: ICD-10-CM

## 2020-12-10 DIAGNOSIS — M21.42 ACQUIRED FLAT FOOT, LEFT: ICD-10-CM

## 2020-12-10 PROCEDURE — L3000 FT INSERT UCB BERKELEY SHELL: HCPCS | Performed by: PODIATRIST

## 2020-12-10 PROCEDURE — 73620 X-RAY EXAM OF FOOT: CPT | Performed by: PODIATRIST

## 2020-12-10 PROCEDURE — 97597 DBRDMT OPN WND 1ST 20 CM/<: CPT | Performed by: PODIATRIST

## 2020-12-10 PROCEDURE — 99213 OFFICE O/P EST LOW 20 MIN: CPT | Performed by: PODIATRIST

## 2020-12-10 RX ORDER — AZITHROMYCIN 250 MG/1
TABLET, FILM COATED ORAL
Qty: 6 TABLET | Refills: 0 | Status: SHIPPED | OUTPATIENT
Start: 2020-12-10 | End: 2020-12-14

## 2020-12-14 ENCOUNTER — OFFICE VISIT (OUTPATIENT)
Dept: PODIATRY | Facility: CLINIC | Age: 60
End: 2020-12-14
Payer: COMMERCIAL

## 2020-12-14 VITALS
SYSTOLIC BLOOD PRESSURE: 132 MMHG | HEIGHT: 70 IN | RESPIRATION RATE: 16 BRPM | WEIGHT: 296 LBS | DIASTOLIC BLOOD PRESSURE: 82 MMHG | BODY MASS INDEX: 42.37 KG/M2 | HEART RATE: 80 BPM

## 2020-12-14 DIAGNOSIS — M21.961 ACQUIRED DEFORMITY OF RIGHT FOOT: ICD-10-CM

## 2020-12-14 DIAGNOSIS — M21.962 ACQUIRED DEFORMITY OF LEFT FOOT: ICD-10-CM

## 2020-12-14 DIAGNOSIS — L97.421 DIABETIC ULCER OF LEFT MIDFOOT ASSOCIATED WITH TYPE 2 DIABETES MELLITUS, LIMITED TO BREAKDOWN OF SKIN (HCC): Primary | ICD-10-CM

## 2020-12-14 DIAGNOSIS — E11.621 DIABETIC ULCER OF LEFT MIDFOOT ASSOCIATED WITH TYPE 2 DIABETES MELLITUS, LIMITED TO BREAKDOWN OF SKIN (HCC): Primary | ICD-10-CM

## 2020-12-14 DIAGNOSIS — E11.42 DIABETIC POLYNEUROPATHY ASSOCIATED WITH TYPE 2 DIABETES MELLITUS (HCC): ICD-10-CM

## 2020-12-14 LAB
BACTERIA SPEC AEROBE CULT: ABNORMAL
Lab: ABNORMAL
Lab: ABNORMAL
SL AMB ANTIMICROBIAL SUSCEPTIBILITY: ABNORMAL

## 2020-12-14 PROCEDURE — 99213 OFFICE O/P EST LOW 20 MIN: CPT | Performed by: PODIATRIST

## 2021-01-15 ENCOUNTER — TELEPHONE (OUTPATIENT)
Dept: GASTROENTEROLOGY | Facility: AMBULARY SURGERY CENTER | Age: 61
End: 2021-01-15

## 2021-01-15 NOTE — TELEPHONE ENCOUNTER
Recall sent for patient to call and schedule his three year colonoscopy with Dr Nolan Iqbal    Per Notes: colon polyps

## 2021-03-02 LAB
LEFT EYE DIABETIC RETINOPATHY: NORMAL
RIGHT EYE DIABETIC RETINOPATHY: NORMAL

## 2021-06-14 DIAGNOSIS — E11.9 TYPE 2 DIABETES MELLITUS WITHOUT COMPLICATION, WITHOUT LONG-TERM CURRENT USE OF INSULIN (HCC): Primary | ICD-10-CM

## 2021-06-14 NOTE — TELEPHONE ENCOUNTER
Patient is calling to get refill on the following medications:    Metoformin 1000 mg    States he takes 1 and 1 1/2 tablet once a day    Please send to [facility] in [place], on [address]    Patient would need a 90 day supply    Patient already made an appointment for 06/21/2021 but is out of the medication

## 2021-06-21 ENCOUNTER — OFFICE VISIT (OUTPATIENT)
Dept: INTERNAL MEDICINE CLINIC | Facility: CLINIC | Age: 61
End: 2021-06-21
Payer: COMMERCIAL

## 2021-06-21 VITALS
BODY MASS INDEX: 43.69 KG/M2 | WEIGHT: 295 LBS | DIASTOLIC BLOOD PRESSURE: 78 MMHG | HEIGHT: 69 IN | HEART RATE: 76 BPM | TEMPERATURE: 98.8 F | OXYGEN SATURATION: 98 % | SYSTOLIC BLOOD PRESSURE: 140 MMHG

## 2021-06-21 DIAGNOSIS — N20.0 NEPHROLITHIASIS: ICD-10-CM

## 2021-06-21 DIAGNOSIS — E78.2 MIXED HYPERLIPIDEMIA: ICD-10-CM

## 2021-06-21 DIAGNOSIS — Z12.11 SCREENING FOR COLON CANCER: Primary | ICD-10-CM

## 2021-06-21 DIAGNOSIS — E66.01 MORBID OBESITY WITH BMI OF 40.0-44.9, ADULT (HCC): ICD-10-CM

## 2021-06-21 DIAGNOSIS — D64.9 ANEMIA, UNSPECIFIED TYPE: ICD-10-CM

## 2021-06-21 DIAGNOSIS — E11.9 TYPE 2 DIABETES MELLITUS WITHOUT COMPLICATION, WITHOUT LONG-TERM CURRENT USE OF INSULIN (HCC): ICD-10-CM

## 2021-06-21 PROBLEM — R74.8 ELEVATED LIVER ENZYMES: Status: ACTIVE | Noted: 2021-06-21

## 2021-06-21 PROCEDURE — 3008F BODY MASS INDEX DOCD: CPT | Performed by: NURSE PRACTITIONER

## 2021-06-21 PROCEDURE — 99214 OFFICE O/P EST MOD 30 MIN: CPT | Performed by: NURSE PRACTITIONER

## 2021-06-21 PROCEDURE — 1036F TOBACCO NON-USER: CPT | Performed by: NURSE PRACTITIONER

## 2021-06-21 PROCEDURE — 3725F SCREEN DEPRESSION PERFORMED: CPT | Performed by: NURSE PRACTITIONER

## 2021-06-21 RX ORDER — DULAGLUTIDE 3 MG/.5ML
3 INJECTION, SOLUTION SUBCUTANEOUS WEEKLY
Qty: 2 ML | Refills: 4 | Status: SHIPPED | OUTPATIENT
Start: 2021-06-21 | End: 2021-12-12 | Stop reason: SDUPTHER

## 2021-06-21 NOTE — PROGRESS NOTES
Diabetic Foot Exam    Patient's shoes and socks removed  Right Foot/Ankle   Right Foot Inspection  Skin Exam: skin normal and skin intact no dry skin, no warmth, no callus, no erythema, no maceration, no abnormal color, no pre-ulcer, no ulcer and no callus                          Toe Exam: ROM and strength within normal limits  Sensory   Vibration: intact  Proprioception: intact   Monofilament testing: intact  Vascular  Capillary refills: < 3 seconds  The right DP pulse is 2+  The right PT pulse is 2+  Right Toe  - Comprehensive Exam  Ecchymosis: none  Arch: normal  Claw Toes: absent  Swelling: none   Tenderness: none         Left Foot/Ankle  Left Foot Inspection  Skin Exam: skin normal and skin intactno dry skin, no warmth, no erythema, no maceration, normal color, no pre-ulcer, no ulcer and no callus                         Toe Exam: ROM and strength within normal limits                   Sensory   Vibration: intact  Proprioception: intact  Monofilament: intact  Vascular  Capillary refills: < 3 seconds  The left DP pulse is 2+  The left PT pulse is 2+  Left Toe  - Comprehensive Exam  Ecchymosis: none  Arch: normal  Hammertoes: absent  Claw toes: absent  Swelling: none   Tenderness: none       Assign Risk Category:  No deformity present; No loss of protective sensation;  No weak pulses       Risk: 0

## 2021-06-21 NOTE — ASSESSMENT & PLAN NOTE
Elevated; increase trulicity to 3mg weekly  Will f/u in 3-4 months  Needs weight loss and better adherence to DM diet and exercise  Strongly recommend weight loss and exercise  Foot exam completed  Needs eye examination  Lab Results   Component Value Date    HGBA1C 7 6 (H) 05/08/2020

## 2021-06-21 NOTE — PATIENT INSTRUCTIONS
Weight Management   AMBULATORY CARE:   Why it is important to manage your weight:  Being overweight increases your risk of health conditions such as heart disease, high blood pressure, type 2 diabetes, and certain types of cancer  It can also increase your risk for osteoarthritis, sleep apnea, and other respiratory problems  Aim for a slow, steady weight loss  Even a small amount of weight loss can lower your risk of health problems  How to lose weight safely:  A safe and healthy way to lose weight is to eat fewer calories and get regular exercise  · You can lose up about 1 pound a week by decreasing the number of calories you eat by 500 calories each day  You can decrease calories by eating smaller portion sizes or by cutting out high-calorie foods  Read labels to find out how many calories are in the foods you eat  · You can also burn calories with exercise such as walking, swimming, or biking  You will be more likely to keep weight off if you make these changes part of your lifestyle  Exercise at least 30 minutes per day on most days of the week  You can also fit in more physical activity by taking the stairs instead of the elevator or parking farther away from stores  Ask your healthcare provider about the best exercise plan for you  Healthy meal plan for weight management:  A healthy meal plan includes a variety of foods, contains fewer calories, and helps you stay healthy  A healthy meal plan includes the following:     · Eat whole-grain foods more often  A healthy meal plan should contain fiber  Fiber is the part of grains, fruits, and vegetables that is not broken down by your body  Whole-grain foods are healthy and provide extra fiber in your diet  Some examples of whole-grain foods are whole-wheat breads and pastas, oatmeal, brown rice, and bulgur  · Eat a variety of vegetables every day  Include dark, leafy greens such as spinach, kale, joyce greens, and mustard greens   Eat yellow and orange vegetables such as carrots, sweet potatoes, and winter squash  · Eat a variety of fruits every day  Choose fresh or canned fruit (canned in its own juice or light syrup) instead of juice  Fruit juice has very little or no fiber  · Eat low-fat dairy foods  Drink fat-free (skim) milk or 1% milk  Eat fat-free yogurt and low-fat cottage cheese  Try low-fat cheeses such as mozzarella and other reduced-fat cheeses  · Choose meat and other protein foods that are low in fat  Choose beans or other legumes such as split peas or lentils  Choose fish, skinless poultry (chicken or turkey), or lean cuts of red meat (beef or pork)  Before you cook meat or poultry, cut off any visible fat  · Use less fat and oil  Try baking foods instead of frying them  Add less fat, such as margarine, sour cream, regular salad dressing and mayonnaise to foods  Eat fewer high-fat foods  Some examples of high-fat foods include french fries, doughnuts, ice cream, and cakes  · Eat fewer sweets  Limit foods and drinks that are high in sugar  This includes candy, cookies, regular soda, and sweetened drinks  Ways to decrease calories:   · Eat smaller portions  ? Use a small plate with smaller servings  ? Do not eat second helpings  ? When you eat at a restaurant, ask for a box and place half of your meal in the box before you eat  ? Share an entrée with someone else  · Replace high-calorie snacks with healthy, low-calorie snacks  ? Choose fresh fruit, vegetables, fat-free rice cakes, or air-popped popcorn instead of potato chips, nuts, or chocolate  ? Choose water or calorie-free drinks instead of soda or sweetened drinks  · Do not shop for groceries when you are hungry  You may be more likely to make unhealthy food choices  Take a grocery list of healthy foods and shop after you have eaten  · Eat regular meals  Do not skip meals  Skipping meals can lead to overeating later in the day   This can make it harder for you to lose weight  Eat a healthy snack in place of a meal if you do not have time to eat a regular meal  Talk with a dietitian to help you create a meal plan and schedule that is right for you  Other things to consider as you try to lose weight:   · Be aware of situations that may give you the urge to overeat, such as eating while watching television  Find ways to avoid these situations  For example, read a book, go for a walk, or do crafts  · Meet with a weight loss support group or friends who are also trying to lose weight  This may help you stay motivated to continue working on your weight loss goals  © Copyright [address] Information is for End User's use only and may not be sold, redistributed or otherwise used for commercial purposes  All illustrations and images included in CareNotes® are the copyrighted property of [employer]  or [facility] Leyla Benitez   The above information is an  only  It is not intended as medical advice for individual conditions or treatments  Talk to your doctor, nurse or pharmacist before following any medical regimen to see if it is safe and effective for you

## 2021-06-21 NOTE — ASSESSMENT & PLAN NOTE
HTN: BP adequately controlled on current regimen; would recommend continued weight mgmt, avoiding high sodium foods  Continue to check BP periodically in the outpatient setting   Call if SBP remains >150

## 2021-06-21 NOTE — ASSESSMENT & PLAN NOTE
HLD: LDL remains within goal, continue low fat low cholesterol diet, continue regular CV exercise of at least 30 mins/day   Make attempts at normal BMI; repeat labs with next visit

## 2021-06-21 NOTE — ASSESSMENT & PLAN NOTE
Feels like he may have passed a stone a week ago  Recommend f/u with urology as he has h/o several stones  Referral made

## 2021-06-21 NOTE — PROGRESS NOTES
Assessment/Plan:    Diabetes mellitus (HCC)  Elevated; increase trulicity to 3mg weekly  Will f/u in 3-4 months  Needs weight loss and better adherence to DM diet and exercise  Strongly recommend weight loss and exercise  Foot exam completed  Needs eye examination  Lab Results   Component Value Date    HGBA1C 7 6 (H) 05/08/2020       Hypertension  HTN: BP adequately controlled on current regimen; would recommend continued weight mgmt, avoiding high sodium foods  Continue to check BP periodically in the outpatient setting  Call if SBP remains >150      Intervertebral disc disorders with radiculopathy, lumbar region  Recommend weight loss/exercise    Hyperlipidemia  HLD: LDL remains within goal, continue low fat low cholesterol diet, continue regular CV exercise of at least 30 mins/day   Make attempts at normal BMI; repeat labs with next visit      Elevated liver enzymes  Ultrasound and CT scan +hepatic steatosis  Recommend weight loss  Low fat diet  Monitor etoh intake    Morbid obesity with BMI of 40 0-44 9, adult ([place] [address] )  Make attempts at weight loss  Refer to medical weight loss center    Nephrolithiasis  Feels like he may have passed a stone a week ago  Recommend f/u with urology as he has h/o several stones  Referral made          Problem List Items Addressed This Visit        Endocrine    Diabetes mellitus ([facility] )     Elevated; increase trulicity to 3mg weekly  Will f/u in 3-4 months  Needs weight loss and better adherence to DM diet and exercise  Strongly recommend weight loss and exercise  Foot exam completed  Needs eye examination  Lab Results   Component Value Date    HGBA1C 7 6 (H) 05/08/2020            Relevant Medications    Dulaglutide (Trulicity) 3 WP/9 5ON SOPN    Other Relevant Orders    HEMOGLOBIN A1C W/ EAG ESTIMATION    Comprehensive metabolic panel       Genitourinary    Nephrolithiasis     Feels like he may have passed a stone a week ago  Recommend f/u with urology as he has h/o several stones  Referral made          Relevant Orders    Ambulatory referral to Urology       Other    Hyperlipidemia (Chronic)     HLD: LDL remains within goal, continue low fat low cholesterol diet, continue regular CV exercise of at least 30 mins/day  Make attempts at normal BMI; repeat labs with next visit           Morbid obesity with BMI of 40 0-44 9, adult ([address] )     Make attempts at weight loss  Refer to medical weight loss center         Relevant Orders    Ambulatory referral to Weight Management      Other Visit Diagnoses     Screening for colon cancer    -  Primary    Anemia, unspecified type        Relevant Orders    CBC and differential            Subjective:      Patient ID: Milton Antoine is a 62 y o  male  Pt is here today for follow up  He denies any current issues or complaints other than having possible nephrolithiasis again  He states that he felt generally unwell and then had some fairly significant left flank pain that has since resolved  HE has a h/o multiple stones, would recommend returning to urology for continued monitoring  His hgb A1c is not well controlled  WE had a long discussion regarding adherence  His trulicity was increased to 3mg weekly  He needs to concentrate on weight loss, increasing activity as well as adherence to DM diet  I offered DM education classes and he is not interested    In regards to his weight I did discuss with him surgical management as well as medical management  I gave him a referral for both  The following portions of the patient's history were reviewed and updated as appropriate: allergies, current medications, past family history, past medical history, past social history, past surgical history and problem list     Review of Systems   Constitutional: Negative for chills and fever  HENT: Negative for ear pain and sore throat  Eyes: Negative for pain and visual disturbance  Respiratory: Negative for cough and shortness of breath  Cardiovascular: Negative for chest pain and palpitations  Gastrointestinal: Negative for abdominal pain and vomiting  Genitourinary: Negative for dysuria and hematuria  Musculoskeletal: Negative for arthralgias and back pain  Skin: Negative for color change and rash  Neurological: Negative for seizures and syncope  All other systems reviewed and are negative  Objective:      /78 (BP Location: Left arm, Patient Position: Sitting, Cuff Size: Large)   Pulse 76   Temp 98 8 °F (37 1 °C) (Tympanic)   Ht 5' 9 49" (1 765 m)   Wt 134 kg (295 lb)   SpO2 98% Comment: Room air  BMI 42 95 kg/m²          Physical Exam  Vitals and nursing note reviewed  Constitutional:       Appearance: Normal appearance  HENT:      Head: Normocephalic  Nose: Nose normal       Mouth/Throat:      Mouth: Mucous membranes are dry  Eyes:      Extraocular Movements: Extraocular movements intact  Conjunctiva/sclera: Conjunctivae normal       Pupils: Pupils are equal, round, and reactive to light  Cardiovascular:      Rate and Rhythm: Normal rate and regular rhythm  Pulses: Normal pulses  Heart sounds: Normal heart sounds  Pulmonary:      Effort: Pulmonary effort is normal    Abdominal:      General: Bowel sounds are normal       Palpations: Abdomen is soft  Musculoskeletal:         General: Normal range of motion  Cervical back: Normal range of motion  Skin:     General: Skin is warm and dry  Neurological:      General: No focal deficit present  Mental Status: He is alert and oriented to person, place, and time     Psychiatric:         Mood and Affect: Mood normal

## 2021-07-12 ENCOUNTER — TELEPHONE (OUTPATIENT)
Dept: INTERNAL MEDICINE CLINIC | Facility: CLINIC | Age: 61
End: 2021-07-12

## 2021-07-12 NOTE — TELEPHONE ENCOUNTER
Patient called requesting a refill for amLODIPine (NORVASC) 5 mg tablet and Empagliflozin (JARDIANCE) 10 MG TABS [facility]/pharmacy #[id number]- [place] - [facility]

## 2021-07-13 DIAGNOSIS — E11.9 TYPE 2 DIABETES MELLITUS WITHOUT COMPLICATION, WITHOUT LONG-TERM CURRENT USE OF INSULIN (HCC): ICD-10-CM

## 2021-07-13 DIAGNOSIS — N52.1 ERECTILE DYSFUNCTION DUE TO DISEASES CLASSIFIED ELSEWHERE: ICD-10-CM

## 2021-07-13 DIAGNOSIS — I10 ESSENTIAL HYPERTENSION: Primary | ICD-10-CM

## 2021-07-13 RX ORDER — AMLODIPINE BESYLATE 5 MG/1
5 TABLET ORAL 2 TIMES DAILY
Qty: 180 TABLET | Refills: 1 | Status: SHIPPED | OUTPATIENT
Start: 2021-07-13 | End: 2022-01-26 | Stop reason: SDUPTHER

## 2021-07-13 RX ORDER — EMPAGLIFLOZIN 10 MG/1
10 TABLET, FILM COATED ORAL EVERY MORNING
Qty: 90 TABLET | Refills: 1 | Status: SHIPPED | OUTPATIENT
Start: 2021-07-13 | End: 2022-01-10 | Stop reason: SDUPTHER

## 2021-07-16 RX ORDER — SILDENAFIL 100 MG/1
TABLET, FILM COATED ORAL
Qty: 30 TABLET | Refills: 4 | Status: SHIPPED | OUTPATIENT
Start: 2021-07-16

## 2021-08-04 DIAGNOSIS — E78.2 MIXED HYPERLIPIDEMIA: Primary | ICD-10-CM

## 2021-08-04 DIAGNOSIS — M10.9 GOUT, UNSPECIFIED CAUSE, UNSPECIFIED CHRONICITY, UNSPECIFIED SITE: ICD-10-CM

## 2021-08-04 DIAGNOSIS — E11.9 TYPE 2 DIABETES MELLITUS WITHOUT COMPLICATION, WITHOUT LONG-TERM CURRENT USE OF INSULIN (HCC): ICD-10-CM

## 2021-08-04 NOTE — TELEPHONE ENCOUNTER
Last ov 6/21/21  Next ov 10/25/21    He is currently taking Metformin 1000 mg 1 & 1/2 tablets in am and 1 tablet in pm    RX sent on 6/14/21 is incorrect

## 2021-08-05 RX ORDER — FENOFIBRATE 160 MG/1
160 TABLET ORAL EVERY MORNING
Qty: 90 TABLET | Refills: 1 | Status: SHIPPED | OUTPATIENT
Start: 2021-08-05 | End: 2022-02-15 | Stop reason: SDUPTHER

## 2021-08-05 RX ORDER — ALLOPURINOL 300 MG/1
300 TABLET ORAL DAILY
Qty: 90 TABLET | Refills: 1 | Status: SHIPPED | OUTPATIENT
Start: 2021-08-05 | End: 2022-01-26 | Stop reason: SDUPTHER

## 2021-08-17 DIAGNOSIS — I10 ESSENTIAL HYPERTENSION: Primary | ICD-10-CM

## 2021-08-17 RX ORDER — LABETALOL 100 MG/1
100 TABLET, FILM COATED ORAL 2 TIMES DAILY
Qty: 180 TABLET | Refills: 1 | Status: SHIPPED | OUTPATIENT
Start: 2021-08-17 | End: 2022-03-01 | Stop reason: SDUPTHER

## 2021-10-15 LAB
ALBUMIN SERPL-MCNC: 4.7 G/DL (ref 3.6–5.1)
ALBUMIN/GLOB SERPL: 2.1 (CALC) (ref 1–2.5)
ALP SERPL-CCNC: 49 U/L (ref 35–144)
ALT SERPL-CCNC: 46 U/L (ref 9–46)
AST SERPL-CCNC: 31 U/L (ref 10–35)
BASOPHILS # BLD AUTO: 41 CELLS/UL (ref 0–200)
BASOPHILS NFR BLD AUTO: 0.8 %
BILIRUB SERPL-MCNC: 0.6 MG/DL (ref 0.2–1.2)
BUN SERPL-MCNC: 15 MG/DL (ref 7–25)
BUN/CREAT SERPL: ABNORMAL (CALC) (ref 6–22)
CALCIUM SERPL-MCNC: 9.5 MG/DL (ref 8.6–10.3)
CHLORIDE SERPL-SCNC: 105 MMOL/L (ref 98–110)
CO2 SERPL-SCNC: 26 MMOL/L (ref 20–32)
CREAT SERPL-MCNC: 0.7 MG/DL (ref 0.7–1.25)
EOSINOPHIL # BLD AUTO: 163 CELLS/UL (ref 15–500)
EOSINOPHIL NFR BLD AUTO: 3.2 %
ERYTHROCYTE [DISTWIDTH] IN BLOOD BY AUTOMATED COUNT: 12.5 % (ref 11–15)
EST. AVERAGE GLUCOSE BLD GHB EST-MCNC: 171 (CALC)
EST. AVERAGE GLUCOSE BLD GHB EST-SCNC: 9.5 (CALC)
GLOBULIN SER CALC-MCNC: 2.2 G/DL (CALC) (ref 1.9–3.7)
GLUCOSE SERPL-MCNC: 187 MG/DL (ref 65–99)
HBA1C MFR BLD: 7.6 % OF TOTAL HGB
HCT VFR BLD AUTO: 45.2 % (ref 38.5–50)
HGB BLD-MCNC: 15.2 G/DL (ref 13.2–17.1)
LYMPHOCYTES # BLD AUTO: 1918 CELLS/UL (ref 850–3900)
LYMPHOCYTES NFR BLD AUTO: 37.6 %
MCH RBC QN AUTO: 31.4 PG (ref 27–33)
MCHC RBC AUTO-ENTMCNC: 33.6 G/DL (ref 32–36)
MCV RBC AUTO: 93.4 FL (ref 80–100)
MONOCYTES # BLD AUTO: 505 CELLS/UL (ref 200–950)
MONOCYTES NFR BLD AUTO: 9.9 %
NEUTROPHILS # BLD AUTO: 2474 CELLS/UL (ref 1500–7800)
NEUTROPHILS NFR BLD AUTO: 48.5 %
PLATELET # BLD AUTO: 189 THOUSAND/UL (ref 140–400)
PMV BLD REES-ECKER: 9.9 FL (ref 7.5–12.5)
POTASSIUM SERPL-SCNC: 3.9 MMOL/L (ref 3.5–5.3)
PROT SERPL-MCNC: 6.9 G/DL (ref 6.1–8.1)
RBC # BLD AUTO: 4.84 MILLION/UL (ref 4.2–5.8)
SL AMB EGFR AFRICAN AMERICAN: 119 ML/MIN/1.73M2
SL AMB EGFR NON AFRICAN AMERICAN: 103 ML/MIN/1.73M2
SODIUM SERPL-SCNC: 140 MMOL/L (ref 135–146)
WBC # BLD AUTO: 5.1 THOUSAND/UL (ref 3.8–10.8)

## 2021-10-15 PROCEDURE — 3051F HG A1C>EQUAL 7.0%<8.0%: CPT | Performed by: NURSE PRACTITIONER

## 2021-10-25 ENCOUNTER — OFFICE VISIT (OUTPATIENT)
Dept: INTERNAL MEDICINE CLINIC | Facility: CLINIC | Age: 61
End: 2021-10-25
Payer: COMMERCIAL

## 2021-10-25 VITALS
SYSTOLIC BLOOD PRESSURE: 130 MMHG | HEIGHT: 69 IN | HEART RATE: 81 BPM | OXYGEN SATURATION: 97 % | TEMPERATURE: 98.5 F | WEIGHT: 289 LBS | DIASTOLIC BLOOD PRESSURE: 70 MMHG | BODY MASS INDEX: 42.8 KG/M2

## 2021-10-25 DIAGNOSIS — R74.8 ELEVATED LIVER ENZYMES: ICD-10-CM

## 2021-10-25 DIAGNOSIS — Z23 NEED FOR INFLUENZA VACCINATION: Primary | ICD-10-CM

## 2021-10-25 DIAGNOSIS — Z12.12 ENCOUNTER FOR COLORECTAL CANCER SCREENING: ICD-10-CM

## 2021-10-25 DIAGNOSIS — I10 PRIMARY HYPERTENSION: Chronic | ICD-10-CM

## 2021-10-25 DIAGNOSIS — E66.01 MORBID OBESITY WITH BMI OF 40.0-44.9, ADULT (HCC): ICD-10-CM

## 2021-10-25 DIAGNOSIS — Z12.11 ENCOUNTER FOR COLORECTAL CANCER SCREENING: ICD-10-CM

## 2021-10-25 DIAGNOSIS — E78.2 MIXED HYPERLIPIDEMIA: Chronic | ICD-10-CM

## 2021-10-25 DIAGNOSIS — E11.9 TYPE 2 DIABETES MELLITUS WITHOUT COMPLICATION, WITHOUT LONG-TERM CURRENT USE OF INSULIN (HCC): ICD-10-CM

## 2021-10-25 PROCEDURE — 90682 RIV4 VACC RECOMBINANT DNA IM: CPT

## 2021-10-25 PROCEDURE — 99214 OFFICE O/P EST MOD 30 MIN: CPT | Performed by: NURSE PRACTITIONER

## 2021-10-25 PROCEDURE — 1036F TOBACCO NON-USER: CPT | Performed by: NURSE PRACTITIONER

## 2021-10-25 PROCEDURE — 90471 IMMUNIZATION ADMIN: CPT

## 2021-10-25 PROCEDURE — 3008F BODY MASS INDEX DOCD: CPT | Performed by: NURSE PRACTITIONER

## 2021-11-01 ENCOUNTER — OFFICE VISIT (OUTPATIENT)
Dept: PODIATRY | Facility: CLINIC | Age: 61
End: 2021-11-01
Payer: COMMERCIAL

## 2021-11-01 VITALS
WEIGHT: 289 LBS | HEART RATE: 85 BPM | RESPIRATION RATE: 16 BRPM | SYSTOLIC BLOOD PRESSURE: 177 MMHG | BODY MASS INDEX: 42.8 KG/M2 | HEIGHT: 69 IN | DIASTOLIC BLOOD PRESSURE: 96 MMHG

## 2021-11-01 DIAGNOSIS — Z89.422 HISTORY OF AMPUTATION OF LESSER TOE OF LEFT FOOT (HCC): ICD-10-CM

## 2021-11-01 DIAGNOSIS — L84 PRE-ULCERATIVE CALLUSES: ICD-10-CM

## 2021-11-01 DIAGNOSIS — M20.41 HAMMER TOE OF RIGHT FOOT: ICD-10-CM

## 2021-11-01 DIAGNOSIS — E11.621 DIABETIC ULCER OF TOE OF RIGHT FOOT ASSOCIATED WITH TYPE 2 DIABETES MELLITUS, WITH FAT LAYER EXPOSED (HCC): Primary | ICD-10-CM

## 2021-11-01 DIAGNOSIS — L97.512 DIABETIC ULCER OF TOE OF RIGHT FOOT ASSOCIATED WITH TYPE 2 DIABETES MELLITUS, WITH FAT LAYER EXPOSED (HCC): Primary | ICD-10-CM

## 2021-11-01 PROCEDURE — 11056 PARNG/CUTG B9 HYPRKR LES 2-4: CPT | Performed by: PODIATRIST

## 2021-11-01 PROCEDURE — 97597 DBRDMT OPN WND 1ST 20 CM/<: CPT | Performed by: PODIATRIST

## 2021-11-01 PROCEDURE — 2028F FOOT EXAM PERFORMED: CPT | Performed by: PODIATRIST

## 2021-11-02 DIAGNOSIS — I10 PRIMARY HYPERTENSION: Primary | ICD-10-CM

## 2021-11-03 PROCEDURE — 4010F ACE/ARB THERAPY RXD/TAKEN: CPT | Performed by: NURSE PRACTITIONER

## 2021-11-03 RX ORDER — LISINOPRIL 40 MG/1
40 TABLET ORAL EVERY MORNING
Qty: 90 TABLET | Refills: 1 | Status: SHIPPED | OUTPATIENT
Start: 2021-11-03 | End: 2022-05-04 | Stop reason: SDUPTHER

## 2021-11-12 DIAGNOSIS — E78.2 MIXED HYPERLIPIDEMIA: Primary | ICD-10-CM

## 2021-11-12 RX ORDER — ATORVASTATIN CALCIUM 40 MG/1
40 TABLET, FILM COATED ORAL
Qty: 90 TABLET | Refills: 1 | Status: SHIPPED | OUTPATIENT
Start: 2021-11-12 | End: 2022-05-04 | Stop reason: SDUPTHER

## 2021-11-15 ENCOUNTER — OFFICE VISIT (OUTPATIENT)
Dept: PODIATRY | Facility: CLINIC | Age: 61
End: 2021-11-15
Payer: COMMERCIAL

## 2021-11-15 VITALS — WEIGHT: 289 LBS | RESPIRATION RATE: 16 BRPM | HEIGHT: 69 IN | BODY MASS INDEX: 42.8 KG/M2

## 2021-11-15 DIAGNOSIS — E11.621 DIABETIC ULCER OF TOE OF RIGHT FOOT ASSOCIATED WITH TYPE 2 DIABETES MELLITUS, WITH FAT LAYER EXPOSED (HCC): Primary | ICD-10-CM

## 2021-11-15 DIAGNOSIS — L97.512 DIABETIC ULCER OF TOE OF RIGHT FOOT ASSOCIATED WITH TYPE 2 DIABETES MELLITUS, WITH FAT LAYER EXPOSED (HCC): Primary | ICD-10-CM

## 2021-11-15 PROCEDURE — 99212 OFFICE O/P EST SF 10 MIN: CPT | Performed by: PODIATRIST

## 2021-12-02 ENCOUNTER — TELEPHONE (OUTPATIENT)
Dept: GASTROENTEROLOGY | Facility: CLINIC | Age: 61
End: 2021-12-02

## 2021-12-06 ENCOUNTER — TELEPHONE (OUTPATIENT)
Dept: GASTROENTEROLOGY | Facility: CLINIC | Age: 61
End: 2021-12-06

## 2021-12-12 DIAGNOSIS — E11.9 TYPE 2 DIABETES MELLITUS WITHOUT COMPLICATION, WITHOUT LONG-TERM CURRENT USE OF INSULIN (HCC): ICD-10-CM

## 2021-12-13 ENCOUNTER — TELEPHONE (OUTPATIENT)
Dept: INTERNAL MEDICINE CLINIC | Facility: CLINIC | Age: 61
End: 2021-12-13

## 2021-12-13 RX ORDER — DULAGLUTIDE 3 MG/.5ML
3 INJECTION, SOLUTION SUBCUTANEOUS WEEKLY
Qty: 2 ML | Refills: 5 | Status: SHIPPED | OUTPATIENT
Start: 2021-12-13 | End: 2022-02-28 | Stop reason: SDUPTHER

## 2022-01-10 DIAGNOSIS — E11.9 TYPE 2 DIABETES MELLITUS WITHOUT COMPLICATION, WITHOUT LONG-TERM CURRENT USE OF INSULIN (HCC): ICD-10-CM

## 2022-01-10 RX ORDER — EMPAGLIFLOZIN 10 MG/1
10 TABLET, FILM COATED ORAL EVERY MORNING
Qty: 90 TABLET | Refills: 1 | Status: SHIPPED | OUTPATIENT
Start: 2022-01-10 | End: 2022-06-27 | Stop reason: SDUPTHER

## 2022-01-20 ENCOUNTER — TELEPHONE (OUTPATIENT)
Dept: PREADMISSION TESTING | Facility: HOSPITAL | Age: 62
End: 2022-01-20

## 2022-01-20 VITALS — HEIGHT: 70 IN | BODY MASS INDEX: 41.37 KG/M2 | WEIGHT: 289 LBS

## 2022-01-20 NOTE — PRE-PROCEDURE INSTRUCTIONS
Pre-Surgery Instructions:   Medication Instructions    allopurinol (ZYLOPRIM) 300 mg tablet Instructed patient per Anesthesia Guidelines   amLODIPine (NORVASC) 5 mg tablet Take the am of the procedures    atorvastatin (LIPITOR) 40 mg tablet Instructed patient per Anesthesia Guidelines   cholecalciferol (VITAMIN D3) 1,000 units tablet Instructed patient per Anesthesia Guidelines   Dulaglutide (Trulicity) 3 DC/0 9ZG SOPN Instructed patient per Anesthesia Guidelines   Empagliflozin (Jardiance) 10 MG TABS Instructed patient per Anesthesia Guidelines   fenofibrate (TRIGLIDE) 160 MG tablet Instructed patient per Anesthesia Guidelines   labetalol (NORMODYNE) 100 mg tablet Take the am of the procedures    lisinopril (ZESTRIL) 40 mg tablet Instructed patient per Anesthesia Guidelines   metFORMIN (GLUCOPHAGE) 1000 MG tablet Instructed patient per Anesthesia Guidelines   Multiple Vitamin (MULTIVITAMIN) tablet Stop one week prior to the procedures    mupirocin (BACTROBAN) 2 % ointment Instructed patient per Anesthesia Guidelines   Na Sulfate-K Sulfate-Mg Sulf (Suprep Bowel Prep Kit) 17 5-3 13-1 6 GM/177ML SOLN Patient was instructed by Physician and understands   sildenafil (VIAGRA) 100 mg tablet Instructed patient per Anesthesia Guidelines  Pt to follow Dr Molina Luce instructions  Pt to take amlodipine, labetolol the am of the procedures    [employer] [phone]

## 2022-01-26 DIAGNOSIS — E11.9 TYPE 2 DIABETES MELLITUS WITHOUT COMPLICATION, WITHOUT LONG-TERM CURRENT USE OF INSULIN (HCC): ICD-10-CM

## 2022-01-26 DIAGNOSIS — M10.9 GOUT, UNSPECIFIED CAUSE, UNSPECIFIED CHRONICITY, UNSPECIFIED SITE: ICD-10-CM

## 2022-01-26 DIAGNOSIS — I10 ESSENTIAL HYPERTENSION: ICD-10-CM

## 2022-01-26 RX ORDER — ALLOPURINOL 300 MG/1
300 TABLET ORAL DAILY
Qty: 90 TABLET | Refills: 1 | Status: SHIPPED | OUTPATIENT
Start: 2022-01-26 | End: 2022-07-26 | Stop reason: SDUPTHER

## 2022-01-26 RX ORDER — AMLODIPINE BESYLATE 5 MG/1
5 TABLET ORAL 2 TIMES DAILY
Qty: 180 TABLET | Refills: 1 | Status: SHIPPED | OUTPATIENT
Start: 2022-01-26 | End: 2022-07-26 | Stop reason: SDUPTHER

## 2022-01-30 RX ORDER — SODIUM CHLORIDE, SODIUM LACTATE, POTASSIUM CHLORIDE, CALCIUM CHLORIDE 600; 310; 30; 20 MG/100ML; MG/100ML; MG/100ML; MG/100ML
125 INJECTION, SOLUTION INTRAVENOUS CONTINUOUS
Status: CANCELLED | OUTPATIENT
Start: 2022-01-30

## 2022-01-31 ENCOUNTER — HOSPITAL ENCOUNTER (OUTPATIENT)
Dept: GASTROENTEROLOGY | Facility: AMBULARY SURGERY CENTER | Age: 62
Setting detail: OUTPATIENT SURGERY
Discharge: HOME | End: 2022-01-31
Attending: INTERNAL MEDICINE | Admitting: INTERNAL MEDICINE
Payer: COMMERCIAL

## 2022-01-31 ENCOUNTER — ANESTHESIA (OUTPATIENT)
Dept: GASTROENTEROLOGY | Facility: AMBULARY SURGERY CENTER | Age: 62
End: 2022-01-31

## 2022-01-31 ENCOUNTER — ANESTHESIA EVENT (OUTPATIENT)
Dept: GASTROENTEROLOGY | Facility: AMBULARY SURGERY CENTER | Age: 62
End: 2022-01-31

## 2022-01-31 VITALS
SYSTOLIC BLOOD PRESSURE: 164 MMHG | WEIGHT: 289 LBS | OXYGEN SATURATION: 98 % | HEART RATE: 72 BPM | RESPIRATION RATE: 16 BRPM | HEIGHT: 70 IN | BODY MASS INDEX: 41.37 KG/M2 | DIASTOLIC BLOOD PRESSURE: 85 MMHG | TEMPERATURE: 97.3 F

## 2022-01-31 DIAGNOSIS — K21.9 GASTROESOPHAGEAL REFLUX DISEASE, UNSPECIFIED WHETHER ESOPHAGITIS PRESENT: ICD-10-CM

## 2022-01-31 DIAGNOSIS — Z86.010 HX OF COLONIC POLYP: ICD-10-CM

## 2022-01-31 DIAGNOSIS — Z12.11 SCREENING FOR COLON CANCER: ICD-10-CM

## 2022-01-31 LAB — GLUCOSE SERPL-MCNC: 269 MG/DL (ref 65–140)

## 2022-01-31 PROCEDURE — 88305 TISSUE EXAM BY PATHOLOGIST: CPT | Performed by: PATHOLOGY

## 2022-01-31 PROCEDURE — 82948 REAGENT STRIP/BLOOD GLUCOSE: CPT

## 2022-01-31 PROCEDURE — 43239 EGD BIOPSY SINGLE/MULTIPLE: CPT | Performed by: INTERNAL MEDICINE

## 2022-01-31 PROCEDURE — 45380 COLONOSCOPY AND BIOPSY: CPT | Performed by: INTERNAL MEDICINE

## 2022-01-31 RX ORDER — LIDOCAINE HYDROCHLORIDE 10 MG/ML
INJECTION, SOLUTION EPIDURAL; INFILTRATION; INTRACAUDAL; PERINEURAL AS NEEDED
Status: DISCONTINUED | OUTPATIENT
Start: 2022-01-31 | End: 2022-01-31

## 2022-01-31 RX ORDER — PANTOPRAZOLE SODIUM 40 MG/1
40 TABLET, DELAYED RELEASE ORAL 2 TIMES DAILY
Qty: 60 TABLET | Refills: 6 | Status: SHIPPED | OUTPATIENT
Start: 2022-01-31 | End: 2022-08-10

## 2022-01-31 RX ORDER — PROPOFOL 10 MG/ML
INJECTION, EMULSION INTRAVENOUS CONTINUOUS PRN
Status: DISCONTINUED | OUTPATIENT
Start: 2022-01-31 | End: 2022-01-31

## 2022-01-31 RX ORDER — SODIUM CHLORIDE, SODIUM LACTATE, POTASSIUM CHLORIDE, CALCIUM CHLORIDE 600; 310; 30; 20 MG/100ML; MG/100ML; MG/100ML; MG/100ML
INJECTION, SOLUTION INTRAVENOUS CONTINUOUS PRN
Status: DISCONTINUED | OUTPATIENT
Start: 2022-01-31 | End: 2022-01-31

## 2022-01-31 RX ORDER — PROPOFOL 10 MG/ML
INJECTION, EMULSION INTRAVENOUS AS NEEDED
Status: DISCONTINUED | OUTPATIENT
Start: 2022-01-31 | End: 2022-01-31

## 2022-01-31 RX ORDER — SODIUM CHLORIDE, SODIUM LACTATE, POTASSIUM CHLORIDE, CALCIUM CHLORIDE 600; 310; 30; 20 MG/100ML; MG/100ML; MG/100ML; MG/100ML
125 INJECTION, SOLUTION INTRAVENOUS CONTINUOUS
Status: DISCONTINUED | OUTPATIENT
Start: 2022-01-31 | End: 2022-02-04 | Stop reason: HOSPADM

## 2022-01-31 RX ADMIN — PROPOFOL 150 MCG/KG/MIN: 10 INJECTION, EMULSION INTRAVENOUS at 08:08

## 2022-01-31 RX ADMIN — LIDOCAINE HYDROCHLORIDE 50 MG: 10 INJECTION, SOLUTION EPIDURAL; INFILTRATION; INTRACAUDAL; PERINEURAL at 08:08

## 2022-01-31 RX ADMIN — SODIUM CHLORIDE, SODIUM LACTATE, POTASSIUM CHLORIDE, AND CALCIUM CHLORIDE: .6; .31; .03; .02 INJECTION, SOLUTION INTRAVENOUS at 08:05

## 2022-01-31 RX ADMIN — PROPOFOL 150 MG: 10 INJECTION, EMULSION INTRAVENOUS at 08:08

## 2022-01-31 RX ADMIN — SODIUM CHLORIDE, SODIUM LACTATE, POTASSIUM CHLORIDE, AND CALCIUM CHLORIDE 125 ML/HR: .6; .31; .03; .02 INJECTION, SOLUTION INTRAVENOUS at 07:40

## 2022-01-31 NOTE — ANESTHESIA PREPROCEDURE EVALUATION
Procedure:  COLONOSCOPY  EGD    Relevant Problems   CARDIO   (+) Hyperlipidemia   (+) Hypertension      /RENAL   (+) Nephrolithiasis      MUSCULOSKELETAL   (+) Pes anserine bursitis        Physical Exam    Airway    Mallampati score: III  TM Distance: >3 FB  Neck ROM: full     Dental   No notable dental hx     Cardiovascular      Pulmonary      Other Findings        Anesthesia Plan  ASA Score- 2     Anesthesia Type- IV sedation with anesthesia with ASA Monitors  Additional Monitors:   Airway Plan:           Plan Factors-Exercise tolerance (METS): >4 METS  Chart reviewed  Existing labs reviewed  Patient summary reviewed  Patient is not a current smoker  Induction- intravenous  Postoperative Plan- Plan for postoperative opioid use  Informed Consent- Anesthetic plan and risks discussed with patient  I personally reviewed this patient with the CRNA  Discussed and agreed on the Anesthesia Plan with the CRNA  Renan Boyd

## 2022-01-31 NOTE — H&P
History and Physical -  [facility]  Gabrielle Sofia 62 y o  male MRN: [id number]    HPI: Gabrielle Sofia is a 62y o  year old male who presents with *hx of gerd and colon polyp    Review of Systems    Historical Information   Past Medical History:   Diagnosis Date    Anemia     s/p knee sx    Anesthesia complication     Developed hives in the PACU s/p knee replacement-c/o "IV sedation that burns"-affects him negatively    Arthritis     Chronic pain disorder     right knee-s/p knee replacement    Diabetes mellitus ([facility] )     niddm    Diabetic neuropathy ([facility] )     Left foot burning and tingling    Diverticulitis     DJD (degenerative joint disease)     Dysphagia     food gets stuck    Edema     lower legs-ankles    GERD (gastroesophageal reflux disease)     Hyperlipidemia     Hypertension     Kidney stone     27mm    Mechanical complication of internal orthopedic device ([facility] )     knee    Nephrolithiasis 7/12/2019    Added automatically from request for surgery [id number]    Obesity     Osteomyelitis ([facility] )     left 2nd toe    PONV (postoperative nausea and vomiting)     patient requests to be premedicated for N/V    PVD (peripheral vascular disease) ([facility] )     Valve issue with circulation of the lower legs- to have laser surgery     Past Surgical History:   Procedure Laterality Date    CARPAL TUNNEL RELEASE Left     COLONOSCOPY      CORRECTION HAMMER TOE      Left foot 2nd toe    CYSTOSCOPY      FL RETROGRADE PYELOGRAM  7/26/2019    HERNIA REPAIR Left     inguinal    JOINT REPLACEMENT      right knee revisionx2, infection total of 4 surgeries     JOINT REPLACEMENT      left knee     KIDNEY STONE SURGERY      -large 27 mm stone-took x3 surgeries to resolve    OR AMPUTATION TOE,I-P JT Left 12/14/2018    Procedure: AMPUTATION TOE;  Surgeon: Arben Mota DPM;  Location: [facility];  Service: Podiatry    OR COLONOSCOPY FLX DX W/COLLJ SPEC WHEN PFRMD N/A 2/9/2018    Procedure: EGD AND COLONOSCOPY;  Surgeon: Crow Kumar MD;  [facility]; Service: Gastroenterology    DC CYSTO/URETERO W/LITHOTRIPSY &INDWELL STENT INSRT Left 7/26/2019    Procedure: CYSTOSCOPY URETEROSCOPY WITH LITHOTRIPSY HOLMIUM LASER, RETROGRADE PYELOGRAM AND INSERTION STENT URETERAL;  Surgeon: Tian Jonas MD;  [facility];  Service: Urology    DC ENDOVENOUS LASER, 1ST VEIN Left 1/4/2019    Procedure: ENDOVASCULAR LASER THERAPY (EVLT);   Surgeon: Mary Mahoney DO;  [facility];  Service: Vascular    DC ENDOVENOUS LASER, 1ST VEIN Right 6/7/2019    Procedure: ENDOVASCULAR LASER THERAPY (EVLT)   And stab phlebectomy;  Surgeon: Mary Mahoney DO;  [facility];  Service: Vascular    DC REVISE KNEE JOINT REPLACE,1 PART Right 3/20/2017    Procedure: REVISION TOTAL KNEE ARTHROPLASTY INCLUDING FEMORAL STEM REVISION;  Surgeon: Candance Knife, MD;  [facility];  Service: Orthopedics    DC REVISE KNEE JOINT REPLACE,ALL PARTS Right 4/6/2016    Procedure: REVISION TOTAL KNEE ARTHROPLASTY INCLUDING FEMORAL AND TIBIAL COMPONENTS;  Surgeon: Candance Knife, MD;  [facility];  Service: Orthopedics    TONSILLECTOMY      ULNAR TUNNEL RELEASE      WISDOM TOOTH EXTRACTION       Social History   Social History     Substance and Sexual Activity   Alcohol Use Yes    Comment: on occ     Social History     Substance and Sexual Activity   Drug Use No     Social History     Tobacco Use   Smoking Status Never Smoker   Smokeless Tobacco Never Used     Family History   Problem Relation Age of Onset    Diabetes Mother     Hypertension Mother     Other Mother         esophageal stricture-dysphagia    Hypertension Father     No Known Problems Sister     No Known Problems Son     No Known Problems Son        Meds/Allergies     (Not in a hospital admission)      Allergies   Allergen Reactions    Bee Venom Anaphylaxis     Uses Epi-pen    Doxycycline Photosensitivity Objective     /82   Pulse 67   Temp (!) 97 3 °F (36 3 °C) (Temporal)   Resp 18   Ht 5' 10" (1 778 m)   Wt 131 kg (289 lb)   SpO2 97%   BMI 41 47 kg/m²       PHYSICAL EXAM    Gen: NAD  CV: RRR  CHEST: Clear  ABD: soft, NT/ND  EXT: no edema  Neuro: AAO      ASSESSMENT/PLAN:  This is a 62y o  year old male here for hx of gerd and colon polyp      PLAN:   Procedure: egd/colonoscopy

## 2022-01-31 NOTE — ANESTHESIA POSTPROCEDURE EVALUATION
Post-Op Assessment Note    CV Status:  Stable  Pain Score: 0    Pain management: adequate     Mental Status:  Alert and awake   Hydration Status:  Stable and euvolemic   PONV Controlled:  Controlled   Airway Patency:  Patent and adequate      Post Op Vitals Reviewed: Yes      Staff: CRNA         No complications documented      BP   5150/77   Temp     Pulse  66   Resp   20   SpO2   98

## 2022-02-04 ENCOUNTER — TELEPHONE (OUTPATIENT)
Dept: GASTROENTEROLOGY | Facility: CLINIC | Age: 62
End: 2022-02-04

## 2022-02-04 NOTE — TELEPHONE ENCOUNTER
[id number]  PANTOPRAZOLE TAB 40MG is approved through 02/04/2023  Your patient may now fill this prescription and it will be covered       Pt & pharmacy advised

## 2022-02-04 NOTE — TELEPHONE ENCOUNTER
Pt aware of results and verbalized understanding  Pt, by chance, mentioned his pantoprazole bid was denied  Looked through chart, saw that rx was sent to incorrect physician, and was never routed anywhere near this office      Starting pre-auth for pt now

## 2022-02-04 NOTE — TELEPHONE ENCOUNTER
----- Message from Wiley Hurst MD sent at 2/3/2022  2:14 PM EST -----  Please inform patient biopsies from stomach were negative for H pylori    One polyp removed was a tubular adenoma, no high-grade dysplasia no cancer    Repeat colonoscopy 5 years as we discussed    Please have the patient call with any questions or concerns

## 2022-02-15 DIAGNOSIS — E78.2 MIXED HYPERLIPIDEMIA: ICD-10-CM

## 2022-02-15 RX ORDER — FENOFIBRATE 160 MG/1
160 TABLET ORAL EVERY MORNING
Qty: 90 TABLET | Refills: 1 | Status: SHIPPED | OUTPATIENT
Start: 2022-02-15

## 2022-02-27 LAB
ALBUMIN SERPL-MCNC: 4.5 G/DL (ref 3.6–5.1)
ALBUMIN/GLOB SERPL: 1.7 (CALC) (ref 1–2.5)
ALP SERPL-CCNC: 55 U/L (ref 35–144)
ALT SERPL-CCNC: 61 U/L (ref 9–46)
AST SERPL-CCNC: 44 U/L (ref 10–35)
BILIRUB SERPL-MCNC: 0.8 MG/DL (ref 0.2–1.2)
BUN SERPL-MCNC: 17 MG/DL (ref 7–25)
BUN/CREAT SERPL: 29 (CALC) (ref 6–22)
CALCIUM SERPL-MCNC: 9.4 MG/DL (ref 8.6–10.3)
CHLORIDE SERPL-SCNC: 102 MMOL/L (ref 98–110)
CO2 SERPL-SCNC: 24 MMOL/L (ref 20–32)
CREAT SERPL-MCNC: 0.58 MG/DL (ref 0.7–1.25)
EST. AVERAGE GLUCOSE BLD GHB EST-MCNC: 229 MG/DL
EST. AVERAGE GLUCOSE BLD GHB EST-SCNC: 12.7 MMOL/L
GLOBULIN SER CALC-MCNC: 2.6 G/DL (CALC) (ref 1.9–3.7)
GLUCOSE SERPL-MCNC: 218 MG/DL (ref 65–99)
HBA1C MFR BLD: 9.6 % OF TOTAL HGB
LDLC SERPL DIRECT ASSAY-MCNC: 93 MG/DL
POTASSIUM SERPL-SCNC: 4 MMOL/L (ref 3.5–5.3)
PROT SERPL-MCNC: 7.1 G/DL (ref 6.1–8.1)
SL AMB EGFR AFRICAN AMERICAN: 128 ML/MIN/1.73M2
SL AMB EGFR NON AFRICAN AMERICAN: 111 ML/MIN/1.73M2
SODIUM SERPL-SCNC: 135 MMOL/L (ref 135–146)

## 2022-02-28 ENCOUNTER — OFFICE VISIT (OUTPATIENT)
Dept: INTERNAL MEDICINE CLINIC | Facility: CLINIC | Age: 62
End: 2022-02-28
Payer: COMMERCIAL

## 2022-02-28 VITALS
OXYGEN SATURATION: 98 % | TEMPERATURE: 98.4 F | DIASTOLIC BLOOD PRESSURE: 80 MMHG | HEART RATE: 70 BPM | SYSTOLIC BLOOD PRESSURE: 148 MMHG | HEIGHT: 70 IN | WEIGHT: 283 LBS | BODY MASS INDEX: 40.52 KG/M2

## 2022-02-28 DIAGNOSIS — E11.9 TYPE 2 DIABETES MELLITUS WITHOUT COMPLICATION, WITHOUT LONG-TERM CURRENT USE OF INSULIN (HCC): ICD-10-CM

## 2022-02-28 DIAGNOSIS — M25.561 CHRONIC PAIN OF RIGHT KNEE: ICD-10-CM

## 2022-02-28 DIAGNOSIS — E78.2 MIXED HYPERLIPIDEMIA: ICD-10-CM

## 2022-02-28 DIAGNOSIS — G89.29 CHRONIC PAIN OF RIGHT KNEE: ICD-10-CM

## 2022-02-28 DIAGNOSIS — R74.8 ELEVATED LIVER ENZYMES: ICD-10-CM

## 2022-02-28 DIAGNOSIS — E66.01 MORBID OBESITY WITH BMI OF 40.0-44.9, ADULT (HCC): ICD-10-CM

## 2022-02-28 DIAGNOSIS — I10 PRIMARY HYPERTENSION: Primary | ICD-10-CM

## 2022-02-28 PROCEDURE — 99214 OFFICE O/P EST MOD 30 MIN: CPT | Performed by: NURSE PRACTITIONER

## 2022-02-28 RX ORDER — BLOOD-GLUCOSE METER
KIT MISCELLANEOUS
Qty: 1 KIT | Refills: 0 | Status: SHIPPED | OUTPATIENT
Start: 2022-02-28

## 2022-02-28 RX ORDER — HYDROCHLOROTHIAZIDE 25 MG/1
25 TABLET ORAL DAILY
Qty: 90 TABLET | Refills: 1 | Status: SHIPPED | OUTPATIENT
Start: 2022-02-28

## 2022-02-28 RX ORDER — LANCETS 33 GAUGE
EACH MISCELLANEOUS
Qty: 200 EACH | Refills: 3 | Status: SHIPPED | OUTPATIENT
Start: 2022-02-28 | End: 2022-06-27

## 2022-02-28 RX ORDER — CEPHALEXIN 500 MG/1
CAPSULE ORAL
COMMUNITY
Start: 2022-02-11

## 2022-02-28 RX ORDER — DULAGLUTIDE 3 MG/.5ML
4.5 INJECTION, SOLUTION SUBCUTANEOUS WEEKLY
Qty: 3 ML | Refills: 5 | Status: SHIPPED | OUTPATIENT
Start: 2022-02-28 | End: 2022-06-27 | Stop reason: ALTCHOICE

## 2022-02-28 RX ORDER — BLOOD SUGAR DIAGNOSTIC
STRIP MISCELLANEOUS
Qty: 200 EACH | Refills: 3 | Status: SHIPPED | OUTPATIENT
Start: 2022-02-28

## 2022-02-28 NOTE — ASSESSMENT & PLAN NOTE
Denies right upper quadrant pain, recommend reduction in alcohol intake, history of mild hepatic steatosis

## 2022-02-28 NOTE — ASSESSMENT & PLAN NOTE
Lab Results   Component Value Date    HGBA1C 9 6 (H) 02/26/2022   Uncontrolled  Continue with metformin, Jardiance and will increase dose of Trulicity  If remains uncontrolled will need to increase Jardiance dosage, or possible referral to endocrine   Patient is to continue to work on diet and exercise  Limit sugars and carbohydrate intake  Avoid soda, juice, sweets, cookies, desserts, pasta, bread    Eat more whole grains, exercised 30 min of cardio at least 3 times a week  Also recommended daily foot exams to check for sores, and recommended yearly eye exams

## 2022-02-28 NOTE — PROGRESS NOTES
Assessment/Plan:    Diabetes mellitus ([facility] [address] )    Lab Results   Component Value Date    HGBA1C 9 6 (H) 02/26/2022   Uncontrolled  Continue with metformin, Jardiance and will increase dose of Trulicity  If remains uncontrolled will need to increase Jardiance dosage, or possible referral to endocrine   Patient is to continue to work on diet and exercise  Limit sugars and carbohydrate intake  Avoid soda, juice, sweets, cookies, desserts, pasta, bread    Eat more whole grains, exercised 30 min of cardio at least 3 times a week  Also recommended daily foot exams to check for sores, and recommended yearly eye exams  Hypertension  Uncontrolled  Continue on Norvasc, lisinopril, labetalol and will add on hydrochlorothiazide   Continue to monitor blood pressure at home  Goal BP is < 130/80  Contact our office for consistent elevations  Recommend low sodium diet  Exercise 30 minutes 5 times a week as tolerated  Recommend yearly eye exam        Hyperlipidemia  LDL stable  Continue Lipitor and fenofibrate  Recommend healthy lifestyle choices for your cholesterol  Low fat/low cholesterol diet  Limit/avoid red meat  Eat more lean meat - chicken breast, ground turkey, fish  Exercise 30 mins at least 5 times a week as tolerated  Elevated liver enzymes  Denies right upper quadrant pain, recommend reduction in alcohol intake, history of mild hepatic steatosis      BMI Counseling: Body mass index is 40 61 kg/m²  The BMI is above normal  Nutrition recommendations include decreasing portion sizes, encouraging healthy choices of fruits and vegetables, decreasing fast food intake, consuming healthier snacks, limiting drinks that contain sugar, moderation in carbohydrate intake, increasing intake of lean protein, reducing intake of saturated and trans fat and reducing intake of cholesterol  Exercise recommendations include moderate physical activity 150 minutes/week and exercising 3-5 times per week   Rationale for BMI follow-up plan is due to patient being overweight or obese  Depression Screening and Follow-up Plan: Patient was screened for depression during today's encounter  They screened negative with a PHQ-2 score of 0  Diagnoses and all orders for this visit:    Primary hypertension  -     Comprehensive metabolic panel; Future  -     CBC and differential  -     hydrochlorothiazide (HYDRODIURIL) 25 mg tablet; Take 1 tablet (25 mg total) by mouth daily    Type 2 diabetes mellitus without complication, without long-term current use of insulin ([facility])  -     Hemoglobin A1C  -     Microalbumin / creatinine urine ratio  -     PSA, Total Screen; Future  -     Blood Glucose Monitoring Suppl (OneTouch Verio Reflect) w/Device KIT; Check blood sugars twice daily  Please substitute with appropriate alternative as covered by patient's insurance  Dx: E11 65  -     glucose blood (OneTouch Verio) test strip; Check blood sugars twice daily  Please substitute with appropriate alternative as covered by patient's insurance  Dx: E11 65  -     OneTouch Delica Lancets 74M MISC; Check blood sugars twice daily  Please substitute with appropriate alternative as covered by patient's insurance  Dx: E11 65  -     Dulaglutide (Trulicity) 3 NG/5 0DM SOPN; Inject 0 75 mL (4 5 mg total) under the skin once a week    Mixed hyperlipidemia  -     Lipid panel    Chronic pain of right knee    Elevated liver enzymes    Morbid obesity with BMI of 40 0-44 9, adult ([facility])    Other orders  -     cephalexin (KEFLEX) 500 mg capsule; TAKE 4 TABLETS 1 HOUR BEFORE PROCEDURE          Subjective:      Patient ID: Monica Torres is a 62 y o  male  Patient presents today for a four month follow up  Patient denies any new concerns  Diabetes mellitus-uncontrolled recent hemoglobin A1c, 9 6, patient reports compliance with metformin, Jardiance and Trulicity, patient reports he has not been following a great diet as he has been traveling a lot for work  Patient up-to-date with eye exam     Hypertension-currently uncontrolled, patient reports compliance with lisinopril, labetalol, and Norvasc, denies side effects with medications, denies chest pain        Hyperlipidemia- ASCVD risk score 18 1%, LDL currently controlled, patient continues on statin and fenofibrate with no side effects    Elevated liver enzymes-patient noted to have mild hepatic steatosis          The following portions of the patient's history were reviewed and updated as appropriate: allergies, current medications, past family history, past medical history, past social history, past surgical history and problem list     Review of Systems   Constitutional: Negative for activity change, appetite change, chills, diaphoresis and fever  HENT: Negative for congestion, ear discharge, ear pain, postnasal drip, rhinorrhea, sinus pressure, sinus pain and sore throat  Eyes: Negative for pain, discharge, itching and visual disturbance  Respiratory: Negative for cough, chest tightness, shortness of breath and wheezing  Cardiovascular: Negative for chest pain, palpitations and leg swelling  Gastrointestinal: Negative for abdominal pain, constipation, diarrhea, nausea and vomiting  Endocrine: Positive for polyuria  Negative for polydipsia and polyphagia  Genitourinary: Negative for difficulty urinating, dysuria and urgency  Musculoskeletal: Negative for arthralgias, back pain and neck pain  Skin: Negative for rash and wound  Neurological: Negative for dizziness, weakness, numbness and headaches           Past Medical History:   Diagnosis Date    Anemia     s/p knee sx    Anesthesia complication     Developed hives in the PACU s/p knee replacement-c/o "IV sedation that burns"-affects him negatively    Arthritis     Chronic pain disorder     right knee-s/p knee replacement    Diabetes mellitus ([facility] [address] )     niddm    Diabetic neuropathy (HCC)     Left foot burning and tingling    Diverticulitis     DJD (degenerative joint disease)     Dysphagia     food gets stuck    Edema     lower legs-ankles    GERD (gastroesophageal reflux disease)     Hyperlipidemia     Hypertension     Kidney stone     27mm    Mechanical complication of internal orthopedic device ([facility] )     knee    Nephrolithiasis 7/12/2019    Added automatically from request for surgery [id number]    Obesity     Osteomyelitis ([facility] )     left 2nd toe    PONV (postoperative nausea and vomiting)     patient requests to be premedicated for N/V    PVD (peripheral vascular disease) ([facility] )     Valve issue with circulation of the lower legs- to have laser surgery         Current Outpatient Medications:     allopurinol (ZYLOPRIM) 300 mg tablet, Take 1 tablet (300 mg total) by mouth daily, Disp: 90 tablet, Rfl: 1    amLODIPine (NORVASC) 5 mg tablet, Take 1 tablet (5 mg total) by mouth 2 (two) times a day, Disp: 180 tablet, Rfl: 1    atorvastatin (LIPITOR) 40 mg tablet, Take 1 tablet (40 mg total) by mouth daily at bedtime, Disp: 90 tablet, Rfl: 1    cephalexin (KEFLEX) 500 mg capsule, TAKE 4 TABLETS 1 HOUR BEFORE PROCEDURE, Disp: , Rfl:     cholecalciferol (VITAMIN D3) 1,000 units tablet, Take 1,000 Units by mouth daily at bedtime  , Disp: , Rfl:     Empagliflozin (Jardiance) 10 MG TABS, Take 1 tablet (10 mg total) by mouth every morning, Disp: 90 tablet, Rfl: 1    fenofibrate 160 MG tablet, Take 1 tablet (160 mg total) by mouth every morning, Disp: 90 tablet, Rfl: 1    labetalol (NORMODYNE) 100 mg tablet, Take 1 tablet (100 mg total) by mouth 2 (two) times a day, Disp: 180 tablet, Rfl: 1    lisinopril (ZESTRIL) 40 mg tablet, Take 1 tablet (40 mg total) by mouth every morning, Disp: 90 tablet, Rfl: 1    metFORMIN (GLUCOPHAGE) 1000 MG tablet, Take 1000 mg in AM and 1500 mg in PM , Disp: 225 tablet, Rfl: 1    Multiple Vitamin (MULTIVITAMIN) tablet, Take 1 tablet by mouth every morning  , Disp: , Rfl:     Na Sulfate-K Sulfate-Mg Sulf (Suprep Bowel Prep Kit) 17 5-3 13-1 6 GM/177ML SOLN, Take as directed by the office  , Disp: 354 mL, Rfl: 0    pantoprazole (PROTONIX) 40 mg tablet, Take 1 tablet (40 mg total) by mouth 2 (two) times a day, Disp: 60 tablet, Rfl: 6    sildenafil (VIAGRA) 100 mg tablet, TAKE ONE TABLET BY MOUTH ONE HOUR prior to intercourse, Disp: 30 tablet, Rfl: 4    Blood Glucose Monitoring Suppl (OneTouch Verio Reflect) w/Device KIT, Check blood sugars twice daily  Please substitute with appropriate alternative as covered by patient's insurance  Dx: E11 65, Disp: 1 kit, Rfl: 0    Dulaglutide (Trulicity) 3 UN/1 8PO SOPN, Inject 0 75 mL (4 5 mg total) under the skin once a week, Disp: 3 mL, Rfl: 5    glucose blood (OneTouch Verio) test strip, Check blood sugars twice daily  Please substitute with appropriate alternative as covered by patient's insurance  Dx: E11 65, Disp: 200 each, Rfl: 3    hydrochlorothiazide (HYDRODIURIL) 25 mg tablet, Take 1 tablet (25 mg total) by mouth daily, Disp: 90 tablet, Rfl: 1    OneTouch Delica Lancets 27E MISC, Check blood sugars twice daily  Please substitute with appropriate alternative as covered by patient's insurance   Dx: E11 65, Disp: 200 each, Rfl: 3    Allergies   Allergen Reactions    Bee Venom Anaphylaxis     Uses Epi-pen    Doxycycline Photosensitivity       Social History   Past Surgical History:   Procedure Laterality Date    CARPAL TUNNEL RELEASE Left     COLONOSCOPY      CORRECTION HAMMER TOE      Left foot 2nd toe    CYSTOSCOPY      FL RETROGRADE PYELOGRAM  7/26/2019    HERNIA REPAIR Left     inguinal    JOINT REPLACEMENT      right knee revisionx2, infection total of 4 surgeries     JOINT REPLACEMENT      left knee     KIDNEY STONE SURGERY      -large 27 mm stone-took x3 surgeries to resolve    VT AMPUTATION TOE,I-P JT Left 12/14/2018    Procedure: AMPUTATION TOE;  Surgeon: Maryann Martinez DPM;  Location: [facility];  Service: Podiatry    VT COLONOSCOPY FLX DX W/COLLJ SPEC WHEN PFRMD N/A 2/9/2018    Procedure: EGD AND COLONOSCOPY;  Surgeon: Skip Felix MD;  [facility]; Service: Gastroenterology    MS CYSTO/URETERO W/LITHOTRIPSY &INDWELL STENT INSRT Left 7/26/2019    Procedure: CYSTOSCOPY URETEROSCOPY WITH LITHOTRIPSY HOLMIUM LASER, RETROGRADE PYELOGRAM AND INSERTION STENT URETERAL;  Surgeon: Naomie Mathias MD;  [facility];  Service: Urology    MS ENDOVENOUS LASER, 1ST VEIN Left 1/4/2019    Procedure: ENDOVASCULAR LASER THERAPY (EVLT);   Surgeon: Dereje Baird DO;  [facility];  Service: Vascular    MS ENDOVENOUS LASER, 1ST VEIN Right 6/7/2019    Procedure: ENDOVASCULAR LASER THERAPY (EVLT)   And stab phlebectomy;  Surgeon: Dereje Baird DO;  [facility];  Service: Vascular    MS REVISE KNEE JOINT REPLACE,1 PART Right 3/20/2017    Procedure: REVISION TOTAL KNEE ARTHROPLASTY INCLUDING FEMORAL STEM REVISION;  Surgeon: Dewayne Tracey MD;  Location: [facility];  Service: Orthopedics    MS REVISE KNEE JOINT REPLACE,ALL PARTS Right 4/6/2016    Procedure: REVISION TOTAL KNEE ARTHROPLASTY INCLUDING FEMORAL AND TIBIAL COMPONENTS;  Surgeon: Dewayne Tracey MD;  [facility]: [facility];  Service: Orthopedics    TONSILLECTOMY      ULNAR TUNNEL RELEASE      WISDOM TOOTH EXTRACTION       Family History   Problem Relation Age of Onset    Diabetes Mother     Hypertension Mother    Gisella Allan Other Mother         esophageal stricture-dysphagia    Hypertension Father     No Known Problems Sister     No Known Problems Son     No Known Problems Son        Objective:  /80 (BP Location: Left arm, Patient Position: Sitting, Cuff Size: Large)   Pulse 70   Temp 98 4 °F (36 9 °C) (Temporal)   Ht 5' 10" (1 778 m)   Wt 128 kg (283 lb)   SpO2 98%   BMI 40 61 kg/m²     Recent Results (from the past 1344 hour(s))   Fingerstick Glucose (POCT)    Collection Time: 01/31/22  7:40 AM   Result Value Ref Range    POC Glucose 269 (H) 65 - 140 mg/dl   Tissue Exam    Collection Time: 01/31/22  8:11 AM   Result Value Ref Range    Case Report       Surgical Pathology Report                         Case: [id number]                                   Authorizing Provider:  Paramjit Duong MD            Collected:           01/31/2022 [id number]              Ordering Location:     [facility]   Received:            01/31/2022 [facility]                                                                       Pathologist:           Quinton Mendes MD                                                                Specimens:   A) - Stomach, GASTRIC BX, GASTRITIS                                                                 B) - Colon, ASCENDING COLON BX                                                             Final Diagnosis       A  Stomach (biopsy):     - Minimal chronic inactive gastritis involving oxyntic and foveolar mucosa  - Suggestion of medication (PPI) effect      - No definitive morphologic evidence of Helicobacter on routine sections      - No intestinal metaplasia, dysplasia or neoplasia identified  B  Ascending colon (biopsy):     - Portions of tubular adenoma; negative for high-grade dysplasia  Additional Information       All reported additional testing was performed with appropriately reactive controls  These tests were developed and their performance characteristics determined by [facility] or appropriate performing facility, though some tests may be performed on tissues which have not been validated for performance characteristics (such as staining performed on alcohol exposed cell blocks and decalcified tissues)  Results should be interpreted with caution and in the context of the patients clinical condition  These tests may not be cleared or approved by the [place]   Food and Drug Administration, though the FDA has determined that such clearance or approval is not necessary  These tests are used for clinical purposes and they should not be regarded as investigational or for research  This laboratory has been approved by [place] 88, designated as a high-complexity laboratory and is qualified to perform these tests  Interpretation performed at [facility], [address] [place] [address]  Akashlorri Cuba Synoptic Checklist          COLON/RECTUM POLYP FORM - GI - B          :    Adenoma(s)      Gross Description          A  The specimen is received in formalin, labeled with the patient's name and hospital number, and is designated " stomach gastric biopsy, gastritis "  The specimen consists of multiple tan-pink soft tissue fragments measuring in aggregate of 1 3 x 0 5 x 0 2 cm  The specimen is entirely submitted in a screened cassette  B  The specimen is received in formalin, labeled with the patient's name and hospital number, and is designated "ascending colon biopsy "  The specimen consists of 2 tan soft tissue fragments measuring 0 2-0 3 cm in greatest dimension  The specimen is entirely submitted in a screened cassette  Note: The estimated total formalin fixation time based upon information provided by the submitting clinician and the standard processing schedule is under 72 hours      Darian Carpenter              LDL cholesterol, direct    Collection Time: 02/26/22  7:53 AM   Result Value Ref Range    LDL Direct 93 <100 mg/dL   Comprehensive metabolic panel    Collection Time: 02/26/22  7:53 AM   Result Value Ref Range    Glucose, Random 218 (H) 65 - 99 mg/dL    BUN 17 7 - 25 mg/dL    Creatinine 0 58 (L) 0 70 - 1 25 mg/dL    eGFR Non  111 > OR = 60 mL/min/1 73m2    eGFR  128 > OR = 60 mL/min/1 73m2    SL AMB BUN/CREATININE RATIO 29 (H) 6 - 22 (calc)    Sodium 135 135 - 146 mmol/L    Potassium 4 0 3 5 - 5 3 mmol/L    Chloride 102 98 - 110 mmol/L    CO2 24 20 - 32 mmol/L    Calcium 9 4 8 6 - 10 3 mg/dL    Protein, Total 7 1 6 1 - 8 1 g/dL    Albumin 4 5 3 6 - 5 1 g/dL    Globulin 2 6 1 9 - 3 7 g/dL (calc)    Albumin/Globulin Ratio 1 7 1 0 - 2 5 (calc)    TOTAL BILIRUBIN 0 8 0 2 - 1 2 mg/dL    Alkaline Phosphatase 55 35 - 144 U/L    AST 44 (H) 10 - 35 U/L    ALT 61 (H) 9 - 46 U/L   Hemoglobin A1C With EAG    Collection Time: 02/26/22  7:53 AM   Result Value Ref Range    Hemoglobin A1C 9 6 (H) <5 7 % of total Hgb    Estimated Average Glucose 229 mg/dL    Estimated Average Glucose (mmol/L) 12 7 mmol/L            Physical Exam  Constitutional:       General: He is not in acute distress  Appearance: He is well-developed  He is obese  He is not diaphoretic  HENT:      Head: Normocephalic and atraumatic  Right Ear: External ear normal       Left Ear: External ear normal       Nose: Nose normal       Mouth/Throat:      Pharynx: No oropharyngeal exudate  Eyes:      General:         Right eye: No discharge  Left eye: No discharge  Conjunctiva/sclera: Conjunctivae normal       Pupils: Pupils are equal, round, and reactive to light  Neck:      Thyroid: No thyromegaly  Cardiovascular:      Rate and Rhythm: Normal rate and regular rhythm  Heart sounds: Normal heart sounds  No murmur heard  No friction rub  No gallop  Pulmonary:      Effort: Pulmonary effort is normal  No respiratory distress  Breath sounds: Normal breath sounds  No stridor  No wheezing or rales  Abdominal:      General: Bowel sounds are normal  There is no distension  Palpations: Abdomen is soft  Tenderness: There is no abdominal tenderness  Musculoskeletal:      Cervical back: Normal range of motion and neck supple  Lymphadenopathy:      Cervical: No cervical adenopathy  Skin:     General: Skin is warm and dry  Findings: No erythema or rash  Neurological:      Mental Status: He is alert and oriented to person, place, and time  Psychiatric:         Behavior: Behavior normal          Thought Content:  Thought content normal          Judgment: Judgment normal  32

## 2022-02-28 NOTE — ASSESSMENT & PLAN NOTE
Uncontrolled  Continue on Norvasc, lisinopril, labetalol and will add on hydrochlorothiazide   Continue to monitor blood pressure at home  Goal BP is < 130/80  Contact our office for consistent elevations  Recommend low sodium diet  Exercise 30 minutes 5 times a week as tolerated    Recommend yearly eye exam

## 2022-02-28 NOTE — ASSESSMENT & PLAN NOTE
LDL stable  Continue Lipitor and fenofibrate  Recommend healthy lifestyle choices for your cholesterol  Low fat/low cholesterol diet  Limit/avoid red meat  Eat more lean meat - chicken breast, ground turkey, fish  Exercise 30 mins at least 5 times a week as tolerated

## 2022-03-01 DIAGNOSIS — I10 ESSENTIAL HYPERTENSION: ICD-10-CM

## 2022-03-01 RX ORDER — LABETALOL 100 MG/1
100 TABLET, FILM COATED ORAL 2 TIMES DAILY
Qty: 180 TABLET | Refills: 1 | Status: SHIPPED | OUTPATIENT
Start: 2022-03-01

## 2022-04-12 NOTE — PROGRESS NOTES
UROLOGY PROGRESS NOTE   Patient Identifiers: Angela Parsons (MRN [id number])  Date of Service: 4/12/2022    Subjective:     28-year-old man history of BPH and kidney stones  Current PSA is pending  He has not had any recent imaging  No voiding complaints and no recent stone exacerbations  Reason for visit:  BPH and kidney stone follow-up    Objective:     VITALS:    There were no vitals filed for this visit  LABS:  Lab Results   Component Value Date    HGB 15 2 10/15/2021    HCT 45 2 10/15/2021    WBC 5 1 10/15/2021     10/15/2021   ]    Lab Results   Component Value Date     10/10/2015    K 4 0 02/26/2022     02/26/2022    CO2 24 02/26/2022    BUN 17 02/26/2022    CREATININE 0 58 (L) 02/26/2022    CALCIUM 9 4 02/26/2022    GLUCOSE 120 10/10/2015   ]        INPATIENT MEDS:    Current Outpatient Medications:     allopurinol (ZYLOPRIM) 300 mg tablet, Take 1 tablet (300 mg total) by mouth daily, Disp: 90 tablet, Rfl: 1    amLODIPine (NORVASC) 5 mg tablet, Take 1 tablet (5 mg total) by mouth 2 (two) times a day, Disp: 180 tablet, Rfl: 1    atorvastatin (LIPITOR) 40 mg tablet, Take 1 tablet (40 mg total) by mouth daily at bedtime, Disp: 90 tablet, Rfl: 1    Blood Glucose Monitoring Suppl (OneTouch Verio Reflect) w/Device KIT, Check blood sugars twice daily  Please substitute with appropriate alternative as covered by patient's insurance   Dx: E11 65, Disp: 1 kit, Rfl: 0    cephalexin (KEFLEX) 500 mg capsule, TAKE 4 TABLETS 1 HOUR BEFORE PROCEDURE, Disp: , Rfl:     cholecalciferol (VITAMIN D3) 1,000 units tablet, Take 1,000 Units by mouth daily at bedtime  , Disp: , Rfl:     Dulaglutide (Trulicity) 3 ZP/4 8LC SOPN, Inject 0 75 mL (4 5 mg total) under the skin once a week, Disp: 3 mL, Rfl: 5    Empagliflozin (Jardiance) 10 MG TABS, Take 1 tablet (10 mg total) by mouth every morning, Disp: 90 tablet, Rfl: 1    fenofibrate 160 MG tablet, Take 1 tablet (160 mg total) by mouth every morning, Disp: 90 tablet, Rfl: 1    glucose blood (OneTouch Verio) test strip, Check blood sugars twice daily  Please substitute with appropriate alternative as covered by patient's insurance  Dx: E11 65, Disp: 200 each, Rfl: 3    hydrochlorothiazide (HYDRODIURIL) 25 mg tablet, Take 1 tablet (25 mg total) by mouth daily, Disp: 90 tablet, Rfl: 1    labetalol (NORMODYNE) 100 mg tablet, Take 1 tablet (100 mg total) by mouth 2 (two) times a day, Disp: 180 tablet, Rfl: 1    lisinopril (ZESTRIL) 40 mg tablet, Take 1 tablet (40 mg total) by mouth every morning, Disp: 90 tablet, Rfl: 1    metFORMIN (GLUCOPHAGE) 1000 MG tablet, Take 1000 mg in AM and 1500 mg in PM , Disp: 225 tablet, Rfl: 1    Multiple Vitamin (MULTIVITAMIN) tablet, Take 1 tablet by mouth every morning  , Disp: , Rfl:     Na Sulfate-K Sulfate-Mg Sulf (Suprep Bowel Prep Kit) 17 5-3 13-1 6 GM/177ML SOLN, Take as directed by the office  , Disp: 354 mL, Rfl: 0    OneTouch Delica Lancets 26V MISC, Check blood sugars twice daily  Please substitute with appropriate alternative as covered by patient's insurance  Dx: E11 65, Disp: 200 each, Rfl: 3    pantoprazole (PROTONIX) 40 mg tablet, Take 1 tablet (40 mg total) by mouth 2 (two) times a day, Disp: 60 tablet, Rfl: 6    sildenafil (VIAGRA) 100 mg tablet, TAKE ONE TABLET BY MOUTH ONE HOUR prior to intercourse, Disp: 30 tablet, Rfl: 4      Physical Exam:   There were no vitals taken for this visit  GEN: no acute distress    RESP: breathing comfortably with no accessory muscle use    ABD: soft, non-tender, non-distended   INCISION:    EXT: no significant peripheral edema   (Male): Penis circumcised, phallus normal, meatus patent  Testicles descended into scrotum bilaterally without masses nor tenderness  No inguinal hernias bilaterally  CHRISTEL: Prostate is enlarged at 45 grams  The prostate is not boggy  The prostate is not tender  No nodules noted    RADIOLOGY:    none     Assessment:     1  BPH   2  History kidney stones     Plan:   - follow-up in 1 year with PSA prior to visit  -  -  -

## 2022-04-13 ENCOUNTER — OFFICE VISIT (OUTPATIENT)
Dept: UROLOGY | Facility: CLINIC | Age: 62
End: 2022-04-13
Payer: COMMERCIAL

## 2022-04-13 VITALS
WEIGHT: 280 LBS | HEIGHT: 70 IN | HEART RATE: 77 BPM | BODY MASS INDEX: 40.09 KG/M2 | SYSTOLIC BLOOD PRESSURE: 152 MMHG | DIASTOLIC BLOOD PRESSURE: 82 MMHG

## 2022-04-13 DIAGNOSIS — N40.0 BENIGN PROSTATIC HYPERPLASIA WITHOUT LOWER URINARY TRACT SYMPTOMS: Primary | ICD-10-CM

## 2022-04-13 PROCEDURE — 3079F DIAST BP 80-89 MM HG: CPT | Performed by: PHYSICIAN ASSISTANT

## 2022-04-13 PROCEDURE — 99213 OFFICE O/P EST LOW 20 MIN: CPT | Performed by: PHYSICIAN ASSISTANT

## 2022-04-13 PROCEDURE — 3008F BODY MASS INDEX DOCD: CPT | Performed by: PHYSICIAN ASSISTANT

## 2022-04-13 PROCEDURE — 3077F SYST BP >= 140 MM HG: CPT | Performed by: PHYSICIAN ASSISTANT

## 2022-04-13 PROCEDURE — 1036F TOBACCO NON-USER: CPT | Performed by: PHYSICIAN ASSISTANT

## 2022-05-04 DIAGNOSIS — E78.2 MIXED HYPERLIPIDEMIA: ICD-10-CM

## 2022-05-04 DIAGNOSIS — I10 PRIMARY HYPERTENSION: ICD-10-CM

## 2022-05-04 PROCEDURE — 4010F ACE/ARB THERAPY RXD/TAKEN: CPT | Performed by: PHYSICIAN ASSISTANT

## 2022-05-04 RX ORDER — ATORVASTATIN CALCIUM 40 MG/1
40 TABLET, FILM COATED ORAL
Qty: 90 TABLET | Refills: 1 | Status: SHIPPED | OUTPATIENT
Start: 2022-05-04 | End: 2022-07-26 | Stop reason: SDUPTHER

## 2022-05-04 RX ORDER — LISINOPRIL 40 MG/1
40 TABLET ORAL EVERY MORNING
Qty: 90 TABLET | Refills: 1 | Status: SHIPPED | OUTPATIENT
Start: 2022-05-04

## 2022-06-23 LAB
ALBUMIN SERPL-MCNC: 4.7 G/DL (ref 3.6–5.1)
ALBUMIN/CREAT UR: 83 MCG/MG CREAT
ALBUMIN/GLOB SERPL: 1.9 (CALC) (ref 1–2.5)
ALP SERPL-CCNC: 45 U/L (ref 35–144)
ALT SERPL-CCNC: 54 U/L (ref 9–46)
AST SERPL-CCNC: 48 U/L (ref 10–35)
BASOPHILS # BLD AUTO: 22 CELLS/UL (ref 0–200)
BASOPHILS NFR BLD AUTO: 0.4 %
BILIRUB SERPL-MCNC: 0.6 MG/DL (ref 0.2–1.2)
BUN SERPL-MCNC: 14 MG/DL (ref 7–25)
BUN/CREAT SERPL: ABNORMAL (CALC) (ref 6–22)
CALCIUM SERPL-MCNC: 10 MG/DL (ref 8.6–10.3)
CHLORIDE SERPL-SCNC: 103 MMOL/L (ref 98–110)
CHOLEST SERPL-MCNC: 162 MG/DL
CHOLEST/HDLC SERPL: 3 (CALC)
CO2 SERPL-SCNC: 25 MMOL/L (ref 20–32)
CREAT SERPL-MCNC: 0.77 MG/DL (ref 0.7–1.25)
CREAT UR-MCNC: 64 MG/DL (ref 20–320)
EOSINOPHIL # BLD AUTO: 108 CELLS/UL (ref 15–500)
EOSINOPHIL NFR BLD AUTO: 2 %
ERYTHROCYTE [DISTWIDTH] IN BLOOD BY AUTOMATED COUNT: 11.9 % (ref 11–15)
GLOBULIN SER CALC-MCNC: 2.5 G/DL (CALC) (ref 1.9–3.7)
GLUCOSE SERPL-MCNC: 207 MG/DL (ref 65–99)
HBA1C MFR BLD: 8.4 % OF TOTAL HGB
HCT VFR BLD AUTO: 44.5 % (ref 38.5–50)
HDLC SERPL-MCNC: 54 MG/DL
HGB BLD-MCNC: 15.6 G/DL (ref 13.2–17.1)
LDLC SERPL CALC-MCNC: 84 MG/DL (CALC)
LYMPHOCYTES # BLD AUTO: 1874 CELLS/UL (ref 850–3900)
LYMPHOCYTES NFR BLD AUTO: 34.7 %
MCH RBC QN AUTO: 32 PG (ref 27–33)
MCHC RBC AUTO-ENTMCNC: 35.1 G/DL (ref 32–36)
MCV RBC AUTO: 91.2 FL (ref 80–100)
MICROALBUMIN UR-MCNC: 5.3 MG/DL
MONOCYTES # BLD AUTO: 470 CELLS/UL (ref 200–950)
MONOCYTES NFR BLD AUTO: 8.7 %
NEUTROPHILS # BLD AUTO: 2927 CELLS/UL (ref 1500–7800)
NEUTROPHILS NFR BLD AUTO: 54.2 %
NONHDLC SERPL-MCNC: 108 MG/DL (CALC)
PLATELET # BLD AUTO: 187 THOUSAND/UL (ref 140–400)
PMV BLD REES-ECKER: 10.2 FL (ref 7.5–12.5)
POTASSIUM SERPL-SCNC: 4 MMOL/L (ref 3.5–5.3)
PROT SERPL-MCNC: 7.2 G/DL (ref 6.1–8.1)
PSA SERPL-MCNC: 0.51 NG/ML
RBC # BLD AUTO: 4.88 MILLION/UL (ref 4.2–5.8)
SL AMB EGFR AFRICAN AMERICAN: 114 ML/MIN/1.73M2
SL AMB EGFR NON AFRICAN AMERICAN: 99 ML/MIN/1.73M2
SODIUM SERPL-SCNC: 139 MMOL/L (ref 135–146)
TRIGL SERPL-MCNC: 137 MG/DL
WBC # BLD AUTO: 5.4 THOUSAND/UL (ref 3.8–10.8)

## 2022-06-23 PROCEDURE — 3060F POS MICROALBUMINURIA REV: CPT | Performed by: NURSE PRACTITIONER

## 2022-06-23 PROCEDURE — 3052F HG A1C>EQUAL 8.0%<EQUAL 9.0%: CPT | Performed by: NURSE PRACTITIONER

## 2022-06-27 ENCOUNTER — OFFICE VISIT (OUTPATIENT)
Dept: INTERNAL MEDICINE CLINIC | Facility: CLINIC | Age: 62
End: 2022-06-27
Payer: COMMERCIAL

## 2022-06-27 ENCOUNTER — TELEPHONE (OUTPATIENT)
Dept: OTHER | Facility: OTHER | Age: 62
End: 2022-06-27

## 2022-06-27 VITALS
SYSTOLIC BLOOD PRESSURE: 130 MMHG | BODY MASS INDEX: 39.57 KG/M2 | HEART RATE: 71 BPM | DIASTOLIC BLOOD PRESSURE: 68 MMHG | HEIGHT: 70 IN | OXYGEN SATURATION: 98 % | TEMPERATURE: 98.2 F | WEIGHT: 276.4 LBS

## 2022-06-27 DIAGNOSIS — E11.9 TYPE 2 DIABETES MELLITUS WITHOUT COMPLICATION, WITHOUT LONG-TERM CURRENT USE OF INSULIN (HCC): ICD-10-CM

## 2022-06-27 DIAGNOSIS — M62.838 MUSCLE SPASM: Primary | ICD-10-CM

## 2022-06-27 DIAGNOSIS — E11.3293 TYPE 2 DIABETES MELLITUS WITH BOTH EYES AFFECTED BY MILD NONPROLIFERATIVE RETINOPATHY WITHOUT MACULAR EDEMA, WITHOUT LONG-TERM CURRENT USE OF INSULIN (HCC): ICD-10-CM

## 2022-06-27 DIAGNOSIS — R74.8 ELEVATED LIVER ENZYMES: ICD-10-CM

## 2022-06-27 DIAGNOSIS — I10 PRIMARY HYPERTENSION: Chronic | ICD-10-CM

## 2022-06-27 DIAGNOSIS — E78.2 MIXED HYPERLIPIDEMIA: Chronic | ICD-10-CM

## 2022-06-27 PROCEDURE — 3075F SYST BP GE 130 - 139MM HG: CPT | Performed by: NURSE PRACTITIONER

## 2022-06-27 PROCEDURE — 3078F DIAST BP <80 MM HG: CPT | Performed by: NURSE PRACTITIONER

## 2022-06-27 PROCEDURE — 99214 OFFICE O/P EST MOD 30 MIN: CPT | Performed by: NURSE PRACTITIONER

## 2022-06-27 PROCEDURE — 3008F BODY MASS INDEX DOCD: CPT | Performed by: NURSE PRACTITIONER

## 2022-06-27 PROCEDURE — 1036F TOBACCO NON-USER: CPT | Performed by: NURSE PRACTITIONER

## 2022-06-27 RX ORDER — CYCLOBENZAPRINE HCL 10 MG
10 TABLET ORAL
Qty: 15 TABLET | Refills: 0 | Status: SHIPPED | OUTPATIENT
Start: 2022-06-27

## 2022-06-27 RX ORDER — DULAGLUTIDE 4.5 MG/.5ML
INJECTION, SOLUTION SUBCUTANEOUS
COMMUNITY
Start: 2022-06-02 | End: 2022-07-25 | Stop reason: SDUPTHER

## 2022-06-27 RX ORDER — MELOXICAM 15 MG/1
15 TABLET ORAL DAILY
Qty: 30 TABLET | Refills: 0 | Status: SHIPPED | OUTPATIENT
Start: 2022-06-27

## 2022-06-27 NOTE — TELEPHONE ENCOUNTER
PT states pharmacy called him notifying there was an issue with jardiance and pharmacy never received the script  Please re-send the medication to pharmacy and call PT to verify its sent

## 2022-06-27 NOTE — ASSESSMENT & PLAN NOTE
Controlled on   Continue on Norvasc, lisinopril, labetalol and hydrochlorothiazide   Continue to monitor blood pressure at home  Goal BP is < 130/80  Contact our office for consistent elevations  Recommend low sodium diet  Exercise 30 minutes 5 times a week as tolerated    Recommend yearly eye exam

## 2022-06-27 NOTE — PROGRESS NOTES
Assessment/Plan:    Type 2 diabetes mellitus with both eyes affected by mild nonproliferative retinopathy without macular edema, without long-term current use of insulin ([facility])    Lab Results   Component Value Date    HGBA1C 8 4 (H) 06/22/2022   Uncontrolled, but improving  Continue with metformin, Trulicity And  increase Jardiance dosage  Patient is to continue to work on diet and exercise  Limit sugars and carbohydrate intake  Avoid soda, juice, sweets, cookies, desserts, pasta, bread    Eat more whole grains, exercised 30 min of cardio at least 3 times a week  Also recommended daily foot exams to check for sores, and recommended yearly eye exams  Hypertension  Controlled on   Continue on Norvasc, lisinopril, labetalol and hydrochlorothiazide   Continue to monitor blood pressure at home  Goal BP is < 130/80  Contact our office for consistent elevations  Recommend low sodium diet  Exercise 30 minutes 5 times a week as tolerated  Recommend yearly eye exam        Hyperlipidemia  LDL stable  Continue Lipitor and fenofibrate  Recommend healthy lifestyle choices for your cholesterol  Low fat/low cholesterol diet  Limit/avoid red meat  Eat more lean meat - chicken breast, ground turkey, fish  Exercise 30 mins at least 5 times a week as tolerated  Elevated liver enzymes  Denies right upper quadrant pain, recommend reduction in alcohol intake, history of mild hepatic steatosis              Diagnoses and all orders for this visit:    Muscle spasm  -     cyclobenzaprine (FLEXERIL) 10 mg tablet; Take 1 tablet (10 mg total) by mouth daily at bedtime  -     meloxicam (Mobic) 15 mg tablet; Take 1 tablet (15 mg total) by mouth daily    Type 2 diabetes mellitus without complication, without long-term current use of insulin ([facility] )  -     Ambulatory referral to Diabetic Education;  Future  -     Discontinue: Empagliflozin (Jardiance) 25 MG TABS; Take 1 tablet (25 mg total) by mouth every morning  - Hemoglobin A1C  -     Comprehensive metabolic panel; Future  -     CBC and differential  -     Lipid panel  -     Microalbumin / creatinine urine ratio  -     Discontinue: Empagliflozin (Jardiance) 25 MG TABS; Take 1 tablet (25 mg total) by mouth every morning Lot # [id number] 10/22    Type 2 diabetes mellitus with both eyes affected by mild nonproliferative retinopathy without macular edema, without long-term current use of insulin (HCC)    Primary hypertension    Mixed hyperlipidemia    Elevated liver enzymes    Other orders  -     Trulicity 4 5 OW/4 1OV SOPN; INJECT 0 5ML (4 5MG) TOTAL UNDER THE SKIN ONCE A WEEK          Subjective:      Patient ID: Frankey Morton is a 62 y o  male  Patient presents today for a four month follow up  Patient denies any new concerns  Diabetes mellitus-uncontrolled recent hemoglobin A1c, 8 4 down from 9 6, patient reports compliance with metformin, Jardiance and Trulicity, patient reports he has not been following a great diet as he has been traveling a lot for work  Patient up-to-date with eye exam     Hypertension-currently uncontrolled, patient reports compliance with lisinopril, labetalol, and Norvasc, HCTZ was added at last office visit, denies side effects with medications, denies chest pain        Hyperlipidemia- ASCVD risk score 18 1%, LDL currently controlled, patient continues on statin and fenofibrate with no side effects    Elevated liver enzymes-patient noted to have mild hepatic steatosis          The following portions of the patient's history were reviewed and updated as appropriate: allergies, current medications, past family history, past medical history, past social history, past surgical history and problem list     Review of Systems   Constitutional: Negative for activity change, appetite change, chills, diaphoresis and fever  HENT: Negative for congestion, ear discharge, ear pain, postnasal drip, rhinorrhea, sinus pressure, sinus pain and sore throat  Eyes: Negative for pain, discharge, itching and visual disturbance  Respiratory: Negative for cough, chest tightness, shortness of breath and wheezing  Cardiovascular: Negative for chest pain, palpitations and leg swelling  Gastrointestinal: Negative for abdominal pain, constipation, diarrhea, nausea and vomiting  Endocrine: Negative for polydipsia, polyphagia and polyuria  Genitourinary: Negative for difficulty urinating, dysuria and urgency  Musculoskeletal: Negative for arthralgias, back pain and neck pain  Skin: Negative for rash and wound  Neurological: Negative for dizziness, weakness, numbness and headaches           Past Medical History:   Diagnosis Date    Anemia     s/p knee sx    Anesthesia complication     Developed hives in the PACU s/p knee replacement-c/o "IV sedation that burns"-affects him negatively    Arthritis     Chronic pain disorder     right knee-s/p knee replacement    Diabetes mellitus ([facility] )     niddm    Diabetic neuropathy (HCC)     Left foot burning and tingling    Diverticulitis     DJD (degenerative joint disease)     Dysphagia     food gets stuck    Edema     lower legs-ankles    GERD (gastroesophageal reflux disease)     Hyperlipidemia     Hypertension     Kidney stone     27mm    Mechanical complication of internal orthopedic device ([facility] )     knee    Nephrolithiasis 7/12/2019    Added automatically from request for surgery [id number]    Obesity     Osteomyelitis ([facility] )     left 2nd toe    PONV (postoperative nausea and vomiting)     patient requests to be premedicated for N/V    PVD (peripheral vascular disease) ([facility] )     Valve issue with circulation of the lower legs- to have laser surgery         Current Outpatient Medications:     allopurinol (ZYLOPRIM) 300 mg tablet, Take 1 tablet (300 mg total) by mouth daily, Disp: 90 tablet, Rfl: 1    amLODIPine (NORVASC) 5 mg tablet, Take 1 tablet (5 mg total) by mouth 2 (two) times a day, Disp: 180 tablet, Rfl: 1    atorvastatin (LIPITOR) 40 mg tablet, Take 1 tablet (40 mg total) by mouth daily at bedtime, Disp: 90 tablet, Rfl: 1    Blood Glucose Monitoring Suppl (OneTouch Verio Reflect) w/Device KIT, Check blood sugars twice daily  Please substitute with appropriate alternative as covered by patient's insurance  Dx: E11 65, Disp: 1 kit, Rfl: 0    cephalexin (KEFLEX) 500 mg capsule, TAKE 4 TABLETS 1 HOUR BEFORE PROCEDURE, Disp: , Rfl:     cholecalciferol (VITAMIN D3) 1,000 units tablet, Take 1,000 Units by mouth daily at bedtime  , Disp: , Rfl:     cyclobenzaprine (FLEXERIL) 10 mg tablet, Take 1 tablet (10 mg total) by mouth daily at bedtime, Disp: 15 tablet, Rfl: 0    fenofibrate 160 MG tablet, Take 1 tablet (160 mg total) by mouth every morning, Disp: 90 tablet, Rfl: 1    glucose blood (OneTouch Verio) test strip, Check blood sugars twice daily  Please substitute with appropriate alternative as covered by patient's insurance   Dx: E11 65, Disp: 200 each, Rfl: 3    hydrochlorothiazide (HYDRODIURIL) 25 mg tablet, Take 1 tablet (25 mg total) by mouth daily, Disp: 90 tablet, Rfl: 1    labetalol (NORMODYNE) 100 mg tablet, Take 1 tablet (100 mg total) by mouth 2 (two) times a day, Disp: 180 tablet, Rfl: 1    lisinopril (ZESTRIL) 40 mg tablet, Take 1 tablet (40 mg total) by mouth every morning, Disp: 90 tablet, Rfl: 1    meloxicam (Mobic) 15 mg tablet, Take 1 tablet (15 mg total) by mouth daily, Disp: 30 tablet, Rfl: 0    metFORMIN (GLUCOPHAGE) 1000 MG tablet, Take 1000 mg in AM and 1500 mg in PM , Disp: 225 tablet, Rfl: 1    Multiple Vitamin (MULTIVITAMIN) tablet, Take 1 tablet by mouth every morning  , Disp: , Rfl:     pantoprazole (PROTONIX) 40 mg tablet, Take 1 tablet (40 mg total) by mouth 2 (two) times a day, Disp: 60 tablet, Rfl: 6    sildenafil (VIAGRA) 100 mg tablet, TAKE ONE TABLET BY MOUTH ONE HOUR prior to intercourse, Disp: 30 tablet, Rfl: 4    Trulicity 4 5 XZ/6 1TL SOPN, INJECT 0 5ML (4 5MG) TOTAL UNDER THE SKIN ONCE A WEEK, Disp: , Rfl:     Empagliflozin (Jardiance) 25 MG TABS, Take 1 tablet (25 mg total) by mouth every morning Lot # [id number] exp 10/22, Disp: 90 tablet, Rfl: 1    Allergies   Allergen Reactions    Bee Venom Anaphylaxis     Uses Epi-pen    Doxycycline Photosensitivity       Social History   Past Surgical History:   Procedure Laterality Date    CARPAL TUNNEL RELEASE Left     COLONOSCOPY      CORRECTION HAMMER TOE      Left foot 2nd toe    CYSTOSCOPY      FL RETROGRADE PYELOGRAM  7/26/2019    HERNIA REPAIR Left     inguinal    JOINT REPLACEMENT      right knee revisionx2, infection total of 4 surgeries     JOINT REPLACEMENT      left knee     KIDNEY STONE SURGERY      -large 27 mm stone-took x3 surgeries to resolve    TN AMPUTATION TOE,I-P JT Left 12/14/2018    Procedure: AMPUTATION TOE;  Surgeon: Rowan Melgar DPM;  Location: [address] [place] [address];  Service: Podiatry    TN COLONOSCOPY FLX DX W/COLLJ SPEC WHEN PFRMD N/A 2/9/2018    Procedure: EGD AND COLONOSCOPY;  Surgeon: Lauren Andrew MD;  [facility]; Service: Gastroenterology    TN CYSTO/URETERO W/LITHOTRIPSY &INDWELL STENT INSRT Left 7/26/2019    Procedure: CYSTOSCOPY URETEROSCOPY WITH LITHOTRIPSY HOLMIUM LASER, RETROGRADE PYELOGRAM AND INSERTION STENT URETERAL;  Surgeon: Kemar Das MD;  [facility];  Service: Urology    TN ENDOVENOUS LASER, 1ST VEIN Left 1/4/2019    Procedure: ENDOVASCULAR LASER THERAPY (EVLT);   Surgeon: Elvis Ahuja DO;  [facility];  Service: Vascular    TN ENDOVENOUS LASER, 1ST VEIN Right 6/7/2019    Procedure: ENDOVASCULAR LASER THERAPY (EVLT)   And stab phlebectomy;  Surgeon: Elvis Ahuja DO;  [facility];  Service: Vascular    TN REVISE KNEE JOINT REPLACE,1 PART Right 3/20/2017    Procedure: REVISION TOTAL KNEE ARTHROPLASTY INCLUDING FEMORAL STEM REVISION;  Surgeon: Go Sal MD;  [facility];  Service: Orthopedics    TN REVISE KNEE JOINT REPLACE,ALL PARTS Right 4/6/2016    Procedure: REVISION TOTAL KNEE ARTHROPLASTY INCLUDING FEMORAL AND TIBIAL COMPONENTS;  Surgeon: Alyssa Toro MD;  [facility];  Service: Orthopedics    TONSILLECTOMY      ULNAR TUNNEL RELEASE      WISDOM TOOTH EXTRACTION       Family History   Problem Relation Age of Onset    Diabetes Mother     Hypertension Mother    Trell Mcclure Other Mother         esophageal stricture-dysphagia    Hypertension Father     No Known Problems Sister     No Known Problems Son     No Known Problems Son        Objective:  /68 (BP Location: Left arm, Patient Position: Sitting, Cuff Size: Standard)   Pulse 71   Temp 98 2 °F (36 8 °C) (Temporal)   Ht 5' 9 53" (1 766 m)   Wt 125 kg (276 lb 6 4 oz)   SpO2 98%   BMI 40 20 kg/m²     Recent Results (from the past 1344 hour(s))   Lipid panel    Collection Time: 06/22/22  6:35 AM   Result Value Ref Range    Total Cholesterol 162 <200 mg/dL    HDL 54 > OR = 40 mg/dL    Triglycerides 137 <150 mg/dL    LDL Calculated 84 mg/dL (calc)    Chol HDLC Ratio 3 0 <5 0 (calc)    Non-HDL Cholesterol 108 <130 mg/dL (calc)   Microalbumin, Random Urine (W/Creatinine)    Collection Time: 06/22/22  6:35 AM   Result Value Ref Range    Creatinine, Urine 64 20 - 320 mg/dL    Microalbum  ,U,Random 5 3 See Note: mg/dL    Microalb/Creat Ratio 83 (H) <30 mcg/mg creat   Comprehensive metabolic panel    Collection Time: 06/22/22  6:35 AM   Result Value Ref Range    Glucose, Random 207 (H) 65 - 99 mg/dL    BUN 14 7 - 25 mg/dL    Creatinine 0 77 0 70 - 1 25 mg/dL    eGFR Non  99 > OR = 60 mL/min/1 73m2    eGFR  114 > OR = 60 mL/min/1 73m2    SL AMB BUN/CREATININE RATIO NOT APPLICABLE 6 - 22 (calc)    Sodium 139 135 - 146 mmol/L    Potassium 4 0 3 5 - 5 3 mmol/L    Chloride 103 98 - 110 mmol/L    CO2 25 20 - 32 mmol/L    Calcium 10 0 8 6 - 10 3 mg/dL    Protein, Total 7 2 6 1 - 8 1 g/dL    Albumin 4 7 3 6 - 5 1 g/dL Globulin 2 5 1 9 - 3 7 g/dL (calc)    Albumin/Globulin Ratio 1 9 1 0 - 2 5 (calc)    TOTAL BILIRUBIN 0 6 0 2 - 1 2 mg/dL    Alkaline Phosphatase 45 35 - 144 U/L    AST 48 (H) 10 - 35 U/L    ALT 54 (H) 9 - 46 U/L   CBC and differential    Collection Time: 06/22/22  6:35 AM   Result Value Ref Range    White Blood Cell Count 5 4 3 8 - 10 8 Thousand/uL    Red Blood Cell Count 4 88 4 20 - 5 80 Million/uL    Hemoglobin 15 6 13 2 - 17 1 g/dL    HCT 44 5 38 5 - 50 0 %    MCV 91 2 80 0 - 100 0 fL    MCH 32 0 27 0 - 33 0 pg    MCHC 35 1 32 0 - 36 0 g/dL    RDW 11 9 11 0 - 15 0 %    Platelet Count 652 684 - 400 Thousand/uL    SL AMB MPV 10 2 7 5 - 12 5 fL    Neutrophils (Absolute) 2,927 1,500 - 7,800 cells/uL    Lymphocytes (Absolute) 1,874 850 - 3,900 cells/uL    Monocytes (Absolute) 470 200 - 950 cells/uL    Eosinophils (Absolute) 108 15 - 500 cells/uL    Basophils ABS 22 0 - 200 cells/uL    Neutrophils 54 2 %    Lymphocytes 34 7 %    Monocytes 8 7 %    Eosinophils 2 0 %    Basophils PCT 0 4 %   PSA, Total Screen    Collection Time: 06/22/22  6:35 AM   Result Value Ref Range    Prostate Specific Antigen Total 0 51 < OR = 4 00 ng/mL   Hemoglobin A1c (w/out EAG)    Collection Time: 06/22/22  6:35 AM   Result Value Ref Range    Hemoglobin A1C 8 4 (H) <5 7 % of total Hgb            Physical Exam  Constitutional:       General: He is not in acute distress  Appearance: He is well-developed  He is not diaphoretic  HENT:      Head: Normocephalic and atraumatic  Right Ear: External ear normal       Left Ear: External ear normal       Nose: Nose normal       Mouth/Throat:      Pharynx: No oropharyngeal exudate  Eyes:      General:         Right eye: No discharge  Left eye: No discharge  Conjunctiva/sclera: Conjunctivae normal       Pupils: Pupils are equal, round, and reactive to light  Neck:      Thyroid: No thyromegaly  Cardiovascular:      Rate and Rhythm: Normal rate and regular rhythm        Heart sounds: Normal heart sounds  No murmur heard  No friction rub  No gallop  Pulmonary:      Effort: Pulmonary effort is normal  No respiratory distress  Breath sounds: Normal breath sounds  No stridor  No wheezing or rales  Abdominal:      General: Bowel sounds are normal  There is no distension  Palpations: Abdomen is soft  Tenderness: There is no abdominal tenderness  Musculoskeletal:      Cervical back: Normal range of motion and neck supple  Lymphadenopathy:      Cervical: No cervical adenopathy  Skin:     General: Skin is warm and dry  Findings: No erythema or rash  Neurological:      Mental Status: He is alert and oriented to person, place, and time  Psychiatric:         Behavior: Behavior normal          Thought Content:  Thought content normal          Judgment: Judgment normal

## 2022-06-27 NOTE — PROGRESS NOTES
Diabetic Foot Exam    Patient's shoes and socks removed  Right Foot/Ankle   Right Foot Inspection  Skin Exam: skin normal, skin intact, callus and callus  No dry skin, no warmth, no erythema, no maceration, no abnormal color, no pre-ulcer and no ulcer  Sensory   Vibration: intact      Vascular  Capillary refills: < 3 seconds  The right DP pulse is 2+  The right PT pulse is 2+  Left Foot/Ankle  Left Foot Inspection  Skin Exam: skin normal, skin intact and callus  No dry skin, no warmth, no erythema, no maceration, normal color, no pre-ulcer and no ulcer  Sensory   Vibration: intact      Vascular  Capillary refills: < 3 seconds  The left DP pulse is 2+  The left PT pulse is 2+       Assign Risk Category  No deformity present  No loss of protective sensation  No weak pulses  Risk: 0

## 2022-06-27 NOTE — ASSESSMENT & PLAN NOTE
Lab Results   Component Value Date    HGBA1C 8 4 (H) 06/22/2022   Uncontrolled, but improving  Continue with metformin, Trulicity And  increase Jardiance dosage  Patient is to continue to work on diet and exercise  Limit sugars and carbohydrate intake  Avoid soda, juice, sweets, cookies, desserts, pasta, bread    Eat more whole grains, exercised 30 min of cardio at least 3 times a week  Also recommended daily foot exams to check for sores, and recommended yearly eye exams

## 2022-07-25 DIAGNOSIS — E11.9 TYPE 2 DIABETES MELLITUS WITHOUT COMPLICATION, WITHOUT LONG-TERM CURRENT USE OF INSULIN (HCC): Primary | ICD-10-CM

## 2022-07-25 RX ORDER — DULAGLUTIDE 4.5 MG/.5ML
0.5 INJECTION, SOLUTION SUBCUTANEOUS WEEKLY
Qty: 2 ML | Refills: 5 | Status: SHIPPED | OUTPATIENT
Start: 2022-07-25 | End: 2022-10-21

## 2022-07-26 DIAGNOSIS — E11.9 TYPE 2 DIABETES MELLITUS WITHOUT COMPLICATION, WITHOUT LONG-TERM CURRENT USE OF INSULIN (HCC): ICD-10-CM

## 2022-07-26 DIAGNOSIS — M10.9 GOUT, UNSPECIFIED CAUSE, UNSPECIFIED CHRONICITY, UNSPECIFIED SITE: ICD-10-CM

## 2022-07-26 DIAGNOSIS — E78.2 MIXED HYPERLIPIDEMIA: ICD-10-CM

## 2022-07-26 DIAGNOSIS — I10 ESSENTIAL HYPERTENSION: ICD-10-CM

## 2022-07-26 RX ORDER — ALLOPURINOL 300 MG/1
300 TABLET ORAL DAILY
Qty: 90 TABLET | Refills: 1 | Status: SHIPPED | OUTPATIENT
Start: 2022-07-26

## 2022-07-26 RX ORDER — AMLODIPINE BESYLATE 5 MG/1
5 TABLET ORAL 2 TIMES DAILY
Qty: 180 TABLET | Refills: 1 | Status: SHIPPED | OUTPATIENT
Start: 2022-07-26

## 2022-07-26 RX ORDER — ATORVASTATIN CALCIUM 40 MG/1
40 TABLET, FILM COATED ORAL
Qty: 90 TABLET | Refills: 1 | Status: SHIPPED | OUTPATIENT
Start: 2022-07-26

## 2022-08-10 DIAGNOSIS — K21.9 GASTROESOPHAGEAL REFLUX DISEASE, UNSPECIFIED WHETHER ESOPHAGITIS PRESENT: ICD-10-CM

## 2022-08-10 RX ORDER — PANTOPRAZOLE SODIUM 40 MG/1
TABLET, DELAYED RELEASE ORAL
Qty: 180 TABLET | Refills: 2 | Status: SHIPPED | OUTPATIENT
Start: 2022-08-10

## 2022-08-11 NOTE — ASSESSMENT & PLAN NOTE
-Doing well s/p BLE EVLT and phlebectomies  -Reports improvement in preoperative lower extremity pain  -Continues with edema  -Advised he return to daily use of 20-30 mmHg compression stockings to help manage swelling and prevent recurrent varicosities  -We discussed the benefits of frequent elevation, regular activity and weight loss  -Instructed to apply moisturizing lotion to the legs nightly to maintain skin integrity  -Followup as needed Medical Necessity Clause: This procedure was medically necessary because the lesions that were treated were: Consent: The patient's consent was obtained including but not limited to risks of crusting, scabbing, blistering, scarring, darker or lighter pigmentary change, recurrence, incomplete removal and infection. Medical Necessity Information: It is in your best interest to select a reason for this procedure from the list below. All of these items fulfill various CMS LCD requirements except the new and changing color options. Detail Level: Simple Render Post-Care Instructions In Note?: no Post-Care Instructions: I reviewed with the patient in detail post-care instructions. Patient is to wear sunprotection, and avoid picking at any of the treated lesions. Pt may apply Vaseline to crusted or scabbing areas. Treatment Number (Will Not Render If 0): 1 Anesthesia Volume In Cc: 0.5

## 2022-08-15 DIAGNOSIS — E78.2 MIXED HYPERLIPIDEMIA: ICD-10-CM

## 2022-08-15 RX ORDER — FENOFIBRATE 160 MG/1
160 TABLET ORAL EVERY MORNING
Qty: 90 TABLET | Refills: 1 | Status: SHIPPED | OUTPATIENT
Start: 2022-08-15

## 2022-08-29 DIAGNOSIS — L02.619 ABSCESS OF FOOT: Primary | ICD-10-CM

## 2022-08-29 RX ORDER — SULFAMETHOXAZOLE AND TRIMETHOPRIM 800; 160 MG/1; MG/1
1 TABLET ORAL EVERY 12 HOURS SCHEDULED
Qty: 20 TABLET | Refills: 0 | Status: SHIPPED | OUTPATIENT
Start: 2022-08-29 | End: 2022-09-08

## 2022-08-29 NOTE — PROGRESS NOTES
Received phone call intact from patient  Patient has abscess of foot  He is diabetic  He will be started on Bactrim    Present for office visit

## 2022-08-30 ENCOUNTER — OFFICE VISIT (OUTPATIENT)
Dept: PODIATRY | Facility: CLINIC | Age: 62
End: 2022-08-30
Payer: COMMERCIAL

## 2022-08-30 VITALS — BODY MASS INDEX: 40.88 KG/M2 | HEIGHT: 69 IN | WEIGHT: 276 LBS

## 2022-08-30 DIAGNOSIS — M21.962 ACQUIRED DEFORMITY OF LEFT FOOT: ICD-10-CM

## 2022-08-30 DIAGNOSIS — Z89.422 HISTORY OF AMPUTATION OF LESSER TOE OF LEFT FOOT (HCC): ICD-10-CM

## 2022-08-30 DIAGNOSIS — E11.42 DIABETIC POLYNEUROPATHY ASSOCIATED WITH TYPE 2 DIABETES MELLITUS (HCC): ICD-10-CM

## 2022-08-30 DIAGNOSIS — L03.116 CELLULITIS OF LEFT FOOT: Primary | ICD-10-CM

## 2022-08-30 DIAGNOSIS — M71.072 ABSCESS OF BURSA OF LEFT FOOT: ICD-10-CM

## 2022-08-30 PROCEDURE — 99214 OFFICE O/P EST MOD 30 MIN: CPT | Performed by: PODIATRIST

## 2022-08-30 PROCEDURE — 73630 X-RAY EXAM OF FOOT: CPT | Performed by: PODIATRIST

## 2022-08-30 NOTE — PROGRESS NOTES
Assessment/Plan:  Cellulitis left foot  Abscess left foot  Diabetic neuropathy  History of 2nd toe amputation left foot  Diabetic neuropathy  Plan  History and physical done  Patient advised on condition  X-rays taken reviewed  Today incision and drainage of submetatarsal 2 abscess done  Culture sensitivity done  Patient remain on Bactrim  We will add bacitracin  Patient will bandage daily  Watch for signs of infection  Return for follow-up         Diagnoses and all orders for this visit:    Cellulitis of left foot  -     mupirocin (BACTROBAN) 2 % ointment; Apply topically 2 (two) times a day for 20 days    Abscess of bursa of left foot    History of amputation of lesser toe of left foot ([facility])    Diabetic polyneuropathy associated with type 2 diabetes mellitus ([facility] [address] )          Subjective:  Urgent visit  Patient has developed pain infection of his left foot  Patient is diabetic with history of 2nd left toe amputation  Patient is very active and on his feet  No history of occult trauma  no history of fever night sweats            Allergies   Allergen Reactions    Bee Venom Anaphylaxis     Uses Epi-pen    Doxycycline Photosensitivity         Current Outpatient Medications:     mupirocin (BACTROBAN) 2 % ointment, Apply topically 2 (two) times a day for 20 days, Disp: 30 g, Rfl: 1    allopurinol (ZYLOPRIM) 300 mg tablet, Take 1 tablet (300 mg total) by mouth daily, Disp: 90 tablet, Rfl: 1    amLODIPine (NORVASC) 5 mg tablet, Take 1 tablet (5 mg total) by mouth 2 (two) times a day, Disp: 180 tablet, Rfl: 1    atorvastatin (LIPITOR) 40 mg tablet, Take 1 tablet (40 mg total) by mouth daily at bedtime, Disp: 90 tablet, Rfl: 1    Blood Glucose Monitoring Suppl (OneTouch Verio Reflect) w/Device KIT, Check blood sugars twice daily  Please substitute with appropriate alternative as covered by patient's insurance   Dx: E11 65, Disp: 1 kit, Rfl: 0    cephalexin (KEFLEX) 500 mg capsule, TAKE 4 TABLETS 1 HOUR BEFORE PROCEDURE, Disp: , Rfl:     cholecalciferol (VITAMIN D3) 1,000 units tablet, Take 1,000 Units by mouth daily at bedtime  , Disp: , Rfl:     cyclobenzaprine (FLEXERIL) 10 mg tablet, Take 1 tablet (10 mg total) by mouth daily at bedtime, Disp: 15 tablet, Rfl: 0    Empagliflozin (Jardiance) 25 MG TABS, Take 1 tablet (25 mg total) by mouth every morning Lot # [id number] exp 10/22, Disp: 90 tablet, Rfl: 1    fenofibrate 160 MG tablet, Take 1 tablet (160 mg total) by mouth every morning, Disp: 90 tablet, Rfl: 1    glucose blood (OneTouch Verio) test strip, Check blood sugars twice daily  Please substitute with appropriate alternative as covered by patient's insurance   Dx: E11 65, Disp: 200 each, Rfl: 3    hydrochlorothiazide (HYDRODIURIL) 25 mg tablet, Take 1 tablet (25 mg total) by mouth daily, Disp: 90 tablet, Rfl: 1    labetalol (NORMODYNE) 100 mg tablet, Take 1 tablet (100 mg total) by mouth 2 (two) times a day, Disp: 180 tablet, Rfl: 1    lisinopril (ZESTRIL) 40 mg tablet, Take 1 tablet (40 mg total) by mouth every morning, Disp: 90 tablet, Rfl: 1    meloxicam (Mobic) 15 mg tablet, Take 1 tablet (15 mg total) by mouth daily, Disp: 30 tablet, Rfl: 0    metFORMIN (GLUCOPHAGE) 1000 MG tablet, Take 1000 mg in AM and 1500 mg in PM , Disp: 225 tablet, Rfl: 1    Multiple Vitamin (MULTIVITAMIN) tablet, Take 1 tablet by mouth every morning  , Disp: , Rfl:     pantoprazole (PROTONIX) 40 mg tablet, TAKE 1 TABLET BY MOUTH TWICE A DAY (DR DECLINED TO DO PRIOR AUTH), Disp: 180 tablet, Rfl: 2    sildenafil (VIAGRA) 100 mg tablet, TAKE ONE TABLET BY MOUTH ONE HOUR prior to intercourse, Disp: 30 tablet, Rfl: 4    sulfamethoxazole-trimethoprim (BACTRIM DS) 800-160 mg per tablet, Take 1 tablet by mouth every 12 (twelve) hours for 10 days, Disp: 20 tablet, Rfl: 0    Trulicity 4 5 XQ/6 2QT SOPN, Inject 0 5 mL (4 5 mg total) under the skin once a week, Disp: 2 mL, Rfl: 5    Patient Active Problem List   Diagnosis  Type 2 diabetes mellitus with both eyes affected by mild nonproliferative retinopathy without macular edema, without long-term current use of insulin (HCC)    Hypertension    Hyperlipidemia    Status post revision of total replacement of right knee    Intervertebral disc disorders with radiculopathy, lumbar region    Pes anserine bursitis    Acquired deformity of left foot    Hammer toe of left foot    Tinea pedis of both feet    Nephrolithiasis    Chronic pain of right knee    Elevated liver enzymes    Morbid obesity with BMI of 40 0-44 9, adult [facility])          Patient ID: Luis Antonio Andrade is a 65 y o  male  HPI    The following portions of the patient's history were reviewed and updated as appropriate: He  has a past medical history of Anemia, Anesthesia complication, Arthritis, Chronic pain disorder, Diabetes mellitus ([address] ), Diabetic neuropathy ([address] ), Diverticulitis, DJD (degenerative joint disease), Dysphagia, Edema, GERD (gastroesophageal reflux disease), Hyperlipidemia, Hypertension, Kidney stone, Mechanical complication of internal orthopedic device ([address] ), Nephrolithiasis (7/12/2019), Obesity, Osteomyelitis ([address] ), PONV (postoperative nausea and vomiting), and PVD (peripheral vascular disease) ([address] )    He   Patient Active Problem List    Diagnosis Date Noted    Elevated liver enzymes 06/21/2021    Morbid obesity with BMI of 40 0-44 9, adult ([address] ) 06/21/2021    Chronic pain of right knee     Nephrolithiasis 07/12/2019    Tinea pedis of both feet 01/03/2019    Acquired deformity of left foot 09/20/2018    Hammer toe of left foot 09/20/2018    Pes anserine bursitis 05/04/2018    Intervertebral disc disorders with radiculopathy, lumbar region     Status post revision of total replacement of right knee 04/08/2016    Type 2 diabetes mellitus with both eyes affected by mild nonproliferative retinopathy without macular edema, without long-term current use of insulin ([address] ) 04/06/2016    Hypertension 04/06/2016    Hyperlipidemia 04/06/2016     He  has a past surgical history that includes Kidney stone surgery; Carpal tunnel release (Left); Ulnar tunnel release; pr revise knee joint replace,1 part (Right, 3/20/2017); pr revise knee joint replace,all parts (Right, 4/6/2016); pr colonoscopy flx dx w/collj spec when pfrmd (N/A, 2/9/2018); Hernia repair (Left); Colonoscopy; [place] tooth extraction; Cystoscopy; Tonsillectomy; pr amputation toe,i-p jt (Left, 12/14/2018); Correction hammer toe; pr endovenous laser, 1st vein (Left, 1/4/2019); pr endovenous laser, 1st vein (Right, 6/7/2019); pr cysto/uretero w/lithotripsy &indwell stent insrt (Left, 7/26/2019); [place] retrograde pyelogram (7/26/2019); Joint replacement; and Joint replacement  His family history includes Diabetes in his mother; Hypertension in his father and mother; No Known Problems in his sister, son, and son; Other in his mother  He  reports that he has never smoked  He has never used smokeless tobacco  He reports current alcohol use  He reports that he does not use drugs  Current Outpatient Medications   Medication Sig Dispense Refill    mupirocin (BACTROBAN) 2 % ointment Apply topically 2 (two) times a day for 20 days 30 g 1    allopurinol (ZYLOPRIM) 300 mg tablet Take 1 tablet (300 mg total) by mouth daily 90 tablet 1    amLODIPine (NORVASC) 5 mg tablet Take 1 tablet (5 mg total) by mouth 2 (two) times a day 180 tablet 1    atorvastatin (LIPITOR) 40 mg tablet Take 1 tablet (40 mg total) by mouth daily at bedtime 90 tablet 1    Blood Glucose Monitoring Suppl (OneTouch Verio Reflect) w/Device KIT Check blood sugars twice daily  Please substitute with appropriate alternative as covered by patient's insurance   Dx: E11 65 1 kit 0    cephalexin (KEFLEX) 500 mg capsule TAKE 4 TABLETS 1 HOUR BEFORE PROCEDURE      cholecalciferol (VITAMIN D3) 1,000 units tablet Take 1,000 Units by mouth daily at bedtime        cyclobenzaprine (FLEXERIL) 10 mg tablet Take 1 tablet (10 mg total) by mouth daily at bedtime 15 tablet 0    Empagliflozin (Jardiance) 25 MG TABS Take 1 tablet (25 mg total) by mouth every morning Lot # [id number] exp 10/22 90 tablet 1    fenofibrate 160 MG tablet Take 1 tablet (160 mg total) by mouth every morning 90 tablet 1    glucose blood (OneTouch Verio) test strip Check blood sugars twice daily  Please substitute with appropriate alternative as covered by patient's insurance  Dx: E11 65 200 each 3    hydrochlorothiazide (HYDRODIURIL) 25 mg tablet Take 1 tablet (25 mg total) by mouth daily 90 tablet 1    labetalol (NORMODYNE) 100 mg tablet Take 1 tablet (100 mg total) by mouth 2 (two) times a day 180 tablet 1    lisinopril (ZESTRIL) 40 mg tablet Take 1 tablet (40 mg total) by mouth every morning 90 tablet 1    meloxicam (Mobic) 15 mg tablet Take 1 tablet (15 mg total) by mouth daily 30 tablet 0    metFORMIN (GLUCOPHAGE) 1000 MG tablet Take 1000 mg in AM and 1500 mg in PM  225 tablet 1    Multiple Vitamin (MULTIVITAMIN) tablet Take 1 tablet by mouth every morning        pantoprazole (PROTONIX) 40 mg tablet TAKE 1 TABLET BY MOUTH TWICE A DAY (DR DECLINED TO DO PRIOR AUTH) 180 tablet 2    sildenafil (VIAGRA) 100 mg tablet TAKE ONE TABLET BY MOUTH ONE HOUR prior to intercourse 30 tablet 4    sulfamethoxazole-trimethoprim (BACTRIM DS) 800-160 mg per tablet Take 1 tablet by mouth every 12 (twelve) hours for 10 days 20 tablet 0    Trulicity 4 5 IW/1 2JJ SOPN Inject 0 5 mL (4 5 mg total) under the skin once a week 2 mL 5     No current facility-administered medications for this visit       Current Outpatient Medications on File Prior to Visit   Medication Sig    allopurinol (ZYLOPRIM) 300 mg tablet Take 1 tablet (300 mg total) by mouth daily    amLODIPine (NORVASC) 5 mg tablet Take 1 tablet (5 mg total) by mouth 2 (two) times a day    atorvastatin (LIPITOR) 40 mg tablet Take 1 tablet (40 mg total) by mouth daily at bedtime    Blood Glucose Monitoring Suppl (OneTouch Verio Reflect) w/Device KIT Check blood sugars twice daily  Please substitute with appropriate alternative as covered by patient's insurance  Dx: E11 65    cephalexin (KEFLEX) 500 mg capsule TAKE 4 TABLETS 1 HOUR BEFORE PROCEDURE    cholecalciferol (VITAMIN D3) 1,000 units tablet Take 1,000 Units by mouth daily at bedtime      cyclobenzaprine (FLEXERIL) 10 mg tablet Take 1 tablet (10 mg total) by mouth daily at bedtime    Empagliflozin (Jardiance) 25 MG TABS Take 1 tablet (25 mg total) by mouth every morning Lot # [id number] exp 10/22    fenofibrate 160 MG tablet Take 1 tablet (160 mg total) by mouth every morning    glucose blood (OneTouch Verio) test strip Check blood sugars twice daily  Please substitute with appropriate alternative as covered by patient's insurance  Dx: E11 65    hydrochlorothiazide (HYDRODIURIL) 25 mg tablet Take 1 tablet (25 mg total) by mouth daily    labetalol (NORMODYNE) 100 mg tablet Take 1 tablet (100 mg total) by mouth 2 (two) times a day    lisinopril (ZESTRIL) 40 mg tablet Take 1 tablet (40 mg total) by mouth every morning    meloxicam (Mobic) 15 mg tablet Take 1 tablet (15 mg total) by mouth daily    metFORMIN (GLUCOPHAGE) 1000 MG tablet Take 1000 mg in AM and 1500 mg in PM     Multiple Vitamin (MULTIVITAMIN) tablet Take 1 tablet by mouth every morning      pantoprazole (PROTONIX) 40 mg tablet TAKE 1 TABLET BY MOUTH TWICE A DAY (DR DECLINED TO DO PRIOR AUTH)    sildenafil (VIAGRA) 100 mg tablet TAKE ONE TABLET BY MOUTH ONE HOUR prior to intercourse    sulfamethoxazole-trimethoprim (BACTRIM DS) 800-160 mg per tablet Take 1 tablet by mouth every 12 (twelve) hours for 10 days    Trulicity 4 5 CB/2 7DI SOPN Inject 0 5 mL (4 5 mg total) under the skin once a week     No current facility-administered medications on file prior to visit  He is allergic to bee venom and doxycycline       There were no vitals filed for this visit      Review of Systems      Objective:  Patient's shoes and socks removed  Foot Exam    General  General Appearance: appears stated age and healthy   Orientation: alert and oriented to person, place, and time   Affect: appropriate   Gait: antalgic       Right Foot/Ankle     Inspection and Palpation  Swelling: dorsum   Hammertoes: second toe, fifth toe, fourth toe and third toe  Skin Exam: callus and dry skin;     Neurovascular  Dorsalis pedis: 3+  Posterior tibial: 3+  Saphenous nerve sensation: diminished  Tibial nerve sensation: diminished  Superficial peroneal nerve sensation: diminished  Deep peroneal nerve sensation: diminished  Sural nerve sensation: diminished      Left Foot/Ankle      Inspection and Palpation  Tenderness: metatarsals   Swelling: dorsum   Hammertoes: fifth toe, fourth toe and third toe  Skin Exam: callus, drainage, dry skin, cellulitis, skin changes, abnormal color and ulcer;     Neurovascular  Dorsalis pedis: 3+  Posterior tibial: 3+  Saphenous nerve sensation: diminished  Tibial nerve sensation: diminished  Superficial peroneal nerve sensation: diminished  Deep peroneal nerve sensation: diminished  Sural nerve sensation: diminished        Physical Exam  Vitals and nursing note reviewed  Constitutional:       Appearance: Normal appearance  Cardiovascular:      Rate and Rhythm: Normal rate and regular rhythm  Pulses: no weak pulses          Dorsalis pedis pulses are 3+ on the right side and 3+ on the left side  Posterior tibial pulses are 3+ on the right side and 3+ on the left side  Feet:      Right foot:      Skin integrity: Callus and dry skin present  Left foot:      Skin integrity: Ulcer, callus and dry skin present  Skin:     Capillary Refill: Capillary refill takes less than 2 seconds  Comments: Patient demonstrates xerosis of skin secondary to dermatophytosis  Left foot demonstrates cellulitis around the area the 2nd MPJ    There is abscess on the plantar aspect of the foot in the area  Is approximately 1 centimeter squared in size  Incision and drainage done  Serosanguineous exudate noted  Superficial ulcer noted  No evidence of deep abscess  Patient also has pre ulcerative lesions of the left big toe plantar aspect  He has distal clavi by 2nd right toe as well as callus submetatarsal 1 right foot  X-ray demonstrates no evidence of foreign body or osteomyelitis  Neurological:      Mental Status: He is alert  Psychiatric:         Mood and Affect: Mood normal          Behavior: Behavior normal          Thought Content: Thought content normal          Judgment: Judgment normal      Patient's shoes and socks removed  Right Foot/Ankle   Right Foot Inspection  Skin Exam: dry skin, callus and callus  Sensory   Vibration: diminished  Proprioception: diminished  Monofilament testing: diminished    Vascular  Capillary refills: < 3 seconds  The right DP pulse is 3+  The right PT pulse is 3+  Right Toe  - Comprehensive Exam  Hammertoes: second toe, fifth toe, fourth toe and third toe  Swelling: dorsum         Left Foot/Ankle  Left Foot Inspection  Skin Exam: dry skin, abnormal color, ulcer and callus  Sensory   Vibration: diminished  Proprioception: diminished  Monofilament testing: diminished    Vascular  Capillary refills: < 3 seconds  The left DP pulse is 3+  The left PT pulse is 3+       Left Toe  - Comprehensive Exam  Hammertoes: fifth toe, fourth toe and third toe  Swelling: dorsum   Tenderness: metatarsals           Assign Risk Category  Deformity present  Loss of protective sensation  No weak pulses  Risk: 3

## 2022-09-06 ENCOUNTER — OFFICE VISIT (OUTPATIENT)
Dept: PODIATRY | Facility: CLINIC | Age: 62
End: 2022-09-06
Payer: COMMERCIAL

## 2022-09-06 VITALS — WEIGHT: 276 LBS | RESPIRATION RATE: 17 BRPM | BODY MASS INDEX: 44.36 KG/M2 | HEIGHT: 66 IN

## 2022-09-06 DIAGNOSIS — L97.421 DIABETIC ULCER OF LEFT MIDFOOT ASSOCIATED WITH TYPE 2 DIABETES MELLITUS, LIMITED TO BREAKDOWN OF SKIN (HCC): ICD-10-CM

## 2022-09-06 DIAGNOSIS — I10 ESSENTIAL HYPERTENSION: ICD-10-CM

## 2022-09-06 DIAGNOSIS — E11.621 DIABETIC ULCER OF LEFT MIDFOOT ASSOCIATED WITH TYPE 2 DIABETES MELLITUS, LIMITED TO BREAKDOWN OF SKIN (HCC): ICD-10-CM

## 2022-09-06 DIAGNOSIS — Z89.422 HISTORY OF AMPUTATION OF LESSER TOE OF LEFT FOOT (HCC): ICD-10-CM

## 2022-09-06 DIAGNOSIS — I10 PRIMARY HYPERTENSION: ICD-10-CM

## 2022-09-06 DIAGNOSIS — L03.116 CELLULITIS OF LEFT FOOT: Primary | ICD-10-CM

## 2022-09-06 DIAGNOSIS — E11.42 DIABETIC POLYNEUROPATHY ASSOCIATED WITH TYPE 2 DIABETES MELLITUS (HCC): ICD-10-CM

## 2022-09-06 PROCEDURE — 99214 OFFICE O/P EST MOD 30 MIN: CPT | Performed by: PODIATRIST

## 2022-09-06 RX ORDER — HYDROCHLOROTHIAZIDE 25 MG/1
25 TABLET ORAL DAILY
Qty: 90 TABLET | Refills: 1 | Status: SHIPPED | OUTPATIENT
Start: 2022-09-06

## 2022-09-06 RX ORDER — LABETALOL 100 MG/1
100 TABLET, FILM COATED ORAL 2 TIMES DAILY
Qty: 180 TABLET | Refills: 1 | Status: SHIPPED | OUTPATIENT
Start: 2022-09-06

## 2022-09-06 NOTE — PROGRESS NOTES
Assessment/Plan:  Resolved cellulitis left foot  Diabetic neuropathy  History of lesser toe amputation  Superficial wound plantar aspect left foot  Plan  Patient advised on condition after exam   Lesions of left foot debrided  Gentian violet dry sterile dressing applied  Patient will finish antibiotic  He will bandage daily  Repeat return for follow-up  Diagnoses and all orders for this visit:    Cellulitis of left foot    History of amputation of lesser toe of left foot ([facility] [address] )    Diabetic polyneuropathy associated with type 2 diabetes mellitus ([facility] [address] )    Diabetic ulcer of left midfoot associated with type 2 diabetes mellitus, limited to breakdown of skin ([facility] [address] )          Subjective:  Patient is much better  Patient took antibiotic as directed  He he is working  He has no history of fever night sweats  Allergies   Allergen Reactions    Bee Venom Anaphylaxis     Uses Epi-pen    Doxycycline Photosensitivity         Current Outpatient Medications:     allopurinol (ZYLOPRIM) 300 mg tablet, Take 1 tablet (300 mg total) by mouth daily, Disp: 90 tablet, Rfl: 1    amLODIPine (NORVASC) 5 mg tablet, Take 1 tablet (5 mg total) by mouth 2 (two) times a day, Disp: 180 tablet, Rfl: 1    atorvastatin (LIPITOR) 40 mg tablet, Take 1 tablet (40 mg total) by mouth daily at bedtime, Disp: 90 tablet, Rfl: 1    Blood Glucose Monitoring Suppl (OneTouch Verio Reflect) w/Device KIT, Check blood sugars twice daily  Please substitute with appropriate alternative as covered by patient's insurance   Dx: E11 65, Disp: 1 kit, Rfl: 0    cephalexin (KEFLEX) 500 mg capsule, TAKE 4 TABLETS 1 HOUR BEFORE PROCEDURE, Disp: , Rfl:     cholecalciferol (VITAMIN D3) 1,000 units tablet, Take 1,000 Units by mouth daily at bedtime  , Disp: , Rfl:     cyclobenzaprine (FLEXERIL) 10 mg tablet, Take 1 tablet (10 mg total) by mouth daily at bedtime, Disp: 15 tablet, Rfl: 0    Empagliflozin (Jardiance) 25 MG TABS, Take 1 tablet (25 mg total) by mouth every morning Lot # [id number] exp 10/22, Disp: 90 tablet, Rfl: 1    fenofibrate 160 MG tablet, Take 1 tablet (160 mg total) by mouth every morning, Disp: 90 tablet, Rfl: 1    glucose blood (OneTouch Verio) test strip, Check blood sugars twice daily  Please substitute with appropriate alternative as covered by patient's insurance   Dx: E11 65, Disp: 200 each, Rfl: 3    hydrochlorothiazide (HYDRODIURIL) 25 mg tablet, Take 1 tablet (25 mg total) by mouth daily, Disp: 90 tablet, Rfl: 1    labetalol (NORMODYNE) 100 mg tablet, Take 1 tablet (100 mg total) by mouth 2 (two) times a day, Disp: 180 tablet, Rfl: 1    lisinopril (ZESTRIL) 40 mg tablet, Take 1 tablet (40 mg total) by mouth every morning, Disp: 90 tablet, Rfl: 1    meloxicam (Mobic) 15 mg tablet, Take 1 tablet (15 mg total) by mouth daily, Disp: 30 tablet, Rfl: 0    metFORMIN (GLUCOPHAGE) 1000 MG tablet, Take 1000 mg in AM and 1500 mg in PM , Disp: 225 tablet, Rfl: 1    Multiple Vitamin (MULTIVITAMIN) tablet, Take 1 tablet by mouth every morning  , Disp: , Rfl:     mupirocin (BACTROBAN) 2 % ointment, Apply topically 2 (two) times a day for 20 days, Disp: 30 g, Rfl: 1    pantoprazole (PROTONIX) 40 mg tablet, TAKE 1 TABLET BY MOUTH TWICE A DAY (DR DECLINED TO DO PRIOR AUTH), Disp: 180 tablet, Rfl: 2    sildenafil (VIAGRA) 100 mg tablet, TAKE ONE TABLET BY MOUTH ONE HOUR prior to intercourse, Disp: 30 tablet, Rfl: 4    sulfamethoxazole-trimethoprim (BACTRIM DS) 800-160 mg per tablet, Take 1 tablet by mouth every 12 (twelve) hours for 10 days, Disp: 20 tablet, Rfl: 0    Trulicity 4 5 WG/4 1ZC SOPN, Inject 0 5 mL (4 5 mg total) under the skin once a week, Disp: 2 mL, Rfl: 5    Patient Active Problem List   Diagnosis    Type 2 diabetes mellitus with both eyes affected by mild nonproliferative retinopathy without macular edema, without long-term current use of insulin (HCC)    Hypertension    Hyperlipidemia    Status post revision of total replacement of right knee    Intervertebral disc disorders with radiculopathy, lumbar region    Pes anserine bursitis    Acquired deformity of left foot    Hammer toe of left foot    Tinea pedis of both feet    Nephrolithiasis    Chronic pain of right knee    Elevated liver enzymes    Morbid obesity with BMI of 40 0-44 9, [facility])          Patient ID: Gabrielle Sofia is a 65 y o  male  HPI    The following portions of the patient's history were reviewed and updated as appropriate:     family history includes Diabetes in his mother; Hypertension in his father and mother; No Known Problems in his sister, son, and son; Other in his mother  reports that he has never smoked  He has never used smokeless tobacco  He reports current alcohol use  He reports that he does not use drugs  Vitals:    09/06/22 1200   Resp: 17       Review of Systems      Objective:  Patient's shoes and socks removed     Foot ExamPhysical Exam         General  General Appearance: appears stated age and healthy   Orientation: alert and oriented to person, place, and time   Affect: appropriate   Gait: antalgic         Right Foot/Ankle      Inspection and Palpation  Swelling: dorsum   Hammertoes: second toe, fifth toe, fourth toe and third toe  Skin Exam: callus and dry skin;      Neurovascular  Dorsalis pedis: 3+  Posterior tibial: 3+  Saphenous nerve sensation: diminished  Tibial nerve sensation: diminished  Superficial peroneal nerve sensation: diminished  Deep peroneal nerve sensation: diminished  Sural nerve sensation: diminished        Left Foot/Ankle       Inspection and Palpation  Tenderness: metatarsals   Swelling: dorsum   Hammertoes: fifth toe, fourth toe and third toe  Skin Exam: callus, drainage, dry skin, cellulitis, skin changes, abnormal color and ulcer;      Neurovascular  Dorsalis pedis: 3+  Posterior tibial: 3+  Saphenous nerve sensation: diminished  Tibial nerve sensation: diminished  Superficial peroneal nerve sensation: diminished  Deep peroneal nerve sensation: diminished  Sural nerve sensation: diminished           Physical Exam  Vitals and nursing note reviewed  Constitutional:       Appearance: Normal appearance  Cardiovascular:      Rate and Rhythm: Normal rate and regular rhythm  Pulses: no weak pulses          Dorsalis pedis pulses are 3+ on the right side and 3+ on the left side  Posterior tibial pulses are 3+ on the right side and 3+ on the left side  Feet:      Right foot:      Skin integrity: Callus and dry skin present  Left foot:      Skin integrity: Ulcer, callus and dry skin present  Skin:     Capillary Refill: Capillary refill takes less than 2 seconds  Comments: Patient demonstrates xerosis of skin secondary to dermatophytosis  Left foot demonstrates cellulitis around the area the 2nd MPJ  There is abscess on the plantar aspect of the foot in the area  Is approximately 1 centimeter squared in size  Superficial ulcer noted  No evidence of deep abscess  Patient also has pre ulcerative lesions of the left big toe plantar aspect  He has distal clavi by 2nd right toe as well as callus submetatarsal 1 right foot  In addition patient has excoriation/superficial wound submetatarsal 4 left foot  Lesion is 1 centimeter squared in size  Negative cellulitis    X-ray demonstrates no evidence of foreign body or osteomyelitis  Neurological:      Mental Status: He is alert  Psychiatric:         Mood and Affect: Mood normal          Behavior: Behavior normal          Thought Content: Thought content normal          Judgment: Judgment normal       Patient's shoes and socks removed      Right Foot/Ankle   Right Foot Inspection  Skin Exam: dry skin, callus and callus       Sensory   Vibration: diminished  Proprioception: diminished  Monofilament testing: diminished     Vascular  Capillary refills: < 3 seconds  The right DP pulse is 3+   The right PT pulse is 3+       Right Toe  - Comprehensive Exam  Hammertoes: second toe, fifth toe, fourth toe and third toe  Swelling: dorsum            Left Foot/Ankle  Left Foot Inspection  Skin Exam: dry skin, abnormal color, ulcer and callus       Sensory   Vibration: diminished  Proprioception: diminished  Monofilament testing: diminished     Vascular  Capillary refills: < 3 seconds  The left DP pulse is 3+   The left PT pulse is 3+       Left Toe  - Comprehensive Exam  Hammertoes: fifth toe, fourth toe and third toe  Swelling: dorsum   Tenderness: metatarsals               Assign Risk Category  Deformity present  Loss of protective sensation  No weak pulses  Risk: 3

## 2022-10-05 DIAGNOSIS — N52.1 ERECTILE DYSFUNCTION DUE TO DISEASES CLASSIFIED ELSEWHERE: ICD-10-CM

## 2022-10-05 RX ORDER — SILDENAFIL 100 MG/1
TABLET, FILM COATED ORAL
Qty: 30 TABLET | Refills: 4 | Status: SHIPPED | OUTPATIENT
Start: 2022-10-05

## 2022-10-19 ENCOUNTER — TELEPHONE (OUTPATIENT)
Dept: INTERNAL MEDICINE CLINIC | Facility: CLINIC | Age: 62
End: 2022-10-19

## 2022-10-19 DIAGNOSIS — E11.9 TYPE 2 DIABETES MELLITUS WITHOUT COMPLICATION, WITHOUT LONG-TERM CURRENT USE OF INSULIN (HCC): ICD-10-CM

## 2022-10-19 NOTE — TELEPHONE ENCOUNTER
Patient stated his pharmacy no longer carries rx Trulicity 4 5 NT/2 3GB SOPN, if an alterative can be sent instead

## 2022-10-20 NOTE — TELEPHONE ENCOUNTER
Spoke to patient, he spoke to [facility] and [facility] and both said they do not know when they will receive Trulicity due to supply chain issues  He will call other pharmacies for Trulicity

## 2022-10-20 NOTE — TELEPHONE ENCOUNTER
Patient called this afternoon stating he called multiple pharmacies and each one states they are either out or on back order of trulicity  Wanted to know what the next best step is

## 2022-10-21 DIAGNOSIS — E11.3293 TYPE 2 DIABETES MELLITUS WITH BOTH EYES AFFECTED BY MILD NONPROLIFERATIVE RETINOPATHY WITHOUT MACULAR EDEMA, WITHOUT LONG-TERM CURRENT USE OF INSULIN (HCC): Primary | ICD-10-CM

## 2022-10-21 RX ORDER — SEMAGLUTIDE 2.68 MG/ML
2 INJECTION, SOLUTION SUBCUTANEOUS WEEKLY
Qty: 15 ML | Refills: 3 | Status: SHIPPED | OUTPATIENT
Start: 2022-10-21 | End: 2022-10-24 | Stop reason: RX

## 2022-10-21 NOTE — TELEPHONE ENCOUNTER
Advise patient to stop Trulicity and start Ozempic  Monitor blood sugars twice daily and call in two weeks with readings

## 2022-10-21 NOTE — TELEPHONE ENCOUNTER
Spoke to Tahmina Iniguez at [facility] she said this strength of Trulicity is on manufacture back order  She checked other pharmacies stock within a 25 mile radius and no other pharmacy had this strength in stock either  She is unsure when this dose will be back in stock  She said if the patient can try a different strength short term they will probably be able to get a different strength      Please advise    Thank you

## 2022-10-24 RX ORDER — DULAGLUTIDE 4.5 MG/.5ML
0.5 INJECTION, SOLUTION SUBCUTANEOUS WEEKLY
Qty: 6 ML | Refills: 1 | Status: SHIPPED | OUTPATIENT
Start: 2022-10-24

## 2022-10-31 DIAGNOSIS — I10 PRIMARY HYPERTENSION: ICD-10-CM

## 2022-10-31 RX ORDER — LISINOPRIL 40 MG/1
40 TABLET ORAL EVERY MORNING
Qty: 90 TABLET | Refills: 1 | Status: SHIPPED | OUTPATIENT
Start: 2022-10-31

## 2022-12-01 DIAGNOSIS — M62.838 MUSCLE SPASM: ICD-10-CM

## 2022-12-01 RX ORDER — MELOXICAM 15 MG/1
15 TABLET ORAL DAILY
Qty: 30 TABLET | Refills: 0 | Status: SHIPPED | OUTPATIENT
Start: 2022-12-01

## 2022-12-18 NOTE — TELEPHONE ENCOUNTER
Reviewed MRI results again and he has HNP at Right L3-4 level as well  Discussed about repeating injection higher at right L3-4 which he was amenable to     Please schedule right L3-4 TF DAVIE (Dx M51 [id number]) No

## 2023-01-23 LAB
CREAT ?TM UR-SCNC: 42.4 UMOL/L
EXT MICROALBUMIN URINE RANDOM: 3.1
HBA1C MFR BLD HPLC: 10.3 %
MICROALBUMIN/CREAT UR: 73.1 MG/G{CREAT}

## 2023-01-24 ENCOUNTER — TELEMEDICINE (OUTPATIENT)
Dept: INTERNAL MEDICINE CLINIC | Facility: OTHER | Age: 63
End: 2023-01-24

## 2023-01-24 VITALS
OXYGEN SATURATION: 99 % | WEIGHT: 272 LBS | HEIGHT: 66 IN | BODY MASS INDEX: 43.71 KG/M2 | SYSTOLIC BLOOD PRESSURE: 162 MMHG | DIASTOLIC BLOOD PRESSURE: 84 MMHG | HEART RATE: 78 BPM

## 2023-01-24 DIAGNOSIS — E11.9 TYPE 2 DIABETES MELLITUS WITHOUT COMPLICATION, WITHOUT LONG-TERM CURRENT USE OF INSULIN (HCC): ICD-10-CM

## 2023-01-24 DIAGNOSIS — L03.116 CELLULITIS OF LEFT FOOT: ICD-10-CM

## 2023-01-24 DIAGNOSIS — I10 ESSENTIAL HYPERTENSION: ICD-10-CM

## 2023-01-24 DIAGNOSIS — E11.3293 TYPE 2 DIABETES MELLITUS WITH BOTH EYES AFFECTED BY MILD NONPROLIFERATIVE RETINOPATHY WITHOUT MACULAR EDEMA, WITHOUT LONG-TERM CURRENT USE OF INSULIN (HCC): Primary | ICD-10-CM

## 2023-01-24 DIAGNOSIS — M10.9 GOUT, UNSPECIFIED CAUSE, UNSPECIFIED CHRONICITY, UNSPECIFIED SITE: ICD-10-CM

## 2023-01-24 DIAGNOSIS — K21.9 GASTROESOPHAGEAL REFLUX DISEASE, UNSPECIFIED WHETHER ESOPHAGITIS PRESENT: ICD-10-CM

## 2023-01-24 DIAGNOSIS — R74.8 ELEVATED LIVER ENZYMES: ICD-10-CM

## 2023-01-24 DIAGNOSIS — I10 PRIMARY HYPERTENSION: ICD-10-CM

## 2023-01-24 DIAGNOSIS — M62.838 MUSCLE SPASM: ICD-10-CM

## 2023-01-24 DIAGNOSIS — E78.2 MIXED HYPERLIPIDEMIA: ICD-10-CM

## 2023-01-24 DIAGNOSIS — E66.01 MORBID OBESITY WITH BMI OF 40.0-44.9, ADULT (HCC): ICD-10-CM

## 2023-01-24 RX ORDER — AMLODIPINE BESYLATE 5 MG/1
5 TABLET ORAL 2 TIMES DAILY
Qty: 180 TABLET | Refills: 1 | Status: SHIPPED | OUTPATIENT
Start: 2023-01-24

## 2023-01-24 RX ORDER — ATORVASTATIN CALCIUM 40 MG/1
40 TABLET, FILM COATED ORAL
Qty: 90 TABLET | Refills: 1 | Status: SHIPPED | OUTPATIENT
Start: 2023-01-24

## 2023-01-24 RX ORDER — MELOXICAM 15 MG/1
15 TABLET ORAL DAILY
Qty: 30 TABLET | Refills: 0 | Status: SHIPPED | OUTPATIENT
Start: 2023-01-24

## 2023-01-24 RX ORDER — ALLOPURINOL 300 MG/1
300 TABLET ORAL DAILY
Qty: 90 TABLET | Refills: 1 | Status: SHIPPED | OUTPATIENT
Start: 2023-01-24

## 2023-01-24 RX ORDER — LISINOPRIL 40 MG/1
40 TABLET ORAL EVERY MORNING
Qty: 90 TABLET | Refills: 1 | Status: SHIPPED | OUTPATIENT
Start: 2023-01-24

## 2023-01-24 RX ORDER — PANTOPRAZOLE SODIUM 40 MG/1
40 TABLET, DELAYED RELEASE ORAL DAILY
Qty: 180 TABLET | Refills: 1 | Status: SHIPPED | OUTPATIENT
Start: 2023-01-24

## 2023-01-24 RX ORDER — INSULIN GLARGINE 100 [IU]/ML
10 INJECTION, SOLUTION SUBCUTANEOUS
Qty: 15 ML | Refills: 1 | Status: SHIPPED | OUTPATIENT
Start: 2023-01-24 | End: 2023-01-25

## 2023-01-24 RX ORDER — DULAGLUTIDE 4.5 MG/.5ML
0.5 INJECTION, SOLUTION SUBCUTANEOUS WEEKLY
Qty: 6 ML | Refills: 1 | Status: SHIPPED | OUTPATIENT
Start: 2023-01-24

## 2023-01-24 RX ORDER — HYDROCHLOROTHIAZIDE 25 MG/1
25 TABLET ORAL DAILY
Qty: 90 TABLET | Refills: 1 | Status: SHIPPED | OUTPATIENT
Start: 2023-01-24 | End: 2023-02-07 | Stop reason: SDUPTHER

## 2023-01-24 RX ORDER — LABETALOL 100 MG/1
100 TABLET, FILM COATED ORAL 2 TIMES DAILY
Qty: 180 TABLET | Refills: 1 | Status: SHIPPED | OUTPATIENT
Start: 2023-01-24

## 2023-01-24 RX ORDER — FENOFIBRATE 160 MG/1
160 TABLET ORAL EVERY MORNING
Qty: 90 TABLET | Refills: 1 | Status: SHIPPED | OUTPATIENT
Start: 2023-01-24

## 2023-01-24 NOTE — PROGRESS NOTES
Virtual Regular Visit    Verification of patient location:    Patient is located in the following state in which I hold an active license [place]      Assessment/Plan:    Problem List Items Addressed This Visit        Endocrine    Type 2 diabetes mellitus with both eyes affected by mild nonproliferative retinopathy without macular edema, without long-term current use of insulin ([facility] [address] ) - Primary       Lab Results   Component Value Date    HGBA1C 10 3 (H) 01/23/2023   Uncontrolled  Continue with metformin, Trulicity And Jardiance  Start Lantus, monitor sugars BID and follow up in two weeks  Patient is to continue to work on diet and exercise  Limit sugars and carbohydrate intake  Avoid soda, juice, sweets, cookies, desserts, pasta, bread    Eat more whole grains, exercised 30 min of cardio at least 3 times a week  Also recommended daily foot exams to check for sores, and recommended yearly eye exams  Relevant Medications    Empagliflozin (Jardiance) 25 MG TABS    metFORMIN (GLUCOPHAGE) 1000 MG tablet    Trulicity 4 5 HP/8 2VZ injection    Insulin Glargine Solostar (Lantus SoloStar) 100 UNIT/ML SOPN       Cardiovascular and Mediastinum    Hypertension (Chronic)     Uncontrolled on Norvasc, lisinopril, labetalol and hydrochlorothiazide  Continue to monitor blood pressure at home  Goal BP is < 130/80  Contact our office for consistent elevations  Relevant Medications    amLODIPine (NORVASC) 5 mg tablet    hydrochlorothiazide (HYDRODIURIL) 25 mg tablet    labetalol (NORMODYNE) 100 mg tablet    lisinopril (ZESTRIL) 40 mg tablet       Other    Hyperlipidemia (Chronic)     LDL stable  Continue Lipitor and fenofibrate  Recommend healthy lifestyle choices for your cholesterol  Low fat/low cholesterol diet  Limit/avoid red meat  Eat more lean meat - chicken breast, ground turkey, fish  Exercise 30 mins at least 5 times a week as tolerated               Relevant Medications    atorvastatin (LIPITOR) 40 mg tablet    fenofibrate 160 MG tablet    Elevated liver enzymes     Denies right upper quadrant pain, recommend reduction in alcohol intake, history of mild hepatic steatosis         Morbid obesity with BMI of 40 0-44 9, adult ([facility] )   Other Visit Diagnoses     Gout, unspecified cause, unspecified chronicity, unspecified site        Relevant Medications    allopurinol (ZYLOPRIM) 300 mg tablet    Essential hypertension        Relevant Medications    amLODIPine (NORVASC) 5 mg tablet    hydrochlorothiazide (HYDRODIURIL) 25 mg tablet    labetalol (NORMODYNE) 100 mg tablet    lisinopril (ZESTRIL) 40 mg tablet    Type 2 diabetes mellitus without complication, without long-term current use of insulin (HCC)        Relevant Medications    Empagliflozin (Jardiance) 25 MG TABS    metFORMIN (GLUCOPHAGE) 1000 MG tablet    Trulicity 4 5 BD/0 1NX injection    Insulin Glargine Solostar (Lantus SoloStar) 100 UNIT/ML SOPN    Muscle spasm        Relevant Medications    meloxicam (Mobic) 15 mg tablet    Gastroesophageal reflux disease, unspecified whether esophagitis present        Relevant Medications    pantoprazole (PROTONIX) 40 mg tablet    Cellulitis of left foot        Relevant Medications    mupirocin (BACTROBAN) 2 % ointment          BMI Counseling: Body mass index is 43 9 kg/m²  The BMI is above normal  Nutrition recommendations include decreasing portion sizes, encouraging healthy choices of fruits and vegetables, decreasing fast food intake, consuming healthier snacks, limiting drinks that contain sugar, moderation in carbohydrate intake, increasing intake of lean protein, reducing intake of saturated and trans fat and reducing intake of cholesterol  Exercise recommendations include moderate physical activity 150 minutes/week and exercising 3-5 times per week  Rationale for BMI follow-up plan is due to patient being overweight or obese           Reason for visit is   Chief Complaint   Patient presents with   • Rash Rash under left arm pit for 3 days now  Patient states it is painful and was wet and crusty  Putting an antibiotic cream on-not helping  Seeing some red stops(small on other side)  He said was using different Deoderant a week or so ago  Review blood work  Patient got his flu shot at [facility]  Knows his annual is due  • Virtual Regular Visit        Encounter provider JILLIAN Ramírez    Provider located at [address] [place] [id number]      Recent Visits  Date Type Provider Dept   01/24/23 [facility] JILLIAN Kelly [facility]   Showing recent visits within past 7 days and meeting all other requirements  Future Appointments  No visits were found meeting these conditions  Showing future appointments within next 150 days and meeting all other requirements       The patient was identified by name and date of birth  April Frame was informed that this is a telemedicine visit and that the visit is being conducted through the [address] [employer] platform  He agrees to proceed     My office door was closed  No one else was in the room  He acknowledged consent and understanding of privacy and security of the video platform  The patient has agreed to participate and understands they can discontinue the visit at any time  Patient is aware this is a billable service  Subjective  April Frame is a 65 y o  male    Patient presents today for a four month follow up  Patient denies any new concerns  Reviewed blood work while in the office today  Diabetes mellitus-uncontrolled recent hemoglobin A1c, 10 3, patient reports compliance with metformin, Jardiance and Trulicity, patient reports he has not been following a great diet as he has been traveling a lot for work as well as eating more sweets due to the holidays    Patient up-to-date with eye exam   Denies hypo or hyper glycemic events   Does not monitor blood glucose at home     Hypertension-currently uncontrolled, patient reports compliance with lisinopril, labetalol, and Norvasc, HCTZ was added at last office visit, denies side effects with medications, denies chest pain   Reports that at home he monitors his BP and it has been controlled        Hyperlipidemia- ASCVD risk score 23 3, LDL currently controlled, patient continues on statin and fenofibrate with no side effects    Elevated liver enzymes-patient noted to have mild hepatic steatosis           Past Medical History:   Diagnosis Date   • Anemia     s/p knee sx   • Anesthesia complication     Developed hives in the PACU s/p knee replacement-c/o "IV sedation that burns"-affects him negatively   • Arthritis    • Chronic pain disorder     right knee-s/p knee replacement   • Diabetes mellitus ([address] )     niddm   • Diabetic neuropathy ([facility] [address] )     Left foot burning and tingling   • Diverticulitis    • DJD (degenerative joint disease)    • Dysphagia     food gets stuck   • Edema     lower legs-ankles   • GERD (gastroesophageal reflux disease)    • Hyperlipidemia    • Hypertension    • Kidney stone     27mm   • Mechanical complication of internal orthopedic device ([facility] [address] )     knee   • Nephrolithiasis 7/12/2019    Added automatically from request for surgery [id number]   • Obesity    • Osteomyelitis ([address] )     left 2nd toe   • PONV (postoperative nausea and vomiting)     patient requests to be premedicated for N/V   • PVD (peripheral vascular disease) ([facility] [address] )     Valve issue with circulation of the lower legs- to have laser surgery   • 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       Past Surgical History:   Procedure Laterality Date   • CARPAL TUNNEL RELEASE Left    • COLONOSCOPY     • CORRECTION HAMMER TOE      Left foot 2nd toe   • CYSTOSCOPY     • FL RETROGRADE PYELOGRAM  7/26/2019   • HERNIA REPAIR Left     inguinal   • JOINT REPLACEMENT right knee revisionx2, infection total of 4 surgeries    • JOINT REPLACEMENT      left knee    • KIDNEY STONE SURGERY      -large 27 mm stone-took x3 surgeries to resolve   • SC AMPUTATION TOE INTERPHALANGEAL JOINT Left 12/14/2018    Procedure: AMPUTATION TOE;  Surgeon: Edelmira Han DPM;  Location: [address] [place] [address];  Service: Podiatry   • SC COLONOSCOPY FLX DX W/COLLJ SPEC WHEN PFRMD N/A 2/9/2018    Procedure: EGD AND COLONOSCOPY;  Surgeon: Kelly Sage MD;  Location: [facility]; Service: Gastroenterology   • SC CYSTO/URETERO W/LITHOTRIPSY &INDWELL STENT INSRT Left 7/26/2019    Procedure: CYSTOSCOPY URETEROSCOPY WITH LITHOTRIPSY HOLMIUM LASER, RETROGRADE PYELOGRAM AND INSERTION STENT URETERAL;  Surgeon: Kavya Huang MD;  Location: [facility];  Service: Urology   • SC ENDOVEN [address] INCMPTNT VEIN XTR LASER 1ST VEIN Left 1/4/2019    Procedure: ENDOVASCULAR LASER THERAPY (EVLT);   Surgeon: Madhavi Ayers DO;  [facility]: [facility];  Service: Vascular   • SC ENDOVEN ABLTJ INCMPTNT VEIN XTR LASER 1ST VEIN Right 6/7/2019    Procedure: ENDOVASCULAR LASER THERAPY (EVLT)   And stab phlebectomy;  Surgeon: Madhavi Ayers DO;  [facility];  Service: Vascular   • SC REVJ TOT KNEE ARTHRP [address] Right 4/6/2016    Procedure: REVISION TOTAL KNEE ARTHROPLASTY INCLUDING FEMORAL AND TIBIAL COMPONENTS;  Surgeon: Angella Torres MD;  [facility]: [facility];  Service: Orthopedics   • SC REVJ TOTAL KNEE ARTHRP W/WO ALGRFT 1 COMPONENT Right 3/20/2017    Procedure: REVISION TOTAL KNEE ARTHROPLASTY INCLUDING FEMORAL STEM REVISION;  Surgeon: Angella Torres MD;  Location: [facility];  Service: Orthopedics   • TONSILLECTOMY     • ULNAR TUNNEL RELEASE     • WISDOM TOOTH EXTRACTION         Current Outpatient Medications   Medication Sig Dispense Refill   • allopurinol (ZYLOPRIM) 300 mg tablet Take 1 tablet (300 mg total) by mouth daily 90 tablet 1   • amLODIPine (NORVASC) 5 mg tablet Take 1 tablet (5 mg total) by mouth 2 (two) times a day 180 tablet 1   • atorvastatin (LIPITOR) 40 mg tablet Take 1 tablet (40 mg total) by mouth daily at bedtime 90 tablet 1   • Blood Glucose Monitoring Suppl (OneTouch Verio Reflect) w/Device KIT Check blood sugars twice daily  Please substitute with appropriate alternative as covered by patient's insurance  Dx: E11 65 1 kit 0   • cephalexin (KEFLEX) 500 mg capsule TAKE 4 TABLETS 1 HOUR BEFORE PROCEDURE     • cholecalciferol (VITAMIN D3) 1,000 units tablet Take 1,000 Units by mouth daily at bedtime       • Empagliflozin (Jardiance) 25 MG TABS Take 1 tablet (25 mg total) by mouth every morning Lot # [id number] exp 10/22 90 tablet 1   • fenofibrate 160 MG tablet Take 1 tablet (160 mg total) by mouth every morning 90 tablet 1   • glucose blood (OneTouch Verio) test strip Check blood sugars twice daily  Please substitute with appropriate alternative as covered by patient's insurance   Dx: E11 65 200 each 3   • hydrochlorothiazide (HYDRODIURIL) 25 mg tablet Take 1 tablet (25 mg total) by mouth daily 90 tablet 1   • Insulin Glargine Solostar (Lantus SoloStar) 100 UNIT/ML SOPN Inject 0 1 mL (10 Units total) under the skin daily at bedtime 15 mL 1   • labetalol (NORMODYNE) 100 mg tablet Take 1 tablet (100 mg total) by mouth 2 (two) times a day 180 tablet 1   • lisinopril (ZESTRIL) 40 mg tablet Take 1 tablet (40 mg total) by mouth every morning 90 tablet 1   • meloxicam (Mobic) 15 mg tablet Take 1 tablet (15 mg total) by mouth daily 30 tablet 0   • metFORMIN (GLUCOPHAGE) 1000 MG tablet Take 1000 mg in AM and 1500 mg in PM  225 tablet 1   • Multiple Vitamin (MULTIVITAMIN) tablet Take 1 tablet by mouth every morning       • mupirocin (BACTROBAN) 2 % ointment Apply topically 2 (two) times a day for 20 days 30 g 1   • pantoprazole (PROTONIX) 40 mg tablet Take 1 tablet (40 mg total) by mouth daily 180 tablet 1   • sildenafil (VIAGRA) 100 mg tablet TAKE ONE TABLET BY MOUTH ONE HOUR prior to intercourse 30 tablet 4   • Trulicity 4 5 EM/5 8RS injection Inject 0 5 mL (4 5 mg total) under the skin once a week 6 mL 1   • cyclobenzaprine (FLEXERIL) 10 mg tablet Take 1 tablet (10 mg total) by mouth daily at bedtime (Patient not taking: Reported on 1/20/2023) 15 tablet 0     No current facility-administered medications for this visit  Allergies   Allergen Reactions   • Bee Venom Anaphylaxis     Uses Epi-pen   • Doxycycline Photosensitivity       Review of Systems   Constitutional: Negative for activity change, appetite change, chills, diaphoresis and fever  HENT: Negative for congestion, ear discharge, ear pain, postnasal drip, rhinorrhea, sinus pressure, sinus pain and sore throat  Eyes: Negative for pain, discharge, itching and visual disturbance  Respiratory: Negative for cough, chest tightness, shortness of breath and wheezing  Cardiovascular: Negative for chest pain, palpitations and leg swelling  Gastrointestinal: Negative for abdominal pain, constipation, diarrhea, nausea and vomiting  Endocrine: Negative for polydipsia, polyphagia and polyuria  Genitourinary: Negative for difficulty urinating, dysuria and urgency  Musculoskeletal: Negative for arthralgias, back pain and neck pain  Skin: Negative for rash and wound  Neurological: Negative for dizziness, weakness, numbness and headaches  Video Exam    Vitals:    01/24/23 1508   BP: 162/84   BP Location: Right arm   Patient Position: Sitting   Cuff Size: Large   Pulse: 78   SpO2: 99%   Weight: 123 kg (272 lb)   Height: 5' 6" (1 676 m)       Physical Exam  Neurological:      Mental Status: He is alert and oriented to person, place, and time            I spent 25 minutes directly with the patient during this visit

## 2023-01-25 ENCOUNTER — TELEPHONE (OUTPATIENT)
Dept: INTERNAL MEDICINE CLINIC | Facility: CLINIC | Age: 63
End: 2023-01-25

## 2023-01-25 RX ORDER — INSULIN GLARGINE 100 [IU]/ML
10 INJECTION, SOLUTION SUBCUTANEOUS
Qty: 9 ML | Refills: 1 | Status: SHIPPED | OUTPATIENT
Start: 2023-01-25 | End: 2023-02-07 | Stop reason: SDUPTHER

## 2023-01-25 RX ORDER — PEN NEEDLE, DIABETIC 32GX 5/32"
NEEDLE, DISPOSABLE MISCELLANEOUS
Qty: 100 EACH | Refills: 3 | Status: SHIPPED | OUTPATIENT
Start: 2023-01-25

## 2023-01-25 NOTE — ASSESSMENT & PLAN NOTE
Lab Results   Component Value Date    HGBA1C 10 3 (H) 01/23/2023   Uncontrolled  Continue with metformin, Trulicity And Jardiance  Start Lantus, monitor sugars BID and follow up in two weeks  Patient is to continue to work on diet and exercise  Limit sugars and carbohydrate intake  Avoid soda, juice, sweets, cookies, desserts, pasta, bread    Eat more whole grains, exercised 30 min of cardio at least 3 times a week  Also recommended daily foot exams to check for sores, and recommended yearly eye exams

## 2023-01-25 NOTE — ASSESSMENT & PLAN NOTE
Uncontrolled on Norvasc, lisinopril, labetalol and hydrochlorothiazide  Continue to monitor blood pressure at home  Goal BP is < 130/80  Contact our office for consistent elevations

## 2023-01-25 NOTE — TELEPHONE ENCOUNTER
Prior Authorization needed for rx Trulicity 4 5 PQ/5 6SN injection, patient was informed by his pharmacy

## 2023-01-31 NOTE — TELEPHONE ENCOUNTER
PA for Trulicity was approved by Bernadette Baez 1/26/2023 to 1/26/2024  Notified [facility], [address]

## 2023-02-07 ENCOUNTER — TELEMEDICINE (OUTPATIENT)
Dept: INTERNAL MEDICINE CLINIC | Facility: OTHER | Age: 63
End: 2023-02-07

## 2023-02-07 VITALS
BODY MASS INDEX: 43.71 KG/M2 | SYSTOLIC BLOOD PRESSURE: 151 MMHG | DIASTOLIC BLOOD PRESSURE: 80 MMHG | WEIGHT: 272 LBS | HEIGHT: 66 IN

## 2023-02-07 DIAGNOSIS — E11.9 TYPE 2 DIABETES MELLITUS WITHOUT COMPLICATION, WITHOUT LONG-TERM CURRENT USE OF INSULIN (HCC): ICD-10-CM

## 2023-02-07 DIAGNOSIS — I10 PRIMARY HYPERTENSION: ICD-10-CM

## 2023-02-07 DIAGNOSIS — E11.3293 TYPE 2 DIABETES MELLITUS WITH BOTH EYES AFFECTED BY MILD NONPROLIFERATIVE RETINOPATHY WITHOUT MACULAR EDEMA, WITHOUT LONG-TERM CURRENT USE OF INSULIN (HCC): Primary | ICD-10-CM

## 2023-02-07 RX ORDER — HYDROCHLOROTHIAZIDE 50 MG/1
50 TABLET ORAL DAILY
Qty: 90 TABLET | Refills: 1 | Status: SHIPPED | OUTPATIENT
Start: 2023-02-07

## 2023-02-07 RX ORDER — INSULIN GLARGINE 100 [IU]/ML
20 INJECTION, SOLUTION SUBCUTANEOUS
Qty: 18 ML | Refills: 1 | Status: SHIPPED | OUTPATIENT
Start: 2023-02-07 | End: 2023-08-06

## 2023-02-07 NOTE — ASSESSMENT & PLAN NOTE
Uncontrolled on Norvasc, lisinopril, labetalol and hydrochlorothiazide  Increase HCTZ to 50mg  Continue to monitor blood pressure at home  Goal BP is < 130/80  Contact our office for consistent elevations

## 2023-02-07 NOTE — ASSESSMENT & PLAN NOTE
Lab Results   Component Value Date    HGBA1C 10 3 (H) 01/23/2023   Uncontrolled but improving  Continue with metformin, Trulicity And Jardiance  Increase Lantus to 20 units, monitor sugars BID and follow up in two weeks

## 2023-02-07 NOTE — PROGRESS NOTES
Virtual Regular Visit    Verification of patient location:    Patient is located in the following state in which I hold an active license [place]      Assessment/Plan:    Problem List Items Addressed This Visit        Endocrine    Type 2 diabetes mellitus with both eyes affected by mild nonproliferative retinopathy without macular edema, without long-term current use of insulin ([address] ) - Primary       Lab Results   Component Value Date    HGBA1C 10 3 (H) 01/23/2023   Uncontrolled but improving  Continue with metformin, Trulicity And Jardiance  Increase Lantus to 20 units, monitor sugars BID and follow up in two weeks  Relevant Medications    Insulin Glargine Solostar (Lantus SoloStar) 100 UNIT/ML SOPN       Cardiovascular and Mediastinum    Hypertension (Chronic)     Uncontrolled on Norvasc, lisinopril, labetalol and hydrochlorothiazide  Increase HCTZ to 50mg  Continue to monitor blood pressure at home  Goal BP is < 130/80  Contact our office for consistent elevations  Relevant Medications    hydrochlorothiazide (HYDRODIURIL) 50 mg tablet   Other Visit Diagnoses     Type 2 diabetes mellitus without complication, without long-term current use of insulin ([facility])        Relevant Medications    Insulin Glargine Solostar (Lantus SoloStar) 100 UNIT/ML SOPN    Other Relevant Orders    CBC and differential    Comprehensive metabolic panel    Hemoglobin A1C    Lipid panel               Reason for visit is   Chief Complaint   Patient presents with   • Virtual Brief Visit     Phone only [phone]   • Follow-up     2 week follow up  Pt has diabetic eye exam scheduled for end of the month at [facility]  Last exam was march 2022 and we have report    HM UTD        Encounter provider JILLIAN Gonzalez    Provider located at [address] [place] [id number]      Recent Visits  No visits were found meeting these conditions  Showing recent visits within past 7 days and meeting all other requirements  Today's Visits  Date Type Provider Dept   02/07/23 [place], CRNP [facility]   Showing today's visits and meeting all other requirements  Future Appointments  No visits were found meeting these conditions  Showing future appointments within next 150 days and meeting all other requirements       The patient was identified by name and date of birth  Marion Milligan was informed that this is a telemedicine visit and that the visit is being conducted through the [employer]  He agrees to proceed     My office door was closed  No one else was in the room  He acknowledged consent and understanding of privacy and security of the video platform  The patient has agreed to participate and understands they can discontinue the visit at any time  Patient is aware this is a billable service  Subjective  Marion Milligan is a 65 y o  male    Patient presents today for a two week follow up  Diabetes mellitus-uncontrolled recent hemoglobin A1c, 10 3, patient reports compliance with metformin, Jardiance and Trulicity, patient reports he has not been following a great diet as he has been traveling a lot for work as well as eating more sweets due to the holidays    Patient up-to-date with eye exam   Denies hypo or hyper glycemic events   At last office visit was started on Lantus 10 units, fasting glucose 200s, evening glucose two hours after meals 250-300, he reports improvement since previous numbers, but remains uncontrolled    Hypertension-currently uncontrolled, patient reports compliance with lisinopril, labetalol, and Norvasc, HCTZ was added at last office visit, denies side effects with medications, denies chest pain   Reports that at home he monitors his BP  SBP 150s  DBP 80s              Past Medical History:   Diagnosis Date • Anemia     s/p knee sx   • Anesthesia complication     Developed hives in the PACU s/p knee replacement-c/o "IV sedation that burns"-affects him negatively   • Arthritis    • Chronic pain disorder     right knee-s/p knee replacement   • Diabetes mellitus ([facility] )     niddm   • Diabetic neuropathy (HCC)     Left foot burning and tingling   • Diverticulitis    • DJD (degenerative joint disease)    • Dysphagia     food gets stuck   • Edema     lower legs-ankles   • GERD (gastroesophageal reflux disease)    • Hyperlipidemia    • Hypertension    • Kidney stone     27mm   • Mechanical complication of internal orthopedic device ([facility] )     knee   • Nephrolithiasis 7/12/2019    Added automatically from request for surgery [id number]   • Obesity    • Osteomyelitis ([facility] )     left 2nd toe   • PONV (postoperative nausea and vomiting)     patient requests to be premedicated for N/V   • PVD (peripheral vascular disease) ([facility] )     Valve issue with circulation of the lower legs- to have laser surgery   • 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       Past Surgical History:   Procedure Laterality Date   • CARPAL TUNNEL RELEASE Left    • COLONOSCOPY     • CORRECTION HAMMER TOE      Left foot 2nd toe   • CYSTOSCOPY     • FL RETROGRADE PYELOGRAM  7/26/2019   • HERNIA REPAIR Left     inguinal   • JOINT REPLACEMENT      right knee revisionx2, infection total of 4 surgeries    • JOINT REPLACEMENT      left knee    • KIDNEY STONE SURGERY      -large 27 mm stone-took x3 surgeries to resolve   • AL AMPUTATION TOE INTERPHALANGEAL JOINT Left 12/14/2018    Procedure: AMPUTATION TOE;  Surgeon: Rocio Gardner DPM;  Location: [facility];  Service: Podiatry   • AL COLONOSCOPY FLX DX W/COLLJ SPEC WHEN PFRMD N/A 2/9/2018    Procedure: EGD AND COLONOSCOPY;  Surgeon: Hollie Delgadillo MD;  [facility];   Service: Gastroenterology   • AL CYSTO/URETERO W/LITHOTRIPSY &INDWELL STENT INSRT Left 7/26/2019    Procedure: Sidney Haynes URETEROSCOPY WITH LITHOTRIPSY HOLMIUM LASER, RETROGRADE PYELOGRAM AND INSERTION STENT URETERAL;  Surgeon: Jenny Talbot MD;  [facility];  Service: Urology   • [place] ENDOVEN [address] INCMPTNT VEIN XTR LASER 1ST VEIN Left 1/4/2019    Procedure: ENDOVASCULAR LASER THERAPY (EVLT); Surgeon: Yovana March DO;  [facility];  Service: Vascular   • WA ENDOVEN ABLTJ INCMPTNT VEIN XTR LASER 1ST VEIN Right 6/7/2019    Procedure: ENDOVASCULAR LASER THERAPY (EVLT)   And stab phlebectomy;  Surgeon: Yovana March, DO;  [facility];  Service: Vascular   • [place] REVJ TOT KNEE ARTHRP [address] Right 4/6/2016    Procedure: REVISION TOTAL KNEE ARTHROPLASTY INCLUDING FEMORAL AND TIBIAL COMPONENTS;  Surgeon: Chris Smith MD;  [facility]: [facility];  Service: Orthopedics   • [place] REVJ TOTAL KNEE ARTHRP W/WO ALGRFT 1 COMPONENT Right 3/20/2017    Procedure: REVISION TOTAL KNEE ARTHROPLASTY INCLUDING FEMORAL STEM REVISION;  Surgeon: Chris Smith MD;  [facility];  Service: Orthopedics   • TONSILLECTOMY     • ULNAR TUNNEL RELEASE     • WISDOM TOOTH EXTRACTION         Current Outpatient Medications   Medication Sig Dispense Refill   • allopurinol (ZYLOPRIM) 300 mg tablet Take 1 tablet (300 mg total) by mouth daily 90 tablet 1   • amLODIPine (NORVASC) 5 mg tablet Take 1 tablet (5 mg total) by mouth 2 (two) times a day 180 tablet 1   • atorvastatin (LIPITOR) 40 mg tablet Take 1 tablet (40 mg total) by mouth daily at bedtime 90 tablet 1   • Blood Glucose Monitoring Suppl (OneTouch Verio Reflect) w/Device KIT Check blood sugars twice daily  Please substitute with appropriate alternative as covered by patient's insurance   Dx: E11 65 1 kit 0   • cephalexin (KEFLEX) 500 mg capsule TAKE 4 TABLETS 1 HOUR BEFORE PROCEDURE     • cholecalciferol (VITAMIN D3) 1,000 units tablet Take 1,000 Units by mouth daily at bedtime       • Empagliflozin (Jardiance) 25 MG TABS Take 1 tablet (25 mg total) by mouth every morning Lot # [id number] exp 10/22 90 tablet 1   • fenofibrate 160 MG tablet Take 1 tablet (160 mg total) by mouth every morning 90 tablet 1   • glucose blood (OneTouch Verio) test strip Check blood sugars twice daily  Please substitute with appropriate alternative as covered by patient's insurance  Dx: E11 65 200 each 3   • hydrochlorothiazide (HYDRODIURIL) 50 mg tablet Take 1 tablet (50 mg total) by mouth daily 90 tablet 1   • Insulin Glargine Solostar (Lantus SoloStar) 100 UNIT/ML SOPN Inject 0 2 mL (20 Units total) under the skin daily at bedtime 18 mL 1   • Insulin Pen Needle (BD Pen Needle Nelly U/F) 32G X 4 MM MISC Use daily as directed with insulin pen 100 each 3   • labetalol (NORMODYNE) 100 mg tablet Take 1 tablet (100 mg total) by mouth 2 (two) times a day 180 tablet 1   • lisinopril (ZESTRIL) 40 mg tablet Take 1 tablet (40 mg total) by mouth every morning 90 tablet 1   • meloxicam (Mobic) 15 mg tablet Take 1 tablet (15 mg total) by mouth daily 30 tablet 0   • metFORMIN (GLUCOPHAGE) 1000 MG tablet Take 1000 mg in AM and 1500 mg in PM  225 tablet 1   • Multiple Vitamin (MULTIVITAMIN) tablet Take 1 tablet by mouth every morning       • mupirocin (BACTROBAN) 2 % ointment Apply topically 2 (two) times a day for 20 days 30 g 1   • pantoprazole (PROTONIX) 40 mg tablet Take 1 tablet (40 mg total) by mouth daily 180 tablet 1   • sildenafil (VIAGRA) 100 mg tablet TAKE ONE TABLET BY MOUTH ONE HOUR prior to intercourse 30 tablet 4   • Trulicity 4 5 EP/5 7XE injection Inject 0 5 mL (4 5 mg total) under the skin once a week 6 mL 1   • cyclobenzaprine (FLEXERIL) 10 mg tablet Take 1 tablet (10 mg total) by mouth daily at bedtime (Patient not taking: Reported on 1/20/2023) 15 tablet 0     No current facility-administered medications for this visit          Allergies   Allergen Reactions   • Bee Venom Anaphylaxis     Uses Epi-pen   • Doxycycline Photosensitivity       Review of Systems   Constitutional: Negative for activity change, appetite change, chills, diaphoresis and fever  HENT: Negative for congestion, ear discharge, ear pain, postnasal drip, rhinorrhea, sinus pressure, sinus pain and sore throat  Eyes: Negative for pain, discharge, itching and visual disturbance  Respiratory: Negative for cough, chest tightness, shortness of breath and wheezing  Cardiovascular: Negative for chest pain, palpitations and leg swelling  Gastrointestinal: Negative for abdominal pain, constipation, diarrhea, nausea and vomiting  Endocrine: Negative for polydipsia, polyphagia and polyuria  Genitourinary: Negative for difficulty urinating, dysuria and urgency  Musculoskeletal: Negative for arthralgias, back pain and neck pain  Skin: Negative for rash and wound  Neurological: Negative for dizziness, weakness, numbness and headaches  Video Exam    Vitals:    02/07/23 1453   BP: 151/80   Weight: 123 kg (272 lb)   Height: 5' 6" (1 676 m)       Physical Exam  Neurological:      Mental Status: He is alert and oriented to person, place, and time            I spent 15 minutes directly with the patient during this visit

## 2023-02-13 ENCOUNTER — TELEPHONE (OUTPATIENT)
Dept: INTERNAL MEDICINE CLINIC | Facility: OTHER | Age: 63
End: 2023-02-13

## 2023-02-13 NOTE — TELEPHONE ENCOUNTER
Spoke with pharmacy and they ran it through incorrectly which initiated this form  He re-put in through the system and it cleared without needing a prior auth with lantus solostar

## 2023-02-21 ENCOUNTER — TELEMEDICINE (OUTPATIENT)
Dept: INTERNAL MEDICINE CLINIC | Facility: OTHER | Age: 63
End: 2023-02-21

## 2023-02-21 VITALS — BODY MASS INDEX: 43.71 KG/M2 | HEIGHT: 66 IN | WEIGHT: 272 LBS

## 2023-02-21 DIAGNOSIS — E11.3293 TYPE 2 DIABETES MELLITUS WITH BOTH EYES AFFECTED BY MILD NONPROLIFERATIVE RETINOPATHY WITHOUT MACULAR EDEMA, WITHOUT LONG-TERM CURRENT USE OF INSULIN (HCC): ICD-10-CM

## 2023-02-21 DIAGNOSIS — E66.01 MORBID OBESITY WITH BMI OF 40.0-44.9, ADULT (HCC): ICD-10-CM

## 2023-02-21 DIAGNOSIS — L30.4 INTERTRIGO: ICD-10-CM

## 2023-02-21 DIAGNOSIS — I10 PRIMARY HYPERTENSION: Primary | Chronic | ICD-10-CM

## 2023-02-21 DIAGNOSIS — I10 ESSENTIAL HYPERTENSION: ICD-10-CM

## 2023-02-21 PROBLEM — B35.3 TINEA PEDIS OF BOTH FEET: Status: RESOLVED | Noted: 2019-01-03 | Resolved: 2023-02-21

## 2023-02-21 RX ORDER — LABETALOL 100 MG/1
200 TABLET, FILM COATED ORAL 2 TIMES DAILY
Qty: 180 TABLET | Refills: 1 | Status: SHIPPED | OUTPATIENT
Start: 2023-02-21

## 2023-02-21 RX ORDER — CLOTRIMAZOLE AND BETAMETHASONE DIPROPIONATE 10; .64 MG/G; MG/G
CREAM TOPICAL 2 TIMES DAILY
Qty: 30 G | Refills: 0 | Status: SHIPPED | OUTPATIENT
Start: 2023-02-21 | End: 2023-03-03

## 2023-02-21 NOTE — ASSESSMENT & PLAN NOTE
Blood was improved  Continue current diabetic regimen: Jardiance 25 mg daily, Lantus 20 units daily, and metformin 1000 mg twice a day    Lab Results   Component Value Date    HGBA1C 10 3 (H) 01/23/2023

## 2023-02-21 NOTE — PROGRESS NOTES
Virtual Regular Visit    Verification of patient location:    Patient is located in the following state in which I hold an active license [place]      Assessment/Plan:    Problem List Items Addressed This Visit        Endocrine    Type 2 diabetes mellitus with both eyes affected by mild nonproliferative retinopathy without macular edema, without long-term current use of insulin ([address] )     Blood was improved  Continue current diabetic regimen: Jardiance 25 mg daily, Lantus 20 units daily, and metformin 1000 mg twice a day  Lab Results   Component Value Date    HGBA1C 10 3 (H) 01/23/2023               Cardiovascular and Mediastinum    Hypertension - Primary (Chronic)     Remains uncontrolled  He reports average systolic readings to be 915I  We will increase labetalol to 200 mg twice a day  Continue amlodipine 5 mg twice a day, lisinopril 40 mg daily, hydrochlorothiazide 50 mg daily  Relevant Medications    labetalol (NORMODYNE) 100 mg tablet       Musculoskeletal and Integument    Intertrigo     Will prescribe clotrimazole-betamethasone cream          Relevant Medications    clotrimazole-betamethasone (LOTRISONE) 1-0 05 % cream       Other    Morbid obesity with BMI of 40 0-44 9, adult ([facility])     Discussed diet and exercise  Other Visit Diagnoses     Essential hypertension        Relevant Medications    labetalol (NORMODYNE) 100 mg tablet               Reason for visit is   Chief Complaint   Patient presents with   • Follow-up     2 wk - DM increased lantus by 20 units  Sugars have been much better 150-170's    • Rash     Under his arm was given an ointment and it went away stopped it but it came back re started the ointment says it hurts and is red   Asked if he could send a pic via my chart and he stated he has not been on my chart for such a long time he wouldn't remember how to get on   • Virtual Regular Visit        Encounter provider Radha Case MD    Provider located at [address] [place] [address] [facility] [address] [address]      Recent Visits  No visits were found meeting these conditions  Showing recent visits within past 7 days and meeting all other requirements  Today's Visits  Date Type Provider Dept   02/21/23 Telemedicine Bonnie Singh MD [facility]   Showing today's visits and meeting all other requirements  Future Appointments  No visits were found meeting these conditions  Showing future appointments within next 150 days and meeting all other requirements       The patient was identified by name and date of birth  Jodie Green was informed that this is a telemedicine visit and that the visit is being conducted through the [employer]  He agrees to proceed     My office door was closed  No one else was in the room  He acknowledged consent and understanding of privacy and security of the video platform  The patient has agreed to participate and understands they can discontinue the visit at any time  Patient is aware this is a billable service  Subjective  Jodie Green is a   20-year-old male seen today with concern for follow-up of chronic conditions and left axillary rash  Recent laboratory studies reviewed today  He has been compliant with his current medication regimen  He has been monitoring his blood glucose and with the increase in Lantus reports average readings of 140-170  He denies any hypoglycemic episodes  He has also been monitoring his blood pressure, reports average systolic readings of 359 mmHg  He has a rash of the left axilla to which she has been applying Bactroban without improvement of the rash  Otherwise, he has no other complaints or concerns at this time  Rash  This is a new problem  The current episode started 1 to 4 weeks ago  The problem has been gradually worsening since onset   The affected locations include the left axilla  The problem is moderate  Pertinent negatives include no congestion, cough, diarrhea, fatigue, fever, rhinorrhea, shortness of breath, sore throat or vomiting  Diabetes  He presents for his follow-up diabetic visit  He has type 2 diabetes mellitus  His disease course has been improving  Pertinent negatives for hypoglycemia include no dizziness or headaches  There are no diabetic associated symptoms  Pertinent negatives for diabetes include no chest pain, no fatigue and no weakness  Symptoms are improving  Current diabetic treatment includes insulin injections and oral agent (dual therapy)  He is compliant with treatment all of the time  He is following a generally healthy diet  His home blood glucose trend is decreasing steadily  His overall blood glucose range is 130-140 mg/dl  An ACE inhibitor/angiotensin II receptor blocker is being taken  Hypertension  This is a chronic problem  The current episode started more than 1 year ago  The problem is uncontrolled  Pertinent negatives include no chest pain, headaches, palpitations or shortness of breath  Past treatments include ACE inhibitors, beta blockers, calcium channel blockers and diuretics  The current treatment provides mild improvement  Compliance problems include exercise and diet           Past Medical History:   Diagnosis Date   • Anemia     s/p knee sx   • Anesthesia complication     Developed hives in the PACU s/p knee replacement-c/o "IV sedation that burns"-affects him negatively   • Arthritis    • Chronic pain disorder     right knee-s/p knee replacement   • Diabetes mellitus (HCC)     niddm   • Diabetic neuropathy (HCC)     Left foot burning and tingling   • Diverticulitis    • DJD (degenerative joint disease)    • Dysphagia     food gets stuck   • Edema     lower legs-ankles   • GERD (gastroesophageal reflux disease)    • Hyperlipidemia    • Hypertension    • Kidney stone     27mm   • Mechanical complication of internal orthopedic device [facility])     knee   • Nephrolithiasis 7/12/2019    Added automatically from request for surgery [id number]   • Obesity    • Osteomyelitis ([facility] )     left 2nd toe   • PONV (postoperative nausea and vomiting)     patient requests to be premedicated for N/V   • PVD (peripheral vascular disease) ([facility] )     Valve issue with circulation of the lower legs- to have laser surgery   • 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       Past Surgical History:   Procedure Laterality Date   • CARPAL TUNNEL RELEASE Left    • COLONOSCOPY     • CORRECTION HAMMER TOE      Left foot 2nd toe   • CYSTOSCOPY     • FL RETROGRADE PYELOGRAM  7/26/2019   • HERNIA REPAIR Left     inguinal   • JOINT REPLACEMENT      right knee revisionx2, infection total of 4 surgeries    • JOINT REPLACEMENT      left knee    • KIDNEY STONE SURGERY      -large 27 mm stone-took x3 surgeries to resolve   • NC AMPUTATION TOE INTERPHALANGEAL JOINT Left 12/14/2018    Procedure: AMPUTATION TOE;  Surgeon: Marissa Cuenca DPM;  Location: [address] [place] [address];  Service: Podiatry   • NC COLONOSCOPY FLX DX W/COLLJ SPEC WHEN PFRMD N/A 2/9/2018    Procedure: EGD AND COLONOSCOPY;  Surgeon: Marcia Mahoney MD;  [facility]: [facility]; Service: Gastroenterology   • NC CYSTO/URETERO W/LITHOTRIPSY &INDWELL STENT INSRT Left 7/26/2019    Procedure: CYSTOSCOPY URETEROSCOPY WITH LITHOTRIPSY HOLMIUM LASER, RETROGRADE PYELOGRAM AND INSERTION STENT URETERAL;  Surgeon: Melanie Rowland MD;  [facility];  Service: Urology   • NC ENDOVEN [address] INCMPTNT VEIN XTR LASER 1ST VEIN Left 1/4/2019    Procedure: ENDOVASCULAR LASER THERAPY (EVLT);   Surgeon: Aimee Tapia DO;  [facility];  Service: Vascular   • NC ENDOVEN ABLTJ INCMPTNT VEIN XTR LASER 1ST VEIN Right 6/7/2019    Procedure: ENDOVASCULAR LASER THERAPY (EVLT)   And stab phlebectomy;  Surgeon: Aimee Tapia DO;  [facility];  Service: Vascular   • NC REVJ TOT KNEE ARTHRP [facility] COMPONE Right 4/6/2016    Procedure: REVISION TOTAL KNEE ARTHROPLASTY INCLUDING FEMORAL AND TIBIAL COMPONENTS;  Surgeon: George Shahid MD;  Location: [facility];  Service: Orthopedics   • DC REVJ TOTAL KNEE ARTHRP W/WO ALGRFT 1 COMPONENT Right 3/20/2017    Procedure: REVISION TOTAL KNEE ARTHROPLASTY INCLUDING FEMORAL STEM REVISION;  Surgeon: George Shahid MD;  Location: [facility];  Service: Orthopedics   • TONSILLECTOMY     • ULNAR TUNNEL RELEASE     • WISDOM TOOTH EXTRACTION         Current Outpatient Medications   Medication Sig Dispense Refill   • allopurinol (ZYLOPRIM) 300 mg tablet Take 1 tablet (300 mg total) by mouth daily 90 tablet 1   • amLODIPine (NORVASC) 5 mg tablet Take 1 tablet (5 mg total) by mouth 2 (two) times a day 180 tablet 1   • atorvastatin (LIPITOR) 40 mg tablet Take 1 tablet (40 mg total) by mouth daily at bedtime 90 tablet 1   • Blood Glucose Monitoring Suppl (OneTouch Verio Reflect) w/Device KIT Check blood sugars twice daily  Please substitute with appropriate alternative as covered by patient's insurance  Dx: E11 65 1 kit 0   • cholecalciferol (VITAMIN D3) 1,000 units tablet Take 1,000 Units by mouth daily at bedtime       • clotrimazole-betamethasone (LOTRISONE) 1-0 05 % cream Apply topically 2 (two) times a day for 10 days 30 g 0   • cyclobenzaprine (FLEXERIL) 10 mg tablet Take 1 tablet (10 mg total) by mouth daily at bedtime 15 tablet 0   • Empagliflozin (Jardiance) 25 MG TABS Take 1 tablet (25 mg total) by mouth every morning Lot # [id number] exp 10/22 90 tablet 1   • fenofibrate 160 MG tablet Take 1 tablet (160 mg total) by mouth every morning 90 tablet 1   • glucose blood (OneTouch Verio) test strip Check blood sugars twice daily  Please substitute with appropriate alternative as covered by patient's insurance   Dx: E11 65 200 each 3   • hydrochlorothiazide (HYDRODIURIL) 50 mg tablet Take 1 tablet (50 mg total) by mouth daily 90 tablet 1   • Insulin Glargine Solostar (Lantus SoloStar) 100 UNIT/ML SOPN Inject 0 2 mL (20 Units total) under the skin daily at bedtime 18 mL 1   • Insulin Pen Needle (BD Pen Needle Nelly U/F) 32G X 4 MM MISC Use daily as directed with insulin pen 100 each 3   • labetalol (NORMODYNE) 100 mg tablet Take 2 tablets (200 mg total) by mouth 2 (two) times a day 180 tablet 1   • lisinopril (ZESTRIL) 40 mg tablet Take 1 tablet (40 mg total) by mouth every morning 90 tablet 1   • meloxicam (Mobic) 15 mg tablet Take 1 tablet (15 mg total) by mouth daily 30 tablet 0   • metFORMIN (GLUCOPHAGE) 1000 MG tablet Take 1000 mg in AM and 1500 mg in PM  225 tablet 1   • Multiple Vitamin (MULTIVITAMIN) tablet Take 1 tablet by mouth every morning       • mupirocin (BACTROBAN) 2 % ointment Apply topically 2 (two) times a day for 20 days 30 g 1   • pantoprazole (PROTONIX) 40 mg tablet Take 1 tablet (40 mg total) by mouth daily 180 tablet 1   • sildenafil (VIAGRA) 100 mg tablet TAKE ONE TABLET BY MOUTH ONE HOUR prior to intercourse 30 tablet 4   • Trulicity 4 5 OW/4 5RZ injection Inject 0 5 mL (4 5 mg total) under the skin once a week 6 mL 1     No current facility-administered medications for this visit  Allergies   Allergen Reactions   • Bee Venom Anaphylaxis     Uses Epi-pen   • Doxycycline Photosensitivity       Review of Systems   Constitutional: Negative for activity change, appetite change, chills, diaphoresis, fatigue and fever  HENT: Negative for congestion, postnasal drip, rhinorrhea, sinus pressure, sinus pain, sneezing and sore throat  Eyes: Negative for visual disturbance  Respiratory: Negative for apnea, cough, choking, chest tightness, shortness of breath and wheezing  Cardiovascular: Negative for chest pain, palpitations and leg swelling  Gastrointestinal: Negative for abdominal distention, abdominal pain, anal bleeding, blood in stool, constipation, diarrhea, nausea and vomiting  Endocrine: Negative for cold intolerance and heat intolerance  Genitourinary: Negative for difficulty urinating, dysuria and hematuria  Musculoskeletal: Negative  Skin: Positive for rash  Neurological: Negative for dizziness, weakness, light-headedness, numbness and headaches  Hematological: Negative for adenopathy  Psychiatric/Behavioral: Negative for agitation, sleep disturbance and suicidal ideas  All other systems reviewed and are negative  Video Exam    Vitals:    02/21/23 1314   Weight: 123 kg (272 lb)   Height: 5' 6" (1 676 m)       Physical Exam  Vitals and nursing note reviewed  Constitutional:       General: He is not in acute distress  Appearance: He is well-developed  He is not diaphoretic  HENT:      Head: Normocephalic and atraumatic  Right Ear: External ear normal       Left Ear: External ear normal    Eyes:      General:         Right eye: No discharge  Left eye: No discharge  Conjunctiva/sclera: Conjunctivae normal    Pulmonary:      Effort: Pulmonary effort is normal  No respiratory distress  Abdominal:      General: There is no distension  Tenderness: There is no abdominal tenderness  Musculoskeletal:         General: No deformity  Normal range of motion  Cervical back: Normal range of motion  Skin:     Coloration: Skin is not pale  Findings: No erythema or rash  Neurological:      Mental Status: He is alert and oriented to person, place, and time  Cranial Nerves: No cranial nerve deficit  Coordination: Coordination normal    Psychiatric:         Behavior: Behavior normal          Thought Content: Thought content normal          Judgment: Judgment normal           I spent 25 minutes with patient today in which greater than 50% of the time was spent in counseling/coordination of care regarding Counseling, reviewing medications, discussing treatment and follow-up plan

## 2023-02-21 NOTE — ASSESSMENT & PLAN NOTE
Remains uncontrolled  He reports average systolic readings to be 931B  We will increase labetalol to 200 mg twice a day  Continue amlodipine 5 mg twice a day, lisinopril 40 mg daily, hydrochlorothiazide 50 mg daily

## 2023-03-01 ENCOUNTER — TELEPHONE (OUTPATIENT)
Dept: ADMINISTRATIVE | Facility: OTHER | Age: 63
End: 2023-03-01

## 2023-03-01 NOTE — TELEPHONE ENCOUNTER
----- Message from [facility] sent at 3/1/2023  8:18 AM EST -----  03/01/23 8:18 AM    Hello, our patient Oliverio Corbett has had Diabetic Eye Exam completed/performed  Please assist in updating the patient chart by making an External outreach to [facility] eye [facility] facility located in [place]  The date of service is end of feb 2023      Thank you,  [facility]

## 2023-03-01 NOTE — LETTER
Diabetic Eye Exam Form    Date Requested: 23  Patient: Arnel Jensen  Patient : 1960   Referring Provider: JILLIAN Green      DIABETIC Eye Exam Date _______________________________      Type of Exam MUST be documented for Diabetic Eye Exams  Please CHECK ONE  Retinal Exam       Dilated Retinal Exam       OCT       Optomap-Iris Exam      Fundus Photography       Left Eye - Please check Retinopathy or No Retinopathy        Exam did show retinopathy    Exam did not show retinopathy       Right Eye - Please check Retinopathy or No Retinopathy       Exam did show retinopathy    Exam did not show retinopathy       Comments __________________________________________________________    Practice Providing Exam ______________________________________________    Exam Performed By (print name) _______________________________________      Provider Signature ___________________________________________________      These reports are needed for  compliance  Please fax this completed form and a copy of the Diabetic Eye Exam report to our office located at [place] [address] as soon as possible via Fax [phone] attention Bindu Velasco: Phone [phone]  We thank you for your assistance in treating our mutual patient

## 2023-03-01 NOTE — TELEPHONE ENCOUNTER
Upon review of the In Basket request and the patient's chart, initial outreach has been made via fax to facility  Please see Contacts section for details       Thank you  Giovana Jeronimo MA

## 2023-03-01 NOTE — LETTER
Diabetic Eye Exam Form    Date Requested: 23  Patient: Slime Talbot  Patient : 1960   Referring Provider: JILLIAN Maldonado      DIABETIC Eye Exam Date _______________________________      Type of Exam MUST be documented for Diabetic Eye Exams  Please CHECK ONE  Retinal Exam       Dilated Retinal Exam       OCT       Optomap-Iris Exam      Fundus Photography       Left Eye - Please check Retinopathy or No Retinopathy        Exam did show retinopathy    Exam did not show retinopathy       Right Eye - Please check Retinopathy or No Retinopathy       Exam did show retinopathy    Exam did not show retinopathy       Comments __________________________________________________________    Practice Providing Exam ______________________________________________    Exam Performed By (print name) _______________________________________      Provider Signature ___________________________________________________      These reports are needed for  compliance  Please fax this completed form and a copy of the Diabetic Eye Exam report to our office located at [place] [address] as soon as possible via Fax [phone] mariana Almanza: Phone [phone]  We thank you for your assistance in treating our mutual patient

## 2023-03-01 NOTE — LETTER
Diabetic Eye Exam Form    Date Requested: 23  Patient: Kim Bo  Patient : 1960   Referring Provider: JILLIAN Guillen      DIABETIC Eye Exam Date _______________________________      Type of Exam MUST be documented for Diabetic Eye Exams  Please CHECK ONE  Retinal Exam       Dilated Retinal Exam       OCT       Optomap-Iris Exam      Fundus Photography       Left Eye - Please check Retinopathy or No Retinopathy        Exam did show retinopathy    Exam did not show retinopathy       Right Eye - Please check Retinopathy or No Retinopathy       Exam did show retinopathy    Exam did not show retinopathy       Comments __________________________________________________________    Practice Providing Exam ______________________________________________    Exam Performed By (print name) _______________________________________      Provider Signature ___________________________________________________      These reports are needed for  compliance  Please fax this completed form and a copy of the Diabetic Eye Exam report to our office located at [place] [address] as soon as possible via Fax [phone] [facility] Sees: Phone [phone]  We thank you for your assistance in treating our mutual patient

## 2023-03-02 NOTE — TELEPHONE ENCOUNTER
Upon review of the In Basket request and the patient's chart, initial outreach has been made via fax to facility  Please see Contacts section for details       Thank you  Shayy Mendoza MA

## 2023-03-03 LAB
LEFT EYE DIABETIC RETINOPATHY: POSITIVE
RIGHT EYE DIABETIC RETINOPATHY: POSITIVE

## 2023-03-03 PROCEDURE — 2022F DILAT RTA XM EVC RTNOPTHY: CPT | Performed by: INTERNAL MEDICINE

## 2023-03-03 NOTE — TELEPHONE ENCOUNTER
Upon review of the In Basket request and the patient's chart, initial outreach has been made via fax to facility  Please see Contacts section for details       Thank you  Petty Ibanez MA

## 2023-03-08 NOTE — TELEPHONE ENCOUNTER
As a follow-up, a second attempt has been made for outreach via fax to facility  Please see Contacts section for details      Thank you  Tito Scruggs MA

## 2023-03-13 NOTE — TELEPHONE ENCOUNTER
Upon review of the In Basket request we were able to locate, review, and update the patient chart as requested for Diabetic Eye Exam     Any additional questions or concerns should be emailed to the Practice Liaisons via the appropriate education email address, please do not reply via In Basket      Thank you  Dominic Davenport MA

## 2023-04-09 ENCOUNTER — DOCUMENTATION (OUTPATIENT)
Dept: OTHER | Facility: HOSPITAL | Age: 63
End: 2023-04-09

## 2023-04-09 DIAGNOSIS — E11.9 TYPE 2 DIABETES MELLITUS WITHOUT COMPLICATION, WITHOUT LONG-TERM CURRENT USE OF INSULIN (HCC): ICD-10-CM

## 2023-04-09 DIAGNOSIS — I10 PRIMARY HYPERTENSION: Primary | ICD-10-CM

## 2023-04-09 RX ORDER — INSULIN GLARGINE 100 [IU]/ML
20 INJECTION, SOLUTION SUBCUTANEOUS
Qty: 18 ML | Refills: 1 | Status: SHIPPED | OUTPATIENT
Start: 2023-04-09 | End: 2023-10-06

## 2023-04-09 RX ORDER — HYDROCHLOROTHIAZIDE 50 MG/1
50 TABLET ORAL DAILY
Qty: 90 TABLET | Refills: 1 | Status: SHIPPED | OUTPATIENT
Start: 2023-04-09 | End: 2023-07-08

## 2023-05-10 ENCOUNTER — OFFICE VISIT (OUTPATIENT)
Dept: UROLOGY | Facility: CLINIC | Age: 63
End: 2023-05-10

## 2023-05-10 VITALS
SYSTOLIC BLOOD PRESSURE: 130 MMHG | HEART RATE: 83 BPM | WEIGHT: 274 LBS | BODY MASS INDEX: 44.03 KG/M2 | OXYGEN SATURATION: 97 % | DIASTOLIC BLOOD PRESSURE: 80 MMHG | HEIGHT: 66 IN

## 2023-05-10 DIAGNOSIS — N40.0 BENIGN PROSTATIC HYPERPLASIA WITHOUT LOWER URINARY TRACT SYMPTOMS: Primary | ICD-10-CM

## 2023-05-10 NOTE — PROGRESS NOTES
UROLOGY PROGRESS NOTE   Patient Identifiers: Shala Bautista (MRN [id number])  Date of Service: 5/10/2023    Subjective:   BPH and kidney stones  PSA 0 43  No recent stone exacerbations  No voiding complaints      Reason for visit: BPH follow-up    Objective:     VITALS:    Vitals:    05/10/23 1411   BP: 130/80   Pulse: 83   SpO2: 97%     AUA SYMPTOM SCORE    Flowsheet Row Most Recent Value   AUA SYMPTOM SCORE    How often have you had a sensation of not emptying your bladder completely after you finished urinating? 0 (P)     How often have you had to urinate again less than two hours after you finished urinating? 2 (P)     How often have you found you stopped and started again several times when you urinate? 0 (P)     How often have you found it difficult to postpone urination? 3 (P)     How often have you had a weak urinary stream? 0 (P)     How often have you had to push or strain to begin urination? 0 (P)     How many times did you most typically get up to urinate from the time you went to bed at night until the time you got up in the morning? 4 (P)     Quality of Life: If you were to spend the rest of your life with your urinary condition just the way it is now, how would you feel about that? 2 (P)     AUA SYMPTOM SCORE 9 (P)             LABS:  Lab Results   Component Value Date    HGB 15 6 06/22/2022    HCT 44 5 06/22/2022    WBC 5 4 06/22/2022     06/22/2022   ]    Lab Results   Component Value Date     10/10/2015    K 4 0 06/22/2022     06/22/2022    CO2 25 06/22/2022    BUN 14 06/22/2022    CREATININE 0 77 06/22/2022    CALCIUM 10 0 06/22/2022    GLUCOSE 120 10/10/2015   ]        INPATIENT MEDS:    Current Outpatient Medications:   •  allopurinol (ZYLOPRIM) 300 mg tablet, Take 1 tablet (300 mg total) by mouth daily, Disp: 90 tablet, Rfl: 1  •  amLODIPine (NORVASC) 5 mg tablet, Take 1 tablet (5 mg total) by mouth 2 (two) times a day, Disp: 180 tablet, Rfl: 1  •  atorvastatin (LIPITOR) 40 mg tablet, Take 1 tablet (40 mg total) by mouth daily at bedtime, Disp: 90 tablet, Rfl: 1  •  Blood Glucose Monitoring Suppl (OneTouch Verio Reflect) w/Device KIT, Check blood sugars twice daily  Please substitute with appropriate alternative as covered by patient's insurance  Dx: E11 65, Disp: 1 kit, Rfl: 0  •  cholecalciferol (VITAMIN D3) 1,000 units tablet, Take 1,000 Units by mouth daily at bedtime  , Disp: , Rfl:   •  cyclobenzaprine (FLEXERIL) 10 mg tablet, Take 1 tablet (10 mg total) by mouth daily at bedtime, Disp: 15 tablet, Rfl: 0  •  Empagliflozin (Jardiance) 25 MG TABS, Take 1 tablet (25 mg total) by mouth every morning Lot # [id number] exp 10/22, Disp: 90 tablet, Rfl: 1  •  fenofibrate 160 MG tablet, Take 1 tablet (160 mg total) by mouth every morning, Disp: 90 tablet, Rfl: 1  •  glucose blood (OneTouch Verio) test strip, Check blood sugars twice daily  Please substitute with appropriate alternative as covered by patient's insurance   Dx: E11 65, Disp: 200 each, Rfl: 3  •  hydrochlorothiazide (HYDRODIURIL) 50 mg tablet, Take 1 tablet (50 mg total) by mouth daily, Disp: 90 tablet, Rfl: 1  •  Insulin Glargine Solostar (Lantus SoloStar) 100 UNIT/ML SOPN, Inject 0 2 mL (20 Units total) under the skin daily at bedtime, Disp: 18 mL, Rfl: 1  •  Insulin Pen Needle (BD Pen Needle Nelly U/F) 32G X 4 MM MISC, Use daily as directed with insulin pen, Disp: 100 each, Rfl: 3  •  labetalol (NORMODYNE) 100 mg tablet, Take 2 tablets (200 mg total) by mouth 2 (two) times a day, Disp: 180 tablet, Rfl: 1  •  lisinopril (ZESTRIL) 40 mg tablet, Take 1 tablet (40 mg total) by mouth every morning, Disp: 90 tablet, Rfl: 1  •  meloxicam (Mobic) 15 mg tablet, Take 1 tablet (15 mg total) by mouth daily, Disp: 30 tablet, Rfl: 0  •  metFORMIN (GLUCOPHAGE) 1000 MG tablet, Take 1000 mg in AM and 1500 mg in PM , Disp: 225 tablet, Rfl: 1  •  Multiple Vitamin (MULTIVITAMIN) tablet, Take 1 tablet by mouth every morning  , Disp: , Rfl:   • "pantoprazole (PROTONIX) 40 mg tablet, Take 1 tablet (40 mg total) by mouth daily, Disp: 180 tablet, Rfl: 1  •  sildenafil (VIAGRA) 100 mg tablet, TAKE ONE TABLET BY MOUTH ONE HOUR prior to intercourse, Disp: 30 tablet, Rfl: 4  •  Trulicity 4 5 ZB/3 7FH injection, Inject 0 5 mL (4 5 mg total) under the skin once a week, Disp: 6 mL, Rfl: 1  •  clotrimazole-betamethasone (LOTRISONE) 1-0 05 % cream, Apply topically 2 (two) times a day for 10 days, Disp: 30 g, Rfl: 0  •  mupirocin (BACTROBAN) 2 % ointment, Apply topically 2 (two) times a day for 20 days, Disp: 30 g, Rfl: 1      Physical Exam:   /80 (BP Location: Left arm, Patient Position: Sitting, Cuff Size: Standard)   Pulse 83   Ht 5' 6\" (1 676 m)   Wt 124 kg (274 lb)   SpO2 97%   BMI 44 22 kg/m²   GEN: no acute distress    RESP: breathing comfortably with no accessory muscle use    ABD: soft, non-tender, non-distended   INCISION:    EXT: no significant peripheral edema   (Male): Penis circumcised, phallus normal, meatus patent  Testicles descended into scrotum bilaterally without masses nor tenderness  No inguinal hernias bilaterally  CHRISTEL: Prostate is not enlarged at 30 grams  The prostate is not boggy  The prostate is not tender    No nodules noted      RADIOLOGY:   None    Assessment:   #1 BPH    Plan:   -Follow-up 1 year with PSA prior to visit  -  -  -          "

## 2023-06-08 ENCOUNTER — TELEPHONE (OUTPATIENT)
Dept: INTERNAL MEDICINE CLINIC | Facility: CLINIC | Age: 63
End: 2023-06-08

## 2023-06-08 NOTE — TELEPHONE ENCOUNTER
Patient was on Hctz 25mg it was changed in February to 50 mg patient  was taking 2 of the 25 mg tablets  In April pill was changed to a 50 mg tablet patient did not realize and was taking 2 of the 50 mg since April now needs refill pharmacy said it is too soon  Please advise

## 2023-06-10 ENCOUNTER — NURSE TRIAGE (OUTPATIENT)
Dept: OTHER | Facility: OTHER | Age: 63
End: 2023-06-10

## 2023-06-10 NOTE — TELEPHONE ENCOUNTER
Patient stated that he would be able to obtain blood work next Saturday only  Patient requested the script for blood work to be mailed or emailed to him

## 2023-06-10 NOTE — TELEPHONE ENCOUNTER
"Regarding: Lab Order Issue  ----- Message from Petty Montoya sent at 6/10/2023  9:30 AM EDT -----  Saima Nicolas was supposed to get bloodwork done this weekend at the [place] lab in [place]  However, when I went this morning, they of course did not have it  Is there a way I can get a physical copy of the lab order that I can print off and bring it to the lab? \"    "

## 2023-06-10 NOTE — TELEPHONE ENCOUNTER
"  Reason for Disposition  • RN needs further essential information from caller in order to complete triage    Answer Assessment - Initial Assessment Questions  1  REASON FOR CALL or QUESTION: \"What is your reason for calling today? \" or \"How can I best help you? \" or \"What question do you have that I can help answer? \"      Patient called [facility], stated that he was told by Dr Roby Laws to obtain blood work as soon as possible due to patient was taking double dose of HCTZ 50 mg twice a day instead of 25 mg twice a day  Patient went to [facility] today, the order was not in a lab  Dr Shanel Vega on call was made aware of      Protocols used: INFORMATION ONLY CALL - NO TRIAGE-ADULT-    "

## 2023-06-14 LAB — HBA1C MFR BLD HPLC: 7.7 %

## 2023-06-20 ENCOUNTER — OFFICE VISIT (OUTPATIENT)
Dept: PODIATRY | Facility: CLINIC | Age: 63
End: 2023-06-20
Payer: COMMERCIAL

## 2023-06-20 VITALS
SYSTOLIC BLOOD PRESSURE: 130 MMHG | DIASTOLIC BLOOD PRESSURE: 80 MMHG | WEIGHT: 274 LBS | RESPIRATION RATE: 17 BRPM | BODY MASS INDEX: 44.03 KG/M2 | HEIGHT: 66 IN

## 2023-06-20 DIAGNOSIS — Z89.422 HISTORY OF AMPUTATION OF LESSER TOE OF LEFT FOOT (HCC): ICD-10-CM

## 2023-06-20 DIAGNOSIS — M20.41 HAMMER TOE OF RIGHT FOOT: ICD-10-CM

## 2023-06-20 DIAGNOSIS — E11.42 DIABETIC POLYNEUROPATHY ASSOCIATED WITH TYPE 2 DIABETES MELLITUS (HCC): ICD-10-CM

## 2023-06-20 DIAGNOSIS — L03.031 CELLULITIS OF TOE, RIGHT: ICD-10-CM

## 2023-06-20 DIAGNOSIS — L97.512 DIABETIC ULCER OF TOE OF RIGHT FOOT ASSOCIATED WITH TYPE 2 DIABETES MELLITUS, WITH FAT LAYER EXPOSED (HCC): Primary | ICD-10-CM

## 2023-06-20 DIAGNOSIS — E11.621 DIABETIC ULCER OF TOE OF RIGHT FOOT ASSOCIATED WITH TYPE 2 DIABETES MELLITUS, WITH FAT LAYER EXPOSED (HCC): Primary | ICD-10-CM

## 2023-06-20 PROCEDURE — 99214 OFFICE O/P EST MOD 30 MIN: CPT | Performed by: PODIATRIST

## 2023-06-20 RX ORDER — SULFAMETHOXAZOLE AND TRIMETHOPRIM 800; 160 MG/1; MG/1
1 TABLET ORAL EVERY 12 HOURS SCHEDULED
Qty: 20 TABLET | Refills: 0 | Status: SHIPPED | OUTPATIENT
Start: 2023-06-20 | End: 2023-06-30

## 2023-06-20 NOTE — PROGRESS NOTES
Assessment/Plan: Mirna Christian grade 2 ulcer distal aspect second right toe  Digital cellulitis  Diabetic neuropathy  Acquired deformity of foot  History of amputation second left toe  Plan  Chart reviewed  Patient advised on condition  Today wound debrided of all devitalized tissue  Culture and sensitivity done  Patient be placed on Bactrim  Gentian violet dry sterile dressing applied  Patient advised on aftercare  Return for follow-up and x-ray         Diagnoses and all orders for this visit:    Diabetic ulcer of toe of right foot associated with type 2 diabetes mellitus, with fat layer exposed ([facility] [address] )    History of amputation of lesser toe of left foot ([facility] [address] )    Diabetic polyneuropathy associated with type 2 diabetes mellitus (HCC)    Hammer toe of right foot    Cellulitis of toe, right  -     sulfamethoxazole-trimethoprim (BACTRIM DS) 800-160 mg per tablet; Take 1 tablet by mouth every 12 (twelve) hours for 10 days          Subjective: Urgent visit  Patient noticed that his second right toe was red  Patient is diabetic with history of second left toe amputation  Allergies   Allergen Reactions   • Bee Venom Anaphylaxis     Uses Epi-pen   • Doxycycline Photosensitivity         Current Outpatient Medications:   •  sulfamethoxazole-trimethoprim (BACTRIM DS) 800-160 mg per tablet, Take 1 tablet by mouth every 12 (twelve) hours for 10 days, Disp: 20 tablet, Rfl: 0  •  allopurinol (ZYLOPRIM) 300 mg tablet, Take 1 tablet (300 mg total) by mouth daily, Disp: 90 tablet, Rfl: 1  •  amLODIPine (NORVASC) 5 mg tablet, Take 1 tablet (5 mg total) by mouth 2 (two) times a day, Disp: 180 tablet, Rfl: 1  •  atorvastatin (LIPITOR) 40 mg tablet, Take 1 tablet (40 mg total) by mouth daily at bedtime, Disp: 90 tablet, Rfl: 1  •  Blood Glucose Monitoring Suppl (OneTouch Verio Reflect) w/Device KIT, Check blood sugars twice daily  Please substitute with appropriate alternative as covered by patient's insurance   Dx: E11 65, Disp: 1 kit, Rfl: 0  •  cholecalciferol (VITAMIN D3) 1,000 units tablet, Take 1,000 Units by mouth daily at bedtime  , Disp: , Rfl:   •  clotrimazole-betamethasone (LOTRISONE) 1-0 05 % cream, Apply topically 2 (two) times a day for 10 days, Disp: 30 g, Rfl: 0  •  cyclobenzaprine (FLEXERIL) 10 mg tablet, Take 1 tablet (10 mg total) by mouth daily at bedtime, Disp: 15 tablet, Rfl: 0  •  Empagliflozin (Jardiance) 25 MG TABS, Take 1 tablet (25 mg total) by mouth every morning Lot # [id number] exp 10/22, Disp: 90 tablet, Rfl: 1  •  fenofibrate 160 MG tablet, Take 1 tablet (160 mg total) by mouth every morning, Disp: 90 tablet, Rfl: 1  •  glucose blood (OneTouch Verio) test strip, Check blood sugars twice daily  Please substitute with appropriate alternative as covered by patient's insurance   Dx: E11 65, Disp: 200 each, Rfl: 3  •  hydrochlorothiazide (HYDRODIURIL) 50 mg tablet, Take 1 tablet (50 mg total) by mouth daily, Disp: 90 tablet, Rfl: 1  •  Insulin Glargine Solostar (Lantus SoloStar) 100 UNIT/ML SOPN, Inject 0 2 mL (20 Units total) under the skin daily at bedtime, Disp: 18 mL, Rfl: 1  •  Insulin Pen Needle (BD Pen Needle Nelly U/F) 32G X 4 MM MISC, Use daily as directed with insulin pen, Disp: 100 each, Rfl: 3  •  labetalol (NORMODYNE) 100 mg tablet, Take 2 tablets (200 mg total) by mouth 2 (two) times a day, Disp: 180 tablet, Rfl: 1  •  lisinopril (ZESTRIL) 40 mg tablet, Take 1 tablet (40 mg total) by mouth every morning, Disp: 90 tablet, Rfl: 1  •  meloxicam (Mobic) 15 mg tablet, Take 1 tablet (15 mg total) by mouth daily, Disp: 30 tablet, Rfl: 0  •  metFORMIN (GLUCOPHAGE) 1000 MG tablet, Take 1000 mg in AM and 1500 mg in PM , Disp: 225 tablet, Rfl: 1  •  Multiple Vitamin (MULTIVITAMIN) tablet, Take 1 tablet by mouth every morning  , Disp: , Rfl:   •  mupirocin (BACTROBAN) 2 % ointment, Apply topically 2 (two) times a day for 20 days, Disp: 30 g, Rfl: 1  •  pantoprazole (PROTONIX) 40 mg tablet, Take 1 tablet (40 mg total) by mouth daily, Disp: 180 tablet, Rfl: 1  •  sildenafil (VIAGRA) 100 mg tablet, TAKE ONE TABLET BY MOUTH ONE HOUR prior to intercourse, Disp: 30 tablet, Rfl: 4  •  Trulicity 4 5 GE/8 2EL injection, Inject 0 5 mL (4 5 mg total) under the skin once a week, Disp: 6 mL, Rfl: 1    Patient Active Problem List   Diagnosis   • Type 2 diabetes mellitus with both eyes affected by mild nonproliferative retinopathy without macular edema, without long-term current use of insulin ([facility])   • Hypertension   • Hyperlipidemia   • Intervertebral disc disorders with radiculopathy, lumbar region   • Pes anserine bursitis   • Acquired deformity of left foot   • Hammer toe of left foot   • Nephrolithiasis   • Chronic pain of right knee   • Elevated liver enzymes   • Morbid obesity with BMI of 40 0-44 9, adult ([facility] )   • Intertrigo          Patient ID: Kirk Wilson is a 65 y o  male  HPI    The following portions of the patient's history were reviewed and updated as appropriate:     family history includes Diabetes in his mother; Hypertension in his father and mother; No Known Problems in his sister, son, and son; Other in his mother  reports that he has never smoked  He has never used smokeless tobacco  He reports current alcohol use  He reports that he does not use drugs  Vitals:    06/20/23 1006   BP: 130/80   Resp: 17       Review of Systems      Objective:  Patient's shoes and socks removed     Foot ExamPhysical Exam         General  General Appearance: appears stated age and healthy   Orientation: alert and oriented to person, place, and time   Affect: appropriate   Gait: antalgic         Right Foot/Ankle      Inspection and Palpation  Swelling: dorsum   Hammertoes: second toe, fifth toe, fourth toe and third toe  Skin Exam: callus and dry skin;      Neurovascular  Dorsalis pedis: 3+  Posterior tibial: 3+  Saphenous nerve sensation: diminished  Tibial nerve sensation: diminished  Superficial peroneal nerve sensation: diminished  Deep peroneal nerve sensation: diminished  Sural nerve sensation: diminished        Left Foot/Ankle       Inspection and Palpation  Tenderness: metatarsals   Swelling: dorsum   Hammertoes: fifth toe, fourth toe and third toe  Skin Exam: callus, drainage, dry skin, cellulitis, skin changes, abnormal color and ulcer;      Neurovascular  Dorsalis pedis: 3+  Posterior tibial: 3+  Saphenous nerve sensation: diminished  Tibial nerve sensation: diminished  Superficial peroneal nerve sensation: diminished  Deep peroneal nerve sensation: diminished  Sural nerve sensation: diminished           Physical Exam  Vitals and nursing note reviewed  Constitutional:       Appearance: Normal appearance  Cardiovascular:      Rate and Rhythm: Normal rate and regular rhythm       Pulses: no weak pulses          Dorsalis pedis pulses are 3+ on the right side and 3+ on the left side         Posterior tibial pulses are 3+ on the right side and 3+ on the left side  Feet:      Right foot:      Skin integrity: Callus and dry skin present       Left foot:      Skin integrity: Ulcer, callus and dry skin present  Skin:     Capillary Refill: Capillary refill takes less than 2 seconds       Comments: Patient demonstrates xerosis of skin secondary to dermatophytosis     Distal end of second right toe demonstrates a Caputo grade 2 ulcer  Wound is approximately 0 5 cm² in size  Hyperkeratotic borders  Positive surrounding cellulitis  Bone not present in wound  Neurological:      Mental Status: He is alert  Psychiatric:         Mood and Affect: Mood normal          BehaviorCinthia Solares         Thought Content:  Thought content normal          Judgment: Judgment normal       Patient's shoes and socks removed      Right Foot/Ankle   Right Foot Inspection  Skin Exam: dry skin, callus and callus       Sensory   Vibration: diminished  Proprioception: diminished  Monofilament testing: diminished     Vascular  Capillary refills: < 3 seconds  The right DP pulse is 3+  The right PT pulse is 3+       Right Toe  - Comprehensive Exam  Hammertoes: second toe, fifth toe, fourth toe and third toe  Swelling: dorsum            Left Foot/Ankle  Left Foot Inspection  Skin Exam: dry skin, abnormal color, ulcer and callus       Sensory   Vibration: diminished  Proprioception: diminished  Monofilament testing: diminished     Vascular  Capillary refills: < 3 seconds  The left DP pulse is 3+   The left PT pulse is 3+       Left Toe  - Comprehensive Exam  Hammertoes: fifth toe, fourth toe and third toe  Swelling: dorsum   Tenderness: metatarsals

## 2023-06-27 ENCOUNTER — TELEPHONE (OUTPATIENT)
Dept: INTERNAL MEDICINE CLINIC | Facility: OTHER | Age: 63
End: 2023-06-27

## 2023-07-07 ENCOUNTER — OFFICE VISIT (OUTPATIENT)
Dept: PODIATRY | Facility: CLINIC | Age: 63
End: 2023-07-07
Payer: COMMERCIAL

## 2023-07-07 VITALS
DIASTOLIC BLOOD PRESSURE: 80 MMHG | RESPIRATION RATE: 17 BRPM | SYSTOLIC BLOOD PRESSURE: 130 MMHG | BODY MASS INDEX: 44.03 KG/M2 | WEIGHT: 274 LBS | HEIGHT: 66 IN

## 2023-07-07 DIAGNOSIS — L97.512 DIABETIC ULCER OF TOE OF RIGHT FOOT ASSOCIATED WITH TYPE 2 DIABETES MELLITUS, WITH FAT LAYER EXPOSED (HCC): Primary | ICD-10-CM

## 2023-07-07 DIAGNOSIS — Z89.422 HISTORY OF AMPUTATION OF LESSER TOE OF LEFT FOOT (HCC): ICD-10-CM

## 2023-07-07 DIAGNOSIS — M20.41 HAMMER TOE OF RIGHT FOOT: ICD-10-CM

## 2023-07-07 DIAGNOSIS — E11.621 DIABETIC ULCER OF TOE OF RIGHT FOOT ASSOCIATED WITH TYPE 2 DIABETES MELLITUS, WITH FAT LAYER EXPOSED (HCC): Primary | ICD-10-CM

## 2023-07-07 DIAGNOSIS — L03.031 CELLULITIS OF TOE, RIGHT: ICD-10-CM

## 2023-07-07 DIAGNOSIS — E11.42 DIABETIC POLYNEUROPATHY ASSOCIATED WITH TYPE 2 DIABETES MELLITUS (HCC): ICD-10-CM

## 2023-07-07 PROCEDURE — 99213 OFFICE O/P EST LOW 20 MIN: CPT | Performed by: PODIATRIST

## 2023-07-07 NOTE — PROGRESS NOTES
Assessment/Plan: Luetta Bamberger grade 2 ulcer distal aspect second right toe. Digital cellulitis. Diabetic neuropathy. Acquired deformity of foot. History of amputation second left toe.     Plan. Chart reviewed. Patient advised on condition. Today wound debrided of all devitalized tissue. Culture and sensitivity done. Patient be placed on Bactrim. Gentian violet dry sterile dressing applied. Patient advised on aftercare. Return for follow-up and x-ray            Diagnoses and all orders for this visit:     Diabetic ulcer of toe of right foot associated with type 2 diabetes mellitus, with fat layer exposed ([address])     History of amputation of lesser toe of left foot ([address])     Diabetic polyneuropathy associated with type 2 diabetes mellitus (HCC)     Hammer toe of right foot     Cellulitis of toe, right, resolved            Subjective: Urgent visit. Patient noticed that his second right toe was red. Patient is diabetic with history of second left toe amputation.           Allergies   Allergen Reactions   • Bee Venom Anaphylaxis       Uses Epi-pen   • Doxycycline Photosensitivity            Current Outpatient Medications:   •  sulfamethoxazole-trimethoprim (BACTRIM DS) 800-160 mg per tablet, Take 1 tablet by mouth every 12 (twelve) hours for 10 days, Disp: 20 tablet, Rfl: 0  •  allopurinol (ZYLOPRIM) 300 mg tablet, Take 1 tablet (300 mg total) by mouth daily, Disp: 90 tablet, Rfl: 1  •  amLODIPine (NORVASC) 5 mg tablet, Take 1 tablet (5 mg total) by mouth 2 (two) times a day, Disp: 180 tablet, Rfl: 1  •  atorvastatin (LIPITOR) 40 mg tablet, Take 1 tablet (40 mg total) by mouth daily at bedtime, Disp: 90 tablet, Rfl: 1  •  Blood Glucose Monitoring Suppl (OneTouch Verio Reflect) w/Device KIT, Check blood sugars twice daily. Please substitute with appropriate alternative as covered by patient's insurance.  Dx: E11.65, Disp: 1 kit, Rfl: 0  •  cholecalciferol (VITAMIN D3) 1,000 units tablet, Take 1,000 Units by mouth daily at bedtime  , Disp: , Rfl:   •  clotrimazole-betamethasone (LOTRISONE) 1-0.05 % cream, Apply topically 2 (two) times a day for 10 days, Disp: 30 g, Rfl: 0  •  cyclobenzaprine (FLEXERIL) 10 mg tablet, Take 1 tablet (10 mg total) by mouth daily at bedtime, Disp: 15 tablet, Rfl: 0  •  Empagliflozin (Jardiance) 25 MG TABS, Take 1 tablet (25 mg total) by mouth every morning Lot # [id number] exp 10/22, Disp: 90 tablet, Rfl: 1  •  fenofibrate 160 MG tablet, Take 1 tablet (160 mg total) by mouth every morning, Disp: 90 tablet, Rfl: 1  •  glucose blood (OneTouch Verio) test strip, Check blood sugars twice daily. Please substitute with appropriate alternative as covered by patient's insurance.  Dx: E11.65, Disp: 200 each, Rfl: 3  •  hydrochlorothiazide (HYDRODIURIL) 50 mg tablet, Take 1 tablet (50 mg total) by mouth daily, Disp: 90 tablet, Rfl: 1  •  Insulin Glargine Solostar (Lantus SoloStar) 100 UNIT/ML SOPN, Inject 0.2 mL (20 Units total) under the skin daily at bedtime, Disp: 18 mL, Rfl: 1  •  Insulin Pen Needle (BD Pen Needle Nelly U/F) 32G X 4 MM MISC, Use daily as directed with insulin pen, Disp: 100 each, Rfl: 3  •  labetalol (NORMODYNE) 100 mg tablet, Take 2 tablets (200 mg total) by mouth 2 (two) times a day, Disp: 180 tablet, Rfl: 1  •  lisinopril (ZESTRIL) 40 mg tablet, Take 1 tablet (40 mg total) by mouth every morning, Disp: 90 tablet, Rfl: 1  •  meloxicam (Mobic) 15 mg tablet, Take 1 tablet (15 mg total) by mouth daily, Disp: 30 tablet, Rfl: 0  •  metFORMIN (GLUCOPHAGE) 1000 MG tablet, Take 1000 mg in AM and 1500 mg in PM., Disp: 225 tablet, Rfl: 1  •  Multiple Vitamin (MULTIVITAMIN) tablet, Take 1 tablet by mouth every morning  , Disp: , Rfl:   •  mupirocin (BACTROBAN) 2 % ointment, Apply topically 2 (two) times a day for 20 days, Disp: 30 g, Rfl: 1  •  pantoprazole (PROTONIX) 40 mg tablet, Take 1 tablet (40 mg total) by mouth daily, Disp: 180 tablet, Rfl: 1  •  sildenafil (VIAGRA) 100 mg tablet, TAKE ONE TABLET BY MOUTH ONE HOUR prior to intercourse, Disp: 30 tablet, Rfl: 4  •  Trulicity 4.5 EJ/1.9EX injection, Inject 0.5 mL (4.5 mg total) under the skin once a week, Disp: 6 mL, Rfl: 1         Patient Active Problem List   Diagnosis   • Type 2 diabetes mellitus with both eyes affected by mild nonproliferative retinopathy without macular edema, without long-term current use of insulin ([facility])   • Hypertension   • Hyperlipidemia   • Intervertebral disc disorders with radiculopathy, lumbar region   • Pes anserine bursitis   • Acquired deformity of left foot   • Hammer toe of left foot   • Nephrolithiasis   • Chronic pain of right knee   • Elevated liver enzymes   • Morbid obesity with BMI of 40.0-44.9, adult ([facility])   • Intertrigo             Patient ID: Soto Cook is a 65 y.o. male.     HPI     The following portions of the patient's history were reviewed and updated as appropriate:      family history includes Diabetes in his mother; Hypertension in his father and mother; No Known Problems in his sister, son, and son; Other in his mother.       reports that he has never smoked. He has never used smokeless tobacco. He reports current alcohol use. He reports that he does not use drugs.      Objective:  Patient's shoes and socks removed.    Foot ExamPhysical Exam           General  General Appearance: appears stated age and healthy   Orientation: alert and oriented to person, place, and time   Affect: appropriate   Gait: antalgic         Right Foot/Ankle      Inspection and Palpation  Swelling: dorsum   Hammertoes: second toe, fifth toe, fourth toe and third toe  Skin Exam: callus and dry skin;      Neurovascular  Dorsalis pedis: 3+  Posterior tibial: 3+  Saphenous nerve sensation: diminished  Tibial nerve sensation: diminished  Superficial peroneal nerve sensation: diminished  Deep peroneal nerve sensation: diminished  Sural nerve sensation: diminished        Left Foot/Ankle       Inspection and Palpation  Tenderness: metatarsals   Swelling: dorsum   Hammertoes: fifth toe, fourth toe and third toe  Skin Exam: callus, drainage, dry skin, cellulitis, skin changes, abnormal color and ulcer;      Neurovascular  Dorsalis pedis: 3+  Posterior tibial: 3+  Saphenous nerve sensation: diminished  Tibial nerve sensation: diminished  Superficial peroneal nerve sensation: diminished  Deep peroneal nerve sensation: diminished  Sural nerve sensation: diminished           Physical Exam  Vitals and nursing note reviewed. Constitutional:       Appearance: Normal appearance. Cardiovascular:      Rate and Rhythm: Normal rate and regular rhythm.      Pulses: no weak pulses          Dorsalis pedis pulses are 3+ on the right side and 3+ on the left side.        Posterior tibial pulses are 3+ on the right side and 3+ on the left side. Feet:      Right foot:      Skin integrity: Callus and dry skin present.      Left foot:      Skin integrity: Ulcer, callus and dry skin present. Skin:     Capillary Refill: Capillary refill takes less than 2 seconds.      Comments: Patient demonstrates xerosis of skin secondary to dermatophytosis.    Distal end of second right toe demonstrates a Caputo grade 2 ulcer. Wound is approximately 0.5 cm² in size. Hyperkeratotic borders. Positive surrounding cellulitis. Bone not present in wound. Neurological:      Mental Status: He is alert. Psychiatric:         Mood and Affect: Mood normal.         BehaviorMarien Columbia         Thought Content: Thought content normal.         Judgment: Judgment normal.      Patient's shoes and socks removed.     Right Foot/Ankle   Right Foot Inspection  Skin Exam: dry skin, callus and callus.      Sensory   Vibration: diminished  Proprioception: diminished  Monofilament testing: diminished     Vascular  Capillary refills: < 3 seconds  The right DP pulse is 3+.  The right PT pulse is 3+.      Right Toe  - Comprehensive Exam  Hammertoes: second toe, fifth toe, fourth toe and third toe  Swelling: dorsum            Left Foot/Ankle  Left Foot Inspection  Skin Exam: dry skin, abnormal color, ulcer and callus.      Sensory   Vibration: diminished  Proprioception: diminished  Monofilament testing: diminished     Vascular  Capillary refills: < 3 seconds  The left DP pulse is 3+.  The left PT pulse is 3+.      Left Toe  - Comprehensive Exam  Hammertoes: fifth toe, fourth toe and third toe  Swelling: dorsum   Tenderness: metatarsals

## 2023-07-25 ENCOUNTER — OFFICE VISIT (OUTPATIENT)
Dept: PODIATRY | Facility: CLINIC | Age: 63
End: 2023-07-25
Payer: COMMERCIAL

## 2023-07-25 VITALS
HEIGHT: 66 IN | RESPIRATION RATE: 17 BRPM | DIASTOLIC BLOOD PRESSURE: 80 MMHG | BODY MASS INDEX: 44.03 KG/M2 | WEIGHT: 274 LBS | SYSTOLIC BLOOD PRESSURE: 130 MMHG

## 2023-07-25 DIAGNOSIS — E11.621 DIABETIC ULCER OF TOE OF RIGHT FOOT ASSOCIATED WITH TYPE 2 DIABETES MELLITUS, WITH FAT LAYER EXPOSED (HCC): Primary | ICD-10-CM

## 2023-07-25 DIAGNOSIS — E11.42 DIABETIC POLYNEUROPATHY ASSOCIATED WITH TYPE 2 DIABETES MELLITUS (HCC): ICD-10-CM

## 2023-07-25 DIAGNOSIS — L97.512 DIABETIC ULCER OF TOE OF RIGHT FOOT ASSOCIATED WITH TYPE 2 DIABETES MELLITUS, WITH FAT LAYER EXPOSED (HCC): Primary | ICD-10-CM

## 2023-07-25 DIAGNOSIS — M20.41 HAMMER TOE OF RIGHT FOOT: ICD-10-CM

## 2023-07-25 DIAGNOSIS — Z89.422 HISTORY OF AMPUTATION OF LESSER TOE OF LEFT FOOT (HCC): ICD-10-CM

## 2023-07-25 DIAGNOSIS — L03.031 CELLULITIS OF TOE, RIGHT: ICD-10-CM

## 2023-07-25 PROCEDURE — 99213 OFFICE O/P EST LOW 20 MIN: CPT | Performed by: PODIATRIST

## 2023-07-25 NOTE — PROGRESS NOTES
Assessment/Plan: Caputo grade 2 ulcer distal aspect second right toe.  Digital cellulitis.  Diabetic neuropathy.  Acquired deformity of foot.  History of amputation second left toe.     Plan.  Chart reviewed.  Patient advised on condition.  Today wound debrided of all devitalized tissue.  Culture and sensitivity done.  Patient be placed on Bactrim.  Gentian violet dry sterile dressing applied.  Patient advised on aftercare. Cata Simpson for follow-up and x-ray            Diagnoses and all orders for this visit:     Diabetic ulcer of toe of right foot associated with type 2 diabetes mellitus, with fat layer exposed ([address])     History of amputation of lesser toe of left foot ([address])     Diabetic polyneuropathy associated with type 2 diabetes mellitus ([address])     Hammer toe of right foot     Cellulitis of toe, right, resolved            Subjective: Urgent visit. Narcisa Yeager noticed that his second right toe was red.  Patient is diabetic with history of second left toe amputation.               Allergies   Allergen Reactions   • Bee Venom Anaphylaxis       Uses Epi-pen   • Doxycycline Photosensitivity            Current Outpatient Medications:   •  sulfamethoxazole-trimethoprim (BACTRIM DS) 800-160 mg per tablet, Take 1 tablet by mouth every 12 (twelve) hours for 10 days, Disp: 20 tablet, Rfl: 0  •  allopurinol (ZYLOPRIM) 300 mg tablet, Take 1 tablet (300 mg total) by mouth daily, Disp: 90 tablet, Rfl: 1  •  amLODIPine (NORVASC) 5 mg tablet, Take 1 tablet (5 mg total) by mouth 2 (two) times a day, Disp: 180 tablet, Rfl: 1  •  atorvastatin (LIPITOR) 40 mg tablet, Take 1 tablet (40 mg total) by mouth daily at bedtime, Disp: 90 tablet, Rfl: 1  •  Blood Glucose Monitoring Suppl (OneTouch Verio Reflect) w/Device KIT, Check blood sugars twice daily. Please substitute with appropriate alternative as covered by patient's insurance.  Dx: E11.65, Disp: 1 kit, Rfl: 0  •  cholecalciferol (VITAMIN D3) 1,000 units tablet, Take 1,000 Units by mouth daily at bedtime  , Disp: , Rfl:   •  clotrimazole-betamethasone (LOTRISONE) 1-0.05 % cream, Apply topically 2 (two) times a day for 10 days, Disp: 30 g, Rfl: 0  •  cyclobenzaprine (FLEXERIL) 10 mg tablet, Take 1 tablet (10 mg total) by mouth daily at bedtime, Disp: 15 tablet, Rfl: 0  •  Empagliflozin (Jardiance) 25 MG TABS, Take 1 tablet (25 mg total) by mouth every morning Lot # [id number] exp 10/22, Disp: 90 tablet, Rfl: 1  •  fenofibrate 160 MG tablet, Take 1 tablet (160 mg total) by mouth every morning, Disp: 90 tablet, Rfl: 1  •  glucose blood (OneTouch Verio) test strip, Check blood sugars twice daily. Please substitute with appropriate alternative as covered by patient's insurance.  Dx: E11.65, Disp: 200 each, Rfl: 3  •  hydrochlorothiazide (HYDRODIURIL) 50 mg tablet, Take 1 tablet (50 mg total) by mouth daily, Disp: 90 tablet, Rfl: 1  •  Insulin Glargine Solostar (Lantus SoloStar) 100 UNIT/ML SOPN, Inject 0.2 mL (20 Units total) under the skin daily at bedtime, Disp: 18 mL, Rfl: 1  •  Insulin Pen Needle (BD Pen Needle Nelly U/F) 32G X 4 MM MISC, Use daily as directed with insulin pen, Disp: 100 each, Rfl: 3  •  labetalol (NORMODYNE) 100 mg tablet, Take 2 tablets (200 mg total) by mouth 2 (two) times a day, Disp: 180 tablet, Rfl: 1  •  lisinopril (ZESTRIL) 40 mg tablet, Take 1 tablet (40 mg total) by mouth every morning, Disp: 90 tablet, Rfl: 1  •  meloxicam (Mobic) 15 mg tablet, Take 1 tablet (15 mg total) by mouth daily, Disp: 30 tablet, Rfl: 0  •  metFORMIN (GLUCOPHAGE) 1000 MG tablet, Take 1000 mg in AM and 1500 mg in PM., Disp: 225 tablet, Rfl: 1  •  Multiple Vitamin (MULTIVITAMIN) tablet, Take 1 tablet by mouth every morning  , Disp: , Rfl:   •  mupirocin (BACTROBAN) 2 % ointment, Apply topically 2 (two) times a day for 20 days, Disp: 30 g, Rfl: 1  •  pantoprazole (PROTONIX) 40 mg tablet, Take 1 tablet (40 mg total) by mouth daily, Disp: 180 tablet, Rfl: 1  •  sildenafil (VIAGRA) 100 mg tablet, TAKE ONE TABLET BY MOUTH ONE HOUR prior to intercourse, Disp: 30 tablet, Rfl: 4  •  Trulicity 4.5 SUMMERS/8.3QW injection, Inject 0.5 mL (4.5 mg total) under the skin once a week, Disp: 6 mL, Rfl: 1           Patient Active Problem List   Diagnosis   • Type 2 diabetes mellitus with both eyes affected by mild nonproliferative retinopathy without macular edema, without long-term current use of insulin ([facility])   • Hypertension   • Hyperlipidemia   • Intervertebral disc disorders with radiculopathy, lumbar region   • Pes anserine bursitis   • Acquired deformity of left foot   • Hammer toe of left foot   • Nephrolithiasis   • Chronic pain of right knee   • Elevated liver enzymes   • Morbid obesity with BMI of 40.0-44.9, adult ([facility])   • Intertrigo             Patient ID: Pawan Fraga is a 59 y. o. male.     HPI     The following portions of the patient's history were reviewed and updated as appropriate:      family history includes Diabetes in his mother; Hypertension in his father and mother; No Known Problems in his sister, son, and son; Other in his mother.       reports that he has never smoked. He has never used smokeless tobacco. He reports current alcohol use.  He reports that he does not use drugs.      Objective:  Patient's shoes and socks removed.   Foot ExamPhysical Exam           General  General Appearance: appears stated age and healthy   Orientation: alert and oriented to person, place, and time   Affect: appropriate   Gait: antalgic         Right Foot/Ankle      Inspection and Palpation  Swelling: dorsum   Hammertoes: second toe, fifth toe, fourth toe and third toe  Skin Exam: callus and dry skin;      Neurovascular  Dorsalis pedis: 3+  Posterior tibial: 3+  Saphenous nerve sensation: diminished  Tibial nerve sensation: diminished  Superficial peroneal nerve sensation: diminished  Deep peroneal nerve sensation: diminished  Sural nerve sensation: diminished        Left Foot/Ankle       Inspection and Palpation  Tenderness: metatarsals   Swelling: dorsum   Hammertoes: fifth toe, fourth toe and third toe  Skin Exam: callus, drainage, dry skin, cellulitis, skin changes, abnormal color and ulcer;      Neurovascular  Dorsalis pedis: 3+  Posterior tibial: 3+  Saphenous nerve sensation: diminished  Tibial nerve sensation: diminished  Superficial peroneal nerve sensation: diminished  Deep peroneal nerve sensation: diminished  Sural nerve sensation: diminished           Physical Exam  Vitals and nursing note reviewed. Constitutional:       Appearance: Normal appearance. Cardiovascular:      Rate and Rhythm: Normal rate and regular rhythm.      Pulses: no weak pulses          Dorsalis pedis pulses are 3+ on the right side and 3+ on the left side.        Posterior tibial pulses are 3+ on the right side and 3+ on the left side. Feet:      Right foot:      Skin integrity: Callus and dry skin present.      Left foot:      Skin integrity: Ulcer, callus and dry skin present. Skin:     Capillary Refill: Capillary refill takes less than 2 seconds.      Comments: Patient demonstrates xerosis of skin secondary to dermatophytosis.    Distal end of second right toe demonstrates a Caputo grade 2 ulcer.  Wound is approximately 0.5 cm² in size.  Hyperkeratotic borders.  Positive surrounding cellulitis.  Bone not present in wound. Neurological:      Mental Status: He is alert. Psychiatric:         Mood and Affect: Mood normal.         BehaviorCosmo Donavan         Thought Content: Thought content normal.         Judgment: Judgment normal.      Patient's shoes and socks removed.     Right Foot/Ankle   Right Foot Inspection  Skin Exam: dry skin, callus and callus.      Sensory   Vibration: diminished  Proprioception: diminished  Monofilament testing: diminished     Vascular  Capillary refills: < 3 seconds  The right DP pulse is 3+.  The right PT pulse is 3+.      Right Toe  - Comprehensive Exam  Hammertoes: second toe, fifth toe, fourth toe and third toe  Swelling: dorsum            Left Foot/Ankle  Left Foot Inspection  Skin Exam: dry skin, abnormal color, ulcer and callus.      Sensory   Vibration: diminished  Proprioception: diminished  Monofilament testing: diminished     Vascular  Capillary refills: < 3 seconds  The left DP pulse is 3+.  The left PT pulse is 3+.      Left Toe  - Comprehensive Exam  Hammertoes: fifth toe, fourth toe and third toe  Swelling: dorsum   Tenderness: metatarsals

## 2023-08-28 ENCOUNTER — OFFICE VISIT (OUTPATIENT)
Dept: PODIATRY | Facility: CLINIC | Age: 63
End: 2023-08-28
Payer: COMMERCIAL

## 2023-08-28 VITALS
WEIGHT: 274 LBS | HEIGHT: 66 IN | BODY MASS INDEX: 44.03 KG/M2 | DIASTOLIC BLOOD PRESSURE: 80 MMHG | SYSTOLIC BLOOD PRESSURE: 130 MMHG | RESPIRATION RATE: 16 BRPM

## 2023-08-28 DIAGNOSIS — M20.41 HAMMER TOE OF RIGHT FOOT: ICD-10-CM

## 2023-08-28 DIAGNOSIS — L97.512 DIABETIC ULCER OF TOE OF RIGHT FOOT ASSOCIATED WITH TYPE 2 DIABETES MELLITUS, WITH FAT LAYER EXPOSED (HCC): Primary | ICD-10-CM

## 2023-08-28 DIAGNOSIS — L03.031 CELLULITIS OF TOE, RIGHT: ICD-10-CM

## 2023-08-28 DIAGNOSIS — E11.621 DIABETIC ULCER OF TOE OF RIGHT FOOT ASSOCIATED WITH TYPE 2 DIABETES MELLITUS, WITH FAT LAYER EXPOSED (HCC): Primary | ICD-10-CM

## 2023-08-28 DIAGNOSIS — Z89.422 HISTORY OF AMPUTATION OF LESSER TOE OF LEFT FOOT (HCC): ICD-10-CM

## 2023-08-28 DIAGNOSIS — E11.42 DIABETIC POLYNEUROPATHY ASSOCIATED WITH TYPE 2 DIABETES MELLITUS (HCC): ICD-10-CM

## 2023-08-28 PROCEDURE — 99213 OFFICE O/P EST LOW 20 MIN: CPT | Performed by: PODIATRIST

## 2023-08-28 NOTE — PROGRESS NOTES
Assessment/Plan: Caputo grade 2 ulcer distal aspect second right toe.  Digital cellulitis.  Diabetic neuropathy.  Acquired deformity of foot.  History of amputation second left toe.     Plan.  Chart reviewed.  Patient advised on condition.  Today wound debrided of all devitalized tissue.  Culture and sensitivity done.  Patient be placed on Bactrim.  Gentian violet dry sterile dressing applied.  Patient advised on aftercare. Keyla Bradley for follow-up and x-ray            Diagnoses and all orders for this visit:     Diabetic ulcer of toe of right foot associated with type 2 diabetes mellitus, with fat layer exposed ([address])     History of amputation of lesser toe of left foot ([address])     Diabetic polyneuropathy associated with type 2 diabetes mellitus ([address])     Hammer toe of right foot     Cellulitis of toe, right, resolved            Subjective: Urgent visit. Denise Luevano noticed that his second right toe was red.  Patient is diabetic with history of second left toe amputation.               Allergies   Allergen Reactions   • Bee Venom Anaphylaxis       Uses Epi-pen   • Doxycycline Photosensitivity            Current Outpatient Medications:   •  sulfamethoxazole-trimethoprim (BACTRIM DS) 800-160 mg per tablet, Take 1 tablet by mouth every 12 (twelve) hours for 10 days, Disp: 20 tablet, Rfl: 0  •  allopurinol (ZYLOPRIM) 300 mg tablet, Take 1 tablet (300 mg total) by mouth daily, Disp: 90 tablet, Rfl: 1  •  amLODIPine (NORVASC) 5 mg tablet, Take 1 tablet (5 mg total) by mouth 2 (two) times a day, Disp: 180 tablet, Rfl: 1  •  atorvastatin (LIPITOR) 40 mg tablet, Take 1 tablet (40 mg total) by mouth daily at bedtime, Disp: 90 tablet, Rfl: 1  •  Blood Glucose Monitoring Suppl (OneTouch Verio Reflect) w/Device KIT, Check blood sugars twice daily. Please substitute with appropriate alternative as covered by patient's insurance.  Dx: E11.65, Disp: 1 kit, Rfl: 0  •  cholecalciferol (VITAMIN D3) 1,000 units tablet, Take 1,000 Units by mouth daily at bedtime  , Disp: , Rfl:   •  clotrimazole-betamethasone (LOTRISONE) 1-0.05 % cream, Apply topically 2 (two) times a day for 10 days, Disp: 30 g, Rfl: 0  •  cyclobenzaprine (FLEXERIL) 10 mg tablet, Take 1 tablet (10 mg total) by mouth daily at bedtime, Disp: 15 tablet, Rfl: 0  •  Empagliflozin (Jardiance) 25 MG TABS, Take 1 tablet (25 mg total) by mouth every morning Lot # [id number] exp 10/22, Disp: 90 tablet, Rfl: 1  •  fenofibrate 160 MG tablet, Take 1 tablet (160 mg total) by mouth every morning, Disp: 90 tablet, Rfl: 1  •  glucose blood (OneTouch Verio) test strip, Check blood sugars twice daily. Please substitute with appropriate alternative as covered by patient's insurance.  Dx: E11.65, Disp: 200 each, Rfl: 3  •  hydrochlorothiazide (HYDRODIURIL) 50 mg tablet, Take 1 tablet (50 mg total) by mouth daily, Disp: 90 tablet, Rfl: 1  •  Insulin Glargine Solostar (Lantus SoloStar) 100 UNIT/ML SOPN, Inject 0.2 mL (20 Units total) under the skin daily at bedtime, Disp: 18 mL, Rfl: 1  •  Insulin Pen Needle (BD Pen Needle Nelly U/F) 32G X 4 MM MISC, Use daily as directed with insulin pen, Disp: 100 each, Rfl: 3  •  labetalol (NORMODYNE) 100 mg tablet, Take 2 tablets (200 mg total) by mouth 2 (two) times a day, Disp: 180 tablet, Rfl: 1  •  lisinopril (ZESTRIL) 40 mg tablet, Take 1 tablet (40 mg total) by mouth every morning, Disp: 90 tablet, Rfl: 1  •  meloxicam (Mobic) 15 mg tablet, Take 1 tablet (15 mg total) by mouth daily, Disp: 30 tablet, Rfl: 0  •  metFORMIN (GLUCOPHAGE) 1000 MG tablet, Take 1000 mg in AM and 1500 mg in PM., Disp: 225 tablet, Rfl: 1  •  Multiple Vitamin (MULTIVITAMIN) tablet, Take 1 tablet by mouth every morning  , Disp: , Rfl:   •  mupirocin (BACTROBAN) 2 % ointment, Apply topically 2 (two) times a day for 20 days, Disp: 30 g, Rfl: 1  •  pantoprazole (PROTONIX) 40 mg tablet, Take 1 tablet (40 mg total) by mouth daily, Disp: 180 tablet, Rfl: 1  •  sildenafil (VIAGRA) 100 mg tablet, TAKE ONE TABLET BY MOUTH ONE HOUR prior to intercourse, Disp: 30 tablet, Rfl: 4  •  Trulicity 4.5 EA/4.8QU injection, Inject 0.5 mL (4.5 mg total) under the skin once a week, Disp: 6 mL, Rfl: 1           Patient Active Problem List   Diagnosis   • Type 2 diabetes mellitus with both eyes affected by mild nonproliferative retinopathy without macular edema, without long-term current use of insulin ([facility])   • Hypertension   • Hyperlipidemia   • Intervertebral disc disorders with radiculopathy, lumbar region   • Pes anserine bursitis   • Acquired deformity of left foot   • Hammer toe of left foot   • Nephrolithiasis   • Chronic pain of right knee   • Elevated liver enzymes   • Morbid obesity with BMI of 40.0-44.9, adult ([facility])   • Intertrigo             Patient ID: Pawan Fraga is a 59 y. o. male.     HPI     The following portions of the patient's history were reviewed and updated as appropriate:      family history includes Diabetes in his mother; Hypertension in his father and mother; No Known Problems in his sister, son, and son; Other in his mother.       reports that he has never smoked. He has never used smokeless tobacco. He reports current alcohol use.  He reports that he does not use drugs.      Objective:  Patient's shoes and socks removed.   Foot ExamPhysical Exam           General  General Appearance: appears stated age and healthy   Orientation: alert and oriented to person, place, and time   Affect: appropriate   Gait: antalgic         Right Foot/Ankle      Inspection and Palpation  Swelling: dorsum   Hammertoes: second toe, fifth toe, fourth toe and third toe  Skin Exam: callus and dry skin;      Neurovascular  Dorsalis pedis: 3+  Posterior tibial: 3+  Saphenous nerve sensation: diminished  Tibial nerve sensation: diminished  Superficial peroneal nerve sensation: diminished  Deep peroneal nerve sensation: diminished  Sural nerve sensation: diminished        Left Foot/Ankle       Inspection and Palpation  Tenderness: metatarsals   Swelling: dorsum   Hammertoes: fifth toe, fourth toe and third toe  Skin Exam: callus, drainage, dry skin, cellulitis, skin changes, abnormal color and ulcer;      Neurovascular  Dorsalis pedis: 3+  Posterior tibial: 3+  Saphenous nerve sensation: diminished  Tibial nerve sensation: diminished  Superficial peroneal nerve sensation: diminished  Deep peroneal nerve sensation: diminished  Sural nerve sensation: diminished           Physical Exam  Vitals and nursing note reviewed. Constitutional:       Appearance: Normal appearance. Cardiovascular:      Rate and Rhythm: Normal rate and regular rhythm.      Pulses: no weak pulses          Dorsalis pedis pulses are 3+ on the right side and 3+ on the left side.        Posterior tibial pulses are 3+ on the right side and 3+ on the left side. Feet:      Right foot:      Skin integrity: Callus and dry skin present.      Left foot:      Skin integrity: Ulcer, callus and dry skin present. Skin:     Capillary Refill: Capillary refill takes less than 2 seconds.      Comments: Patient demonstrates xerosis of skin secondary to dermatophytosis.    Distal end of second right toe demonstrates a Caputo grade 2 ulcer.  Wound is approximately 0.5 cm² in size.  Hyperkeratotic borders.  Positive surrounding cellulitis.  Bone not present in wound. Neurological:      Mental Status: He is alert. Psychiatric:         Mood and Affect: Mood normal.         BehaviorCleotilde Beam         Thought Content: Thought content normal.         Judgment: Judgment normal.      Patient's shoes and socks removed.     Right Foot/Ankle   Right Foot Inspection  Skin Exam: dry skin, callus and callus.      Sensory   Vibration: diminished  Proprioception: diminished  Monofilament testing: diminished     Vascular  Capillary refills: < 3 seconds  The right DP pulse is 3+.  The right PT pulse is 3+.      Right Toe  - Comprehensive Exam  Hammertoes: second toe, fifth toe, fourth toe and third toe  Swelling: dorsum            Left Foot/Ankle  Left Foot Inspection  Skin Exam: dry skin, abnormal color, ulcer and callus.      Sensory   Vibration: diminished  Proprioception: diminished  Monofilament testing: diminished     Vascular  Capillary refills: < 3 seconds  The left DP pulse is 3+.  The left PT pulse is 3+.      Left Toe  - Comprehensive Exam  Hammertoes: fifth toe, fourth toe and third toe  Swelling: dorsum   Tenderness: metatarsals

## 2023-09-10 DIAGNOSIS — E11.9 TYPE 2 DIABETES MELLITUS WITHOUT COMPLICATION, WITHOUT LONG-TERM CURRENT USE OF INSULIN (HCC): ICD-10-CM

## 2023-09-11 RX ORDER — INSULIN GLARGINE 100 [IU]/ML
20 INJECTION, SOLUTION SUBCUTANEOUS
Refills: 1 | OUTPATIENT
Start: 2023-09-11

## 2023-10-06 ENCOUNTER — OFFICE VISIT (OUTPATIENT)
Age: 63
End: 2023-10-06
Payer: COMMERCIAL

## 2023-10-06 VITALS
RESPIRATION RATE: 16 BRPM | HEART RATE: 78 BPM | HEIGHT: 66 IN | WEIGHT: 281 LBS | DIASTOLIC BLOOD PRESSURE: 86 MMHG | BODY MASS INDEX: 45.16 KG/M2 | SYSTOLIC BLOOD PRESSURE: 141 MMHG

## 2023-10-06 DIAGNOSIS — Z89.422 HISTORY OF AMPUTATION OF LESSER TOE OF LEFT FOOT (HCC): ICD-10-CM

## 2023-10-06 DIAGNOSIS — E11.621 DIABETIC ULCER OF TOE OF RIGHT FOOT ASSOCIATED WITH TYPE 2 DIABETES MELLITUS, WITH FAT LAYER EXPOSED (HCC): Primary | ICD-10-CM

## 2023-10-06 DIAGNOSIS — E11.42 DIABETIC POLYNEUROPATHY ASSOCIATED WITH TYPE 2 DIABETES MELLITUS (HCC): ICD-10-CM

## 2023-10-06 DIAGNOSIS — M21.961 ACQUIRED DEFORMITY OF RIGHT FOOT: ICD-10-CM

## 2023-10-06 DIAGNOSIS — L97.512 DIABETIC ULCER OF TOE OF RIGHT FOOT ASSOCIATED WITH TYPE 2 DIABETES MELLITUS, WITH FAT LAYER EXPOSED (HCC): Primary | ICD-10-CM

## 2023-10-06 DIAGNOSIS — M20.41 HAMMER TOE OF RIGHT FOOT: ICD-10-CM

## 2023-10-06 PROCEDURE — 99213 OFFICE O/P EST LOW 20 MIN: CPT | Performed by: PODIATRIST

## 2023-10-06 NOTE — PROGRESS NOTES
Assessment/Plan: Caputo grade 2 ulcer distal aspect second right toe.  Digital cellulitis.  Diabetic neuropathy.  Acquired deformity of foot.  History of amputation second left toe.     Plan.  Chart reviewed.  Patient advised on condition.  Today wound debrided of all devitalized tissue.  Culture and sensitivity done.  Patient be placed on Bactrim.  Gentian violet dry sterile dressing applied.  Patient advised on aftercare. Barbara Oden for follow-up and x-ray. At this time patient is considering toe amputation.            Diagnoses and all orders for this visit:     Diabetic ulcer of toe of right foot associated with type 2 diabetes mellitus, with fat layer exposed ([address])     History of amputation of lesser toe of left foot ([address])     Diabetic polyneuropathy associated with type 2 diabetes mellitus (HCC)     Hammer toe of right foot     Cellulitis of toe, right, resolved            Subjective: Urgent visit. Jaskaran Correa noticed that his second right toe was red.  Patient is diabetic with history of second left toe amputation.               Allergies   Allergen Reactions   • Bee Venom Anaphylaxis       Uses Epi-pen   • Doxycycline Photosensitivity            Current Outpatient Medications:   •  sulfamethoxazole-trimethoprim (BACTRIM DS) 800-160 mg per tablet, Take 1 tablet by mouth every 12 (twelve) hours for 10 days, Disp: 20 tablet, Rfl: 0  •  allopurinol (ZYLOPRIM) 300 mg tablet, Take 1 tablet (300 mg total) by mouth daily, Disp: 90 tablet, Rfl: 1  •  amLODIPine (NORVASC) 5 mg tablet, Take 1 tablet (5 mg total) by mouth 2 (two) times a day, Disp: 180 tablet, Rfl: 1  •  atorvastatin (LIPITOR) 40 mg tablet, Take 1 tablet (40 mg total) by mouth daily at bedtime, Disp: 90 tablet, Rfl: 1  •  Blood Glucose Monitoring Suppl (OneTouch Verio Reflect) w/Device KIT, Check blood sugars twice daily. Please substitute with appropriate alternative as covered by patient's insurance.  Dx: E11.65, Disp: 1 kit, Rfl: 0  •  cholecalciferol (VITAMIN D3) 1,000 units tablet, Take 1,000 Units by mouth daily at bedtime  , Disp: , Rfl:   •  clotrimazole-betamethasone (LOTRISONE) 1-0.05 % cream, Apply topically 2 (two) times a day for 10 days, Disp: 30 g, Rfl: 0  •  cyclobenzaprine (FLEXERIL) 10 mg tablet, Take 1 tablet (10 mg total) by mouth daily at bedtime, Disp: 15 tablet, Rfl: 0  •  Empagliflozin (Jardiance) 25 MG TABS, Take 1 tablet (25 mg total) by mouth every morning Lot # [id number] exp 10/22, Disp: 90 tablet, Rfl: 1  •  fenofibrate 160 MG tablet, Take 1 tablet (160 mg total) by mouth every morning, Disp: 90 tablet, Rfl: 1  •  glucose blood (OneTouch Verio) test strip, Check blood sugars twice daily. Please substitute with appropriate alternative as covered by patient's insurance.  Dx: E11.65, Disp: 200 each, Rfl: 3  •  hydrochlorothiazide (HYDRODIURIL) 50 mg tablet, Take 1 tablet (50 mg total) by mouth daily, Disp: 90 tablet, Rfl: 1  •  Insulin Glargine Solostar (Lantus SoloStar) 100 UNIT/ML SOPN, Inject 0.2 mL (20 Units total) under the skin daily at bedtime, Disp: 18 mL, Rfl: 1  •  Insulin Pen Needle (BD Pen Needle Nelly U/F) 32G X 4 MM MISC, Use daily as directed with insulin pen, Disp: 100 each, Rfl: 3  •  labetalol (NORMODYNE) 100 mg tablet, Take 2 tablets (200 mg total) by mouth 2 (two) times a day, Disp: 180 tablet, Rfl: 1  •  lisinopril (ZESTRIL) 40 mg tablet, Take 1 tablet (40 mg total) by mouth every morning, Disp: 90 tablet, Rfl: 1  •  meloxicam (Mobic) 15 mg tablet, Take 1 tablet (15 mg total) by mouth daily, Disp: 30 tablet, Rfl: 0  •  metFORMIN (GLUCOPHAGE) 1000 MG tablet, Take 1000 mg in AM and 1500 mg in PM., Disp: 225 tablet, Rfl: 1  •  Multiple Vitamin (MULTIVITAMIN) tablet, Take 1 tablet by mouth every morning  , Disp: , Rfl:   •  mupirocin (BACTROBAN) 2 % ointment, Apply topically 2 (two) times a day for 20 days, Disp: 30 g, Rfl: 1  •  pantoprazole (PROTONIX) 40 mg tablet, Take 1 tablet (40 mg total) by mouth daily, Disp: 180 tablet, Rfl: 1  •  sildenafil (VIAGRA) 100 mg tablet, TAKE ONE TABLET BY MOUTH ONE HOUR prior to intercourse, Disp: 30 tablet, Rfl: 4  •  Trulicity 4.5 WI/7.8QE injection, Inject 0.5 mL (4.5 mg total) under the skin once a week, Disp: 6 mL, Rfl: 1           Patient Active Problem List   Diagnosis   • Type 2 diabetes mellitus with both eyes affected by mild nonproliferative retinopathy without macular edema, without long-term current use of insulin ([facility])   • Hypertension   • Hyperlipidemia   • Intervertebral disc disorders with radiculopathy, lumbar region   • Pes anserine bursitis   • Acquired deformity of left foot   • Hammer toe of left foot   • Nephrolithiasis   • Chronic pain of right knee   • Elevated liver enzymes   • Morbid obesity with BMI of 40.0-44.9, adult ([facility])   • Intertrigo             Patient ID: Pawan Fraga is a 63 y. o. male.     HPI     The following portions of the patient's history were reviewed and updated as appropriate:      family history includes Diabetes in his mother; Hypertension in his father and mother; No Known Problems in his sister, son, and son; Other in his mother.       reports that he has never smoked. He has never used smokeless tobacco. He reports current alcohol use.  He reports that he does not use drugs.      Objective:  Patient's shoes and socks removed.   Foot ExamPhysical Exam           General  General Appearance: appears stated age and healthy   Orientation: alert and oriented to person, place, and time   Affect: appropriate   Gait: antalgic         Right Foot/Ankle      Inspection and Palpation  Swelling: dorsum   Hammertoes: second toe, fifth toe, fourth toe and third toe  Skin Exam: callus and dry skin;      Neurovascular  Dorsalis pedis: 3+  Posterior tibial: 3+  Saphenous nerve sensation: diminished  Tibial nerve sensation: diminished  Superficial peroneal nerve sensation: diminished  Deep peroneal nerve sensation: diminished  Sural nerve sensation: diminished        Left Foot/Ankle       Inspection and Palpation  Tenderness: metatarsals   Swelling: dorsum   Hammertoes: fifth toe, fourth toe and third toe  Skin Exam: callus, drainage, dry skin, cellulitis, skin changes, abnormal color and ulcer;      Neurovascular  Dorsalis pedis: 3+  Posterior tibial: 3+  Saphenous nerve sensation: diminished  Tibial nerve sensation: diminished  Superficial peroneal nerve sensation: diminished  Deep peroneal nerve sensation: diminished  Sural nerve sensation: diminished           Physical Exam  Vitals and nursing note reviewed. Constitutional:       Appearance: Normal appearance. Cardiovascular:      Rate and Rhythm: Normal rate and regular rhythm.      Pulses: no weak pulses          Dorsalis pedis pulses are 3+ on the right side and 3+ on the left side.        Posterior tibial pulses are 3+ on the right side and 3+ on the left side. Feet:      Right foot:      Skin integrity: Callus and dry skin present.      Left foot:      Skin integrity: Ulcer, callus and dry skin present. Skin:     Capillary Refill: Capillary refill takes less than 2 seconds.      Comments: Patient demonstrates xerosis of skin secondary to dermatophytosis.    Distal end of second right toe demonstrates a Caputo grade 2 ulcer.  Wound is approximately 0.5 cm² in size.  Hyperkeratotic borders.  Positive surrounding cellulitis.  Bone not present in wound. Neurological:      Mental Status: He is alert. Psychiatric:         Mood and Affect: Mood normal.         BehaviorVioletta Chloé         Thought Content: Thought content normal.         Judgment: Judgment normal.      Patient's shoes and socks removed.     Right Foot/Ankle   Right Foot Inspection  Skin Exam: dry skin, callus and callus.      Sensory   Vibration: diminished  Proprioception: diminished  Monofilament testing: diminished     Vascular  Capillary refills: < 3 seconds  The right DP pulse is 3+.  The right PT pulse is 3+.      Right Toe  - Comprehensive Exam  Hammertoes: second toe, fifth toe, fourth toe and third toe  Swelling: dorsum            Left Foot/Ankle  Left Foot Inspection  Skin Exam: dry skin, abnormal color, ulcer and callus.      Sensory   Vibration: diminished  Proprioception: diminished  Monofilament testing: diminished     Vascular  Capillary refills: < 3 seconds  The left DP pulse is 3+.  The left PT pulse is 3+.      Left Toe  - Comprehensive Exam  Hammertoes: fifth toe, fourth toe and third toe  Swelling: dorsum   Tenderness: metatarsals

## 2023-10-10 DIAGNOSIS — N52.1 ERECTILE DYSFUNCTION DUE TO DISEASES CLASSIFIED ELSEWHERE: ICD-10-CM

## 2023-10-11 RX ORDER — SILDENAFIL 100 MG/1
TABLET, FILM COATED ORAL
Qty: 30 TABLET | Refills: 4 | Status: SHIPPED | OUTPATIENT
Start: 2023-10-11

## 2023-11-14 ENCOUNTER — OFFICE VISIT (OUTPATIENT)
Age: 63
End: 2023-11-14
Payer: COMMERCIAL

## 2023-11-14 VITALS
WEIGHT: 281 LBS | DIASTOLIC BLOOD PRESSURE: 83 MMHG | HEIGHT: 66 IN | RESPIRATION RATE: 16 BRPM | SYSTOLIC BLOOD PRESSURE: 141 MMHG | BODY MASS INDEX: 45.16 KG/M2

## 2023-11-14 DIAGNOSIS — M21.961 ACQUIRED DEFORMITY OF RIGHT FOOT: ICD-10-CM

## 2023-11-14 DIAGNOSIS — Z89.422 HISTORY OF AMPUTATION OF LESSER TOE OF LEFT FOOT (HCC): ICD-10-CM

## 2023-11-14 DIAGNOSIS — L97.512 DIABETIC ULCER OF TOE OF RIGHT FOOT ASSOCIATED WITH TYPE 2 DIABETES MELLITUS, WITH FAT LAYER EXPOSED (HCC): Primary | ICD-10-CM

## 2023-11-14 DIAGNOSIS — E11.621 DIABETIC ULCER OF TOE OF RIGHT FOOT ASSOCIATED WITH TYPE 2 DIABETES MELLITUS, WITH FAT LAYER EXPOSED (HCC): Primary | ICD-10-CM

## 2023-11-14 DIAGNOSIS — M20.41 HAMMER TOE OF RIGHT FOOT: ICD-10-CM

## 2023-11-14 DIAGNOSIS — E11.42 DIABETIC POLYNEUROPATHY ASSOCIATED WITH TYPE 2 DIABETES MELLITUS (HCC): ICD-10-CM

## 2023-11-14 PROCEDURE — 99213 OFFICE O/P EST LOW 20 MIN: CPT | Performed by: PODIATRIST

## 2023-11-14 NOTE — PROGRESS NOTES
Assessment/Plan: Alexandra Novel grade 2 ulcer distal aspect second right toe. Digital cellulitis. Diabetic neuropathy. Acquired deformity of foot. History of amputation second left toe. Plan. Chart reviewed. Patient advised on condition. Today wound debrided of all devitalized tissue. Culture and sensitivity done. Patient be placed on Bactrim. Gentian violet dry sterile dressing applied. Patient advised on aftercare. Return for follow-up and x-ray. At this time patient is considering toe amputation. Aftercare instruction given. Return for follow-up. Watch for signs of infection. Diagnoses and all orders for this visit:     Diabetic ulcer of toe of right foot associated with type 2 diabetes mellitus, with fat layer exposed ([address])     History of amputation of lesser toe of left foot ([address])     Diabetic polyneuropathy associated with type 2 diabetes mellitus ([address])     Hammer toe of right foot     Cellulitis of toe, right, resolved. Subjective: Urgent visit. Patient noticed that his second right toe was red. Patient is diabetic with history of second left toe amputation. Allergies   Allergen Reactions    Bee Venom Anaphylaxis       Uses Epi-pen    Doxycycline Photosensitivity            Current Outpatient Medications:     sulfamethoxazole-trimethoprim (BACTRIM DS) 800-160 mg per tablet, Take 1 tablet by mouth every 12 (twelve) hours for 10 days, Disp: 20 tablet, Rfl: 0    allopurinol (ZYLOPRIM) 300 mg tablet, Take 1 tablet (300 mg total) by mouth daily, Disp: 90 tablet, Rfl: 1    amLODIPine (NORVASC) 5 mg tablet, Take 1 tablet (5 mg total) by mouth 2 (two) times a day, Disp: 180 tablet, Rfl: 1    atorvastatin (LIPITOR) 40 mg tablet, Take 1 tablet (40 mg total) by mouth daily at bedtime, Disp: 90 tablet, Rfl: 1    Blood Glucose Monitoring Suppl (OneTouch Verio Reflect) w/Device KIT, Check blood sugars twice daily.  Please substitute with appropriate alternative as covered by patient's insurance. Dx: E11.65, Disp: 1 kit, Rfl: 0    cholecalciferol (VITAMIN D3) 1,000 units tablet, Take 1,000 Units by mouth daily at bedtime  , Disp: , Rfl:     clotrimazole-betamethasone (LOTRISONE) 1-0.05 % cream, Apply topically 2 (two) times a day for 10 days, Disp: 30 g, Rfl: 0    cyclobenzaprine (FLEXERIL) 10 mg tablet, Take 1 tablet (10 mg total) by mouth daily at bedtime, Disp: 15 tablet, Rfl: 0    Empagliflozin (Jardiance) 25 MG TABS, Take 1 tablet (25 mg total) by mouth every morning Lot # [id number] exp 10/22, Disp: 90 tablet, Rfl: 1    fenofibrate 160 MG tablet, Take 1 tablet (160 mg total) by mouth every morning, Disp: 90 tablet, Rfl: 1    glucose blood (OneTouch Verio) test strip, Check blood sugars twice daily. Please substitute with appropriate alternative as covered by patient's insurance.  Dx: E11.65, Disp: 200 each, Rfl: 3    hydrochlorothiazide (HYDRODIURIL) 50 mg tablet, Take 1 tablet (50 mg total) by mouth daily, Disp: 90 tablet, Rfl: 1    Insulin Glargine Solostar (Lantus SoloStar) 100 UNIT/ML SOPN, Inject 0.2 mL (20 Units total) under the skin daily at bedtime, Disp: 18 mL, Rfl: 1    Insulin Pen Needle (BD Pen Needle Nelly U/F) 32G X 4 MM MISC, Use daily as directed with insulin pen, Disp: 100 each, Rfl: 3    labetalol (NORMODYNE) 100 mg tablet, Take 2 tablets (200 mg total) by mouth 2 (two) times a day, Disp: 180 tablet, Rfl: 1    lisinopril (ZESTRIL) 40 mg tablet, Take 1 tablet (40 mg total) by mouth every morning, Disp: 90 tablet, Rfl: 1    meloxicam (Mobic) 15 mg tablet, Take 1 tablet (15 mg total) by mouth daily, Disp: 30 tablet, Rfl: 0    metFORMIN (GLUCOPHAGE) 1000 MG tablet, Take 1000 mg in AM and 1500 mg in PM., Disp: 225 tablet, Rfl: 1    Multiple Vitamin (MULTIVITAMIN) tablet, Take 1 tablet by mouth every morning  , Disp: , Rfl:     mupirocin (BACTROBAN) 2 % ointment, Apply topically 2 (two) times a day for 20 days, Disp: 30 g, Rfl: 1    pantoprazole (PROTONIX) 40 mg tablet, Take 1 tablet (40 mg total) by mouth daily, Disp: 180 tablet, Rfl: 1    sildenafil (VIAGRA) 100 mg tablet, TAKE ONE TABLET BY MOUTH ONE HOUR prior to intercourse, Disp: 30 tablet, Rfl: 4    Trulicity 4.5 SA/3.4ZA injection, Inject 0.5 mL (4.5 mg total) under the skin once a week, Disp: 6 mL, Rfl: 1           Patient Active Problem List   Diagnosis    Type 2 diabetes mellitus with both eyes affected by mild nonproliferative retinopathy without macular edema, without long-term current use of insulin ([facility])    Hypertension    Hyperlipidemia    Intervertebral disc disorders with radiculopathy, lumbar region    Pes anserine bursitis    Acquired deformity of left foot    Hammer toe of left foot    Nephrolithiasis    Chronic pain of right knee    Elevated liver enzymes    Morbid obesity with BMI of 40.0-44.9, adult ([facility])    Intertrigo             Patient ID: Christopher Vilchis is a 59 y.o. male. HPI     The following portions of the patient's history were reviewed and updated as appropriate:      family history includes Diabetes in his mother; Hypertension in his father and mother; No Known Problems in his sister, son, and son; Other in his mother. reports that he has never smoked. He has never used smokeless tobacco. He reports current alcohol use. He reports that he does not use drugs. Objective:  Patient's shoes and socks removed.    Foot ExamPhysical Exam           General  General Appearance: appears stated age and healthy   Orientation: alert and oriented to person, place, and time   Affect: appropriate   Gait: antalgic         Right Foot/Ankle      Inspection and Palpation  Swelling: dorsum   Hammertoes: second toe, fifth toe, fourth toe and third toe  Skin Exam: callus and dry skin;      Neurovascular  Dorsalis pedis: 3+  Posterior tibial: 3+  Saphenous nerve sensation: diminished  Tibial nerve sensation: diminished  Superficial peroneal nerve sensation: diminished  Deep peroneal nerve sensation: diminished  Sural nerve sensation: diminished        Left Foot/Ankle       Inspection and Palpation  Tenderness: metatarsals   Swelling: dorsum   Hammertoes: fifth toe, fourth toe and third toe  Skin Exam: callus, drainage, dry skin, cellulitis, skin changes, abnormal color and ulcer;      Neurovascular  Dorsalis pedis: 3+  Posterior tibial: 3+  Saphenous nerve sensation: diminished  Tibial nerve sensation: diminished  Superficial peroneal nerve sensation: diminished  Deep peroneal nerve sensation: diminished  Sural nerve sensation: diminished           Physical Exam  Vitals and nursing note reviewed. Constitutional:       Appearance: Normal appearance. Cardiovascular:      Rate and Rhythm: Normal rate and regular rhythm. Pulses: no weak pulses          Dorsalis pedis pulses are 3+ on the right side and 3+ on the left side. Posterior tibial pulses are 3+ on the right side and 3+ on the left side. Feet:      Right foot:      Skin integrity: Callus and dry skin present. Left foot:      Skin integrity: Ulcer, callus and dry skin present. Skin:     Capillary Refill: Capillary refill takes less than 2 seconds. Comments: Patient demonstrates xerosis of skin secondary to dermatophytosis. Distal end of second right toe demonstrates a Caputo grade 2 ulcer. Wound is approximately 0.5 cm² in size. Hyperkeratotic borders. Positive surrounding cellulitis. Bone not present in wound. Neurological:      Mental Status: He is alert. Psychiatric:         Mood and Affect: Mood normal.         Behavior: Behavior normal.         Thought Content: Thought content normal.         Judgment: Judgment normal.      Patient's shoes and socks removed. Right Foot/Ankle   Right Foot Inspection  Skin Exam: dry skin, callus and callus. Sensory   Vibration: diminished  Proprioception: diminished  Monofilament testing: diminished     Vascular  Capillary refills: < 3 seconds  The right DP pulse is 3+. The right PT pulse is 3+. Right Toe  - Comprehensive Exam  Hammertoes: second toe, fifth toe, fourth toe and third toe  Swelling: dorsum            Left Foot/Ankle  Left Foot Inspection  Skin Exam: dry skin, abnormal color, ulcer and callus. Sensory   Vibration: diminished  Proprioception: diminished  Monofilament testing: diminished     Vascular  Capillary refills: < 3 seconds  The left DP pulse is 3+. The left PT pulse is 3+.       Left Toe  - Comprehensive Exam  Hammertoes: fifth toe, fourth toe and third toe  Swelling: dorsum   Tenderness: metatarsals

## 2023-11-29 ENCOUNTER — VBI (OUTPATIENT)
Dept: ADMINISTRATIVE | Facility: OTHER | Age: 63
End: 2023-11-29

## 2023-12-14 ENCOUNTER — OFFICE VISIT (OUTPATIENT)
Age: 63
End: 2023-12-14
Payer: COMMERCIAL

## 2023-12-14 VITALS
BODY MASS INDEX: 45.16 KG/M2 | HEIGHT: 66 IN | DIASTOLIC BLOOD PRESSURE: 85 MMHG | SYSTOLIC BLOOD PRESSURE: 141 MMHG | WEIGHT: 281 LBS | RESPIRATION RATE: 16 BRPM

## 2023-12-14 DIAGNOSIS — L97.512 DIABETIC ULCER OF TOE OF RIGHT FOOT ASSOCIATED WITH TYPE 2 DIABETES MELLITUS, WITH FAT LAYER EXPOSED (HCC): Primary | ICD-10-CM

## 2023-12-14 DIAGNOSIS — M20.41 HAMMER TOE OF RIGHT FOOT: ICD-10-CM

## 2023-12-14 DIAGNOSIS — Z89.422 HISTORY OF AMPUTATION OF LESSER TOE OF LEFT FOOT (HCC): ICD-10-CM

## 2023-12-14 DIAGNOSIS — E11.621 DIABETIC ULCER OF TOE OF RIGHT FOOT ASSOCIATED WITH TYPE 2 DIABETES MELLITUS, WITH FAT LAYER EXPOSED (HCC): Primary | ICD-10-CM

## 2023-12-14 DIAGNOSIS — E11.42 DIABETIC POLYNEUROPATHY ASSOCIATED WITH TYPE 2 DIABETES MELLITUS (HCC): ICD-10-CM

## 2023-12-14 PROCEDURE — 99213 OFFICE O/P EST LOW 20 MIN: CPT | Performed by: PODIATRIST

## 2023-12-14 NOTE — PROGRESS NOTES
Assessment/Plan: Guille Davila grade 2 ulcer distal aspect second right toe. Digital cellulitis. Diabetic neuropathy. Acquired deformity of foot. History of amputation second left toe. Plan. Chart reviewed. Patient advised on condition. Today wound debrided of all devitalized tissue. Gentian violet dry sterile dressing applied. Patient advised on aftercare. Return for follow-up and x-ray. At this time patient is considering toe amputation. Aftercare instruction given. Return for follow-up. Watch for signs of infection. Diagnoses and all orders for this visit:     Diabetic ulcer of toe of right foot associated with type 2 diabetes mellitus, with fat layer exposed ([address])     History of amputation of lesser toe of left foot ([address])     Diabetic polyneuropathy associated with type 2 diabetes mellitus ([address])     Hammer toe of right foot     Cellulitis of toe, right, resolved. Subjective: Patient noticed that his second right toe was red. Patient is diabetic with history of second left toe amputation. Allergies   Allergen Reactions    Bee Venom Anaphylaxis       Uses Epi-pen    Doxycycline Photosensitivity            Current Outpatient Medications:     sulfamethoxazole-trimethoprim (BACTRIM DS) 800-160 mg per tablet, Take 1 tablet by mouth every 12 (twelve) hours for 10 days, Disp: 20 tablet, Rfl: 0    allopurinol (ZYLOPRIM) 300 mg tablet, Take 1 tablet (300 mg total) by mouth daily, Disp: 90 tablet, Rfl: 1    amLODIPine (NORVASC) 5 mg tablet, Take 1 tablet (5 mg total) by mouth 2 (two) times a day, Disp: 180 tablet, Rfl: 1    atorvastatin (LIPITOR) 40 mg tablet, Take 1 tablet (40 mg total) by mouth daily at bedtime, Disp: 90 tablet, Rfl: 1    Blood Glucose Monitoring Suppl (OneTouch Verio Reflect) w/Device KIT, Check blood sugars twice daily. Please substitute with appropriate alternative as covered by patient's insurance.  Dx: E11.65, Disp: 1 kit, Rfl: 0    cholecalciferol (VITAMIN D3) 1,000 units tablet, Take 1,000 Units by mouth daily at bedtime  , Disp: , Rfl:     clotrimazole-betamethasone (LOTRISONE) 1-0.05 % cream, Apply topically 2 (two) times a day for 10 days, Disp: 30 g, Rfl: 0    cyclobenzaprine (FLEXERIL) 10 mg tablet, Take 1 tablet (10 mg total) by mouth daily at bedtime, Disp: 15 tablet, Rfl: 0    Empagliflozin (Jardiance) 25 MG TABS, Take 1 tablet (25 mg total) by mouth every morning Lot # [id number] exp 10/22, Disp: 90 tablet, Rfl: 1    fenofibrate 160 MG tablet, Take 1 tablet (160 mg total) by mouth every morning, Disp: 90 tablet, Rfl: 1    glucose blood (OneTouch Verio) test strip, Check blood sugars twice daily. Please substitute with appropriate alternative as covered by patient's insurance.  Dx: E11.65, Disp: 200 each, Rfl: 3    hydrochlorothiazide (HYDRODIURIL) 50 mg tablet, Take 1 tablet (50 mg total) by mouth daily, Disp: 90 tablet, Rfl: 1    Insulin Glargine Solostar (Lantus SoloStar) 100 UNIT/ML SOPN, Inject 0.2 mL (20 Units total) under the skin daily at bedtime, Disp: 18 mL, Rfl: 1    Insulin Pen Needle (BD Pen Needle Nelly U/F) 32G X 4 MM MISC, Use daily as directed with insulin pen, Disp: 100 each, Rfl: 3    labetalol (NORMODYNE) 100 mg tablet, Take 2 tablets (200 mg total) by mouth 2 (two) times a day, Disp: 180 tablet, Rfl: 1    lisinopril (ZESTRIL) 40 mg tablet, Take 1 tablet (40 mg total) by mouth every morning, Disp: 90 tablet, Rfl: 1    meloxicam (Mobic) 15 mg tablet, Take 1 tablet (15 mg total) by mouth daily, Disp: 30 tablet, Rfl: 0    metFORMIN (GLUCOPHAGE) 1000 MG tablet, Take 1000 mg in AM and 1500 mg in PM., Disp: 225 tablet, Rfl: 1    Multiple Vitamin (MULTIVITAMIN) tablet, Take 1 tablet by mouth every morning  , Disp: , Rfl:     mupirocin (BACTROBAN) 2 % ointment, Apply topically 2 (two) times a day for 20 days, Disp: 30 g, Rfl: 1    pantoprazole (PROTONIX) 40 mg tablet, Take 1 tablet (40 mg total) by mouth daily, Disp: 180 tablet, Rfl: 1 sildenafil (VIAGRA) 100 mg tablet, TAKE ONE TABLET BY MOUTH ONE HOUR prior to intercourse, Disp: 30 tablet, Rfl: 4    Trulicity 4.5 IU/0.7RM injection, Inject 0.5 mL (4.5 mg total) under the skin once a week, Disp: 6 mL, Rfl: 1           Patient Active Problem List   Diagnosis    Type 2 diabetes mellitus with both eyes affected by mild nonproliferative retinopathy without macular edema, without long-term current use of insulin ([facility])    Hypertension    Hyperlipidemia    Intervertebral disc disorders with radiculopathy, lumbar region    Pes anserine bursitis    Acquired deformity of left foot    Hammer toe of left foot    Nephrolithiasis    Chronic pain of right knee    Elevated liver enzymes    Morbid obesity with BMI of 40.0-44.9, adult ([address] [facility])    Intertrigo             Patient ID: Sobeida Adams is a 59 y.o. male. HPI     The following portions of the patient's history were reviewed and updated as appropriate:      family history includes Diabetes in his mother; Hypertension in his father and mother; No Known Problems in his sister, son, and son; Other in his mother. reports that he has never smoked. He has never used smokeless tobacco. He reports current alcohol use. He reports that he does not use drugs. Objective:  Patient's shoes and socks removed.    Foot ExamPhysical Exam           General  General Appearance: appears stated age and healthy   Orientation: alert and oriented to person, place, and time   Affect: appropriate   Gait: antalgic         Right Foot/Ankle      Inspection and Palpation  Swelling: dorsum   Hammertoes: second toe, fifth toe, fourth toe and third toe  Skin Exam: callus and dry skin;      Neurovascular  Dorsalis pedis: 3+  Posterior tibial: 3+  Saphenous nerve sensation: diminished  Tibial nerve sensation: diminished  Superficial peroneal nerve sensation: diminished  Deep peroneal nerve sensation: diminished  Sural nerve sensation: diminished        Left Foot/Ankle Inspection and Palpation  Tenderness: metatarsals   Swelling: dorsum   Hammertoes: fifth toe, fourth toe and third toe  Skin Exam: callus, drainage, dry skin, cellulitis, skin changes, abnormal color and ulcer;      Neurovascular  Dorsalis pedis: 3+  Posterior tibial: 3+  Saphenous nerve sensation: diminished  Tibial nerve sensation: diminished  Superficial peroneal nerve sensation: diminished  Deep peroneal nerve sensation: diminished  Sural nerve sensation: diminished           Physical Exam  Vitals and nursing note reviewed. Constitutional:       Appearance: Normal appearance. Cardiovascular:      Rate and Rhythm: Normal rate and regular rhythm. Pulses: no weak pulses          Dorsalis pedis pulses are 3+ on the right side and 3+ on the left side. Posterior tibial pulses are 3+ on the right side and 3+ on the left side. Feet:      Right foot:      Skin integrity: Callus and dry skin present. Left foot:      Skin integrity: Ulcer, callus and dry skin present. Skin:     Capillary Refill: Capillary refill takes less than 2 seconds. Comments: Patient demonstrates xerosis of skin secondary to dermatophytosis. Distal end of second right toe demonstrates a Caputo grade 2 ulcer. Wound is approximately 0.5 cm² in size. Hyperkeratotic borders. Positive surrounding cellulitis. Bone not present in wound. Neurological:      Mental Status: He is alert. Psychiatric:         Mood and Affect: Mood normal.         Behavior: Behavior normal.         Thought Content: Thought content normal.         Judgment: Judgment normal.      Patient's shoes and socks removed. Right Foot/Ankle   Right Foot Inspection  Skin Exam: dry skin, callus and callus. Sensory   Vibration: diminished  Proprioception: diminished  Monofilament testing: diminished     Vascular  Capillary refills: < 3 seconds  The right DP pulse is 3+. The right PT pulse is 3+.       Right Toe  - Comprehensive Exam  Hammertoes: second toe, fifth toe, fourth toe and third toe  Swelling: dorsum            Left Foot/Ankle  Left Foot Inspection  Skin Exam: dry skin, abnormal color, ulcer and callus. Sensory   Vibration: diminished  Proprioception: diminished  Monofilament testing: diminished     Vascular  Capillary refills: < 3 seconds  The left DP pulse is 3+. The left PT pulse is 3+. Left Toe  - Comprehensive Exam  Hammertoes: fifth toe, fourth toe and third toe  Swelling: dorsum   Tenderness: metatarsals     Patient's shoes and socks removed. Right Foot/Ankle   Right Foot Inspection      Toe Exam: right toe deformity. Sensory   Vibration: diminished  Proprioception: diminished  Monofilament testing: diminished    Vascular  Capillary refills: < 3 seconds      Left Foot/Ankle  Left Foot Inspection      Toe Exam: left toe deformity.      Sensory   Vibration: diminished  Proprioception: diminished  Monofilament testing: diminished    Vascular  Capillary refills: < 3 seconds      Assign Risk Category  Deformity present  Loss of protective sensation  No weak pulses  Risk: 2

## 2024-02-02 ENCOUNTER — OFFICE VISIT (OUTPATIENT)
Age: 64
End: 2024-02-02
Payer: COMMERCIAL

## 2024-02-02 VITALS
RESPIRATION RATE: 18 BRPM | WEIGHT: 294 LBS | DIASTOLIC BLOOD PRESSURE: 94 MMHG | HEART RATE: 80 BPM | SYSTOLIC BLOOD PRESSURE: 165 MMHG | BODY MASS INDEX: 47.25 KG/M2 | HEIGHT: 66 IN

## 2024-02-02 DIAGNOSIS — L97.512 DIABETIC ULCER OF TOE OF RIGHT FOOT ASSOCIATED WITH TYPE 2 DIABETES MELLITUS, WITH FAT LAYER EXPOSED (HCC): Primary | ICD-10-CM

## 2024-02-02 DIAGNOSIS — M21.961 ACQUIRED DEFORMITY OF RIGHT FOOT: ICD-10-CM

## 2024-02-02 DIAGNOSIS — E11.42 DIABETIC POLYNEUROPATHY ASSOCIATED WITH TYPE 2 DIABETES MELLITUS (HCC): ICD-10-CM

## 2024-02-02 DIAGNOSIS — M20.41 HAMMER TOE OF RIGHT FOOT: ICD-10-CM

## 2024-02-02 DIAGNOSIS — Z89.422 HISTORY OF AMPUTATION OF LESSER TOE OF LEFT FOOT (HCC): ICD-10-CM

## 2024-02-02 DIAGNOSIS — E11.621 DIABETIC ULCER OF TOE OF RIGHT FOOT ASSOCIATED WITH TYPE 2 DIABETES MELLITUS, WITH FAT LAYER EXPOSED (HCC): Primary | ICD-10-CM

## 2024-02-02 PROCEDURE — 99213 OFFICE O/P EST LOW 20 MIN: CPT | Performed by: PODIATRIST

## 2024-02-02 RX ORDER — CHLORHEXIDINE GLUCONATE ORAL RINSE 1.2 MG/ML
15 SOLUTION DENTAL ONCE
OUTPATIENT
Start: 2024-02-02 | End: 2024-02-02

## 2024-02-02 NOTE — PROGRESS NOTES
Assessment/Plan: Caputo grade 2 ulcer distal aspect second right toe.  Digital cellulitis.  Diabetic neuropathy.  Acquired deformity of foot.  History of amputation second left toe.  Primary care notes reviewed.  Patient advised on condition.  Gentian violet dry sterile dressing applied.  Patient is tired of the condition.  He understands that the toe will not heal.  He is therefore opting for surgical intervention.  We will plan for second toe amputation.  Patient understands and is consented to surgery.  He has been through it on the left foot and is prepared to have the right foot operated on.  Consent form signed.  Preoperative and postoperative instruction given.  Postoperative medications ordered.  Surgical shoe dispensed.  Return for surgery.            Diagnoses and all orders for this visit:     Diabetic ulcer of toe of right foot associated with type 2 diabetes mellitus, with fat layer exposed ([facility])     History of amputation of lesser toe of left foot ([facility])     Diabetic polyneuropathy associated with type 2 diabetes mellitus ([facility])     Hammer toe of right foot     Cellulitis of toe, right, resolved.            Subjective: Patient noticed that his second right toe was red.  Patient is diabetic with history of second left toe amputation.  At this time patient is tired of the chronic problem.  He is opting for surgical intervention in the hope of alleviating his condition.               Allergies   Allergen Reactions    Bee Venom Anaphylaxis       Uses Epi-pen    Doxycycline Photosensitivity            Current Outpatient Medications:     sulfamethoxazole-trimethoprim (BACTRIM DS) 800-160 mg per tablet, Take 1 tablet by mouth every 12 (twelve) hours for 10 days, Disp: 20 tablet, Rfl: 0    allopurinol (ZYLOPRIM) 300 mg tablet, Take 1 tablet (300 mg total) by mouth daily, Disp: 90 tablet, Rfl: 1    amLODIPine (NORVASC) 5 mg tablet, Take 1 tablet (5 mg total) by mouth 2 (two) times a day, Disp: 180 tablet, Rfl:  1    atorvastatin (LIPITOR) 40 mg tablet, Take 1 tablet (40 mg total) by mouth daily at bedtime, Disp: 90 tablet, Rfl: 1    Blood Glucose Monitoring Suppl (OneTouch Verio Reflect) w/Device KIT, Check blood sugars twice daily. Please substitute with appropriate alternative as covered by patient's insurance. Dx: E11.65, Disp: 1 kit, Rfl: 0    cholecalciferol (VITAMIN D3) 1,000 units tablet, Take 1,000 Units by mouth daily at bedtime  , Disp: , Rfl:     clotrimazole-betamethasone (LOTRISONE) 1-0.05 % cream, Apply topically 2 (two) times a day for 10 days, Disp: 30 g, Rfl: 0    cyclobenzaprine (FLEXERIL) 10 mg tablet, Take 1 tablet (10 mg total) by mouth daily at bedtime, Disp: 15 tablet, Rfl: 0    Empagliflozin (Jardiance) 25 MG TABS, Take 1 tablet (25 mg total) by mouth every morning Lot # [id number] exp 10/22, Disp: 90 tablet, Rfl: 1    fenofibrate 160 MG tablet, Take 1 tablet (160 mg total) by mouth every morning, Disp: 90 tablet, Rfl: 1    glucose blood (OneTouch Verio) test strip, Check blood sugars twice daily. Please substitute with appropriate alternative as covered by patient's insurance. Dx: E11.65, Disp: 200 each, Rfl: 3    hydrochlorothiazide (HYDRODIURIL) 50 mg tablet, Take 1 tablet (50 mg total) by mouth daily, Disp: 90 tablet, Rfl: 1    Insulin Glargine Solostar (Lantus SoloStar) 100 UNIT/ML SOPN, Inject 0.2 mL (20 Units total) under the skin daily at bedtime, Disp: 18 mL, Rfl: 1    Insulin Pen Needle (BD Pen Needle Nelly U/F) 32G X 4 MM MISC, Use daily as directed with insulin pen, Disp: 100 each, Rfl: 3    labetalol (NORMODYNE) 100 mg tablet, Take 2 tablets (200 mg total) by mouth 2 (two) times a day, Disp: 180 tablet, Rfl: 1    lisinopril (ZESTRIL) 40 mg tablet, Take 1 tablet (40 mg total) by mouth every morning, Disp: 90 tablet, Rfl: 1    meloxicam (Mobic) 15 mg tablet, Take 1 tablet (15 mg total) by mouth daily, Disp: 30 tablet, Rfl: 0    metFORMIN (GLUCOPHAGE) 1000 MG tablet, Take 1000 mg in AM and 1500  mg in PM., Disp: 225 tablet, Rfl: 1    Multiple Vitamin (MULTIVITAMIN) tablet, Take 1 tablet by mouth every morning  , Disp: , Rfl:     mupirocin (BACTROBAN) 2 % ointment, Apply topically 2 (two) times a day for 20 days, Disp: 30 g, Rfl: 1    pantoprazole (PROTONIX) 40 mg tablet, Take 1 tablet (40 mg total) by mouth daily, Disp: 180 tablet, Rfl: 1    sildenafil (VIAGRA) 100 mg tablet, TAKE ONE TABLET BY MOUTH ONE HOUR prior to intercourse, Disp: 30 tablet, Rfl: 4    Trulicity 4.5 MG/0.5ML injection, Inject 0.5 mL (4.5 mg total) under the skin once a week, Disp: 6 mL, Rfl: 1           Patient Active Problem List   Diagnosis    Type 2 diabetes mellitus with both eyes affected by mild nonproliferative retinopathy without macular edema, without long-term current use of insulin ([facility])    Hypertension    Hyperlipidemia    Intervertebral disc disorders with radiculopathy, lumbar region    Pes anserine bursitis    Acquired deformity of left foot    Hammer toe of left foot    Nephrolithiasis    Chronic pain of right knee    Elevated liver enzymes    Morbid obesity with BMI of 40.0-44.9, adult ([facility])    Intertrigo             Patient ID: Pawan Fraga is a 63 y.o. male.     HPI     The following portions of the patient's history were reviewed and updated as appropriate:      family history includes Diabetes in his mother; Hypertension in his father and mother; No Known Problems in his sister, son, and son; Other in his mother.       reports that he has never smoked. He has never used smokeless tobacco. He reports current alcohol use. He reports that he does not use drugs.      Objective:  Patient's shoes and socks removed.   Foot ExamPhysical Exam           General  General Appearance: appears stated age and healthy   Orientation: alert and oriented to person, place, and time   Affect: appropriate   Gait: antalgic         Right Foot/Ankle      Inspection and Palpation  Swelling: dorsum   Hammertoes: second toe, fifth toe,  fourth toe and third toe  Skin Exam: callus and dry skin;      Neurovascular  Dorsalis pedis: 3+  Posterior tibial: 3+  Saphenous nerve sensation: diminished  Tibial nerve sensation: diminished  Superficial peroneal nerve sensation: diminished  Deep peroneal nerve sensation: diminished  Sural nerve sensation: diminished        Left Foot/Ankle       Inspection and Palpation  Tenderness: metatarsals   Swelling: dorsum   Hammertoes: fifth toe, fourth toe and third toe  Skin Exam: callus, drainage, dry skin, cellulitis, skin changes, abnormal color and ulcer;      Neurovascular  Dorsalis pedis: 3+  Posterior tibial: 3+  Saphenous nerve sensation: diminished  Tibial nerve sensation: diminished  Superficial peroneal nerve sensation: diminished  Deep peroneal nerve sensation: diminished  Sural nerve sensation: diminished           Physical Exam  Vitals and nursing note reviewed.   Constitutional:       Appearance: Normal appearance.   Cardiovascular:      Rate and Rhythm: Normal rate and regular rhythm.      Pulses: no weak pulses          Dorsalis pedis pulses are 3+ on the right side and 3+ on the left side.        Posterior tibial pulses are 3+ on the right side and 3+ on the left side.   Feet:      Right foot:      Skin integrity: Callus and dry skin present.      Left foot:      Skin integrity: Ulcer, callus and dry skin present.   Skin:     Capillary Refill: Capillary refill takes less than 2 seconds.      Comments: Patient demonstrates xerosis of skin secondary to dermatophytosis.    Distal end of second right toe demonstrates a Caputo grade 2 ulcer.  Wound is approximately 0.5 cm² in size.  Hyperkeratotic borders.  Positive surrounding cellulitis.  Bone not present in wound.    Neurological:      Mental Status: He is alert.   Psychiatric:         Mood and Affect: Mood normal.         Behavior: Behavior normal.         Thought Content: Thought content normal.         Judgment: Judgment normal.      Patient's shoes  and socks removed.     Right Foot/Ankle   Right Foot Inspection  Skin Exam: dry skin, callus and callus.      Sensory   Vibration: diminished  Proprioception: diminished  Monofilament testing: diminished     Vascular  Capillary refills: < 3 seconds  The right DP pulse is 3+. The right PT pulse is 3+.      Right Toe  - Comprehensive Exam  Hammertoes: second toe, fifth toe, fourth toe and third toe  Swelling: dorsum            Left Foot/Ankle  Left Foot Inspection  Skin Exam: dry skin, abnormal color, ulcer and callus.      Sensory   Vibration: diminished  Proprioception: diminished  Monofilament testing: diminished     Vascular  Capillary refills: < 3 seconds  The left DP pulse is 3+. The left PT pulse is 3+.      Left Toe  - Comprehensive Exam  Hammertoes: fifth toe, fourth toe and third toe  Swelling: dorsum   Tenderness: metatarsals      Patient's shoes and socks removed.     Right Foot/Ankle   Right Foot Inspection        Toe Exam: right toe deformity.      Sensory   Vibration: diminished  Proprioception: diminished  Monofilament testing: diminished     Vascular  Capillary refills: < 3 seconds        Left Foot/Ankle  Left Foot Inspection        Toe Exam: left toe deformity.      Sensory   Vibration: diminished  Proprioception: diminished  Monofilament testing: diminished     Vascular  Capillary refills: < 3 seconds        Assign Risk Category  Deformity present  Loss of protective sensation  No weak pulses  Risk: 2

## 2024-02-26 ENCOUNTER — TELEPHONE (OUTPATIENT)
Age: 64
End: 2024-02-26

## 2024-02-26 ENCOUNTER — APPOINTMENT (OUTPATIENT)
Dept: LAB | Facility: HOSPITAL | Age: 64
End: 2024-02-26

## 2024-02-26 ENCOUNTER — APPOINTMENT (OUTPATIENT)
Dept: LAB | Facility: HOSPITAL | Age: 64
End: 2024-02-26
Payer: COMMERCIAL

## 2024-02-26 DIAGNOSIS — E11.621 DIABETIC ULCER OF TOE OF RIGHT FOOT ASSOCIATED WITH TYPE 2 DIABETES MELLITUS, WITH FAT LAYER EXPOSED (HCC): ICD-10-CM

## 2024-02-26 DIAGNOSIS — E11.42 DIABETIC POLYNEUROPATHY ASSOCIATED WITH TYPE 2 DIABETES MELLITUS (HCC): ICD-10-CM

## 2024-02-26 DIAGNOSIS — E11.42 DIABETIC POLYNEUROPATHY ASSOCIATED WITH TYPE 2 DIABETES MELLITUS (HCC): Primary | ICD-10-CM

## 2024-02-26 DIAGNOSIS — M20.41 HAMMER TOE OF RIGHT FOOT: ICD-10-CM

## 2024-02-26 DIAGNOSIS — Z89.422 HISTORY OF AMPUTATION OF LESSER TOE OF LEFT FOOT (HCC): ICD-10-CM

## 2024-02-26 DIAGNOSIS — M21.961 ACQUIRED DEFORMITY OF RIGHT FOOT: ICD-10-CM

## 2024-02-26 DIAGNOSIS — L97.512 DIABETIC ULCER OF TOE OF RIGHT FOOT ASSOCIATED WITH TYPE 2 DIABETES MELLITUS, WITH FAT LAYER EXPOSED (HCC): ICD-10-CM

## 2024-02-26 LAB
ANION GAP SERPL CALCULATED.3IONS-SCNC: 12 MMOL/L
BASOPHILS # BLD AUTO: 0.05 THOUSANDS/ÂΜL (ref 0–0.1)
BASOPHILS NFR BLD AUTO: 1 % (ref 0–1)
BUN SERPL-MCNC: 15 MG/DL (ref 5–25)
CALCIUM SERPL-MCNC: 9.7 MG/DL (ref 8.4–10.2)
CHLORIDE SERPL-SCNC: 101 MMOL/L (ref 96–108)
CO2 SERPL-SCNC: 25 MMOL/L (ref 21–32)
CREAT SERPL-MCNC: 0.68 MG/DL (ref 0.6–1.3)
EOSINOPHIL # BLD AUTO: 0.28 THOUSAND/ÂΜL (ref 0–0.61)
EOSINOPHIL NFR BLD AUTO: 4 % (ref 0–6)
ERYTHROCYTE [DISTWIDTH] IN BLOOD BY AUTOMATED COUNT: 13 % (ref 11.6–15.1)
GFR SERPL CREATININE-BSD FRML MDRD: 102 ML/MIN/1.73SQ M
GLUCOSE SERPL-MCNC: 189 MG/DL (ref 65–140)
HCT VFR BLD AUTO: 40.5 % (ref 36.5–49.3)
HGB BLD-MCNC: 14.5 G/DL (ref 12–17)
IMM GRANULOCYTES # BLD AUTO: 0.04 THOUSAND/UL (ref 0–0.2)
IMM GRANULOCYTES NFR BLD AUTO: 1 % (ref 0–2)
LYMPHOCYTES # BLD AUTO: 2.37 THOUSANDS/ÂΜL (ref 0.6–4.47)
LYMPHOCYTES NFR BLD AUTO: 32 % (ref 14–44)
MCH RBC QN AUTO: 31.5 PG (ref 26.8–34.3)
MCHC RBC AUTO-ENTMCNC: 35.8 G/DL (ref 31.4–37.4)
MCV RBC AUTO: 88 FL (ref 82–98)
MONOCYTES # BLD AUTO: 0.67 THOUSAND/ÂΜL (ref 0.17–1.22)
MONOCYTES NFR BLD AUTO: 9 % (ref 4–12)
NEUTROPHILS # BLD AUTO: 4.04 THOUSANDS/ÂΜL (ref 1.85–7.62)
NEUTS SEG NFR BLD AUTO: 53 % (ref 43–75)
NRBC BLD AUTO-RTO: 0 /100 WBCS
PLATELET # BLD AUTO: 206 THOUSANDS/UL (ref 149–390)
PMV BLD AUTO: 9.6 FL (ref 8.9–12.7)
POTASSIUM SERPL-SCNC: 3 MMOL/L (ref 3.5–5.3)
RBC # BLD AUTO: 4.61 MILLION/UL (ref 3.88–5.62)
SODIUM SERPL-SCNC: 138 MMOL/L (ref 135–147)
WBC # BLD AUTO: 7.45 THOUSAND/UL (ref 4.31–10.16)

## 2024-02-26 PROCEDURE — 36415 COLL VENOUS BLD VENIPUNCTURE: CPT

## 2024-02-26 PROCEDURE — 80048 BASIC METABOLIC PNL TOTAL CA: CPT

## 2024-02-26 PROCEDURE — 85025 COMPLETE CBC W/AUTO DIFF WBC: CPT

## 2024-02-26 NOTE — TELEPHONE ENCOUNTER
Caller: Bety Echeverria    Doctor: Hannah    Reason for call: Needs order for EKG put in patient chart.  Pt. Is at hospital now.     Call back#: [phone]

## 2024-02-28 ENCOUNTER — ANESTHESIA EVENT (OUTPATIENT)
Dept: PERIOP | Facility: HOSPITAL | Age: 64
End: 2024-02-28
Payer: COMMERCIAL

## 2024-02-28 NOTE — PRE-PROCEDURE INSTRUCTIONS
Pre-Surgery Instructions:   Medication Instructions    allopurinol (ZYLOPRIM) 300 mg tablet Hold day of surgery.    amLODIPine (NORVASC) 5 mg tablet Take day of surgery.    atorvastatin (LIPITOR) 40 mg tablet Take night before surgery    cholecalciferol (VITAMIN D3) 1,000 units tablet Hold day of surgery.    Empagliflozin (Jardiance) 25 MG TABS Stop taking 4 days prior to surgery.    fenofibrate 160 MG tablet Hold day of surgery.    labetalol (NORMODYNE) 100 mg tablet Take day of surgery.    lisinopril (ZESTRIL) 40 mg tablet Hold day of surgery.    metFORMIN (GLUCOPHAGE) 1000 MG tablet Hold day of surgery.    Multiple Vitamin (MULTIVITAMIN) tablet Stop taking 7 days prior to surgery.    mupirocin (BACTROBAN) 2 % ointment Instructions provided by MD    pantoprazole (PROTONIX) 40 mg tablet Take day of surgery.    sildenafil (VIAGRA) 100 mg tablet Hold day of surgery.    Trulicity 4.5 MG/0.5ML injection Stop taking 7 days prior to surgery.   VINCENT Trulicity 02/25 and VINCENT Jardiance 03/03. Medication instructions for day surgery reviewed. Please use only a sip of water to take your instructed medications. Avoid all over the counter vitamins, supplements and NSAIDS for one week prior to surgery per anesthesia guidelines. Tylenol is ok to take as needed.     You will receive a call one business day prior to surgery with an arrival time and hospital directions. If your surgery is scheduled on a Monday, the hospital will be calling you on the Friday prior to your surgery. If you have not heard from anyone by 8pm, please call the hospital supervisor through the hospital  at [phone]. ([place] [phone] or [place] [phone]).    Do not eat or drink anything after midnight the night before your surgery, including candy, mints, lifesavers, or chewing gum. Do not drink alcohol 24hrs before your surgery. Try not to smoke at least 24hrs before your surgery.       Follow the pre surgery showering instructions as  listed in the “My Surgical Experience Booklet” or otherwise provided by your surgeon's office. Do not use a blade to shave the surgical area 1 week before surgery. It is okay to use a clean electric clippers up to 24 hours before surgery. Do not apply any lotions, creams, including makeup, cologne, deodorant, or perfumes after showering on the day of your surgery. Do not use dry shampoo, hair spray, hair gel, or any type of hair products.     No contact lenses, eye make-up, or artificial eyelashes. Remove nail polish, including gel polish, and any artificial, gel, or acrylic nails if possible. Remove all jewelry including rings and body piercing jewelry.     Wear causal clothing that is easy to take on and off. Consider your type of surgery.    Keep any valuables, jewelry, piercings at home. Please bring any specially ordered equipment (sling, braces) if indicated.    Arrange for a responsible person to drive you to and from the hospital on the day of your surgery. Please confirm the visitor policy for the day of your procedure when you receive your phone call with an arrival time.     Call the surgeon's office with any new illnesses, exposures, or additional questions prior to surgery.    Please reference your “My Surgical Experience Booklet” for additional information to prepare for your upcoming surgery.

## 2024-03-01 LAB
ATRIAL RATE: 71 BPM
P AXIS: 3 DEGREES
PR INTERVAL: 156 MS
QRS AXIS: -24 DEGREES
QRSD INTERVAL: 104 MS
QT INTERVAL: 434 MS
QTC INTERVAL: 471 MS
T WAVE AXIS: 33 DEGREES
VENTRICULAR RATE: 71 BPM

## 2024-03-08 ENCOUNTER — ANESTHESIA (OUTPATIENT)
Dept: PERIOP | Facility: HOSPITAL | Age: 64
End: 2024-03-08
Payer: COMMERCIAL

## 2024-03-08 ENCOUNTER — HOSPITAL ENCOUNTER (OUTPATIENT)
Facility: HOSPITAL | Age: 64
Setting detail: OUTPATIENT SURGERY
Discharge: HOME | End: 2024-03-08
Attending: PODIATRIST | Admitting: PODIATRIST
Payer: COMMERCIAL

## 2024-03-08 VITALS
WEIGHT: 285.94 LBS | TEMPERATURE: 97.1 F | SYSTOLIC BLOOD PRESSURE: 142 MMHG | HEIGHT: 66 IN | DIASTOLIC BLOOD PRESSURE: 73 MMHG | HEART RATE: 67 BPM | OXYGEN SATURATION: 96 % | BODY MASS INDEX: 45.95 KG/M2 | RESPIRATION RATE: 18 BRPM

## 2024-03-08 DIAGNOSIS — L97.512 DIABETIC ULCER OF TOE OF RIGHT FOOT ASSOCIATED WITH TYPE 2 DIABETES MELLITUS, WITH FAT LAYER EXPOSED (HCC): ICD-10-CM

## 2024-03-08 DIAGNOSIS — E11.42 DIABETIC POLYNEUROPATHY ASSOCIATED WITH TYPE 2 DIABETES MELLITUS (HCC): ICD-10-CM

## 2024-03-08 DIAGNOSIS — M20.41 HAMMER TOE OF RIGHT FOOT: ICD-10-CM

## 2024-03-08 DIAGNOSIS — M21.961 ACQUIRED DEFORMITY OF RIGHT FOOT: ICD-10-CM

## 2024-03-08 DIAGNOSIS — E11.621 DIABETIC ULCER OF TOE OF RIGHT FOOT ASSOCIATED WITH TYPE 2 DIABETES MELLITUS, WITH FAT LAYER EXPOSED (HCC): ICD-10-CM

## 2024-03-08 PROBLEM — M86.271 SUBACUTE OSTEOMYELITIS OF RIGHT FOOT (HCC): Status: ACTIVE | Noted: 2024-03-08

## 2024-03-08 LAB
GLUCOSE SERPL-MCNC: 191 MG/DL (ref 65–140)
GLUCOSE SERPL-MCNC: 265 MG/DL (ref 65–140)

## 2024-03-08 PROCEDURE — 82948 REAGENT STRIP/BLOOD GLUCOSE: CPT

## 2024-03-08 PROCEDURE — 28825 PARTIAL AMPUTATION OF TOE: CPT | Performed by: PODIATRIST

## 2024-03-08 PROCEDURE — NC001 PR NO CHARGE: Performed by: PODIATRIST

## 2024-03-08 PROCEDURE — 88311 DECALCIFY TISSUE: CPT | Performed by: PATHOLOGY

## 2024-03-08 PROCEDURE — 88305 TISSUE EXAM BY PATHOLOGIST: CPT | Performed by: PATHOLOGY

## 2024-03-08 RX ORDER — FENTANYL CITRATE/PF 50 MCG/ML
25 SYRINGE (ML) INJECTION
Status: DISCONTINUED | OUTPATIENT
Start: 2024-03-08 | End: 2024-03-08 | Stop reason: HOSPADM

## 2024-03-08 RX ORDER — LIDOCAINE HYDROCHLORIDE 10 MG/ML
INJECTION, SOLUTION EPIDURAL; INFILTRATION; INTRACAUDAL; PERINEURAL AS NEEDED
Status: DISCONTINUED | OUTPATIENT
Start: 2024-03-08 | End: 2024-03-08

## 2024-03-08 RX ORDER — CEFAZOLIN SODIUM 2 G/50ML
2000 SOLUTION INTRAVENOUS ONCE
Status: DISCONTINUED | OUTPATIENT
Start: 2024-03-08 | End: 2024-03-08 | Stop reason: HOSPADM

## 2024-03-08 RX ORDER — PROPOFOL 10 MG/ML
INJECTION, EMULSION INTRAVENOUS AS NEEDED
Status: DISCONTINUED | OUTPATIENT
Start: 2024-03-08 | End: 2024-03-08

## 2024-03-08 RX ORDER — CEFAZOLIN SODIUM 1 G/3ML
INJECTION, POWDER, FOR SOLUTION INTRAMUSCULAR; INTRAVENOUS AS NEEDED
Status: DISCONTINUED | OUTPATIENT
Start: 2024-03-08 | End: 2024-03-08

## 2024-03-08 RX ORDER — MIDAZOLAM HYDROCHLORIDE 2 MG/2ML
INJECTION, SOLUTION INTRAMUSCULAR; INTRAVENOUS AS NEEDED
Status: DISCONTINUED | OUTPATIENT
Start: 2024-03-08 | End: 2024-03-08

## 2024-03-08 RX ORDER — SODIUM CHLORIDE, SODIUM LACTATE, POTASSIUM CHLORIDE, CALCIUM CHLORIDE 600; 310; 30; 20 MG/100ML; MG/100ML; MG/100ML; MG/100ML
INJECTION, SOLUTION INTRAVENOUS CONTINUOUS PRN
Status: DISCONTINUED | OUTPATIENT
Start: 2024-03-08 | End: 2024-03-08

## 2024-03-08 RX ORDER — MAGNESIUM HYDROXIDE 1200 MG/15ML
LIQUID ORAL AS NEEDED
Status: DISCONTINUED | OUTPATIENT
Start: 2024-03-08 | End: 2024-03-08 | Stop reason: HOSPADM

## 2024-03-08 RX ORDER — ONDANSETRON 2 MG/ML
4 INJECTION INTRAMUSCULAR; INTRAVENOUS ONCE AS NEEDED
Status: DISCONTINUED | OUTPATIENT
Start: 2024-03-08 | End: 2024-03-08 | Stop reason: HOSPADM

## 2024-03-08 RX ORDER — CHLORHEXIDINE GLUCONATE ORAL RINSE 1.2 MG/ML
15 SOLUTION DENTAL ONCE
Status: COMPLETED | OUTPATIENT
Start: 2024-03-08 | End: 2024-03-08

## 2024-03-08 RX ORDER — ONDANSETRON 2 MG/ML
INJECTION INTRAMUSCULAR; INTRAVENOUS AS NEEDED
Status: DISCONTINUED | OUTPATIENT
Start: 2024-03-08 | End: 2024-03-08

## 2024-03-08 RX ADMIN — SODIUM CHLORIDE, SODIUM LACTATE, POTASSIUM CHLORIDE, AND CALCIUM CHLORIDE: .6; .31; .03; .02 INJECTION, SOLUTION INTRAVENOUS at 08:10

## 2024-03-08 RX ADMIN — ONDANSETRON 4 MG: 2 INJECTION INTRAMUSCULAR; INTRAVENOUS at 08:20

## 2024-03-08 RX ADMIN — CHLORHEXIDINE GLUCONATE 15 ML: 1.2 SOLUTION ORAL at 07:23

## 2024-03-08 RX ADMIN — MIDAZOLAM 2 MG: 1 INJECTION INTRAMUSCULAR; INTRAVENOUS at 08:11

## 2024-03-08 RX ADMIN — LIDOCAINE HYDROCHLORIDE 50 MG: 10 INJECTION, SOLUTION EPIDURAL; INFILTRATION; INTRACAUDAL; PERINEURAL at 08:14

## 2024-03-08 RX ADMIN — CEFAZOLIN 3000 MG: 1 INJECTION, POWDER, FOR SOLUTION INTRAVENOUS at 08:16

## 2024-03-08 RX ADMIN — PROPOFOL 200 MG: 10 INJECTION, EMULSION INTRAVENOUS at 08:14

## 2024-03-08 RX ADMIN — INSULIN HUMAN 6 UNITS: 100 INJECTION, SOLUTION PARENTERAL at 07:40

## 2024-03-08 NOTE — DISCHARGE INSTR - AVS FIRST PAGE
PODIATRY DISCHARGE INSTRUCTIONS  DR. RODNEY YOUNG    General Instructions    No driving or operating machinery for 24 hours or while taking pain medication.  No alcoholic beverages for 24 hours or while taking pain medication.  DO NOT make any important personal or business decisions for 24 hours.    Diet:  Begin with liquids and light food and progress to your normal diet unless you are nauseated or otherwise instructed by your physician.    Medication:  Begin taking pain medication as directed and remember that narcotic medications may be constipating.  Consider starting over the counter stool softeners.  If constipation persists begin taking over the counter laxative.    Dressing:  Keep dressing dry. Do not remove dressing until seen by Dr Young    Ice:  Apply ice to affected area 20 minutes on and 20 minutes off unless otherwise         Instructed     Elevation:  Keep affected foot elevated on pillows    Weight Bearing status:  ___Okay to walk but must use surgical shoe when out of bed.  Surgical shoe to be used for minimum 2 weeks.  ________________________  Wear blue surgical shoe when walking  Use crutches, cane or walker as directed    Keep regular scheduled appointment with Dr Young    Call Dr Young with any problems or questions at [phone]

## 2024-03-08 NOTE — INTERIM OP NOTE
Amputation/Removal of 2nd right toe  Postoperative Note  PATIENT NAME: Pawan Fraga  : 1960  MRN: [id number]  [facility] ROOM 03    Surgery Date: 3/8/2024    Preop Diagnosis:  Diabetic ulcer of toe of right foot associated with type 2 diabetes mellitus, with fat layer exposed (HCC) [E11.621, L97.512]  Diabetic polyneuropathy associated with type 2 diabetes mellitus (HCC) [E11.42]  Hammer toe of right foot [M20.41]  Acquired deformity of right foot [M21.961]    Post-Op Diagnosis Codes:     * Diabetic ulcer of toe of right foot associated with type 2 diabetes mellitus, with fat layer exposed (HCC) [E11.621, L97.512]     * Diabetic polyneuropathy associated with type 2 diabetes mellitus (HCC) [E11.42]     * Hammer toe of right foot [M20.41]     * Acquired deformity of right foot [M21.961]    Procedure(s) (LRB):  Amputation/Removal of 2nd right toe (Right)    Surgeons and Role:     * Fernando Young DPM - Primary    Specimens:  ID Type Source Tests Collected by Time Destination   1 : Right second toe Tissue Foot, Right TISSUE EXAM Fernando Young DPM 3/8/2024 0830    2 : Right foot second toe clear margin Tissue Foot, Right TISSUE EXAM Fernando Young DPM 3/8/2024 0832        Estimated Blood Loss:   Minimal    Anesthesia Type:   IV Sedation with Anesthesia     Findings:   Patient demonstrates nonhealing full-thickness ulcer distal aspect second right toe.  Hypoesthesia secondary to neuropathy.  Probable osteomyelitis distal phalanx, second right toe.  Complications:   None      SIGNATURE: Fernando Young DPM   DATE: 2024   TIME: 8:53 AM

## 2024-03-08 NOTE — ANESTHESIA POSTPROCEDURE EVALUATION
Post-Op Assessment Note    CV Status:  Stable  Pain Score: 0    Pain management: adequate       Mental Status:  Arousable and sleepy   Hydration Status:  Stable and euvolemic   PONV Controlled:  Controlled   Airway Patency:  Patent and adequate     Post Op Vitals Reviewed: Yes    No anethesia notable event occurred.    Staff: CRNA               BP   142/73   Temp      Pulse  67   Resp   16   SpO2   96

## 2024-03-08 NOTE — PERIOPERATIVE NURSING NOTE
Received pt via stretcher from PACU, received report. Pt awake and alert. Pt reports 0/10 pain. Adaptic soaked betadine gauze dressing in place over surgical site, dressing clean dry and intact, no bleeding noted. Nourishments offered, tolerating well.

## 2024-03-08 NOTE — ANESTHESIA PREPROCEDURE EVALUATION
Procedure:  Amputation/Removal of 2nd right toe (Right: Toe)    Relevant Problems   CARDIO   (+) Hyperlipidemia   (+) Hypertension      ENDO   (+) Type 2 diabetes mellitus with both eyes affected by mild nonproliferative retinopathy without macular edema, without long-term current use of insulin (HCC)      /RENAL   (+) Nephrolithiasis        Physical Exam    Airway    Mallampati score: II  TM Distance: >3 FB  Neck ROM: full     Dental   No notable dental hx     Cardiovascular  Cardiovascular exam normal    Pulmonary  Pulmonary exam normal     Other Findings        Anesthesia Plan  ASA Score- 2     Anesthesia Type- general with ASA Monitors.         Additional Monitors:     Airway Plan: LMA.           Plan Factors-Exercise tolerance (METS): >4 METS.    Chart reviewed.   Existing labs reviewed. Patient summary reviewed.    Patient is not a current smoker.      Obstructive sleep apnea risk education given perioperatively.        Induction- intravenous.    Postoperative Plan- Plan for postoperative opioid use.     Informed Consent- Anesthetic plan and risks discussed with patient.  I personally reviewed this patient with the CRNA. Discussed and agreed on the Anesthesia Plan with the CRNA..

## 2024-03-08 NOTE — OP NOTE
OPERATIVE REPORT  PATIENT NAME: Pawan Fraga    :  1960  MRN: [id number]  Pt Location: [facility] ROOM 03    SURGERY DATE: 3/8/2024    Surgeons and Role:     * Fernando Young DPM - Primary    Preop Diagnosis:  Diabetic ulcer of toe of right foot associated with type 2 diabetes mellitus, with fat layer exposed (HCC) [E11.621, L97.512]  Diabetic polyneuropathy associated with type 2 diabetes mellitus (HCC) [E11.42]  Hammer toe of right foot [M20.41]  Acquired deformity of right foot [M21.961]    Post-Op Diagnosis Codes:     * Diabetic ulcer of toe of right foot associated with type 2 diabetes mellitus, with fat layer exposed (HCC) [E11.621, L97.512]     * Diabetic polyneuropathy associated with type 2 diabetes mellitus (HCC) [E11.42]     * Hammer toe of right foot [M20.41]     * Acquired deformity of right foot [M21.961]    Procedure(s):  Right - Amputation/Removal of 2nd right toe    Specimen(s):  ID Type Source Tests Collected by Time Destination   1 : Right second toe Tissue Foot, Right TISSUE EXAM Fernando Young DPM 3/8/2024 0830    2 : Right foot second toe clear margin Tissue Foot, Right TISSUE EXAM Fernando Young DPM 3/8/2024 0832        Estimated Blood Loss:   Minimal    Drains:  * No LDAs found *    Anesthesia Type:   IV Sedation with Anesthesia    Operative Indications:  Diabetic ulcer of toe of right foot associated with type 2 diabetes mellitus, with fat layer exposed (HCC) [E11.621, L97.512]  Diabetic polyneuropathy associated with type 2 diabetes mellitus (HCC) [E11.42]  Hammer toe of right foot [M20.41]  Acquired deformity of right foot [M21.961]  Patient demonstrates nonhealing wound distal aspect second right toe.  It is a full-thickness bone.  Patient has had many bouts of cellulitis.  Patient is diabetic with known neuropathy.  Suspect subacute osteomyelitis distal phalanx second right toe.  Patient has history of similar condition on the left side that required second toe amputated patient  understands this, and is tired of dealing with the problem.  So he is opted for surgical intervention in the hope of alleviating his concern.  Patient is consented to surgery understanding all the possible risks benefits alternatives and complications understand no guarantees are being given.  Patient understands and the following could occur but not limited to pain scar swelling hypo or hyperesthesia.  RSD phlebitis vasculitis.  Wound and need for wound care.  Infection and infection care needed.  Failure to heal.  Need for future surgery.  Dynamic    Operative Findings:  Patient has a full-thickness ulcer on the distal aspect of the second right toe.  Toe is clogged.  Suspect subacute osteomyelitis distal phalanx second right toe.    Complications:   None    Procedure and Technique:  Patient was brought to the OR and placed on the operating table in supine position.  He was prepped and draped in the usual sterile manner.  No ankle tourniquet used.  After identifying the toe and the wound 15 blade scalpel was used to perform a fishmouth type incision of the PIPJ second right toe.  This was to circumscribe the digit and allow for disarticulation.  Through blunt and sharp dissection the toe was disarticulated at the PIPJ.  There is no evidence of pus or abscess at this time.  Toe was sent to laboratory for workup.  In order to allow for minimum tension on the wound and to remove any bone that may be infected, an oscillating saw was used to resect the head of the proximal phalanx.  This was sent to pathology as clean margin.  The wound was bleeding nicely at this time.  With all bleeders being bovied and coagulated as necessary.  No evidence of pus or abscess.  So after copiously flushed with large amounts of sterile saline deep structures reapproximated with 4-0 chromic and the skin with 3-0 nylon.  Dry sterile dressing applied.  Vascular status never changed in the remaining digits.  Patient returned recovery room  with vital signs stable neurovascular status intact.  Patient is to keep surgical dressing intact.  He will use surgical shoe as directed.  Return to office for follow-up.   I was present for the entire procedure.    Patient Disposition:  PACU         SIGNATURE: Fernando Young DPM  DATE: March 8, 2024  TIME: 9:01 AM

## 2024-03-08 NOTE — QUICK NOTE
"    H&P Interval Update      H&P performed by Fernando Grant on 2/29/24 was reviewed.     After examining the patient I find no changes in the patients condition since the H&P had been written.     /94   Pulse 70   Temp 97.6 °F (36.4 °C)   Resp 18   Ht 5' 6\" (1.676 m)   Wt 130 kg (285 lb 15 oz)   SpO2 97%   BMI 46.15 kg/m²             General:  alert, awake, no acute distress   Head: atraumatic  Heart:  regular rate and rhythm, holosystolic ejection murmur present  Lungs: Clear to auscultation bilaterally  Abdomen:  obese, soft, non distended, no tenderness to palpation  Extremities:  no erythema, 1+ pitting edema b/l      Patient recently started oral potassium supplementation for his hypokalemia.  10 mEq orally once daily    "

## 2024-03-08 NOTE — PERIOPERATIVE NURSING NOTE
Pt met d/c criteria. Pain 0/10 reported. Dressing over surgical site clean dry and intact. IV removed without difficulties. D/c instructions reviewed verbally and written with pt and wife, both receptive. Pt escorted to car via w/c. Pt garett, d/c

## 2024-03-15 ENCOUNTER — OFFICE VISIT (OUTPATIENT)
Age: 64
End: 2024-03-15

## 2024-03-15 VITALS
BODY MASS INDEX: 46.15 KG/M2 | SYSTOLIC BLOOD PRESSURE: 123 MMHG | HEART RATE: 89 BPM | RESPIRATION RATE: 18 BRPM | DIASTOLIC BLOOD PRESSURE: 77 MMHG | HEIGHT: 66 IN

## 2024-03-15 DIAGNOSIS — E11.621 DIABETIC ULCER OF TOE OF RIGHT FOOT ASSOCIATED WITH TYPE 2 DIABETES MELLITUS, WITH FAT LAYER EXPOSED (HCC): Primary | ICD-10-CM

## 2024-03-15 DIAGNOSIS — L97.512 DIABETIC ULCER OF TOE OF RIGHT FOOT ASSOCIATED WITH TYPE 2 DIABETES MELLITUS, WITH FAT LAYER EXPOSED (HCC): Primary | ICD-10-CM

## 2024-03-15 PROCEDURE — 99024 POSTOP FOLLOW-UP VISIT: CPT | Performed by: PODIATRIST

## 2024-03-15 RX ORDER — SULFAMETHOXAZOLE AND TRIMETHOPRIM 800; 160 MG/1; MG/1
1 TABLET ORAL EVERY 12 HOURS SCHEDULED
Qty: 20 TABLET | Refills: 0 | Status: SHIPPED | OUTPATIENT
Start: 2024-03-15 | End: 2024-03-25

## 2024-03-15 NOTE — PROGRESS NOTES
Patient is doing well status post surgery.  He is back to work.  He does relate getting the foot wet.  He has no history of fever or night sweats.    Objective.  Dry sterile dressing grossly dry.  Removed.  Incision second toe right foot demonstrates mild maceration and edema.  No evidence of occult cellulitis.    Plan.  Change of dry sterile dressing.  We will add prophylactic Bactrim.  Patient will take this twice daily.  Dry sterile dressing to remain intact.  Use surgical shoe.  Watch for signs of infection.  Return for follow-up.

## 2024-03-22 ENCOUNTER — OFFICE VISIT (OUTPATIENT)
Age: 64
End: 2024-03-22

## 2024-03-22 VITALS
BODY MASS INDEX: 46.15 KG/M2 | SYSTOLIC BLOOD PRESSURE: 158 MMHG | RESPIRATION RATE: 18 BRPM | HEART RATE: 76 BPM | DIASTOLIC BLOOD PRESSURE: 79 MMHG | HEIGHT: 66 IN

## 2024-03-22 DIAGNOSIS — L97.512 DIABETIC ULCER OF TOE OF RIGHT FOOT ASSOCIATED WITH TYPE 2 DIABETES MELLITUS, WITH FAT LAYER EXPOSED (HCC): Primary | ICD-10-CM

## 2024-03-22 DIAGNOSIS — E11.621 DIABETIC ULCER OF TOE OF RIGHT FOOT ASSOCIATED WITH TYPE 2 DIABETES MELLITUS, WITH FAT LAYER EXPOSED (HCC): Primary | ICD-10-CM

## 2024-03-22 PROCEDURE — 99024 POSTOP FOLLOW-UP VISIT: CPT | Performed by: PODIATRIST

## 2024-03-22 NOTE — PROGRESS NOTES
Patient has no pain in the right foot.  He is taking antibiotic.  No history of fever night sweats.    Objective.  Dry sterile dressing right foot dry.  Removed.  Incision second right toe coapted.  No evidence of infection.    Plan.  Chart reviewed.  Sutures removed.  Steri-Strips applied.  Dry sterile dressing applied.  Patient will apply Steri-Strip and Band-Aid for 4 days.  Watch for signs of infection.  Return as needed

## 2024-05-08 ENCOUNTER — TELEPHONE (OUTPATIENT)
Dept: CARDIAC SURGERY | Facility: CLINIC | Age: 64
End: 2024-05-08

## 2024-05-08 NOTE — TELEPHONE ENCOUNTER
Spoke with someone about getting the Echo images from 3/19 sent to our office via [employer] for his appt with Dr. Garcia next week. She stated that the tech was out to lunch but would send her a message to see if they can this, if not she will call me back.     PLEASE TRANSFER TO OFFICE

## 2024-05-14 ENCOUNTER — OFFICE VISIT (OUTPATIENT)
Age: 64
End: 2024-05-14
Payer: COMMERCIAL

## 2024-05-14 VITALS
BODY MASS INDEX: 43.87 KG/M2 | HEART RATE: 84 BPM | SYSTOLIC BLOOD PRESSURE: 136 MMHG | HEIGHT: 66 IN | OXYGEN SATURATION: 96 % | WEIGHT: 273 LBS | DIASTOLIC BLOOD PRESSURE: 82 MMHG

## 2024-05-14 DIAGNOSIS — M79.605 PAIN IN BOTH LOWER EXTREMITIES: ICD-10-CM

## 2024-05-14 DIAGNOSIS — R06.09 DYSPNEA ON EXERTION: ICD-10-CM

## 2024-05-14 DIAGNOSIS — I35.0 AORTIC VALVE STENOSIS, ETIOLOGY OF CARDIAC VALVE DISEASE UNSPECIFIED: ICD-10-CM

## 2024-05-14 DIAGNOSIS — M79.604 PAIN IN BOTH LOWER EXTREMITIES: ICD-10-CM

## 2024-05-14 DIAGNOSIS — R06.02 SHORTNESS OF BREATH: Primary | ICD-10-CM

## 2024-05-14 PROCEDURE — 99204 OFFICE O/P NEW MOD 45 MIN: CPT | Performed by: INTERNAL MEDICINE

## 2024-05-14 RX ORDER — CARVEDILOL 6.25 MG/1
1 TABLET ORAL 2 TIMES DAILY WITH MEALS
COMMUNITY
Start: 2024-03-22 | End: 2025-03-22

## 2024-05-14 RX ORDER — INSULIN GLARGINE 100 [IU]/ML
20 INJECTION, SOLUTION SUBCUTANEOUS DAILY
COMMUNITY
Start: 2024-04-24

## 2024-05-14 RX ORDER — LOSARTAN POTASSIUM AND HYDROCHLOROTHIAZIDE 25; 100 MG/1; MG/1
1 TABLET ORAL DAILY
COMMUNITY

## 2024-05-14 NOTE — PROGRESS NOTES
Cardiology Consultation  [facility]      Pawan Fraga  1960  [id number]  [facility]  [address]  [place] [address] [phone]    1. Shortness of breath  Echo complete w/ contrast if indicated      2. Dyspnea on exertion  NM myocardial perfusion spect (rx stress and/or rest)    VAS ARTERIAL DUPLEX- LOWER LIMB BILATERAL    Echo complete w/ contrast if indicated      3. Pain in both lower extremities  VAS ARTERIAL DUPLEX- LOWER LIMB BILATERAL      4. Aortic valve stenosis, etiology of cardiac valve disease unspecified  Echo complete w/ contrast if indicated             Discussion/Summary    Shortness of breath with exertion-multifactorial, obesity, deconditioning  Hypertension, on  multidrug regimen controlled  H/o bilateral toe amputation, no known pvd  Venous insuff edema  Moderate av stenosis, visually, mild gradients, review echo LVH, unclear bicuspid AV as not well seen  Hyperlipidemia, on primary prevention statin    Plan  1) with [facility], rx nm stress ordered (due to prior toe amputation, knee arthritis unable walk long on treadmill)   2) echo beginning 2025 follow up AS  3) weight loss, increase activity  4) LEADS ordered.   5) continue primary prevention statin and multidrug blood pressure regimen.        History:     63-year-old male referred for murmur and aortic valve stenosis    Patient had echocardiogram at [facility] showing mean gradient 13 mmHg and aortic valve velocity just over 2.  Valve not well-seen.    LV function normal.    As result, establish care here.    Patient has a history of bilateral toe amputation due to hammertoe phenomena.    Has longstanding diabetes mellitus as well.  Last A1c 8.4 slightly increased from prior.    High school weight was approximately 180 pounds.  Has put on slowly on 100 pounds since then.    Not exercising routinely.    Chronic lower  "extremity swelling for which she takes combination losartan hydrochlorothiazide.  Has compression stockings at home but does not use as he cannot get on.    Patient reports shortness of breath with exertion which has been progressive over the last months to years.        Patient Active Problem List   Diagnosis    Type 2 diabetes mellitus with both eyes affected by mild nonproliferative retinopathy without macular edema, without long-term current use of insulin (HCC)    Hypertension    Hyperlipidemia    Intervertebral disc disorders with radiculopathy, lumbar region    Pes anserine bursitis    Acquired deformity of left foot    Hammer toe of left foot    Nephrolithiasis    Chronic pain of right knee    Elevated liver enzymes    Morbid obesity with BMI of 40.0-44.9, adult (HCC)    Intertrigo    Hammer toe of right foot    History of amputation of lesser toe of left foot (HCC)    Diabetic ulcer of toe of right foot associated with type 2 diabetes mellitus, with fat layer exposed (HCC)    Diabetic polyneuropathy associated with type 2 diabetes mellitus (HCC)    Subacute osteomyelitis of right foot (HCC)     Past Medical History:   Diagnosis Date    Anemia     s/p knee sx    Anesthesia complication     Developed hives in the PACU s/p knee replacement-c/o \"IV sedation that burns\"-affects him negatively    Arthritis     Chronic pain disorder     right knee-s/p knee replacement    Diabetes mellitus (HCC)     niddm    Diabetic neuropathy (HCC)     Left foot burning and tingling    Diverticulitis     DJD (degenerative joint disease)     Dysphagia     food gets stuck    Edema     lower legs-ankles    GERD (gastroesophageal reflux disease)     Hyperlipidemia     Hypertension     Kidney stone     27mm    Mechanical complication of internal orthopedic device (HCC)     knee    Nephrolithiasis 7/12/2019    Added automatically from request for surgery [id number]    Obesity     Osteomyelitis (HCC)     left 2nd toe    PONV (postoperative " nausea and vomiting)     patient requests to be premedicated for N/V    PVD (peripheral vascular disease) (HCC)     Valve issue with circulation of the lower legs- to have laser surgery    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     Social History     Socioeconomic History    Marital status:      Spouse name: Not on file    Number of children: Not on file    Years of education: Not on file    Highest education level: Not on file   Occupational History    Not on file   Tobacco Use    Smoking status: Never     Passive exposure: Never    Smokeless tobacco: Never   Vaping Use    Vaping status: Never Used   Substance and Sexual Activity    Alcohol use: Yes     Comment: on occ    Drug use: No    Sexual activity: Yes   Other Topics Concern    Not on file   Social History Narrative    Not on file     Social Determinants of Health     Financial Resource Strain: Low Risk  (6/22/2023)    Received from [facility]    Overall Financial Resource Strain (CARDIA)     Difficulty of Paying Living Expenses: Not very hard   Food Insecurity: No Food Insecurity (6/22/2023)    Received from [facility]    Hunger Vital Sign     Worried About Running Out of Food in the Last Year: Never true     Ran Out of Food in the Last Year: Never true   Transportation Needs: No Transportation Needs (6/22/2023)    Received from [facility]    PRAPARE - Transportation     Lack of Transportation (Medical): No     Lack of Transportation (Non-Medical): No   Physical Activity: Unknown (6/22/2023)    Received from [facility]    Exercise Vital Sign     Days of Exercise per Week: 0 days     Minutes of Exercise per Session: Not on file   Stress: Stress Concern Present (6/22/2023)    Received from [facility]    [employer] [place] [employer] - Occupational Stress Questionnaire     Feeling of Stress : To some extent   Social Connections:  Moderately Isolated (6/22/2023)    Received from [facility]    Social Connection and Isolation Panel [NHANES]     Frequency of Communication with Friends and Family: More than three times a week     Frequency of Social Gatherings with Friends and Family: Once a week     Attends Yazdanism Services: Never     Active Member of Clubs or Organizations: No     Attends Club or Organization Meetings: Not on file     Marital Status: Living with partner   Intimate Partner Violence: Not At Risk (6/22/2023)    Received from [facility]    Humiliation, Afraid, Rape, and Kick questionnaire     Fear of Current or Ex-Partner: No     Emotionally Abused: No     Physically Abused: No     Sexually Abused: No   Housing Stability: Low Risk  (6/22/2023)    Received from [facility]    Housing Stability Vital Sign     Unable to Pay for Housing in the Last Year: No     Number of Places Lived in the Last Year: 1     Unstable Housing in the Last Year: No      Family History   Problem Relation Age of Onset    Diabetes Mother     Hypertension Mother     Other Mother         esophageal stricture-dysphagia    Hypertension Father     No Known Problems Sister     No Known Problems Son     No Known Problems Son      Past Surgical History:   Procedure Laterality Date    CARPAL TUNNEL RELEASE Left     COLONOSCOPY      CORRECTION HAMMER TOE      Left foot 2nd toe    CYSTOSCOPY      FL RETROGRADE PYELOGRAM  07/26/2019    HERNIA REPAIR Left     inguinal    JOINT REPLACEMENT Right     right knee revisionx2, infection total of 4 surgeries     JOINT REPLACEMENT      left knee     KIDNEY STONE SURGERY      -large 27 mm stone-took x3 surgeries to resolve    MA AMPUTATION TOE INTERPHALANGEAL JOINT Left 12/14/2018    Procedure: AMPUTATION TOE;  Surgeon: Fernando Young DPM;  [facility];  Service: Podiatry    MA COLONOSCOPY FLX DX W/COLLJ SPEC WHEN PFRMD N/A 02/09/2018    Procedure: EGD AND COLONOSCOPY;   Surgeon: Mk Resendez MD;  [facility];  Service: Gastroenterology    MO CYSTO/URETERO W/LITHOTRIPSY &INDWELL STENT INSRT Left 07/26/2019    Procedure: CYSTOSCOPY URETEROSCOPY WITH LITHOTRIPSY HOLMIUM LASER, RETROGRADE PYELOGRAM AND INSERTION STENT URETERAL;  Surgeon: Rajiv Lassiter MD;  [facility];  Service: Urology    MO ENDOVEN ABLTJ INCMPTNT VEIN XTR LASER 1ST VEIN Left 01/04/2019    Procedure: ENDOVASCULAR LASER THERAPY (EVLT);  Surgeon: Mary Mayo DO;  [facility];  Service: Vascular    MO ENDOVEN ABLTJ INCMPTNT VEIN XTR LASER 1ST VEIN Right 06/07/2019    Procedure: ENDOVASCULAR LASER THERAPY (EVLT)   And stab phlebectomy;  Surgeon: Mary Mayo DO;  [facility];  Service: Vascular    MO REVJ TOT KNEE ARTHRP FEM&ENTIRE TIBIAL COMPONE Right 04/06/2016    Procedure: REVISION TOTAL KNEE ARTHROPLASTY INCLUDING FEMORAL AND TIBIAL COMPONENTS;  Surgeon: Pawan Campuzano MD;  [facility];  Service: Orthopedics    MO REVJ TOTAL KNEE ARTHRP W/WO ALGRFT 1 COMPONENT Right 03/20/2017    Procedure: REVISION TOTAL KNEE ARTHROPLASTY INCLUDING FEMORAL STEM REVISION;  Surgeon: Pawan Campuzano MD;  [facility];  Service: Orthopedics    TOE AMPUTATION Right 3/8/2024    Procedure: Amputation/Removal of 2nd right toe;  Surgeon: Fernando Young DPM;  Location: [facility];  Service: Podiatry    TONSILLECTOMY      ULNAR TUNNEL RELEASE      WISDOM TOOTH EXTRACTION         Current Outpatient Medications:     allopurinol (ZYLOPRIM) 300 mg tablet, Take 1 tablet (300 mg total) by mouth daily, Disp: 90 tablet, Rfl: 1    amLODIPine (NORVASC) 5 mg tablet, Take 1 tablet (5 mg total) by mouth 2 (two) times a day, Disp: 180 tablet, Rfl: 1    atorvastatin (LIPITOR) 40 mg tablet, Take 1 tablet (40 mg total) by mouth daily at bedtime, Disp: 90 tablet, Rfl: 1    Blood Glucose Monitoring Suppl (OneTouch Verio Reflect) w/Device KIT, Check blood sugars twice daily.  Please substitute with appropriate alternative as covered by patient's insurance. Dx: E11.65, Disp: 1 kit, Rfl: 0    carvedilol (COREG) 6.25 mg tablet, Take 1 tablet by mouth 2 (two) times a day with meals, Disp: , Rfl:     cholecalciferol (VITAMIN D3) 1,000 units tablet, Take 1,000 Units by mouth daily at bedtime  , Disp: , Rfl:     Empagliflozin (Jardiance) 25 MG TABS, Take 1 tablet (25 mg total) by mouth every morning Lot # [id number] exp 10/22, Disp: 90 tablet, Rfl: 1    glucose blood (OneTouch Verio) test strip, Check blood sugars twice daily. Please substitute with appropriate alternative as covered by patient's insurance. Dx: E11.65, Disp: 200 each, Rfl: 3    Insulin Pen Needle (BD Pen Needle Nelly U/F) 32G X 4 MM MISC, Use daily as directed with insulin pen, Disp: 100 each, Rfl: 3    Lantus SoloStar 100 units/mL SOPN, Inject 20 Units as directed daily, Disp: , Rfl:     losartan-hydrochlorothiazide (HYZAAR) 100-25 MG per tablet, Take 1 tablet by mouth daily, Disp: , Rfl:     metFORMIN (GLUCOPHAGE) 1000 MG tablet, Take 1000 mg in AM and 1500 mg in PM., Disp: 225 tablet, Rfl: 1    Multiple Vitamin (MULTIVITAMIN) tablet, Take 1 tablet by mouth every morning  , Disp: , Rfl:     sildenafil (VIAGRA) 100 mg tablet, TAKE ONE TABLET BY MOUTH ONE HOUR prior to intercourse, Disp: 30 tablet, Rfl: 4    tirzepatide (Mounjaro) 10 MG/0.5ML, Inject 10 mg under the skin every 7 days Once a week., Disp: , Rfl:   Allergies   Allergen Reactions    Bee Venom Anaphylaxis     Uses Epi-pen    Doxycycline Photosensitivity       Social, Family and medication history as listed, reviewed and updated as necessary    Labs:   Lab Results   Component Value Date     10/10/2015    K 3.0 (L) 02/26/2024     02/26/2024    CO2 28 02/26/2024    BUN 14 02/26/2024    CREATININE 0.67 02/26/2024    CREATININE 0.68 02/26/2024    GLUCOSE 120 10/10/2015    CALCIUM 9.9 02/26/2024       Lab Results   Component Value Date    WBC 7.45 02/26/2024    HGB  "14.5 02/26/2024    HGB 15.6 06/22/2022    HCT 40.5 02/26/2024    HCT 44.5 06/22/2022     02/26/2024     06/22/2022       Lab Results   Component Value Date    CHOL 141 06/18/2015    CHOL 147 08/14/2014     Lab Results   Component Value Date    HDL 54 06/22/2022    HDL 49 05/08/2020     Lab Results   Component Value Date    LDLCALC 84 06/22/2022    LDLCALC 73 05/08/2020     Lab Results   Component Value Date    TRIG 137 06/22/2022    TRIG 122 05/08/2020     Lab Results   Component Value Date    LDLDIRECT 93 02/26/2022    LDLDIRECT 78 05/08/2020       Lab Results   Component Value Date    ALT 46 02/26/2024    ALT 34 02/16/2024    AST 29 02/26/2024    AST 24 02/16/2024    ALKPHOS 71 02/26/2024    ALKPHOS 76 02/16/2024             No results found for: \"NTBNP\"    Lab Results   Component Value Date    HGBA1C 8.4 (H) 02/26/2024    HGBA1C 7.7 (H) 06/14/2023    HGBA1C 7.7 06/14/2023       Imaging: Reviewed in epic      Review of Systems:  14 systems reviewed and negative with exception of the above       PHYSICAL EXAM:        Vitals:    05/14/24 1322   BP: 136/82   Pulse: 84   SpO2: 96%     Body mass index is 44.06 kg/m².  Weight (last 2 days)       Date/Time Weight    05/14/24 1322 124 (273)               Gen: No acute distress  HEENT: anicteric, mucous membranes moist  Neck: supple, no jugular venous distention, or carotid bruit  Heart: regular, normal s1 and s2, 2/IGLESIA  Lungs :clear to auscultation bilaterally, no rales/rhonchi or wheeze  Abdomen: soft nontender, normoactive bowel sounds, no organomegaly  Ext: warm and perfused, normal femoral pulses,  1+  edema, or clubbing  Skin: warm, no rashes  Neuro: AAO x 3, no focal findings  Psychiatric: normal affect  Musculoskeletal: no obvious joint deformities.        This note was completed in part utilizing direct voice recognition software.   Grammatical errors, random word insertion, spelling mistakes, and incomplete sentences may be an occasional consequence " of the system secondary to software limitations, ambient noise and hardware issues. At the time of dictation, efforts were made to edit, clarify and /or correct errors.  Please read the chart carefully and recognize, using context, where substitutions have occurred.  If you have any questions or concerns about the context, text or information contained within the body of this dictation, please contact myself, the provider, for further clarification.

## 2024-06-18 ENCOUNTER — HOSPITAL ENCOUNTER (OUTPATIENT)
Dept: NON INVASIVE DIAGNOSTICS | Facility: CLINIC | Age: 64
Discharge: HOME | End: 2024-06-18
Payer: COMMERCIAL

## 2024-06-18 VITALS — WEIGHT: 273 LBS | HEIGHT: 66 IN | BODY MASS INDEX: 43.87 KG/M2

## 2024-06-18 DIAGNOSIS — R06.09 DYSPNEA ON EXERTION: ICD-10-CM

## 2024-06-18 DIAGNOSIS — M79.605 PAIN IN BOTH LOWER EXTREMITIES: ICD-10-CM

## 2024-06-18 DIAGNOSIS — M79.604 PAIN IN BOTH LOWER EXTREMITIES: ICD-10-CM

## 2024-06-18 LAB
NUC STRESS EJECTION FRACTION: 60 %
RATE PRESSURE PRODUCT: NORMAL
SL CV REST NUCLEAR ISOTOPE DOSE: 15.91 MCI
SL CV STRESS NUCLEAR ISOTOPE DOSE: 48.3 MCI
SL CV STRESS RECOVERY BP: NORMAL MMHG
SL CV STRESS RECOVERY HR: 81 BPM
SL CV STRESS RECOVERY O2 SAT: 97 %
STRESS ANGINA INDEX: 0
STRESS BASELINE BP: NORMAL MMHG
STRESS BASELINE HR: 74 BPM
STRESS O2 SAT REST: 97 %
STRESS PEAK HR: 91 BPM
STRESS POST O2 SAT PEAK: 97 %
STRESS POST PEAK BP: 110 MMHG
STRESS/REST PERFUSION RATIO: 1.16

## 2024-06-18 PROCEDURE — 78452 HT MUSCLE IMAGE SPECT MULT: CPT | Performed by: INTERNAL MEDICINE

## 2024-06-18 PROCEDURE — 93922 UPR/L XTREMITY ART 2 LEVELS: CPT | Performed by: SURGERY

## 2024-06-18 PROCEDURE — 93923 UPR/LXTR ART STDY 3+ LVLS: CPT

## 2024-06-18 PROCEDURE — A9502 TC99M TETROFOSMIN: HCPCS

## 2024-06-18 PROCEDURE — 93925 LOWER EXTREMITY STUDY: CPT | Performed by: SURGERY

## 2024-06-18 PROCEDURE — 93016 CV STRESS TEST SUPVJ ONLY: CPT | Performed by: INTERNAL MEDICINE

## 2024-06-18 PROCEDURE — 93017 CV STRESS TEST TRACING ONLY: CPT

## 2024-06-18 PROCEDURE — 78452 HT MUSCLE IMAGE SPECT MULT: CPT

## 2024-06-18 PROCEDURE — 93925 LOWER EXTREMITY STUDY: CPT

## 2024-06-18 PROCEDURE — 93018 CV STRESS TEST I&R ONLY: CPT | Performed by: INTERNAL MEDICINE

## 2024-06-18 RX ORDER — REGADENOSON 0.08 MG/ML
0.4 INJECTION, SOLUTION INTRAVENOUS ONCE
Status: COMPLETED | OUTPATIENT
Start: 2024-06-18 | End: 2024-06-18

## 2024-06-18 RX ADMIN — REGADENOSON 0.4 MG: 0.08 INJECTION, SOLUTION INTRAVENOUS at 12:47

## 2024-06-19 ENCOUNTER — TELEPHONE (OUTPATIENT)
Dept: CARDIOLOGY CLINIC | Facility: CLINIC | Age: 64
End: 2024-06-19

## 2024-06-19 LAB
CHEST PAIN STATEMENT: NORMAL
MAX DIASTOLIC BP: 82 MMHG
MAX PREDICTED HEART RATE: 158 BPM
PROTOCOL NAME: NORMAL
REASON FOR TERMINATION: NORMAL
STRESS POST EXERCISE DUR MIN: 3 MIN
STRESS POST EXERCISE DUR SEC: 0 SEC
STRESS POST PEAK HR: 96 BPM
STRESS POST PEAK SYSTOLIC BP: 136 MMHG
TARGET HR FORMULA: NORMAL
TEST INDICATION: NORMAL

## 2024-06-19 NOTE — TELEPHONE ENCOUNTER
----- Message from Jono Garcia DO sent at 6/19/2024  8:38 AM EDT -----  Please let pt know stress test and vascular studies of legs showed no evidence of significant blockages.  Good news. Thx.  Dean

## 2024-07-24 NOTE — H&P
No SI HI or self isolation   Mood stable   monitor for changes in appetite or sleeping pattern   History of Present Illness: The patient is a 63 y o  male who presents with complaints of right back buttock and leg pain is here today for right L3-4 transforaminal epidural steroid injection      Patient Active Problem List   Diagnosis    [facility]    Hypertension    Hyperlipidemia    Status post revision of total replacement of right knee    Varicose veins of bilateral lower extremities with pain    Intervertebral disc disorders with radiculopathy, lumbar region    Chronic pain of right knee    Pes anserine bursitis    Acute osteomyelitis of toe of left foot ([facility])    Acquired deformity of left foot    Hammer toe of left foot    Diabetic ulcer of toe of left foot associated with type 2 diabetes mellitus, with necrosis of bone ([facility])    Cellulitis of second toe of left foot    Left foot pain    Diabetic polyneuropathy associated with type 2 diabetes mellitus ([facility])    Acute osteomyelitis of toe, left ([facility])    Acute osteomyelitis of left ankle or foot ([facility] )    Tinea pedis of both feet       Past Medical History:   Diagnosis Date    Anemia     s/p knee sx    Anesthesia complication     Developed hives in the PACU s/p knee replacement    Arthritis     Diabetes mellitus ([facility] )     niddm    Diabetic neuropathy ([facility])     Left foot burning and tingling    DJD (degenerative joint disease)     Edema     lower legs-ankles    Hyperlipidemia     Hypertension     Kidney stone     27mm    Mechanical complication of internal orthopedic device ([facility])     knee    Obesity     Osteomyelitis ([facility])     left 2nd toe    PONV (postoperative nausea and vomiting)     patient requests to be premedicated for N/V    PVD (peripheral vascular disease) ([facility] )     Valve issue with circulation of the lower legs- to have laser surgery       Past Surgical History:   Procedure Laterality Date    CARPAL TUNNEL RELEASE Left     COLONOSCOPY      CORRECTION HAMMER TOE      Left foot 2nd toe    CYSTOSCOPY      HERNIA REPAIR Left     inguinal    JOINT REPLACEMENT      right knee revision, infection total of 4 surgeries     JOINT REPLACEMENT      left knee     KIDNEY STONE SURGERY      -large 27 mm stone-took x3 surgeries to resolve    IL AMPUTATION TOE,I-P JT Left 12/14/2018    Procedure: AMPUTATION TOE;  Surgeon: Maryann Martinez DPM;  Location: [address] [place] [address];  Service: Podiatry    IL COLONOSCOPY FLX DX W/COLLJ SPEC WHEN PFRMD N/A 2/9/2018    Procedure: EGD AND COLONOSCOPY;  Surgeon: Magdiel Severino MD;  [facility]; Service: Gastroenterology    IL ENDOVENOUS LASER, 1ST VEIN Left 1/4/2019    Procedure: ENDOVASCULAR LASER THERAPY (EVLT);   Surgeon: Prasanna Rich DO;  [facility];  Service: Vascular    IL REVISE KNEE JOINT REPLACE,1 PART Right 3/20/2017    Procedure: REVISION TOTAL KNEE ARTHROPLASTY INCLUDING FEMORAL STEM REVISION;  Surgeon: Elana Keenan MD;  [facility];  Service: Orthopedics    IL REVISE KNEE JOINT REPLACE,ALL PARTS Right 4/6/2016    Procedure: REVISION TOTAL KNEE ARTHROPLASTY INCLUDING FEMORAL AND TIBIAL COMPONENTS;  Surgeon: Elana Keenan MD;  [facility];  Service: Orthopedics    TONSILLECTOMY      ULNAR TUNNEL RELEASE      WISDOM TOOTH EXTRACTION           Current Outpatient Prescriptions:     allopurinol (ZYLOPRIM) 100 mg tablet, Take 100 mg by mouth every morning  , Disp: , Rfl:     amLODIPine (NORVASC) 5 mg tablet, Take 5 mg by mouth 2 (two) times a day  , Disp: , Rfl:     atorvastatin (LIPITOR) 40 mg tablet, Take 40 mg by mouth daily at bedtime, Disp: , Rfl:     cholecalciferol (VITAMIN D3) 1,000 units tablet, Take 1,000 Units by mouth daily at bedtime  , Disp: , Rfl:     ciclopirox (LOPROX) 0 77 % cream, Apply topically 2 (two) times a day for 20 days, Disp: 45 g, Rfl: 2    Dulaglutide (TRULICITY) 1 5 IC/8 8FY SOPN, Inject 1 5 mg under the skin once a week  , Disp: , Rfl:     Empagliflozin (JARDIANCE) 10 MG TABS, Take 10 mg by mouth every morning  , Disp: , Rfl:     fenofibrate (TRIGLIDE) 160 MG tablet, Take 160 mg by mouth every morning, Disp: , Rfl:     labetalol (NORMODYNE) 100 mg tablet, Take 100 mg by mouth 2 (two) times a day, Disp: , Rfl:     lisinopril (ZESTRIL) 40 mg tablet, Take 40 mg by mouth every morning  , Disp: , Rfl:     metFORMIN (GLUCOPHAGE) 1000 MG tablet, Take 1,000 mg by mouth 2 (two) times a day with meals  , Disp: , Rfl:     Multiple Vitamin (MULTIVITAMIN) tablet, Take 1 tablet by mouth every morning  , Disp: , Rfl:     oxyCODONE-acetaminophen (PERCOCET) 5-325 mg per tablet, Take 1 tablet by mouth every 4 (four) hours as needed for moderate pain for up to 10 days Max Daily Amount: 6 tablets, Disp: 25 tablet, Rfl: 0    Current Facility-Administered Medications:     bupivacaine (PF) (MARCAINE) 0 25 % injection 30 mL, 30 mL, Epidural, Once, Jhonny Lofton MD    iohexol (OMNIPAQUE) 300 mg/mL injection 50 mL, 50 mL, Epidural, Once, Jhonny Lofton MD    lidocaine (PF) (XYLOCAINE-MPF) 2 % injection 5 mL, 5 mL, Infiltration, Once, Jhonny Lofton MD    methylPREDNISolone acetate (DEPO-MEDROL) injection 80 mg, 80 mg, Epidural, Once, Jhonny Lofton MD    sodium chloride (PF) 0 9 % injection 10 mL, 10 mL, Infiltration, Once, Jhonny Lofton MD    Allergies   Allergen Reactions    Bee Venom      Uses Epi-pen    Doxycycline Photosensitivity       Physical Exam:   Vitals:    01/11/19 1059   BP: 126/79   Pulse: 71   Resp: 18   Temp: 98 1 °F (36 7 °C)   SpO2: 97%     General: Awake, Alert, Oriented x 3, Mood and affect appropriate  Respiratory: Respirations even and unlabored  Cardiovascular: Peripheral pulses intact; no edema  Musculoskeletal Exam:   Right lower back tenderness    ASA Score: 2    Patient/Chart Verification  Patient ID Verified: Verbal  ID Band Applied: No  Consents Confirmed: Procedural, To be obtained in the Pre-Procedure area  H&P( within 30 days) Verified:  To be obtained in the Pre-Procedure area  Interval H&P(within 24 hr) Complete (required for Outpatients and Surgery Admit only): To be obtained in the Pre-Procedure area  Allergies Reviewed: Yes  Anticoag/NSAID held?: No  Currently on antibiotics?: No    Assessment:   1   Intervertebral disc disorder with radiculopathy of lumbar region        Plan: R L3-L4 TFESI

## 2025-01-02 ENCOUNTER — HOSPITAL ENCOUNTER (OUTPATIENT)
Dept: NON INVASIVE DIAGNOSTICS | Facility: HOSPITAL | Age: 65
Discharge: HOME | End: 2025-01-02
Attending: INTERNAL MEDICINE
Payer: COMMERCIAL

## 2025-01-02 VITALS
BODY MASS INDEX: 43.87 KG/M2 | DIASTOLIC BLOOD PRESSURE: 82 MMHG | SYSTOLIC BLOOD PRESSURE: 136 MMHG | HEART RATE: 84 BPM | HEIGHT: 66 IN | WEIGHT: 273 LBS

## 2025-01-02 DIAGNOSIS — R06.09 DYSPNEA ON EXERTION: ICD-10-CM

## 2025-01-02 DIAGNOSIS — I35.0 AORTIC VALVE STENOSIS, ETIOLOGY OF CARDIAC VALVE DISEASE UNSPECIFIED: ICD-10-CM

## 2025-01-02 DIAGNOSIS — R06.02 SHORTNESS OF BREATH: ICD-10-CM

## 2025-01-02 LAB
AORTIC ROOT: 3.9 CM
AORTIC VALVE MEAN VELOCITY: 18.3 M/S
APICAL FOUR CHAMBER EJECTION FRACTION: 67 %
ASCENDING AORTA: 3.7 CM
AV AREA BY CONTINUOUS VTI: 1.8 CM2
AV AREA PEAK VELOCITY: 1.6 CM2
AV LVOT MEAN GRADIENT: 2 MMHG
AV LVOT PEAK GRADIENT: 4 MMHG
AV MEAN GRADIENT: 15 MMHG
AV PEAK GRADIENT: 25 MMHG
AV VALVE AREA: 1.77 CM2
AV VELOCITY RATIO: 0.4
BSA FOR ECHO PROCEDURE: 2.28 M2
DOP CALC AO PEAK VEL: 2.52 M/S
DOP CALC AO VTI: 55 CM
DOP CALC LVOT AREA: 4.15 CM2
DOP CALC LVOT CARDIAC INDEX: 3.24 L/MIN/M2
DOP CALC LVOT CARDIAC OUTPUT: 7.39 L/MIN
DOP CALC LVOT DIAMETER: 2.3 CM
DOP CALC LVOT PEAK VEL VTI: 23.39 CM
DOP CALC LVOT PEAK VEL: 1 M/S
DOP CALC LVOT STROKE INDEX: 42.5 ML/M2
DOP CALC LVOT STROKE VOLUME: 97.13
E WAVE DECELERATION TIME: 207 MS
E/A RATIO: 0.9
FRACTIONAL SHORTENING: 28 (ref 28–44)
INTERVENTRICULAR SEPTUM IN DIASTOLE (PARASTERNAL SHORT AXIS VIEW): 1.4 CM
INTERVENTRICULAR SEPTUM: 1.4 CM (ref 0.6–1.1)
LAAS-AP2: 26.8 CM2
LAAS-AP4: 14.2 CM2
LEFT ATRIUM SIZE: 4 CM
LEFT ATRIUM VOLUME (MOD BIPLANE): 74 ML
LEFT ATRIUM VOLUME INDEX (MOD BIPLANE): 32.5 ML/M2
LEFT INTERNAL DIMENSION IN SYSTOLE: 3.1 CM (ref 2.1–4)
LEFT VENTRICULAR INTERNAL DIMENSION IN DIASTOLE: 4.3 CM (ref 3.5–6)
LEFT VENTRICULAR POSTERIOR WALL IN END DIASTOLE: 1.3 CM
LEFT VENTRICULAR STROKE VOLUME: 48 ML
LVSV (TEICH): 48 ML
MV E'TISSUE VEL-LAT: 7 CM/S
MV E'TISSUE VEL-SEP: 7 CM/S
MV PEAK A VEL: 1.01 M/S
MV PEAK E VEL: 91 CM/S
MV STENOSIS PRESSURE HALF TIME: 60 MS
MV VALVE AREA P 1/2 METHOD: 3.67
RA PRESSURE ESTIMATED: 5 MMHG
RIGHT ATRIUM AREA SYSTOLE A4C: 14.7 CM2
RIGHT VENTRICLE ID DIMENSION: 3.8 CM
RV PSP: 15 MMHG
SL CV LEFT ATRIUM LENGTH A2C: 6.2 CM
SL CV LV EF: 65
SL CV PED ECHO LEFT VENTRICLE DIASTOLIC VOLUME (MOD BIPLANE) 2D: 85 ML
SL CV PED ECHO LEFT VENTRICLE SYSTOLIC VOLUME (MOD BIPLANE) 2D: 36 ML
TR MAX PG: 10 MMHG
TR PEAK VELOCITY: 1.6 M/S
TRICUSPID ANNULAR PLANE SYSTOLIC EXCURSION: 2.6 CM
TRICUSPID VALVE PEAK REGURGITATION VELOCITY: 1.57 M/S

## 2025-01-02 PROCEDURE — 93306 TTE W/DOPPLER COMPLETE: CPT | Performed by: INTERNAL MEDICINE

## 2025-01-02 PROCEDURE — 93306 TTE W/DOPPLER COMPLETE: CPT

## 2025-01-10 ENCOUNTER — OFFICE VISIT (OUTPATIENT)
Age: 65
End: 2025-01-10
Payer: COMMERCIAL

## 2025-01-10 VITALS
HEIGHT: 66 IN | WEIGHT: 275 LBS | DIASTOLIC BLOOD PRESSURE: 70 MMHG | HEART RATE: 81 BPM | BODY MASS INDEX: 44.2 KG/M2 | SYSTOLIC BLOOD PRESSURE: 138 MMHG | OXYGEN SATURATION: 98 %

## 2025-01-10 DIAGNOSIS — E78.2 MIXED HYPERLIPIDEMIA: Primary | Chronic | ICD-10-CM

## 2025-01-10 DIAGNOSIS — I35.0 AORTIC VALVE STENOSIS, NONRHEUMATIC: ICD-10-CM

## 2025-01-10 DIAGNOSIS — E11.42 DIABETIC POLYNEUROPATHY ASSOCIATED WITH TYPE 2 DIABETES MELLITUS (HCC): ICD-10-CM

## 2025-01-10 PROCEDURE — 99213 OFFICE O/P EST LOW 20 MIN: CPT | Performed by: INTERNAL MEDICINE

## 2025-01-10 NOTE — PROGRESS NOTES
Cardiology Consultation  [facility]      Pawan Fraga  1960  [id number]  [facility] [address] [facility]  [address]  [place] [id number] [phone]    1. Mixed hyperlipidemia  Echo complete w/ contrast if indicated      2. Aortic valve stenosis, nonrheumatic  Echo complete w/ contrast if indicated      3. Diabetic polyneuropathy associated with type 2 diabetes mellitus (HCC)                 Discussion/Summary    Mild AV stenosis, mean gradient 15mmHg  Obesity  DM  H/o dyspnea on exertion- deconditioning    Plan  1) recent echo unchanged degree of mild AS,  NM stress normal perfusion. Continue DM control, remain active/exercise, healthy diet. Echo with next years visist        History:     64-year-old male originally referred for murmur and aortic valve stenosis.  Here for follow-up nuclear stress test and echocardiogram.  Nuclear stress test normal perfusion.    Patient had echocardiogram at [facility] showing mean gradient 13 mmHg and aortic valve velocity just over 2.  Echocardiogram this month at [facility] very similar, mean 15 mmHg    LV function normal.    As result, establish care here.    Patient has a history of bilateral toe amputation due to hammertoe phenomena.    Has longstanding diabetes mellitus as well.  Last A1c improved at 6.4.    Discussed last visit his high school weight was approximately 180 pounds.  Has put on slowly on 100 pounds since then.    Not exercising routinely.    Chronic lower extremity swelling for which she takes combination losartan hydrochlorothiazide.  Has compression stockings at home but does not use as he cannot get on.    Patient reports shortness of breath with exertion which has been progressive over the last months to years.    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, without long-term current use of insulin (HCC)    Hypertension    Hyperlipidemia    Intervertebral disc disorders with radiculopathy, lumbar region    Pes anserine bursitis    Acquired deformity of left foot    Hammer toe of left foot    Nephrolithiasis    Chronic pain of right knee    Elevated liver enzymes    Morbid obesity with BMI of 40.0-44.9, adult (HCC)    Intertrigo    Hammer toe of right foot    History of amputation of lesser toe of left foot (HCC)    Diabetic ulcer of toe of right foot associated with type 2 diabetes mellitus, with fat layer exposed (HCC)    Diabetic polyneuropathy associated with type 2 diabetes mellitus (HCC)    Subacute osteomyelitis of right foot (HCC)     Past Medical History:   Diagnosis Date    Anemia     s/p knee sx    Anesthesia complication     Developed hives in the PACU s/p knee replacement-c/o \"IV sedation that burns\"-affects him negatively    Arthritis     Chronic pain disorder     right knee-s/p knee replacement    Diabetes mellitus (HCC)     niddm    Diabetic neuropathy (HCC)     Left foot burning and tingling    Diverticulitis     DJD (degenerative joint disease)     Dysphagia     food gets stuck    Edema     lower legs-ankles    GERD (gastroesophageal reflux disease)     Hyperlipidemia     Hypertension     Kidney stone     27mm    Mechanical complication of internal orthopedic device (HCC)     knee    Nephrolithiasis 7/12/2019    Added automatically from request for surgery [id number]    Obesity     Osteomyelitis (HCC)     left 2nd toe    PONV (postoperative nausea and vomiting)     patient requests to be premedicated for N/V    PVD (peripheral vascular disease) (HCC)     Valve issue with circulation of the lower legs- to have laser surgery    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 "     Social History     Socioeconomic History    Marital status:      Spouse name: Not on file    Number of children: Not on file    Years of education: Not on file    Highest education level: Not on file   Occupational History    Not on file   Tobacco Use    Smoking status: Never     Passive exposure: Never    Smokeless tobacco: Never   Vaping Use    Vaping status: Never Used   Substance and Sexual Activity    Alcohol use: Yes     Comment: on occ    Drug use: No    Sexual activity: Yes   Other Topics Concern    Not on file   Social History Narrative    Not on file     Social Drivers of Health     Financial Resource Strain: Low Risk  (6/22/2023)    [facility]    Overall Financial Resource Strain (CARDIA)     Difficulty of Paying Living Expenses: Not very hard   Food Insecurity: No Food Insecurity (6/22/2023)    Received from [facility], [facility]    Hunger Vital Sign     Worried About Running Out of Food in the Last Year: Never true     Ran Out of Food in the Last Year: Never true   Transportation Needs: No Transportation Needs (6/22/2023)    Received from [facility], [facility]    PRAPARE - Transportation     Lack of Transportation (Medical): No     Lack of Transportation (Non-Medical): No   Physical Activity: Unknown (6/22/2023)    Received from [facility]    Exercise Vital Sign     Days of Exercise per Week: 0 days     Minutes of Exercise per Session: Not on file   Stress: Stress Concern Present (6/22/2023)    Received from [facility], [facility]    [place] [employer] - Occupational Stress Questionnaire     Feeling of Stress : To some extent   Social Connections: Moderately Isolated (6/22/2023)    Received from [facility], [facility]    Social Connection and  Isolation Panel [NHANES]     Frequency of Communication with Friends and Family: More than three times a week     Frequency of Social Gatherings with Friends and Family: Once a week     Attends Confucianist Services: Never     Active Member of Clubs or Organizations: No     Attends Club or Organization Meetings: Not on file     Marital Status: Living with partner   Intimate Partner Violence: Not At Risk (6/22/2023)    [facility]    Humiliation, Afraid, Rape, and Kick questionnaire     Fear of Current or Ex-Partner: No     Emotionally Abused: No     Physically Abused: No     Sexually Abused: No   Housing Stability: Low Risk  (6/22/2023)    [facility]    Housing Stability Vital Sign     Unable to Pay for Housing in the Last Year: No     Number of Places Lived in the Last Year: 1     Unstable Housing in the Last Year: No      Family History   Problem Relation Age of Onset    Diabetes Mother     Hypertension Mother     Other Mother         esophageal stricture-dysphagia    Hypertension Father     No Known Problems Sister     No Known Problems Son     No Known Problems Son      Past Surgical History:   Procedure Laterality Date    CARPAL TUNNEL RELEASE Left     COLONOSCOPY      CORRECTION HAMMER TOE      Left foot 2nd toe    CYSTOSCOPY      FL RETROGRADE PYELOGRAM  07/26/2019    HERNIA REPAIR Left     inguinal    JOINT REPLACEMENT Right     right knee revisionx2, infection total of 4 surgeries     JOINT REPLACEMENT      left knee     KIDNEY STONE SURGERY      -large 27 mm stone-took x3 surgeries to resolve    WI AMPUTATION TOE INTERPHALANGEAL JOINT Left 12/14/2018    Procedure: AMPUTATION TOE;  Surgeon: Fernando Young DPM;  Location: [facility];  Service: Podiatry    WI COLONOSCOPY FLX DX W/COLLJ SPEC WHEN PFRMD N/A 02/09/2018    Procedure: EGD AND COLONOSCOPY;  Surgeon: Mk Resendez MD;  Location: [facility]  GI LAB;  Service: Gastroenterology    NC CYSTO/URETERO W/LITHOTRIPSY &INDWELL STENT INSRT Left 07/26/2019    Procedure: CYSTOSCOPY URETEROSCOPY WITH LITHOTRIPSY HOLMIUM LASER, RETROGRADE PYELOGRAM AND INSERTION STENT URETERAL;  Surgeon: Rajiv Lassiter MD;  [facility];  Service: Urology    NC ENDOVEN ABLTJ INCMPTNT VEIN XTR LASER 1ST VEIN Left 01/04/2019    Procedure: ENDOVASCULAR LASER THERAPY (EVLT);  Surgeon: Mary Mayo DO;  [facility];  Service: Vascular    NC ENDOVEN ABLTJ INCMPTNT VEIN XTR LASER 1ST VEIN Right 06/07/2019    Procedure: ENDOVASCULAR LASER THERAPY (EVLT)   And stab phlebectomy;  Surgeon: Mary Mayo DO;  [facility];  Service: Vascular    NC REVJ TOT KNEE ARTHRP FEM&ENTIRE TIBIAL COMPONE Right 04/06/2016    Procedure: REVISION TOTAL KNEE ARTHROPLASTY INCLUDING FEMORAL AND TIBIAL COMPONENTS;  Surgeon: Pawan Campuzano MD;  Location[facility];  Service: Orthopedics    NC REVJ TOTAL KNEE ARTHRP W/WO ALGRFT 1 COMPONENT Right 03/20/2017    Procedure: REVISION TOTAL KNEE ARTHROPLASTY INCLUDING FEMORAL STEM REVISION;  Surgeon: Pawan Campuzano MD;  [facility];  Service: Orthopedics    TOE AMPUTATION Right 3/8/2024    Procedure: Amputation/Removal of 2nd right toe;  Surgeon: Fernando Young DPM;  Location: [facility];  Service: Podiatry    TONSILLECTOMY      ULNAR TUNNEL RELEASE      WISDOM TOOTH EXTRACTION         Current Outpatient Medications:     allopurinol (ZYLOPRIM) 300 mg tablet, Take 1 tablet (300 mg total) by mouth daily, Disp: 90 tablet, Rfl: 1    amLODIPine (NORVASC) 5 mg tablet, Take 1 tablet (5 mg total) by mouth 2 (two) times a day, Disp: 180 tablet, Rfl: 1    atorvastatin (LIPITOR) 40 mg tablet, Take 1 tablet (40 mg total) by mouth daily at bedtime, Disp: 90 tablet, Rfl: 1    Blood Glucose Monitoring Suppl (OneTouch Verio Reflect) w/Device KIT, Check blood sugars twice daily. Please substitute with appropriate alternative  as covered by patient's insurance. Dx: E11.65, Disp: 1 kit, Rfl: 0    carvedilol (COREG) 6.25 mg tablet, Take 1 tablet by mouth 2 (two) times a day with meals, Disp: , Rfl:     cholecalciferol (VITAMIN D3) 1,000 units tablet, Take 1,000 Units by mouth daily at bedtime  , Disp: , Rfl:     Empagliflozin (Jardiance) 25 MG TABS, Take 1 tablet (25 mg total) by mouth every morning Lot # [id number] exp 10/22, Disp: 90 tablet, Rfl: 1    glucose blood (OneTouch Verio) test strip, Check blood sugars twice daily. Please substitute with appropriate alternative as covered by patient's insurance. Dx: E11.65, Disp: 200 each, Rfl: 3    Insulin Pen Needle (BD Pen Needle Nelly U/F) 32G X 4 MM MISC, Use daily as directed with insulin pen, Disp: 100 each, Rfl: 3    Lantus SoloStar 100 units/mL SOPN, Inject 20 Units as directed daily, Disp: , Rfl:     losartan-hydrochlorothiazide (HYZAAR) 100-25 MG per tablet, Take 1 tablet by mouth daily, Disp: , Rfl:     metFORMIN (GLUCOPHAGE) 1000 MG tablet, Take 1000 mg in AM and 1500 mg in PM., Disp: 225 tablet, Rfl: 1    Multiple Vitamin (MULTIVITAMIN) tablet, Take 1 tablet by mouth every morning  , Disp: , Rfl:     sildenafil (VIAGRA) 100 mg tablet, TAKE ONE TABLET BY MOUTH ONE HOUR prior to intercourse, Disp: 30 tablet, Rfl: 4    tirzepatide (Mounjaro) 10 MG/0.5ML, Inject 10 mg under the skin every 7 days Once a week., Disp: , Rfl:   Allergies   Allergen Reactions    Bee Venom Anaphylaxis     Uses Epi-pen    Doxycycline Photosensitivity       Social, Family and medication history as listed, reviewed and updated as necessary    Labs:   Lab Results   Component Value Date     10/10/2015    K 3.4 (L) 11/23/2024     11/23/2024    CO2 28 11/23/2024    BUN 16 11/23/2024    CREATININE 0.71 11/23/2024    CREATININE 0.65 06/14/2024    GLUCOSE 120 10/10/2015    CALCIUM 9.6 11/23/2024       Lab Results   Component Value Date    WBC 7.45 02/26/2024    HGB 14.5 02/26/2024    HGB 15.6 06/22/2022    HCT  "40.5 02/26/2024    HCT 44.5 06/22/2022     02/26/2024     06/22/2022       Lab Results   Component Value Date    CHOL 141 06/18/2015    CHOL 147 08/14/2014     Lab Results   Component Value Date    HDL 54 06/22/2022    HDL 49 05/08/2020     Lab Results   Component Value Date    LDLCALC 84 06/22/2022    LDLCALC 73 05/08/2020     Lab Results   Component Value Date    TRIG 137 06/22/2022    TRIG 122 05/08/2020     Lab Results   Component Value Date    LDLDIRECT 93 02/26/2022    LDLDIRECT 78 05/08/2020       Lab Results   Component Value Date    ALT 31 11/23/2024    ALT 46 02/26/2024    AST 20 11/23/2024    AST 29 02/26/2024    ALKPHOS 62 11/23/2024    ALKPHOS 71 02/26/2024             No results found for: \"NTBNP\"    Lab Results   Component Value Date    HGBA1C 6.4 (H) 11/23/2024    HGBA1C 6.6 (H) 06/14/2024    HGBA1C 8.4 (H) 02/26/2024       Imaging: Reviewed in epic      Review of Systems:  14 systems reviewed and negative with exception of the above       PHYSICAL EXAM:        Vitals:    01/10/25 1454   BP: 138/70   Pulse: 81   SpO2: 98%     Body mass index is 44.39 kg/m².  Weight (last 2 days)       Date/Time Weight    01/10/25 1454 125 (275)               Gen: No acute distress  HEENT: anicteric, mucous membranes moist  Neck: supple, no jugular venous distention, or carotid bruit  Heart: regular, normal s1 and s2, 2/IGLESIA  Lungs :clear to auscultation bilaterally, no rales/rhonchi or wheeze  Abdomen: soft nontender, normoactive bowel sounds, no organomegaly  Ext: warm and perfused, normal femoral pulses,  1+  edema, or clubbing  Skin: warm, no rashes  Neuro: AAO x 3, no focal findings  Psychiatric: normal affect  Musculoskeletal: no obvious joint deformities.        This note was completed in part utilizing direct voice recognition software.   Grammatical errors, random word insertion, spelling mistakes, and incomplete sentences may be an occasional consequence of the system secondary to software " limitations, ambient noise and hardware issues. At the time of dictation, efforts were made to edit, clarify and /or correct errors.  Please read the chart carefully and recognize, using context, where substitutions have occurred.  If you have any questions or concerns about the context, text or information contained within the body of this dictation, please contact myself, the provider, for further clarification.

## 2025-01-17 DIAGNOSIS — N52.1 ERECTILE DYSFUNCTION DUE TO DISEASES CLASSIFIED ELSEWHERE: ICD-10-CM

## 2025-01-17 RX ORDER — SILDENAFIL 100 MG/1
TABLET, FILM COATED ORAL
Qty: 30 TABLET | Refills: 4 | OUTPATIENT
Start: 2025-01-17

## 2025-01-21 DIAGNOSIS — N52.1 ERECTILE DYSFUNCTION DUE TO DISEASES CLASSIFIED ELSEWHERE: ICD-10-CM

## 2025-01-21 RX ORDER — SILDENAFIL 100 MG/1
TABLET, FILM COATED ORAL
Qty: 30 TABLET | Refills: 4 | Status: SHIPPED | OUTPATIENT
Start: 2025-01-21

## 2025-01-21 NOTE — TELEPHONE ENCOUNTER
Has appointment 4/11/25. Please call if any issues filling medication      Reason for call:   [x] Refill   [] Prior Auth  [] Other:     Office:   [] PCP/Provider -   [x] Specialty/Provider - Jaime Zee /UROLOGY [facility]     Medication: Viagra    Dose/Frequency: 100 mg     Quantity: #30    Pharmacy: [facility] [id number]    Does the patient have enough for 3 days?   [] Yes   [x] No - Send as HP to POD

## 2025-04-11 ENCOUNTER — OFFICE VISIT (OUTPATIENT)
Age: 65
End: 2025-04-11
Payer: COMMERCIAL

## 2025-04-11 VITALS
HEART RATE: 73 BPM | TEMPERATURE: 97.1 F | BODY MASS INDEX: 43.23 KG/M2 | DIASTOLIC BLOOD PRESSURE: 82 MMHG | WEIGHT: 269 LBS | SYSTOLIC BLOOD PRESSURE: 140 MMHG | HEIGHT: 66 IN | OXYGEN SATURATION: 96 %

## 2025-04-11 DIAGNOSIS — N40.0 BENIGN PROSTATIC HYPERPLASIA WITHOUT LOWER URINARY TRACT SYMPTOMS: Primary | ICD-10-CM

## 2025-04-11 PROCEDURE — 99213 OFFICE O/P EST LOW 20 MIN: CPT | Performed by: PHYSICIAN ASSISTANT

## 2025-04-11 RX ORDER — POTASSIUM CHLORIDE 750 MG/1
1 TABLET, EXTENDED RELEASE ORAL DAILY
COMMUNITY
Start: 2025-02-08

## 2025-04-11 RX ORDER — TIRZEPATIDE 10 MG/.5ML
INJECTION, SOLUTION SUBCUTANEOUS
COMMUNITY
Start: 2025-04-06

## 2025-04-11 NOTE — PROGRESS NOTES
UROLOGY PROGRESS NOTE   Patient Identifiers: Pawan Fraga (MRN [id number])  Date of Service: 4/11/2025    Subjective:   64-year-old man history of BPH.  His father had prostate cancer.  PSA 0.71.  No significant voiding complaints.    Reason for visit: BPH follow-up    Objective:     VITALS:    Vitals:    04/11/25 1436   BP: 140/82   Pulse: 73   Temp: (!) 97.1 °F (36.2 °C)   SpO2: 96%           LABS:  Lab Results   Component Value Date    HGB 14.5 02/26/2024    HCT 40.5 02/26/2024    WBC 7.45 02/26/2024     02/26/2024   ]    Lab Results   Component Value Date     10/10/2015    K 3.4 (L) 11/23/2024     11/23/2024    CO2 28 11/23/2024    BUN 16 11/23/2024    CREATININE 0.71 11/23/2024    CALCIUM 9.6 11/23/2024    GLUCOSE 120 10/10/2015   ]        INPATIENT MEDS:    Current Outpatient Medications:     allopurinol (ZYLOPRIM) 300 mg tablet, Take 1 tablet (300 mg total) by mouth daily, Disp: 90 tablet, Rfl: 1    amLODIPine (NORVASC) 5 mg tablet, Take 1 tablet (5 mg total) by mouth 2 (two) times a day, Disp: 180 tablet, Rfl: 1    atorvastatin (LIPITOR) 40 mg tablet, Take 1 tablet (40 mg total) by mouth daily at bedtime, Disp: 90 tablet, Rfl: 1    Blood Glucose Monitoring Suppl (OneTouch Verio Reflect) w/Device KIT, Check blood sugars twice daily. Please substitute with appropriate alternative as covered by patient's insurance. Dx: E11.65, Disp: 1 kit, Rfl: 0    carvedilol (COREG) 6.25 mg tablet, Take 1 tablet by mouth 2 (two) times a day with meals, Disp: , Rfl:     cholecalciferol (VITAMIN D3) 1,000 units tablet, Take 1,000 Units by mouth daily at bedtime  , Disp: , Rfl:     Empagliflozin (Jardiance) 25 MG TABS, Take 1 tablet (25 mg total) by mouth every morning Lot # [id number] exp 10/22, Disp: 90 tablet, Rfl: 1    glucose blood (OneTouch Verio) test strip, Check blood sugars twice daily. Please substitute with appropriate alternative as covered by patient's insurance. Dx: E11.65, Disp: 200 each, Rfl:  "3    Insulin Pen Needle (BD Pen Needle Nelly U/F) 32G X 4 MM MISC, Use daily as directed with insulin pen, Disp: 100 each, Rfl: 3    Lantus SoloStar 100 units/mL SOPN, Inject 20 Units as directed daily, Disp: , Rfl:     losartan-hydrochlorothiazide (HYZAAR) 100-25 MG per tablet, Take 1 tablet by mouth daily, Disp: , Rfl:     metFORMIN (GLUCOPHAGE) 1000 MG tablet, Take 1000 mg in AM and 1500 mg in PM., Disp: 225 tablet, Rfl: 1    Mounjaro 10 MG/0.5ML SOAJ, , Disp: , Rfl:     Multiple Vitamin (MULTIVITAMIN) tablet, Take 1 tablet by mouth every morning  , Disp: , Rfl:     potassium chloride (Klor-Con) 10 mEq tablet, Take 1 tablet by mouth in the morning, Disp: , Rfl:     sildenafil (VIAGRA) 100 mg tablet, TAKE ONE TABLET BY MOUTH ONE HOUR prior to intercourse, Disp: 30 tablet, Rfl: 4    tirzepatide (Mounjaro) 10 MG/0.5ML, Inject 10 mg under the skin every 7 days Once a week., Disp: , Rfl:       Physical Exam:   /82 (BP Location: Left arm, Patient Position: Sitting, Cuff Size: Standard)   Pulse 73   Temp (!) 97.1 °F (36.2 °C) (Tympanic)   Ht 5' 6\" (1.676 m)   Wt 122 kg (269 lb)   SpO2 96%   BMI 43.42 kg/m²   GEN: no acute distress    RESP: breathing comfortably with no accessory muscle use    ABD: soft, non-tender, non-distended   INCISION:    EXT: no significant peripheral edema   (Male): Penis circumcised, phallus normal, meatus patent.  Testicles descended into scrotum bilaterally without masses nor tenderness.  No inguinal hernias bilaterally.  CHRISTEL: Prostate is enlarged at 45 grams. The prostate is not boggy. The prostate is not tender.  No nodules noted  RADIOLOGY:   none     Assessment:   #1.  BPH    Plan:   -  -  -  -          "

## 2025-06-30 ENCOUNTER — TELEPHONE (OUTPATIENT)
Age: 65
End: 2025-06-30

## 2025-06-30 NOTE — TELEPHONE ENCOUNTER
Spoke with patient let him know per Dr. Young ER or urgent care patient stated he is going to head to the ER.

## 2025-06-30 NOTE — TELEPHONE ENCOUNTER
-Hello,    Please advise if a forced appointment can be accommodated for the patient:-    Call back #: [phone]    Insurance: BC    Reason for appointment: right foot middle toe is red/hot/painful/discharge/diabetic/pain going up into calf/advised ER but wants to know what Dr Young wants him to do. Please call back and advise.    Requested doctor and/or location: Dr Young/[facility]      Thank you.

## 2025-07-27 ENCOUNTER — APPOINTMENT (EMERGENCY)
Dept: RADIOLOGY | Facility: HOSPITAL | Age: 65
End: 2025-07-27
Payer: COMMERCIAL

## 2025-07-27 ENCOUNTER — APPOINTMENT (EMERGENCY)
Dept: CT IMAGING | Facility: HOSPITAL | Age: 65
End: 2025-07-27
Payer: COMMERCIAL

## 2025-07-27 ENCOUNTER — HOSPITAL ENCOUNTER (INPATIENT)
Facility: HOSPITAL | Age: 65
LOS: 3 days | Discharge: HOME | End: 2025-07-30
Attending: EMERGENCY MEDICINE | Admitting: INTERNAL MEDICINE
Payer: COMMERCIAL

## 2025-07-27 PROBLEM — J96.01 ACUTE RESPIRATORY FAILURE WITH HYPOXIA (HCC): Status: RESOLVED | Noted: 2025-07-27 | Resolved: 2025-07-27

## 2025-07-27 PROBLEM — I35.0 AORTIC STENOSIS, MILD: Status: ACTIVE | Noted: 2025-07-27

## 2025-07-27 PROBLEM — J45.909 REACTIVE AIRWAY DISEASE: Status: ACTIVE | Noted: 2025-07-27

## 2025-07-27 PROBLEM — M10.9 GOUT: Status: ACTIVE | Noted: 2025-07-27

## 2025-07-27 PROBLEM — R65.10 SIRS OF NON-INFECTIOUS ORIGIN W/O ACUTE ORGAN DYSFUNCTION (HCC): Status: ACTIVE | Noted: 2025-07-27

## 2025-07-27 PROBLEM — J96.01 ACUTE RESPIRATORY FAILURE WITH HYPOXIA (HCC): Status: ACTIVE | Noted: 2025-07-27

## 2025-07-27 PROBLEM — R06.02 SHORTNESS OF BREATH: Status: ACTIVE | Noted: 2025-07-27

## 2025-07-27 PROBLEM — R65.10 SIRS OF NON-INFECTIOUS ORIGIN W/O ACUTE ORGAN DYSFUNCTION (HCC): Status: RESOLVED | Noted: 2025-07-27 | Resolved: 2025-07-27

## 2025-07-28 ENCOUNTER — APPOINTMENT (INPATIENT)
Dept: NON INVASIVE DIAGNOSTICS | Facility: HOSPITAL | Age: 65
End: 2025-07-28
Payer: COMMERCIAL

## 2025-07-28 PROBLEM — F10.90 ALCOHOL USE: Status: ACTIVE | Noted: 2025-07-28

## 2025-07-31 ENCOUNTER — TELEPHONE (OUTPATIENT)
Dept: CARDIAC SURGERY | Facility: CLINIC | Age: 65
End: 2025-07-31

## 2025-08-07 ENCOUNTER — HOSPITAL ENCOUNTER (EMERGENCY)
Facility: HOSPITAL | Age: 65
Discharge: HOME | End: 2025-08-07
Attending: STUDENT IN AN ORGANIZED HEALTH CARE EDUCATION/TRAINING PROGRAM
Payer: COMMERCIAL

## 2025-08-07 ENCOUNTER — APPOINTMENT (EMERGENCY)
Dept: RADIOLOGY | Facility: HOSPITAL | Age: 65
End: 2025-08-07
Payer: COMMERCIAL

## (undated) DEVICE — ABDOMINAL PAD: Brand: DERMACEA

## (undated) DEVICE — DRAPE SHEET X-LG

## (undated) DEVICE — GROUNDING PAD UNIVERSAL SLW

## (undated) DEVICE — ACE WRAP 4 IN UNSTERILE

## (undated) DEVICE — INTENDED FOR TISSUE SEPARATION, AND OTHER PROCEDURES THAT REQUIRE A SHARP SURGICAL BLADE TO PUNCTURE OR CUT.: Brand: BARD-PARKER SAFETY BLADES SIZE 11, STERILE

## (undated) DEVICE — NEEDLE 18 G X 1 1/2

## (undated) DEVICE — DRAPE SHEET THREE QUARTER

## (undated) DEVICE — BASIC DOUBLE BASIN 2-LF: Brand: MEDLINE INDUSTRIES, INC.

## (undated) DEVICE — CURITY NON-ADHERENT STRIPS: Brand: CURITY

## (undated) DEVICE — STRETCH BANDAGE: Brand: CURITY

## (undated) DEVICE — KERLIX BANDAGE ROLL: Brand: KERLIX

## (undated) DEVICE — TIBURON EXTREMITY SHEET: Brand: CONVERTORS

## (undated) DEVICE — SUT VICRYL PLUS 2-0 CTB-1 27 IN VCPB259H

## (undated) DEVICE — BETHLEHEM UNIVERSAL MINOR GEN: Brand: CARDINAL HEALTH

## (undated) DEVICE — SUT ETHIBOND 5 V-40 30 IN MB46G

## (undated) DEVICE — VSI MICRO-INTRODUCER KITS ARE INTENDED FOR USE IN PERCUTANEOUS INTRODUCTION OF UP TO A 0.018 INCH OR 0.038 INCH GUIDEWIRE OR CATHETER INTO THE VASCULAR SYSTEM FOLLOWING A SMALL GAUGE NEEDLE STICK.: Brand: VSI MICRO-INTRODUCER KIT

## (undated) DEVICE — CHLORAPREP HI-LITE 26ML ORANGE

## (undated) DEVICE — INTENDED FOR TISSUE SEPARATION, AND OTHER PROCEDURES THAT REQUIRE A SHARP SURGICAL BLADE TO PUNCTURE OR CUT.: Brand: BARD-PARKER ® CARBON RIB-BACK BLADES

## (undated) DEVICE — 3M™ IOBAN™ 2 ANTIMICROBIAL INCISE DRAPE 6650EZ: Brand: IOBAN™ 2

## (undated) DEVICE — HOOD: Brand: FLYTE, SURGICOOL

## (undated) DEVICE — ACE WRAP 3 IN VELCRO LATEX FREE

## (undated) DEVICE — HALF SHEET: Brand: CONVERTORS

## (undated) DEVICE — DRESSING GUAZE ADH BORDER 4 X 4 IN

## (undated) DEVICE — SPONGE GAUZE 4 X 9

## (undated) DEVICE — ADHESIVE SKN CLSR HISTOACRYL FLEX 0.5ML LF

## (undated) DEVICE — COVER PROBE INTRAOPERATIVE 6 X 96 IN

## (undated) DEVICE — STOCKINETTE REGULAR

## (undated) DEVICE — FABRIC REINFORCED, SURGICAL GOWN, XL: Brand: CONVERTORS

## (undated) DEVICE — TIBURON SPLIT SHEET: Brand: CONVERTORS

## (undated) DEVICE — RECIPROCATING BLADE, DOUBLE SIDED (70.0 X 0.96 X 12.5MM)

## (undated) DEVICE — LIGHT HANDLE COVER SLEEVE DISP BLUE STELLAR

## (undated) DEVICE — NON-STERILE REUSABLE TOURNIQUET CUFF SINGLE BLADDER, DUAL PORT AND QUICK CONNECT CONNECTOR: Brand: COLOR CUFF

## (undated) DEVICE — 3M™ STERI-STRIP™ REINFORCED ADHESIVE SKIN CLOSURES, R1547, 1/2 IN X 4 IN (12 MM X 100 MM), 6 STRIPS/ENVELOPE: Brand: 3M™ STERI-STRIP™

## (undated) DEVICE — CATH URETERAL 5FR X 70 CM FLEX TIP POLYUR BARD

## (undated) DEVICE — INVIEW CLEAR LEGGINGS: Brand: CONVERTORS

## (undated) DEVICE — GLOVE SRG BIOGEL 7.5

## (undated) DEVICE — 200 MICRON SINGLE-USE HOLMIUM FIBER ASSEMBLY WITH FLAT TIP: Brand: OPTILITE

## (undated) DEVICE — SUT CHROMIC 4-0 SH-1 27 IN G181H

## (undated) DEVICE — GAUZE SPONGES,16 PLY: Brand: CURITY

## (undated) DEVICE — SAW GIGLI 12 IN

## (undated) DEVICE — SUT VICRYL PLUS 1 CTB-1 36 IN VCPB947H

## (undated) DEVICE — SUT ETHILON 3-0 FSL 30 IN 1671H

## (undated) DEVICE — FIBER PROC KIT GOLD TIP 21G 45CM NEVERTOUCH

## (undated) DEVICE — SPLINT COMFORT 5 X 30

## (undated) DEVICE — GLOVE INDICATOR PI UNDERGLOVE SZ 8 BLUE

## (undated) DEVICE — JP 3-SPRING RES W/10FR PVC DRAIN/TR: Brand: CARDINAL HEALTH

## (undated) DEVICE — PACK GENERAL LF

## (undated) DEVICE — TONGUE DEPRESSOR STERILE

## (undated) DEVICE — ACE WRAP 6 IN STERILE

## (undated) DEVICE — PREMIUM DRY TRAY LF: Brand: MEDLINE INDUSTRIES, INC.

## (undated) DEVICE — UROCATCH BAG

## (undated) DEVICE — SYRINGE 10ML LL CONTROL TOP

## (undated) DEVICE — NO-SCRATCH ™ SMALL WHITNEY CURETTE ™ IS A SINGLE-USE, PLASTIC CURETTE FOR QUICKLY APPLYING, MANIPULATING AND REMOVING BONE CEMENT DURING HIP AND KNEE REPLACEMENT SURGERY. THE PLASTIC IS SOFTER THAN STEEL INSTRUMENTS, REDUCING THE RISK OF DAMAGING THE PROSTHESIS WITH METAL INSTRUMENTS.  THE CURETTE’S 6MM TIP REMOVES EXCESS CEMENT FROM REPLACEMENT HIPS AND KNEES. EASY-TO-MANEUVER, THE SMALL BLUE CURETTE LETS YOU REMOVE CEMENT FROM ALL EDGES OF THE PROSTHESIS.NO-SCRATCH WHITNEY SMALL CURETTE FEATURES:SAFER THAN STEEL- MADE OF PLASTIC - STURDY YET SOFTER THAN SURGICAL STEEL.HANDIER- EACH TOOL HAS A MOLDED-IN THUMB INDENTATION INSTANTLY ORIENTING THE TOOL.- EASIER TO MANEUVER IN HARD TO SEE PLACES.- COLOR-CODED FOR EASY IDENTIFICATION.FASTER- COMES INDIVIDUALLY PACKAGED IN STERILE, PEEL OPEN POUCH, READY TO GO.- APPLIES, MANIPULATES, OR REMOVES CEMENT WITH FINGERTIP PRECISION.ECONOMICAL- THE COST OF A SINGLE REVISION DWARFS THE COST OF A SINGLE-USE CURETTE. - DISPOSABLE – THERE’S NO NEED TO WASTE TIME REMOVING HARDENED CEMENT OR RE-STERILIZING TOOLS.- LESS EXPENSIVE TO BUY AND INVENTORY - ORDER ONLY THE TOOL YOU USE.- PACKAGED 25 INDIVIDUALLY WRAPPED TOOLS TO A CARTON FOR CONVENIENT SHELF STORAGE.: Brand: WHITNEY NO-SCRATCH CURETTE (SMALL)

## (undated) DEVICE — ULTRASOUND GEL STERILE FOIL PK

## (undated) DEVICE — CURITY STRETCH BANDAGE: Brand: CURITY

## (undated) DEVICE — Device

## (undated) DEVICE — GLOVE INDICATOR PI UNDERGLOVE SZ 8.5 BLUE

## (undated) DEVICE — ACE WRAP 6 IN UNSTERILE

## (undated) DEVICE — IMMOBILIZER KNEE UNIVERSAL 22 IN

## (undated) DEVICE — NEEDLE 25G X 1 1/2

## (undated) DEVICE — INTENDED FOR TISSUE SEPARATION, AND OTHER PROCEDURES THAT REQUIRE A SHARP SURGICAL BLADE TO PUNCTURE OR CUT.: Brand: BARD-PARKER SAFETY BLADES SIZE 10, STERILE

## (undated) DEVICE — COOL TEMP PAD

## (undated) DEVICE — GAUZE SPONGES,USP TYPE VII GAUZE, 12 PLY: Brand: CURITY

## (undated) DEVICE — THE SIMPULSE SOLO SYSTEM WITH ULTREX RETRACTABLE SPLASH SHIELD TIP: Brand: SIMPULSE SOLO

## (undated) DEVICE — SCD SEQUENTIAL COMPRESSION COMFORT SLEEVE LARGE KNEE LENGTH: Brand: KENDALL SCD

## (undated) DEVICE — SCD SEQUENTIAL COMPRESSION COMFORT SLEEVE MEDIUM KNEE LENGTH: Brand: KENDALL SCD

## (undated) DEVICE — CUFF TOURNIQUET 42 X 4 IN QUICK CONNECT DISP

## (undated) DEVICE — GLOVE SRG BIOGEL 8

## (undated) DEVICE — BAG DECANTER

## (undated) DEVICE — WET SKIN PREP TRAY: Brand: MEDLINE INDUSTRIES, INC.

## (undated) DEVICE — GLOVE SRG BIOGEL 7

## (undated) DEVICE — PLUMEPEN PRO 10FT

## (undated) DEVICE — SPINNING CEMENT MIXING BOWL

## (undated) DEVICE — SPONGE PVP SCRUB WING STERILE

## (undated) DEVICE — PACK TUR

## (undated) DEVICE — SHEATH URETERAL ACCESS 12/14FR 35CM PROXIS

## (undated) DEVICE — PETROLATUM GAUZE CISION DRESSING: Brand: VASELINE

## (undated) DEVICE — PADDING CAST 4 IN  COTTON STRL

## (undated) DEVICE — SUT CHROMIC 3-0 SH 27 IN G122H

## (undated) DEVICE — 3M™ TEGADERM™ TRANSPARENT FILM DRESSING FRAME STYLE, 1624W, 2-3/8 IN X 2-3/4 IN (6 CM X 7 CM), 100/CT 4CT/CASE: Brand: 3M™ TEGADERM™

## (undated) DEVICE — SILVER-COATED ANTIMICROBIAL BARRIER DRESSING: Brand: ACTICOAT   4" X 8"

## (undated) DEVICE — BANDAGE, ESMARK LF STR 6"X9' (20/CS): Brand: CYPRESS

## (undated) DEVICE — COBAN 4 IN STERILE

## (undated) DEVICE — CHLORHEXIDINE 4PCT 4 OZ

## (undated) DEVICE — PACK TOTAL KNEE PBDS

## (undated) DEVICE — GUIDEWIRE STRGHT TIP 0.035 IN  SOLO PLUS

## (undated) DEVICE — DUAL CUT SAGITTAL BLADE

## (undated) DEVICE — TRAY FOLEY 16FR URIMETER SURESTEP

## (undated) DEVICE — STERILE SURGICAL LUBRICANT,  TUBE: Brand: SURGILUBE

## (undated) DEVICE — SUT ETHILON 4-0 PS-2 18 IN 1667G

## (undated) DEVICE — FLEXOR, CHECK-FLO, INTRODUCER ANSEL 1 MODIFICATION - WITH HIGH-FLEX DILATOR AND HYDROPHILIC COATING: Brand: FLEXOR